# Patient Record
Sex: FEMALE | Race: BLACK OR AFRICAN AMERICAN | Employment: UNEMPLOYED | URBAN - METROPOLITAN AREA
[De-identification: names, ages, dates, MRNs, and addresses within clinical notes are randomized per-mention and may not be internally consistent; named-entity substitution may affect disease eponyms.]

---

## 2018-02-20 ENCOUNTER — HOSPITAL ENCOUNTER (EMERGENCY)
Age: 38
Discharge: HOME | End: 2018-02-20
Attending: EMERGENCY MEDICINE
Payer: MEDICARE

## 2018-02-20 VITALS
HEIGHT: 65 IN | WEIGHT: 160 LBS | DIASTOLIC BLOOD PRESSURE: 79 MMHG | BODY MASS INDEX: 26.66 KG/M2 | TEMPERATURE: 97.3 F | OXYGEN SATURATION: 98 % | RESPIRATION RATE: 18 BRPM | HEART RATE: 79 BPM | SYSTOLIC BLOOD PRESSURE: 132 MMHG

## 2018-02-20 DIAGNOSIS — B96.89 BACTERIAL VAGINOSIS: ICD-10-CM

## 2018-02-20 DIAGNOSIS — R10.9 ABDOMINAL PAIN, UNSPECIFIED ABDOMINAL LOCATION: ICD-10-CM

## 2018-02-20 DIAGNOSIS — N39.0 URINARY TRACT INFECTION WITHOUT HEMATURIA, SITE UNSPECIFIED: ICD-10-CM

## 2018-02-20 DIAGNOSIS — R11.10 VOMITING, INTRACTABILITY OF VOMITING NOT SPECIFIED, PRESENCE OF NAUSEA NOT SPECIFIED, UNSPECIFIED VOMITING TYPE: Primary | ICD-10-CM

## 2018-02-20 DIAGNOSIS — N76.0 BACTERIAL VAGINOSIS: ICD-10-CM

## 2018-02-20 LAB
-: ABNORMAL
AMORPHOUS: ABNORMAL
BACTERIA: ABNORMAL
BILIRUBIN URINE: NEGATIVE
CASTS UA: ABNORMAL /LPF (ref 0–8)
COLOR: YELLOW
CRYSTALS, UA: ABNORMAL /HPF
DIRECT EXAM: ABNORMAL
EPITHELIAL CELLS UA: ABNORMAL /HPF (ref 0–5)
GLUCOSE URINE: NEGATIVE
HCG(URINE) PREGNANCY TEST: NEGATIVE
KETONES, URINE: ABNORMAL
LEUKOCYTE ESTERASE, URINE: ABNORMAL
Lab: ABNORMAL
MUCUS: ABNORMAL
NITRITE, URINE: NEGATIVE
OTHER OBSERVATIONS UA: ABNORMAL
PH UA: 5 (ref 5–8)
PROTEIN UA: NEGATIVE
RBC UA: ABNORMAL /HPF (ref 0–4)
RENAL EPITHELIAL, UA: ABNORMAL /HPF
SPECIFIC GRAVITY UA: 1.02 (ref 1–1.03)
SPECIMEN DESCRIPTION: ABNORMAL
STATUS: ABNORMAL
TRICHOMONAS: ABNORMAL
TURBIDITY: ABNORMAL
URINE HGB: NEGATIVE
UROBILINOGEN, URINE: NORMAL
WBC UA: ABNORMAL /HPF (ref 0–5)
YEAST: ABNORMAL

## 2018-02-20 PROCEDURE — 99284 EMERGENCY DEPT VISIT MOD MDM: CPT

## 2018-02-20 PROCEDURE — 6370000000 HC RX 637 (ALT 250 FOR IP): Performed by: EMERGENCY MEDICINE

## 2018-02-20 PROCEDURE — 87480 CANDIDA DNA DIR PROBE: CPT

## 2018-02-20 PROCEDURE — 87660 TRICHOMONAS VAGIN DIR PROBE: CPT

## 2018-02-20 PROCEDURE — 84703 CHORIONIC GONADOTROPIN ASSAY: CPT

## 2018-02-20 PROCEDURE — 87491 CHLMYD TRACH DNA AMP PROBE: CPT

## 2018-02-20 PROCEDURE — 87591 N.GONORRHOEAE DNA AMP PROB: CPT

## 2018-02-20 PROCEDURE — 87086 URINE CULTURE/COLONY COUNT: CPT

## 2018-02-20 PROCEDURE — 81001 URINALYSIS AUTO W/SCOPE: CPT

## 2018-02-20 PROCEDURE — 87510 GARDNER VAG DNA DIR PROBE: CPT

## 2018-02-20 RX ORDER — ONDANSETRON 4 MG/1
4 TABLET, FILM COATED ORAL EVERY 8 HOURS PRN
Qty: 20 TABLET | Refills: 0 | Status: SHIPPED | OUTPATIENT
Start: 2018-02-20 | End: 2018-06-13 | Stop reason: SDUPTHER

## 2018-02-20 RX ORDER — METRONIDAZOLE 500 MG/1
500 TABLET ORAL 2 TIMES DAILY
Qty: 14 TABLET | Refills: 0 | Status: SHIPPED | OUTPATIENT
Start: 2018-02-20 | End: 2018-02-27

## 2018-02-20 RX ORDER — CEPHALEXIN 500 MG/1
500 CAPSULE ORAL 4 TIMES DAILY
Qty: 28 CAPSULE | Refills: 0 | Status: SHIPPED | OUTPATIENT
Start: 2018-02-20 | End: 2018-02-27

## 2018-02-20 RX ORDER — ACETAMINOPHEN 325 MG/1
650 TABLET ORAL ONCE
Status: COMPLETED | OUTPATIENT
Start: 2018-02-20 | End: 2018-02-20

## 2018-02-20 RX ADMIN — ACETAMINOPHEN 650 MG: 325 TABLET ORAL at 10:49

## 2018-02-20 ASSESSMENT — PAIN DESCRIPTION - LOCATION: LOCATION: ABDOMEN

## 2018-02-20 ASSESSMENT — PAIN SCALES - GENERAL
PAINLEVEL_OUTOF10: 5
PAINLEVEL_OUTOF10: 5

## 2018-02-20 ASSESSMENT — PAIN DESCRIPTION - DESCRIPTORS: DESCRIPTORS: CRAMPING;ACHING;CONSTANT

## 2018-02-20 NOTE — ED PROVIDER NOTES
[address] [facility]     Emergency Department     Faculty Attestation    I performed a history and physical examination of the patient and discussed management with the resident. I reviewed the residents note and agree with the documented findings and plan of care. Any areas of disagreement are noted on the chart. I was personally present for the key portions of any procedures. I have documented in the chart those procedures where I was not present during the key portions. I have reviewed the emergency nurses triage note. I agree with the chief complaint, past medical history, past surgical history, allergies, medications, social and family history as documented unless otherwise noted below. For Physician Assistant/ Nurse Practitioner cases/documentation I have personally evaluated this patient and have completed at least one if not all key elements of the E/M (history, physical exam, and MDM). Additional findings are as noted. Patient presents complaining of abdominal pain. She denies fever, chills, chest pain, shortness breath, nausea, vomiting, dysuria. Patient says she did have an abnormal.  A couple of weeks ago that was very light and only lasts a couple of hours. She says that she has not yet had a normal period and is late for that. She is concerned that she may be pregnant but she did have a tubal ligation. She says she has not taken a pregnancy test at home. On exam, patient is resting comfortably in the bed. Lungs are clear to auscultation bilaterally heart sounds are normal.  Abdomen is soft with mild suprapubic tenderness. No rebound or guarding is present. There is no tenderness over McBurney's point. The resident will perform a pelvic exam.  We'll check urinalysis, pregnancy, and pelvic labs and reassess.       Slime Cui MD  Attending Emergency  Physician              Steve Chapa MD  02/20/18 8794

## 2018-02-20 NOTE — ED NOTES
Writer contacted [facility] clinic, was informed patient's been permanently dismissed from clinic. Writer was informed can make [facility] clinic appointment or send patient to [facility] walk-in hours. Writer to provide patient information & [place] cab #.       FLORENCE Martínez, FISHW  02/20/18 1206

## 2018-02-20 NOTE — ED NOTES
Awaiting Dr Adi Llamas to do vaginal exam     [address] [place] [address], RN  02/20/18 1132

## 2018-02-20 NOTE — ED PROVIDER NOTES
[facility] ED  Emergency Department Encounter  Emergency Medicine Resident     Pt Name: Benjamin Vogel  MRN: [id number]  [place] 1980  Date of evaluation: 2/20/18  PCP:  No primary care provider on file. CHIEF COMPLAINT       Chief Complaint   Patient presents with    Abdominal Pain     vomiting x 1 week       HISTORY OF PRESENT ILLNESS  (Location/Symptom, Timing/Onset, Context/Setting, Quality, Duration, Modifying Factors, Severity.)      Benjamin Vogel is a 41 y.o. female who presents for a pregnancy check up. She states that she has had unprotected sex with   1 partners. Does not complains of acute vaginal discharge and dysuria. Denies urinating more frequently. Does not crampy suprapubic abdominal with some vomitting that is non bilious and non bloody. She said her last period was approximately 1 month prior to the spotting that she had last week. Symptoms started over the last couple of days with nothing providing adequate relief. Mild to moderate severity. No other aggravating or relieving factors. Does have a history of stds and tubal no history of pid. Daughter has same symptoms of vomitting with some abdominal pain    Denies n/v/f/c/abd pain/CP/SOB. Sore throat, Conjunctivitis, Rashes, Arthralgia,     Denies anal pain and lesions or abnormal bowel movents. No recent travel, hospitalization or antibiotic use    PAST MEDICAL / SURGICAL / SOCIAL / FAMILY HISTORY      has a past medical history of Hypertension and Seasonal allergies. has a past surgical history that includes Tubal ligation. Social History     Social History    Marital status: Single     Spouse name: N/A    Number of children: N/A    Years of education: N/A     Occupational History    Not on file.      Social History Main Topics    Smoking status: Current Every Day Smoker     Packs/day: 0.25     Types: Cigarettes    Smokeless tobacco: Not on file    Alcohol use Yes      Comment: seldom    Drug (FLAGYL) 500 MG tablet     Sig: Take 1 tablet by mouth 2 times daily for 7 days     Dispense:  14 tablet     Refill:  0    cephALEXin (KEFLEX) 500 MG capsule     Sig: Take 1 capsule by mouth 4 times daily for 7 days     Dispense:  28 capsule     Refill:  0    ondansetron (ZOFRAN) 4 MG tablet     Sig: Take 1 tablet by mouth every 8 hours as needed for Nausea     Dispense:  20 tablet     Refill:  0       DDX: ureteritis via std (gonnococcal, chlamydia) others trichomonas, herpes, other causes of urethritis include chemical/irritants, allergic, physical damage, adenovirus, other infections like mycoplasm    DIAGNOSTIC RESULTS / [address] [place] [address] / [facility]     LABS:  Results for orders placed or performed during the hospital encounter of 02/20/18   Urine Culture   Result Value Ref Range    Specimen Description . CLEAN CATCH URINE     Special Requests NOT REPORTED     Culture NO SIGNIFICANT GROWTH     Culture       [address], [address] [place] [address] [phone]    Status FINAL 02/21/2018    VAGINITIS DNA PROBE   Result Value Ref Range    Specimen Description . VAGINA     Special Requests NOT REPORTED     Direct Exam POSITIVE for Gardnerella vaginalis. (A)     Direct Exam NEGATIVE for Candida sp. Direct Exam NEGATIVE for Trichomonas vaginalis     Direct Exam       Method of testing is a DNA probe intended for detection and identification of    Direct Exam        Candida species, Gardnerella vaginalis, and Trichomonas vaginalis nucleic acid    Direct Exam        in vaginal fluid specimens from patients with symptoms of vaginitis/vaginosis.     Direct Exam       [facility] [id number] [address], [address] [place] [address] [phone]    Status FINAL 02/20/2018    Urinalysis with microscopic   Result Value Ref Range    Color, UA YELLOW YEL    Turbidity UA CLOUDY (A) CLEAR    Glucose, Ur NEGATIVE NEG    Bilirubin Urine NEGATIVE NEG    Ketones, Urine TRACE (A) NEG    Specific [place], UA 1.021 1.005 - 12:14 PM      START taking these medications    Details   metroNIDAZOLE (FLAGYL) 500 MG tablet Take 1 tablet by mouth 2 times daily for 7 days, Disp-14 tablet, R-0Print      cephALEXin (KEFLEX) 500 MG capsule Take 1 capsule by mouth 4 times daily for 7 days, Disp-28 capsule, R-0Print             Mariama Prasad MD  Emergency Medicine Resident    (Please note that portions of this note were completed with a voice recognition program.  Efforts were made to edit the dictations but occasionally words are mis-transcribed.)        Mariama Prasad MD  Resident  02/21/18 5137

## 2018-02-21 LAB
C TRACH DNA GENITAL QL NAA+PROBE: NEGATIVE
CULTURE: NORMAL
CULTURE: NORMAL
Lab: NORMAL
N. GONORRHOEAE DNA: NEGATIVE
SPECIMEN DESCRIPTION: NORMAL
STATUS: NORMAL

## 2018-06-13 ENCOUNTER — HOSPITAL ENCOUNTER (EMERGENCY)
Age: 38
Discharge: HOME | End: 2018-06-13
Attending: EMERGENCY MEDICINE
Payer: MEDICARE

## 2018-06-13 VITALS
BODY MASS INDEX: 26.49 KG/M2 | TEMPERATURE: 97.7 F | OXYGEN SATURATION: 99 % | DIASTOLIC BLOOD PRESSURE: 88 MMHG | RESPIRATION RATE: 18 BRPM | HEIGHT: 65 IN | WEIGHT: 159 LBS | HEART RATE: 70 BPM | SYSTOLIC BLOOD PRESSURE: 145 MMHG

## 2018-06-13 DIAGNOSIS — J06.9 ACUTE UPPER RESPIRATORY INFECTION: Primary | ICD-10-CM

## 2018-06-13 DIAGNOSIS — J32.1 CHRONIC FRONTAL SINUSITIS: ICD-10-CM

## 2018-06-13 PROCEDURE — 99283 EMERGENCY DEPT VISIT LOW MDM: CPT

## 2018-06-13 RX ORDER — AZITHROMYCIN 250 MG/1
TABLET, FILM COATED ORAL
Qty: 1 PACKET | Refills: 0 | Status: SHIPPED | OUTPATIENT
Start: 2018-06-13 | End: 2018-06-23

## 2018-06-13 RX ORDER — GUAIFENESIN/DEXTROMETHORPHAN 100-10MG/5
5 SYRUP ORAL 3 TIMES DAILY PRN
Qty: 120 ML | Refills: 0 | Status: SHIPPED | OUTPATIENT
Start: 2018-06-13 | End: 2018-06-23

## 2018-06-13 RX ORDER — ONDANSETRON HYDROCHLORIDE 8 MG/1
8 TABLET, FILM COATED ORAL EVERY 8 HOURS PRN
Qty: 20 TABLET | Refills: 0 | Status: SHIPPED | OUTPATIENT
Start: 2018-06-13 | End: 2019-03-24

## 2018-06-13 ASSESSMENT — PAIN DESCRIPTION - LOCATION: LOCATION: OTHER (COMMENT)

## 2018-06-13 ASSESSMENT — PAIN SCALES - GENERAL: PAINLEVEL_OUTOF10: 7

## 2018-06-13 ASSESSMENT — ENCOUNTER SYMPTOMS
SINUS PAIN: 1
SHORTNESS OF BREATH: 0
WHEEZING: 0
RHINORRHEA: 1
VOMITING: 0
SORE THROAT: 1
NAUSEA: 0
TROUBLE SWALLOWING: 0
COUGH: 1
VOICE CHANGE: 0

## 2018-06-13 ASSESSMENT — PAIN DESCRIPTION - DESCRIPTORS: DESCRIPTORS: ACHING

## 2018-06-13 ASSESSMENT — PAIN DESCRIPTION - PAIN TYPE: TYPE: ACUTE PAIN

## 2018-06-13 ASSESSMENT — PAIN DESCRIPTION - FREQUENCY: FREQUENCY: CONTINUOUS

## 2018-09-14 ENCOUNTER — HOSPITAL ENCOUNTER (EMERGENCY)
Age: 38
Discharge: HOME | End: 2018-09-14
Attending: EMERGENCY MEDICINE
Payer: MEDICARE

## 2018-09-14 VITALS
SYSTOLIC BLOOD PRESSURE: 156 MMHG | HEIGHT: 65 IN | TEMPERATURE: 98 F | HEART RATE: 60 BPM | RESPIRATION RATE: 18 BRPM | DIASTOLIC BLOOD PRESSURE: 98 MMHG | OXYGEN SATURATION: 100 % | BODY MASS INDEX: 25.99 KG/M2 | WEIGHT: 156 LBS

## 2018-09-14 DIAGNOSIS — K08.89 PAIN, DENTAL: Primary | ICD-10-CM

## 2018-09-14 PROCEDURE — 6360000002 HC RX W HCPCS: Performed by: EMERGENCY MEDICINE

## 2018-09-14 PROCEDURE — 99282 EMERGENCY DEPT VISIT SF MDM: CPT

## 2018-09-14 PROCEDURE — 96374 THER/PROPH/DIAG INJ IV PUSH: CPT

## 2018-09-14 PROCEDURE — 6370000000 HC RX 637 (ALT 250 FOR IP): Performed by: EMERGENCY MEDICINE

## 2018-09-14 RX ORDER — KETOROLAC TROMETHAMINE 30 MG/ML
30 INJECTION, SOLUTION INTRAMUSCULAR; INTRAVENOUS ONCE
Status: COMPLETED | OUTPATIENT
Start: 2018-09-14 | End: 2018-09-14

## 2018-09-14 RX ORDER — OXYCODONE HYDROCHLORIDE AND ACETAMINOPHEN 5; 325 MG/1; MG/1
1 TABLET ORAL EVERY 6 HOURS PRN
Qty: 3 TABLET | Refills: 0 | Status: SHIPPED | OUTPATIENT
Start: 2018-09-14 | End: 2018-09-16

## 2018-09-14 RX ORDER — OXYCODONE HYDROCHLORIDE AND ACETAMINOPHEN 5; 325 MG/1; MG/1
1 TABLET ORAL ONCE
Status: COMPLETED | OUTPATIENT
Start: 2018-09-14 | End: 2018-09-14

## 2018-09-14 RX ORDER — PENICILLIN V POTASSIUM 500 MG/1
500 TABLET ORAL 4 TIMES DAILY
Qty: 28 TABLET | Refills: 0 | Status: SHIPPED | OUTPATIENT
Start: 2018-09-14 | End: 2018-09-21

## 2018-09-14 RX ORDER — IBUPROFEN 600 MG/1
600 TABLET ORAL EVERY 6 HOURS PRN
Qty: 24 TABLET | Refills: 0 | Status: SHIPPED | OUTPATIENT
Start: 2018-09-14 | End: 2018-09-30

## 2018-09-14 RX ADMIN — KETOROLAC TROMETHAMINE 30 MG: 30 INJECTION, SOLUTION INTRAMUSCULAR; INTRAVENOUS at 02:11

## 2018-09-14 RX ADMIN — OXYCODONE HYDROCHLORIDE AND ACETAMINOPHEN 1 TABLET: 5; 325 TABLET ORAL at 02:11

## 2018-09-14 ASSESSMENT — ENCOUNTER SYMPTOMS
FACIAL SWELLING: 0
WHEEZING: 0
SHORTNESS OF BREATH: 0
ABDOMINAL PAIN: 0
NAUSEA: 0
VOMITING: 0
COUGH: 0
DIARRHEA: 0
VOICE CHANGE: 0

## 2018-09-14 ASSESSMENT — PAIN DESCRIPTION - LOCATION: LOCATION: MOUTH

## 2018-09-14 ASSESSMENT — PAIN DESCRIPTION - ORIENTATION: ORIENTATION: RIGHT

## 2018-09-14 ASSESSMENT — PAIN SCALES - GENERAL
PAINLEVEL_OUTOF10: 9
PAINLEVEL_OUTOF10: 9

## 2018-09-14 ASSESSMENT — PAIN DESCRIPTION - DESCRIPTORS: DESCRIPTORS: SHARP;THROBBING

## 2018-09-14 NOTE — ED PROVIDER NOTES
ACETAMINOPHEN (TYLENOL) 325 MG TABLET    Take 1 tablet by mouth every 6 hours as needed for Pain    BENZONATATE (TESSALON) 100 MG CAPSULE    Take 1 capsule by mouth 3 times daily as needed for Cough    DOCOSANOL (ABREVA) 10 % CREA CREAM    Apply 1 Dose topically 5 times daily    HYDROCHLOROTHIAZIDE (HYDRODIURIL) 25 MG TABLET    Take 1 tablet by mouth daily    LORATADINE (CLARITIN) 10 MG TABLET    Take 10 mg by mouth daily. NAPROXEN (NAPROSYN) 500 MG TABLET    Take 1 tablet by mouth 2 times daily (with meals) for 30 doses    ONDANSETRON (ZOFRAN) 8 MG TABLET    Take 1 tablet by mouth every 8 hours as needed for Nausea    SODIUM CHLORIDE (OCEAN, BABY AYR) 0.65 % NASAL SPRAY    1 spray by Nasal route as needed for Congestion       ALLERGIES     Seasonal and Motrin [ibuprofen]    FAMILY HISTORY     History reviewed. No pertinent family history. SOCIAL HISTORY       Social History     Social History    Marital status: Single     Spouse name: N/A    Number of children: N/A    Years of education: N/A     Social History Main Topics    Smoking status: Current Every Day Smoker     Packs/day: 0.25     Types: Cigarettes    Smokeless tobacco: None    Alcohol use Yes      Comment: seldom    Drug use: No    Sexual activity: Not Asked     Other Topics Concern    None     Social History Narrative    None       SCREENINGS             PHYSICAL EXAM    (up to 7 for level 4, 8 or more for level 5)     ED Triage Vitals   BP Temp Temp src Pulse Resp SpO2 Height Weight   -- -- -- -- -- -- -- --       Physical Exam   Constitutional: She is oriented to person, place, and time. She appears well-developed and well-nourished. No distress. HENT:   Head: Normocephalic and atraumatic. Multiple fillings, no obvious abscess, no fluctuance. No trismus. No signs of Morgan's, no stridor or trouble swallowing   Eyes: Conjunctivae and EOM are normal.   Neck: Normal range of motion. Neck supple. No JVD present.    Cardiovascular: (IBU) 600 MG TABLET    Take 1 tablet by mouth every 6 hours as needed for Pain    OXYCODONE-ACETAMINOPHEN (PERCOCET) 5-325 MG PER TABLET    Take 1 tablet by mouth every 6 hours as needed for Pain for up to 3 doses. Intended supply: 3 days. Take lowest dose possible to manage pain. PENICILLIN V POTASSIUM (VEETID) 500 MG TABLET    Take 1 tablet by mouth 4 times daily for 7 days              Summation      Patient Course:      ED Medications administered this visit:    Medications   ketorolac (TORADOL) injection 30 mg (not administered)   oxyCODONE-acetaminophen (PERCOCET) 5-325 MG per tablet 1 tablet (not administered)       New Prescriptions from this visit:    New Prescriptions    IBUPROFEN (IBU) 600 MG TABLET    Take 1 tablet by mouth every 6 hours as needed for Pain    OXYCODONE-ACETAMINOPHEN (PERCOCET) 5-325 MG PER TABLET    Take 1 tablet by mouth every 6 hours as needed for Pain for up to 3 doses. Intended supply: 3 days. Take lowest dose possible to manage pain. PENICILLIN V POTASSIUM (VEETID) 500 MG TABLET    Take 1 tablet by mouth 4 times daily for 7 days       Follow-up:  Your dentist    Schedule an appointment as soon as possible for a visit in 1 day  for evaluation and treatment        Final Impression:   1.  Pain, dental               (Please note that portions of this note were completed with a voice recognition program.  Efforts were made to edit the dictations but occasionally words are mis-transcribed.)            Yanelis Larios MD  09/14/18 0210

## 2018-09-30 ENCOUNTER — HOSPITAL ENCOUNTER (EMERGENCY)
Age: 38
Discharge: HOME | End: 2018-09-30
Attending: EMERGENCY MEDICINE
Payer: MEDICARE

## 2018-09-30 VITALS
HEART RATE: 77 BPM | SYSTOLIC BLOOD PRESSURE: 117 MMHG | OXYGEN SATURATION: 100 % | HEIGHT: 65 IN | RESPIRATION RATE: 16 BRPM | TEMPERATURE: 98.1 F | WEIGHT: 180 LBS | BODY MASS INDEX: 29.99 KG/M2 | DIASTOLIC BLOOD PRESSURE: 92 MMHG

## 2018-09-30 DIAGNOSIS — K04.7 DENTAL ABSCESS: Primary | ICD-10-CM

## 2018-09-30 DIAGNOSIS — R51.9 FACE PAIN: ICD-10-CM

## 2018-09-30 PROCEDURE — 99282 EMERGENCY DEPT VISIT SF MDM: CPT

## 2018-09-30 PROCEDURE — 6360000002 HC RX W HCPCS: Performed by: EMERGENCY MEDICINE

## 2018-09-30 PROCEDURE — 6370000000 HC RX 637 (ALT 250 FOR IP): Performed by: EMERGENCY MEDICINE

## 2018-09-30 PROCEDURE — 96372 THER/PROPH/DIAG INJ SC/IM: CPT

## 2018-09-30 RX ORDER — HYDROCODONE BITARTRATE AND ACETAMINOPHEN 5; 325 MG/1; MG/1
1 TABLET ORAL ONCE
Status: COMPLETED | OUTPATIENT
Start: 2018-09-30 | End: 2018-09-30

## 2018-09-30 RX ORDER — KETOROLAC TROMETHAMINE 30 MG/ML
30 INJECTION, SOLUTION INTRAMUSCULAR; INTRAVENOUS ONCE
Status: COMPLETED | OUTPATIENT
Start: 2018-09-30 | End: 2018-09-30

## 2018-09-30 RX ORDER — PENICILLIN V POTASSIUM 250 MG/1
500 TABLET ORAL ONCE
Status: COMPLETED | OUTPATIENT
Start: 2018-09-30 | End: 2018-09-30

## 2018-09-30 RX ORDER — IBUPROFEN 600 MG/1
600 TABLET ORAL EVERY 6 HOURS PRN
Qty: 60 TABLET | Refills: 0 | Status: SHIPPED | OUTPATIENT
Start: 2018-09-30 | End: 2019-03-24

## 2018-09-30 RX ORDER — PENICILLIN V POTASSIUM 500 MG/1
500 TABLET ORAL 4 TIMES DAILY
Qty: 40 TABLET | Refills: 0 | Status: SHIPPED | OUTPATIENT
Start: 2018-09-30 | End: 2018-10-10

## 2018-09-30 RX ORDER — HYDROCODONE BITARTRATE AND ACETAMINOPHEN 5; 325 MG/1; MG/1
1 TABLET ORAL EVERY 6 HOURS PRN
Qty: 20 TABLET | Refills: 0 | Status: SHIPPED | OUTPATIENT
Start: 2018-09-30 | End: 2018-10-05

## 2018-09-30 RX ADMIN — KETOROLAC TROMETHAMINE 30 MG: 30 INJECTION, SOLUTION INTRAMUSCULAR; INTRAVENOUS at 14:39

## 2018-09-30 RX ADMIN — PENICILLIN V POTASIUM 500 MG: 250 TABLET ORAL at 14:39

## 2018-09-30 RX ADMIN — HYDROCODONE BITARTRATE AND ACETAMINOPHEN 1 TABLET: 5; 325 TABLET ORAL at 14:39

## 2018-09-30 ASSESSMENT — PAIN SCALES - GENERAL
PAINLEVEL_OUTOF10: 8
PAINLEVEL_OUTOF10: 9
PAINLEVEL_OUTOF10: 9

## 2018-09-30 ASSESSMENT — ENCOUNTER SYMPTOMS
FACIAL SWELLING: 1
TRISMUS: 0

## 2018-10-05 ENCOUNTER — HOSPITAL ENCOUNTER (EMERGENCY)
Age: 38
Discharge: HOME | End: 2018-10-05
Attending: EMERGENCY MEDICINE
Payer: MEDICARE

## 2018-10-05 ENCOUNTER — APPOINTMENT (OUTPATIENT)
Dept: GENERAL RADIOLOGY | Age: 38
End: 2018-10-05
Payer: MEDICARE

## 2018-10-05 VITALS
WEIGHT: 153 LBS | TEMPERATURE: 98.4 F | BODY MASS INDEX: 25.49 KG/M2 | DIASTOLIC BLOOD PRESSURE: 100 MMHG | HEIGHT: 65 IN | HEART RATE: 71 BPM | OXYGEN SATURATION: 100 % | RESPIRATION RATE: 16 BRPM | SYSTOLIC BLOOD PRESSURE: 153 MMHG

## 2018-10-05 DIAGNOSIS — S82.001A CLOSED DISPLACED FRACTURE OF RIGHT PATELLA, UNSPECIFIED FRACTURE MORPHOLOGY, INITIAL ENCOUNTER: ICD-10-CM

## 2018-10-05 DIAGNOSIS — M25.061 HEMARTHROSIS OF RIGHT KNEE: Primary | ICD-10-CM

## 2018-10-05 PROCEDURE — 6370000000 HC RX 637 (ALT 250 FOR IP): Performed by: PHYSICIAN ASSISTANT

## 2018-10-05 PROCEDURE — 29505 APPLICATION LONG LEG SPLINT: CPT

## 2018-10-05 PROCEDURE — 73562 X-RAY EXAM OF KNEE 3: CPT

## 2018-10-05 PROCEDURE — 99284 EMERGENCY DEPT VISIT MOD MDM: CPT

## 2018-10-05 RX ORDER — HYDROCODONE BITARTRATE AND ACETAMINOPHEN 5; 325 MG/1; MG/1
1 TABLET ORAL EVERY 6 HOURS PRN
Qty: 12 TABLET | Refills: 0 | Status: SHIPPED | OUTPATIENT
Start: 2018-10-05 | End: 2018-10-08

## 2018-10-05 RX ORDER — HYDROCODONE BITARTRATE AND ACETAMINOPHEN 5; 325 MG/1; MG/1
1 TABLET ORAL ONCE
Status: COMPLETED | OUTPATIENT
Start: 2018-10-05 | End: 2018-10-05

## 2018-10-05 RX ADMIN — HYDROCODONE BITARTRATE AND ACETAMINOPHEN 1 TABLET: 5; 325 TABLET ORAL at 12:04

## 2018-10-05 ASSESSMENT — PAIN SCALES - GENERAL
PAINLEVEL_OUTOF10: 10
PAINLEVEL_OUTOF10: 10

## 2018-10-05 ASSESSMENT — PAIN DESCRIPTION - DESCRIPTORS: DESCRIPTORS: SHARP

## 2018-10-05 ASSESSMENT — PAIN DESCRIPTION - ORIENTATION: ORIENTATION: RIGHT

## 2018-10-05 ASSESSMENT — PAIN DESCRIPTION - LOCATION: LOCATION: KNEE

## 2018-10-05 ASSESSMENT — PAIN DESCRIPTION - PAIN TYPE: TYPE: ACUTE PAIN

## 2018-10-05 NOTE — ED PROVIDER NOTES
Ordering Physician Provided Reason for Exam: Pt states she fell and has entire right knee pain x 1 day Acuity: Acute Type of Exam: Initial Mechanism of Injury: Pt states she fell and has entire right knee pain x 1 day FINDINGS: Under irregular lucency extends through the inferior and medial aspect of the patella as well as along the superior margin of the patella. A large joint effusion is also present. The findings are suspicious of minimally displaced patellar fracture with hemarthrosis. Suspected acute intra-articular fracture of the inferior pole of patella with large hemarthrosis. LABS:  Labs Reviewed - No data to display    All other labs were within normal range or not returned as of this dictation. EMERGENCY DEPARTMENT COURSE and DIFFERENTIAL DIAGNOSIS/MDM:   Vitals:    Vitals:    10/05/18 1056   BP: (!) 153/100   Pulse: 71   Resp: 16   Temp: 98.4 °F (36.9 °C)   TempSrc: Oral   SpO2: 100%   Weight: 153 lb (69.4 kg)   Height: 5' 5\" (1.651 m)         Patient instructed to return to the emergency room if symptoms worsen, return, or any other concern right away which is agreed by the patient    ED MEDS:  Orders Placed This Encounter   Medications    HYDROcodone-acetaminophen (NORCO) 5-325 MG per tablet 1 tablet    HYDROcodone-acetaminophen (NORCO) 5-325 MG per tablet     Sig: Take 1 tablet by mouth every 6 hours as needed for Pain for up to 3 days. Intended supply: 3 days. Take lowest dose possible to manage pain. Dispense:  12 tablet     Refill:  0         CONSULTS:  None    PROCEDURES:  None      FINAL IMPRESSION      1. Hemarthrosis of right knee    2.  Closed displaced fracture of right patella, unspecified fracture morphology, initial encounter          DISPOSITION/PLAN   DISPOSITION Decision To Discharge    PATIENT REFERRED TO:  [facility] ED  [address] [address]  [phone]    If symptoms worsen    Robbin Nelson MD  [address] [place] [address] [address]

## 2018-10-09 ENCOUNTER — OFFICE VISIT (OUTPATIENT)
Dept: ORTHOPEDIC SURGERY | Age: 38
End: 2018-10-09
Payer: MEDICARE

## 2018-10-09 VITALS — WEIGHT: 153 LBS | HEIGHT: 65 IN | BODY MASS INDEX: 25.49 KG/M2

## 2018-10-09 DIAGNOSIS — S82.001A CLOSED NONDISPLACED FRACTURE OF RIGHT PATELLA, UNSPECIFIED FRACTURE MORPHOLOGY, INITIAL ENCOUNTER: Primary | ICD-10-CM

## 2018-10-09 PROCEDURE — G8419 CALC BMI OUT NRM PARAM NOF/U: HCPCS | Performed by: ORTHOPAEDIC SURGERY

## 2018-10-09 PROCEDURE — 4004F PT TOBACCO SCREEN RCVD TLK: CPT | Performed by: ORTHOPAEDIC SURGERY

## 2018-10-09 PROCEDURE — G8484 FLU IMMUNIZE NO ADMIN: HCPCS | Performed by: ORTHOPAEDIC SURGERY

## 2018-10-09 PROCEDURE — 99203 OFFICE O/P NEW LOW 30 MIN: CPT | Performed by: ORTHOPAEDIC SURGERY

## 2018-10-09 PROCEDURE — 20610 DRAIN/INJ JOINT/BURSA W/O US: CPT | Performed by: ORTHOPAEDIC SURGERY

## 2018-10-09 PROCEDURE — G8427 DOCREV CUR MEDS BY ELIG CLIN: HCPCS | Performed by: ORTHOPAEDIC SURGERY

## 2018-10-09 RX ORDER — LIDOCAINE HYDROCHLORIDE 10 MG/ML
4 INJECTION, SOLUTION INFILTRATION; PERINEURAL ONCE
Status: COMPLETED | OUTPATIENT
Start: 2018-10-09 | End: 2018-10-09

## 2018-10-09 RX ORDER — TRAMADOL HYDROCHLORIDE 50 MG/1
50 TABLET ORAL EVERY 4 HOURS PRN
Qty: 30 TABLET | Refills: 0 | Status: SHIPPED | OUTPATIENT
Start: 2018-10-09 | End: 2018-10-14

## 2018-10-09 RX ADMIN — LIDOCAINE HYDROCHLORIDE 4 ML: 10 INJECTION, SOLUTION INFILTRATION; PERINEURAL at 10:00

## 2018-10-19 ENCOUNTER — HOSPITAL ENCOUNTER (EMERGENCY)
Age: 38
Discharge: HOME | End: 2018-10-19
Attending: EMERGENCY MEDICINE
Payer: MEDICARE

## 2018-10-19 ENCOUNTER — APPOINTMENT (OUTPATIENT)
Dept: CT IMAGING | Age: 38
End: 2018-10-19
Payer: MEDICARE

## 2018-10-19 ENCOUNTER — APPOINTMENT (OUTPATIENT)
Dept: ULTRASOUND IMAGING | Age: 38
End: 2018-10-19
Payer: MEDICARE

## 2018-10-19 VITALS
TEMPERATURE: 98.1 F | HEIGHT: 65 IN | OXYGEN SATURATION: 98 % | WEIGHT: 153 LBS | RESPIRATION RATE: 17 BRPM | HEART RATE: 70 BPM | SYSTOLIC BLOOD PRESSURE: 178 MMHG | DIASTOLIC BLOOD PRESSURE: 113 MMHG | BODY MASS INDEX: 25.49 KG/M2

## 2018-10-19 DIAGNOSIS — N73.9 FEMALE PELVIC INFLAMMATORY DISEASE: ICD-10-CM

## 2018-10-19 DIAGNOSIS — N39.0 URINARY TRACT INFECTION WITHOUT HEMATURIA, SITE UNSPECIFIED: Primary | ICD-10-CM

## 2018-10-19 DIAGNOSIS — B96.89 BACTERIAL VAGINOSIS: ICD-10-CM

## 2018-10-19 DIAGNOSIS — N76.0 BACTERIAL VAGINOSIS: ICD-10-CM

## 2018-10-19 DIAGNOSIS — N83.519 OVARIAN TORSION: ICD-10-CM

## 2018-10-19 LAB
-: ABNORMAL
ABSOLUTE EOS #: 0.15 K/UL (ref 0–0.44)
ABSOLUTE IMMATURE GRANULOCYTE: 0.1 K/UL (ref 0–0.3)
ABSOLUTE LYMPH #: 1.58 K/UL (ref 1.1–3.7)
ABSOLUTE MONO #: 0.73 K/UL (ref 0.1–1.2)
ALBUMIN SERPL-MCNC: 3.9 G/DL (ref 3.5–5.2)
ALBUMIN/GLOBULIN RATIO: 1.3 (ref 1–2.5)
ALP BLD-CCNC: 53 U/L (ref 35–104)
ALT SERPL-CCNC: 26 U/L (ref 5–33)
AMORPHOUS: ABNORMAL
ANION GAP SERPL CALCULATED.3IONS-SCNC: 12 MMOL/L (ref 9–17)
AST SERPL-CCNC: 39 U/L
BACTERIA: ABNORMAL
BASOPHILS # BLD: 0 % (ref 0–2)
BASOPHILS ABSOLUTE: 0.05 K/UL (ref 0–0.2)
BILIRUB SERPL-MCNC: 0.15 MG/DL (ref 0.3–1.2)
BILIRUBIN URINE: NEGATIVE
BUN BLDV-MCNC: 7 MG/DL (ref 6–20)
BUN/CREAT BLD: ABNORMAL (ref 9–20)
CALCIUM SERPL-MCNC: 9.1 MG/DL (ref 8.6–10.4)
CASTS UA: ABNORMAL /LPF (ref 0–8)
CHLORIDE BLD-SCNC: 107 MMOL/L (ref 98–107)
CO2: 26 MMOL/L (ref 20–31)
COLOR: YELLOW
CREAT SERPL-MCNC: 0.48 MG/DL (ref 0.5–0.9)
CRYSTALS, UA: ABNORMAL /HPF
DIFFERENTIAL TYPE: ABNORMAL
DIRECT EXAM: ABNORMAL
EOSINOPHILS RELATIVE PERCENT: 1 % (ref 1–4)
EPITHELIAL CELLS UA: ABNORMAL /HPF (ref 0–5)
GFR AFRICAN AMERICAN: >60 ML/MIN
GFR NON-AFRICAN AMERICAN: >60 ML/MIN
GFR SERPL CREATININE-BSD FRML MDRD: ABNORMAL ML/MIN/{1.73_M2}
GFR SERPL CREATININE-BSD FRML MDRD: ABNORMAL ML/MIN/{1.73_M2}
GLUCOSE BLD-MCNC: 88 MG/DL (ref 70–99)
GLUCOSE URINE: NEGATIVE
HCG QUALITATIVE: NEGATIVE
HCT VFR BLD CALC: 30.9 % (ref 36.3–47.1)
HEMOGLOBIN: 9.6 G/DL (ref 11.9–15.1)
IMMATURE GRANULOCYTES: 1 %
KETONES, URINE: NEGATIVE
LEUKOCYTE ESTERASE, URINE: ABNORMAL
LIPASE: 35 U/L (ref 13–60)
LYMPHOCYTES # BLD: 14 % (ref 24–43)
Lab: ABNORMAL
MCH RBC QN AUTO: 26.4 PG (ref 25.2–33.5)
MCHC RBC AUTO-ENTMCNC: 31.1 G/DL (ref 28.4–34.8)
MCV RBC AUTO: 85.1 FL (ref 82.6–102.9)
MONOCYTES # BLD: 6 % (ref 3–12)
MUCUS: ABNORMAL
NITRITE, URINE: NEGATIVE
NRBC AUTOMATED: 0 PER 100 WBC
OTHER OBSERVATIONS UA: ABNORMAL
PDW BLD-RTO: 19.1 % (ref 11.8–14.4)
PH UA: 7 (ref 5–8)
PLATELET # BLD: 233 K/UL (ref 138–453)
PLATELET ESTIMATE: ABNORMAL
PMV BLD AUTO: 11.6 FL (ref 8.1–13.5)
POTASSIUM SERPL-SCNC: 4.1 MMOL/L (ref 3.7–5.3)
PROTEIN UA: ABNORMAL
RBC # BLD: 3.63 M/UL (ref 3.95–5.11)
RBC # BLD: ABNORMAL 10*6/UL
RBC UA: ABNORMAL /HPF (ref 0–4)
RENAL EPITHELIAL, UA: ABNORMAL /HPF
SEG NEUTROPHILS: 78 % (ref 36–65)
SEGMENTED NEUTROPHILS ABSOLUTE COUNT: 8.75 K/UL (ref 1.5–8.1)
SODIUM BLD-SCNC: 145 MMOL/L (ref 135–144)
SPECIFIC GRAVITY UA: 1.01 (ref 1–1.03)
SPECIMEN DESCRIPTION: ABNORMAL
STATUS: ABNORMAL
TOTAL PROTEIN: 7 G/DL (ref 6.4–8.3)
TRICHOMONAS: ABNORMAL
TURBIDITY: CLEAR
URINE HGB: NEGATIVE
UROBILINOGEN, URINE: NORMAL
WBC # BLD: 11.4 K/UL (ref 3.5–11.3)
WBC # BLD: ABNORMAL 10*3/UL
WBC UA: ABNORMAL /HPF (ref 0–5)
YEAST: ABNORMAL

## 2018-10-19 PROCEDURE — 93976 VASCULAR STUDY: CPT

## 2018-10-19 PROCEDURE — 81001 URINALYSIS AUTO W/SCOPE: CPT

## 2018-10-19 PROCEDURE — 6360000002 HC RX W HCPCS: Performed by: STUDENT IN AN ORGANIZED HEALTH CARE EDUCATION/TRAINING PROGRAM

## 2018-10-19 PROCEDURE — 87491 CHLMYD TRACH DNA AMP PROBE: CPT

## 2018-10-19 PROCEDURE — 84703 CHORIONIC GONADOTROPIN ASSAY: CPT

## 2018-10-19 PROCEDURE — 96374 THER/PROPH/DIAG INJ IV PUSH: CPT

## 2018-10-19 PROCEDURE — 83690 ASSAY OF LIPASE: CPT

## 2018-10-19 PROCEDURE — 99284 EMERGENCY DEPT VISIT MOD MDM: CPT

## 2018-10-19 PROCEDURE — 6370000000 HC RX 637 (ALT 250 FOR IP): Performed by: STUDENT IN AN ORGANIZED HEALTH CARE EDUCATION/TRAINING PROGRAM

## 2018-10-19 PROCEDURE — 76830 TRANSVAGINAL US NON-OB: CPT

## 2018-10-19 PROCEDURE — 87480 CANDIDA DNA DIR PROBE: CPT

## 2018-10-19 PROCEDURE — 87510 GARDNER VAG DNA DIR PROBE: CPT

## 2018-10-19 PROCEDURE — 87591 N.GONORRHOEAE DNA AMP PROB: CPT

## 2018-10-19 PROCEDURE — 80053 COMPREHEN METABOLIC PANEL: CPT

## 2018-10-19 PROCEDURE — 87660 TRICHOMONAS VAGIN DIR PROBE: CPT

## 2018-10-19 PROCEDURE — 85025 COMPLETE CBC W/AUTO DIFF WBC: CPT

## 2018-10-19 PROCEDURE — 96372 THER/PROPH/DIAG INJ SC/IM: CPT

## 2018-10-19 RX ORDER — CEFTRIAXONE SODIUM 250 MG/1
250 INJECTION, POWDER, FOR SOLUTION INTRAMUSCULAR; INTRAVENOUS ONCE
Status: COMPLETED | OUTPATIENT
Start: 2018-10-19 | End: 2018-10-19

## 2018-10-19 RX ORDER — ACETAMINOPHEN 500 MG
1000 TABLET ORAL EVERY 6 HOURS PRN
Qty: 120 TABLET | Refills: 0 | Status: SHIPPED | OUTPATIENT
Start: 2018-10-19 | End: 2019-01-22

## 2018-10-19 RX ORDER — DOXYCYCLINE HYCLATE 100 MG
100 TABLET ORAL ONCE
Status: COMPLETED | OUTPATIENT
Start: 2018-10-19 | End: 2018-10-19

## 2018-10-19 RX ORDER — KETOROLAC TROMETHAMINE 30 MG/ML
30 INJECTION, SOLUTION INTRAMUSCULAR; INTRAVENOUS ONCE
Status: COMPLETED | OUTPATIENT
Start: 2018-10-19 | End: 2018-10-19

## 2018-10-19 RX ORDER — MORPHINE SULFATE 4 MG/ML
4 INJECTION, SOLUTION INTRAMUSCULAR; INTRAVENOUS ONCE
Status: DISCONTINUED | OUTPATIENT
Start: 2018-10-19 | End: 2018-10-19

## 2018-10-19 RX ORDER — DOXYCYCLINE 100 MG/1
100 TABLET ORAL 2 TIMES DAILY
Qty: 28 TABLET | Refills: 0 | Status: SHIPPED | OUTPATIENT
Start: 2018-10-19 | End: 2018-11-02

## 2018-10-19 RX ORDER — ACETAMINOPHEN 500 MG
1000 TABLET ORAL ONCE
Status: COMPLETED | OUTPATIENT
Start: 2018-10-19 | End: 2018-10-19

## 2018-10-19 RX ORDER — CEPHALEXIN 500 MG/1
500 CAPSULE ORAL 2 TIMES DAILY
Qty: 14 CAPSULE | Refills: 0 | Status: SHIPPED | OUTPATIENT
Start: 2018-10-19 | End: 2018-10-26

## 2018-10-19 RX ADMIN — KETOROLAC TROMETHAMINE 30 MG: 30 INJECTION, SOLUTION INTRAMUSCULAR at 12:16

## 2018-10-19 RX ADMIN — CEFTRIAXONE SODIUM 250 MG: 250 INJECTION, POWDER, FOR SOLUTION INTRAMUSCULAR; INTRAVENOUS at 14:12

## 2018-10-19 RX ADMIN — DOXYCYCLINE HYCLATE 100 MG: 100 TABLET, COATED ORAL at 14:12

## 2018-10-19 RX ADMIN — ACETAMINOPHEN 1000 MG: 500 TABLET ORAL at 12:16

## 2018-10-19 ASSESSMENT — PAIN DESCRIPTION - LOCATION: LOCATION: ABDOMEN

## 2018-10-19 ASSESSMENT — ENCOUNTER SYMPTOMS
VOMITING: 0
DIARRHEA: 0
NAUSEA: 0
BLOOD IN STOOL: 0
PHOTOPHOBIA: 0
ABDOMINAL PAIN: 1
ABDOMINAL DISTENTION: 1
CONSTIPATION: 0
SORE THROAT: 0
SHORTNESS OF BREATH: 0
COUGH: 0

## 2018-10-19 ASSESSMENT — PAIN DESCRIPTION - FREQUENCY: FREQUENCY: CONTINUOUS

## 2018-10-19 ASSESSMENT — PAIN DESCRIPTION - PROGRESSION: CLINICAL_PROGRESSION: NOT CHANGED

## 2018-10-19 ASSESSMENT — PAIN SCALES - GENERAL
PAINLEVEL_OUTOF10: 8
PAINLEVEL_OUTOF10: 8

## 2018-10-19 ASSESSMENT — PAIN DESCRIPTION - DESCRIPTORS: DESCRIPTORS: CRAMPING;ACHING

## 2018-10-19 ASSESSMENT — PAIN DESCRIPTION - PAIN TYPE: TYPE: ACUTE PAIN

## 2018-10-19 ASSESSMENT — PAIN DESCRIPTION - ONSET: ONSET: ON-GOING

## 2018-10-19 NOTE — ED PROVIDER NOTES
Occupational History    Not on file. Social History Main Topics    Smoking status: Current Every Day Smoker     Packs/day: 0.25     Types: Cigarettes    Smokeless tobacco: Never Used    Alcohol use Yes      Comment: seldom    Drug use: No    Sexual activity: Not on file     Other Topics Concern    Not on file     Social History Narrative    No narrative on file       History reviewed. No pertinent family history. Allergies:  Seasonal and Motrin [ibuprofen]    Home Medications:  Prior to Admission medications    Medication Sig Start Date End Date Taking? Authorizing Provider   doxycycline monohydrate (ADOXA) 100 MG tablet Take 1 tablet by mouth 2 times daily for 14 days 10/19/18 11/2/18 Yes Camden Garcia MD   cephALEXin (KEFLEX) 500 MG capsule Take 1 capsule by mouth 2 times daily for 7 days 10/19/18 10/26/18 Yes Camden Garcia MD   acetaminophen (TYLENOL) 500 MG tablet Take 2 tablets by mouth every 6 hours as needed for Pain 10/19/18  Yes Camden Garcia MD   ibuprofen (IBU) 600 MG tablet Take 1 tablet by mouth every 6 hours as needed for Pain 9/30/18   Zak Grossman MD   sodium chloride (OCEAN, BABY AYR) 0.65 % nasal spray 1 spray by Nasal route as needed for Congestion 6/13/18   Merlinda Ronde, MD   ondansetron [facility]) 8 MG tablet Take 1 tablet by mouth every 8 hours as needed for Nausea 6/13/18   Merlinda Ronde, MD   hydrochlorothiazide (HYDRODIURIL) 25 MG tablet Take 1 tablet by mouth daily 11/12/15   Flaquita Juares MD   benzonatate (TESSALON) 100 MG capsule Take 1 capsule by mouth 3 times daily as needed for Cough 9/11/15   Radha Sequin, DO   docosanol (ABREVA) 10 % CREA cream Apply 1 Dose topically 5 times daily    Radha Sequin, DO   naproxen (NAPROSYN) 500 MG tablet Take 1 tablet by mouth 2 times daily (with meals) for 30 doses 7/4/15 7/19/15  Anival Larson MD   loratadine (CLARITIN) 10 MG tablet Take 10 mg by mouth daily.     Historical Provider, MD MG tablet Take 2 tablets by mouth every 6 hours as needed for Pain, Disp-120 tablet, R-0Print             Guicho Mac MD  Emergency Medicine Resident    (Please note that portions of thisnote were completed with a voice recognition program.  Efforts were made to edit the dictations but occasionally words are mis-transcribed.)        Guicho Mac MD  10/19/18 6294

## 2018-10-22 LAB
C TRACH DNA GENITAL QL NAA+PROBE: NEGATIVE
N. GONORRHOEAE DNA: ABNORMAL

## 2018-10-23 ENCOUNTER — TELEPHONE (OUTPATIENT)
Dept: PHARMACY | Age: 38
End: 2018-10-23

## 2018-10-23 NOTE — TELEPHONE ENCOUNTER
CLINICAL PHARMACY NOTE:  Telephone Follow-up for Positive STD Test    At the time of Piper Gandara's visit to [facility] Emergency Department on 10/19/18 STD testing was performed. DNA testing was positive for Gonorrhea. While in the ED, patient was given ceftriaxone 250mg IM x 1 dose, one dose of doxycycline 100mg orally x1 plus a prescription for continued therapy with doxycycline. Treatment appropriate, no follow up needed at this time. Attempt made to reach patient by telephone to review results and instruct to abstain from sexual intercourse  for 7 days after treatment in addition to advising to inform any partners that they will need to be tested as well. Unable to reach patient, current number invalid.     Electronically signed by Sharon So [facility] on 10/23/2018 at 11:32 AM

## 2019-01-21 ENCOUNTER — APPOINTMENT (OUTPATIENT)
Dept: GENERAL RADIOLOGY | Age: 39
End: 2019-01-21
Payer: MEDICARE

## 2019-01-21 ENCOUNTER — HOSPITAL ENCOUNTER (EMERGENCY)
Age: 39
Discharge: HOME | End: 2019-01-22
Attending: EMERGENCY MEDICINE
Payer: MEDICARE

## 2019-01-21 DIAGNOSIS — M25.469 SUPRAPATELLAR EFFUSION OF KNEE: Primary | ICD-10-CM

## 2019-01-21 DIAGNOSIS — M25.561 ACUTE PAIN OF RIGHT KNEE: ICD-10-CM

## 2019-01-21 PROCEDURE — 99283 EMERGENCY DEPT VISIT LOW MDM: CPT

## 2019-01-21 PROCEDURE — 73562 X-RAY EXAM OF KNEE 3: CPT

## 2019-01-21 PROCEDURE — 6370000000 HC RX 637 (ALT 250 FOR IP): Performed by: STUDENT IN AN ORGANIZED HEALTH CARE EDUCATION/TRAINING PROGRAM

## 2019-01-21 RX ORDER — ACETAMINOPHEN 325 MG/1
650 TABLET ORAL ONCE
Status: COMPLETED | OUTPATIENT
Start: 2019-01-21 | End: 2019-01-21

## 2019-01-21 RX ADMIN — ACETAMINOPHEN 650 MG: 325 TABLET ORAL at 22:26

## 2019-01-21 ASSESSMENT — PAIN DESCRIPTION - LOCATION: LOCATION: KNEE

## 2019-01-21 ASSESSMENT — PAIN DESCRIPTION - DESCRIPTORS: DESCRIPTORS: ACHING;DISCOMFORT;SHOOTING;SHARP

## 2019-01-21 ASSESSMENT — PAIN DESCRIPTION - ORIENTATION: ORIENTATION: RIGHT

## 2019-01-21 ASSESSMENT — PAIN SCALES - GENERAL
PAINLEVEL_OUTOF10: 10
PAINLEVEL_OUTOF10: 10

## 2019-01-22 VITALS
TEMPERATURE: 98.6 F | RESPIRATION RATE: 18 BRPM | DIASTOLIC BLOOD PRESSURE: 86 MMHG | HEART RATE: 94 BPM | OXYGEN SATURATION: 97 % | SYSTOLIC BLOOD PRESSURE: 138 MMHG

## 2019-01-22 RX ORDER — ACETAMINOPHEN 325 MG/1
650 TABLET ORAL EVERY 6 HOURS PRN
Qty: 120 TABLET | Refills: 0 | Status: SHIPPED | OUTPATIENT
Start: 2019-01-22 | End: 2019-03-24 | Stop reason: SDUPTHER

## 2019-01-22 ASSESSMENT — ENCOUNTER SYMPTOMS
SHORTNESS OF BREATH: 0
ABDOMINAL PAIN: 0

## 2019-01-23 ENCOUNTER — OFFICE VISIT (OUTPATIENT)
Dept: ORTHOPEDIC SURGERY | Age: 39
End: 2019-01-23
Payer: MEDICARE

## 2019-01-23 VITALS — HEIGHT: 65 IN | WEIGHT: 150 LBS | BODY MASS INDEX: 24.99 KG/M2

## 2019-01-23 DIAGNOSIS — M25.561 ACUTE PAIN OF RIGHT KNEE: Primary | ICD-10-CM

## 2019-01-23 PROCEDURE — 99213 OFFICE O/P EST LOW 20 MIN: CPT | Performed by: STUDENT IN AN ORGANIZED HEALTH CARE EDUCATION/TRAINING PROGRAM

## 2019-01-23 PROCEDURE — G8420 CALC BMI NORM PARAMETERS: HCPCS | Performed by: STUDENT IN AN ORGANIZED HEALTH CARE EDUCATION/TRAINING PROGRAM

## 2019-01-23 PROCEDURE — G8427 DOCREV CUR MEDS BY ELIG CLIN: HCPCS | Performed by: STUDENT IN AN ORGANIZED HEALTH CARE EDUCATION/TRAINING PROGRAM

## 2019-01-23 PROCEDURE — 4004F PT TOBACCO SCREEN RCVD TLK: CPT | Performed by: STUDENT IN AN ORGANIZED HEALTH CARE EDUCATION/TRAINING PROGRAM

## 2019-01-23 PROCEDURE — 20610 DRAIN/INJ JOINT/BURSA W/O US: CPT | Performed by: STUDENT IN AN ORGANIZED HEALTH CARE EDUCATION/TRAINING PROGRAM

## 2019-01-23 PROCEDURE — G8484 FLU IMMUNIZE NO ADMIN: HCPCS | Performed by: STUDENT IN AN ORGANIZED HEALTH CARE EDUCATION/TRAINING PROGRAM

## 2019-03-05 ENCOUNTER — HOSPITAL ENCOUNTER (EMERGENCY)
Age: 39
Discharge: HOME | End: 2019-03-05
Attending: EMERGENCY MEDICINE
Payer: MEDICARE

## 2019-03-05 ENCOUNTER — APPOINTMENT (OUTPATIENT)
Dept: GENERAL RADIOLOGY | Age: 39
End: 2019-03-05
Payer: MEDICARE

## 2019-03-05 VITALS
HEIGHT: 65 IN | OXYGEN SATURATION: 99 % | BODY MASS INDEX: 26.66 KG/M2 | RESPIRATION RATE: 18 BRPM | TEMPERATURE: 98.2 F | HEART RATE: 89 BPM | WEIGHT: 160 LBS | DIASTOLIC BLOOD PRESSURE: 80 MMHG | SYSTOLIC BLOOD PRESSURE: 138 MMHG

## 2019-03-05 DIAGNOSIS — M53.3 COCCYX PAIN: Primary | ICD-10-CM

## 2019-03-05 LAB
CHP ED QC CHECK: YES
PREGNANCY TEST URINE, POC: NEGATIVE

## 2019-03-05 PROCEDURE — 6370000000 HC RX 637 (ALT 250 FOR IP): Performed by: STUDENT IN AN ORGANIZED HEALTH CARE EDUCATION/TRAINING PROGRAM

## 2019-03-05 PROCEDURE — 72220 X-RAY EXAM SACRUM TAILBONE: CPT

## 2019-03-05 PROCEDURE — 99283 EMERGENCY DEPT VISIT LOW MDM: CPT

## 2019-03-05 PROCEDURE — 96372 THER/PROPH/DIAG INJ SC/IM: CPT

## 2019-03-05 PROCEDURE — 6360000002 HC RX W HCPCS: Performed by: EMERGENCY MEDICINE

## 2019-03-05 RX ORDER — KETOROLAC TROMETHAMINE 30 MG/ML
30 INJECTION, SOLUTION INTRAMUSCULAR; INTRAVENOUS ONCE
Status: COMPLETED | OUTPATIENT
Start: 2019-03-05 | End: 2019-03-05

## 2019-03-05 RX ORDER — CYCLOBENZAPRINE HCL 10 MG
10 TABLET ORAL ONCE
Status: COMPLETED | OUTPATIENT
Start: 2019-03-05 | End: 2019-03-05

## 2019-03-05 RX ORDER — ACETAMINOPHEN 500 MG
1000 TABLET ORAL ONCE
Status: COMPLETED | OUTPATIENT
Start: 2019-03-05 | End: 2019-03-05

## 2019-03-05 RX ORDER — HYDROCODONE BITARTRATE AND ACETAMINOPHEN 5; 325 MG/1; MG/1
1 TABLET ORAL EVERY 8 HOURS PRN
Qty: 7 TABLET | Refills: 0 | Status: SHIPPED | OUTPATIENT
Start: 2019-03-05 | End: 2019-03-07

## 2019-03-05 RX ADMIN — ACETAMINOPHEN 1000 MG: 500 TABLET ORAL at 18:25

## 2019-03-05 RX ADMIN — CYCLOBENZAPRINE HYDROCHLORIDE 10 MG: 10 TABLET, FILM COATED ORAL at 18:25

## 2019-03-05 RX ADMIN — KETOROLAC TROMETHAMINE 30 MG: 30 INJECTION, SOLUTION INTRAMUSCULAR; INTRAVENOUS at 20:10

## 2019-03-05 ASSESSMENT — PAIN DESCRIPTION - DESCRIPTORS: DESCRIPTORS: ACHING;SHARP

## 2019-03-05 ASSESSMENT — PAIN SCALES - GENERAL
PAINLEVEL_OUTOF10: 9

## 2019-03-05 ASSESSMENT — PAIN DESCRIPTION - LOCATION: LOCATION: COCCYX

## 2019-03-12 ASSESSMENT — ENCOUNTER SYMPTOMS
ABDOMINAL PAIN: 0
NAUSEA: 0
VOMITING: 0
SHORTNESS OF BREATH: 0

## 2019-03-24 ENCOUNTER — HOSPITAL ENCOUNTER (EMERGENCY)
Age: 39
Discharge: HOME | End: 2019-03-24
Attending: EMERGENCY MEDICINE
Payer: MEDICARE

## 2019-03-24 VITALS
OXYGEN SATURATION: 98 % | SYSTOLIC BLOOD PRESSURE: 148 MMHG | HEART RATE: 80 BPM | BODY MASS INDEX: 24.96 KG/M2 | TEMPERATURE: 97.5 F | RESPIRATION RATE: 18 BRPM | WEIGHT: 150 LBS | DIASTOLIC BLOOD PRESSURE: 95 MMHG

## 2019-03-24 DIAGNOSIS — M71.21 SYNOVIAL CYST OF RIGHT POPLITEAL SPACE: Primary | ICD-10-CM

## 2019-03-24 PROCEDURE — 99284 EMERGENCY DEPT VISIT MOD MDM: CPT

## 2019-03-24 PROCEDURE — 96372 THER/PROPH/DIAG INJ SC/IM: CPT

## 2019-03-24 PROCEDURE — 6360000002 HC RX W HCPCS: Performed by: NURSE PRACTITIONER

## 2019-03-24 PROCEDURE — 93971 EXTREMITY STUDY: CPT

## 2019-03-24 RX ORDER — ACETAMINOPHEN 500 MG
1000 TABLET ORAL EVERY 6 HOURS PRN
Qty: 30 TABLET | Refills: 0 | Status: SHIPPED | OUTPATIENT
Start: 2019-03-24 | End: 2019-04-04 | Stop reason: SDUPTHER

## 2019-03-24 RX ORDER — KETOROLAC TROMETHAMINE 30 MG/ML
30 INJECTION, SOLUTION INTRAMUSCULAR; INTRAVENOUS ONCE
Status: COMPLETED | OUTPATIENT
Start: 2019-03-24 | End: 2019-03-24

## 2019-03-24 RX ADMIN — KETOROLAC TROMETHAMINE 30 MG: 30 INJECTION INTRAMUSCULAR; INTRAVENOUS at 15:32

## 2019-03-24 ASSESSMENT — PAIN DESCRIPTION - PAIN TYPE: TYPE: ACUTE PAIN

## 2019-03-24 ASSESSMENT — PAIN SCALES - GENERAL
PAINLEVEL_OUTOF10: 10
PAINLEVEL_OUTOF10: 10

## 2019-03-24 ASSESSMENT — PAIN DESCRIPTION - ORIENTATION: ORIENTATION: RIGHT

## 2019-03-24 ASSESSMENT — PAIN DESCRIPTION - LOCATION: LOCATION: KNEE;LEG

## 2019-03-24 ASSESSMENT — PAIN DESCRIPTION - FREQUENCY: FREQUENCY: CONTINUOUS

## 2019-03-24 ASSESSMENT — PAIN DESCRIPTION - PROGRESSION: CLINICAL_PROGRESSION: GRADUALLY WORSENING

## 2019-03-24 ASSESSMENT — PAIN DESCRIPTION - ONSET: ONSET: ON-GOING

## 2019-04-02 ENCOUNTER — HOSPITAL ENCOUNTER (OUTPATIENT)
Dept: MRI IMAGING | Age: 39
Discharge: HOME | End: 2019-04-04
Payer: MEDICARE

## 2019-04-02 DIAGNOSIS — M25.561 ACUTE PAIN OF RIGHT KNEE: ICD-10-CM

## 2019-04-02 PROCEDURE — 73721 MRI JNT OF LWR EXTRE W/O DYE: CPT

## 2019-04-04 ENCOUNTER — HOSPITAL ENCOUNTER (EMERGENCY)
Age: 39
Discharge: HOME | End: 2019-04-04
Attending: EMERGENCY MEDICINE
Payer: MEDICARE

## 2019-04-04 VITALS
HEIGHT: 65 IN | OXYGEN SATURATION: 100 % | DIASTOLIC BLOOD PRESSURE: 86 MMHG | HEART RATE: 73 BPM | WEIGHT: 158 LBS | SYSTOLIC BLOOD PRESSURE: 134 MMHG | TEMPERATURE: 97.3 F | RESPIRATION RATE: 16 BRPM | BODY MASS INDEX: 26.33 KG/M2

## 2019-04-04 DIAGNOSIS — M25.561 CHRONIC PAIN OF RIGHT KNEE: Primary | ICD-10-CM

## 2019-04-04 DIAGNOSIS — G89.29 CHRONIC PAIN OF RIGHT KNEE: Primary | ICD-10-CM

## 2019-04-04 PROCEDURE — 99282 EMERGENCY DEPT VISIT SF MDM: CPT

## 2019-04-04 RX ORDER — ACETAMINOPHEN 500 MG
1000 TABLET ORAL EVERY 6 HOURS PRN
Qty: 30 TABLET | Refills: 0 | Status: SHIPPED | OUTPATIENT
Start: 2019-04-04 | End: 2020-02-03 | Stop reason: ALTCHOICE

## 2019-04-04 ASSESSMENT — PAIN DESCRIPTION - PAIN TYPE: TYPE: ACUTE PAIN

## 2019-04-04 ASSESSMENT — PAIN DESCRIPTION - LOCATION: LOCATION: KNEE

## 2019-04-04 ASSESSMENT — PAIN DESCRIPTION - ORIENTATION: ORIENTATION: RIGHT

## 2019-04-04 ASSESSMENT — PAIN DESCRIPTION - FREQUENCY: FREQUENCY: CONTINUOUS

## 2019-04-04 ASSESSMENT — PAIN SCALES - GENERAL: PAINLEVEL_OUTOF10: 9

## 2019-04-04 NOTE — ED PROVIDER NOTES
[facility] ED  Emergency Department Encounter  Emergency Medicine Resident     Pt Name: Collin Mills  MRN: [id number]  [place] 1980  Date ofevaluation: 4/4/19  PCP:  No primary care provider on file. CHIEF COMPLAINT       Chief Complaint   Patient presents with    Other     Pt reports that she needs her MRI results from scan on Monday       HISTORY OF PRESENT ILLNESS  (Location/Symptom, Timing/Onset, Context/Setting, Quality, Duration, Modifying Factors, Severity.)      Collin Mills is a 42 y.o. female who presents requesting results from an MRI of her R knee she had done on 4/2. Patient has had chronic R knee pain that she has had for several months and is being evaluated by orthopedic surgery for this. She was instructed to call the orthopedic clinic for her results however states she was not told this. Denies any changes in her pain, denies any new injury. No other symptoms or complaints otherwise. Would like her results and to be discharged so she can make it to work at 5. REVIEW OF SYSTEMS    (2-9 systems for level 4, 10 or more for level 5)      Review of Systems   Constitutional: Negative for chills and fever. Respiratory: Negative for shortness of breath. Cardiovascular: Negative for chest pain. Musculoskeletal:        R knee pain   Neurological: Negative for weakness and numbness. PAST MEDICAL / SURGICAL /SOCIAL / FAMILY HISTORY      has a past medical history of Hypertension and Seasonal allergies. has a past surgical history that includes Tubal ligation.       Social History     Socioeconomic History    Marital status: Single     Spouse name: Not on file    Number of children: Not on file    Years of education: Not on file    Highest education level: Not on file   Occupational History    Not on file   Social Needs    Financial resource strain: Not on file    Food insecurity:     Worry: Not on file     Inability: Not on file   [facility] blood pressure is 134/86 and her pulse is 73. Her respiration is 16 and oxygen saturation is 100%. Physical Exam   Constitutional: She is oriented to person, place, and time. She appears well-developed and well-nourished. HENT:   Head: Normocephalic and atraumatic. Mouth/Throat: Oropharynx is clear and moist.   Neck: Neck supple. Cardiovascular: Normal rate. Pulmonary/Chest: Effort normal.   Abdominal: Soft. There is no tenderness. Musculoskeletal: She exhibits no deformity. Tenderness to palpation diffusely to R knee, with mild amount of swelling to posterior aspect, full ROM at the knee though with pain, no erythema or warmth   Neurological: She is alert and oriented to person, place, and time. Skin: Skin is warm and dry. Psychiatric: She has a normal mood and affect. Her behavior is normal.   Vitals reviewed. Vitals:    Vitals:    04/04/19 0822   BP: 134/86   Pulse: 73   Resp: 16   Temp: 97.3 °F (36.3 °C)   TempSrc: Oral   SpO2: 100%   Weight: 158 lb (71.7 kg)   Height: 5' 5\" (1.651 m)       DIFFERENTIAL  DIAGNOSIS     PLAN (LABS / IMAGING / EKG):  No orders of the defined types were placed in this encounter. Plan of care is reviewed and discussed with the family/ patient when able. Family/ Patient consents to treatment and plan if able to do so. MEDICATIONS ORDERED:  Orders Placed This Encounter   Medications    acetaminophen (APAP EXTRA STRENGTH) 500 MG tablet     Sig: Take 2 tablets by mouth every 6 hours as needed for Pain     Dispense:  30 tablet     Refill:  0       DIAGNOSTIC RESULTS     LABS:  No results found for this visit on 04/04/19. Labs Reviewed - No data to display    RADIOLOGY:        ED BEDSIDE ULTRASOUND:   Not indicated    [address] [place] [address] / [facility]   41 y/o F w/ chronic R knee pain, currently being evaluated by orthopedic surgery, here requesting results of her MRI that was obtained 2 days ago. Denies any changes in her symptoms.  Did not call the worsen      DISCHARGE MEDICATIONS:  Current Discharge Medication List          Adalberto Kumar DO  Emergency Medicine Resident    (Please note that portions of this note were completed with a voice recognition program.  Julienne Merchant made to edit the dictations but occasionally words are mis-transcribed.)      Adalberto Kumar DO  04/05/19 4297

## 2019-04-05 ENCOUNTER — TELEPHONE (OUTPATIENT)
Dept: ORTHOPEDIC SURGERY | Age: 39
End: 2019-04-05

## 2019-04-05 ASSESSMENT — ENCOUNTER SYMPTOMS: SHORTNESS OF BREATH: 0

## 2019-04-05 NOTE — ED PROVIDER NOTES
°F (36.3 °C), Pulse: 73, Resp: 16      Comments            Sanders MD, F.A.C.E.P.   Attending Emergency Physician         Elin Saldivar MD  04/05/19 8779

## 2019-04-05 NOTE — TELEPHONE ENCOUNTER
Patient was a no show for 4/5/19 appointment with Dr. Charlie Rodriguez. Attempted to call patient to reschedule, but had to leave a VM.

## 2019-04-17 DIAGNOSIS — S82.001A CLOSED NONDISPLACED FRACTURE OF RIGHT PATELLA, UNSPECIFIED FRACTURE MORPHOLOGY, INITIAL ENCOUNTER: Primary | ICD-10-CM

## 2019-04-22 DIAGNOSIS — S82.001A CLOSED NONDISPLACED FRACTURE OF RIGHT PATELLA, UNSPECIFIED FRACTURE MORPHOLOGY, INITIAL ENCOUNTER: Primary | ICD-10-CM

## 2019-04-23 ENCOUNTER — TELEPHONE (OUTPATIENT)
Dept: ORTHOPEDIC SURGERY | Age: 39
End: 2019-04-23

## 2019-04-23 NOTE — TELEPHONE ENCOUNTER
Pt missed apt today, called to reschedule. Pt just wants to know her results of her MRI right knee. Anyway you could call her and tell her or give us permission to give her the results. She doesn't want an apt if its not necessary.

## 2019-04-25 NOTE — TELEPHONE ENCOUNTER
Spoke with Dr. Nithin Wang and given the ok to give patient MRI results over the phone.      Spoke with patient about MRI results, and scheduled appointment for patient to talk with Dr. Nithin Wang about bakers cyst

## 2019-05-06 ENCOUNTER — HOSPITAL ENCOUNTER (EMERGENCY)
Age: 39
Discharge: HOME | End: 2019-05-07
Attending: EMERGENCY MEDICINE
Payer: MEDICARE

## 2019-05-06 DIAGNOSIS — S89.91XA INJURY OF RIGHT KNEE, INITIAL ENCOUNTER: Primary | ICD-10-CM

## 2019-05-06 PROCEDURE — 99284 EMERGENCY DEPT VISIT MOD MDM: CPT

## 2019-05-06 ASSESSMENT — ENCOUNTER SYMPTOMS
NAUSEA: 0
TROUBLE SWALLOWING: 0
VOMITING: 0
COUGH: 0
SHORTNESS OF BREATH: 0

## 2019-05-06 ASSESSMENT — PAIN SCALES - GENERAL: PAINLEVEL_OUTOF10: 10

## 2019-05-06 ASSESSMENT — PAIN DESCRIPTION - ORIENTATION: ORIENTATION: RIGHT

## 2019-05-06 ASSESSMENT — PAIN DESCRIPTION - LOCATION: LOCATION: KNEE

## 2019-05-07 ENCOUNTER — APPOINTMENT (OUTPATIENT)
Dept: GENERAL RADIOLOGY | Age: 39
End: 2019-05-07
Payer: MEDICARE

## 2019-05-07 VITALS
WEIGHT: 169 LBS | HEART RATE: 83 BPM | TEMPERATURE: 98.5 F | OXYGEN SATURATION: 100 % | RESPIRATION RATE: 16 BRPM | SYSTOLIC BLOOD PRESSURE: 163 MMHG | HEIGHT: 65 IN | BODY MASS INDEX: 28.16 KG/M2 | DIASTOLIC BLOOD PRESSURE: 81 MMHG

## 2019-05-07 PROCEDURE — 73562 X-RAY EXAM OF KNEE 3: CPT

## 2019-05-07 PROCEDURE — 6370000000 HC RX 637 (ALT 250 FOR IP): Performed by: NURSE PRACTITIONER

## 2019-05-07 RX ORDER — HYDROCODONE BITARTRATE AND ACETAMINOPHEN 5; 325 MG/1; MG/1
1 TABLET ORAL ONCE
Status: COMPLETED | OUTPATIENT
Start: 2019-05-07 | End: 2019-05-07

## 2019-05-07 RX ORDER — ACETAMINOPHEN 325 MG/1
650 TABLET ORAL EVERY 6 HOURS PRN
Qty: 20 TABLET | Refills: 0 | Status: SHIPPED | OUTPATIENT
Start: 2019-05-07 | End: 2020-02-03 | Stop reason: ALTCHOICE

## 2019-05-07 RX ADMIN — HYDROCODONE BITARTRATE AND ACETAMINOPHEN 1 TABLET: 5; 325 TABLET ORAL at 00:36

## 2019-05-07 ASSESSMENT — PAIN SCALES - GENERAL: PAINLEVEL_OUTOF10: 9

## 2019-05-07 NOTE — ED PROVIDER NOTES
[address] ED  eMERGENCY dEPARTMENT eNCOUnter   Independent Attestation     Pt Name: Reeda Bumpers  MRN: [id number]  [place] 1980  Date of evaluation: 5/7/19       Reeda Bumpers is a 42 y.o. female who presents with Knee Injury        Based on the medical record, the care appears appropriate. I was personally available for consultation in the Emergency Department.     Leonela Nguyen MD  Attending Emergency  Physician                  Leonela Nguyen MD  05/07/19 7161

## 2019-05-07 NOTE — ED NOTES
Pt discharged in stable condition with knee immobilizer and crutches. Pt verbalized the understanding for the need of follow up care with an orthopedic doctor.       Scottie Goldberg RN  05/07/19 6103

## 2019-05-07 NOTE — ED PROVIDER NOTES
[address] [facility] ED  eMERGENCYdEPARTMENT eNCOUnter      Pt Name: Patrizia Tapia  MRN: [id number]  [place] 1980  Date of evaluation: 5/6/2019  Provider:TALYA VITALE CNP    CHIEF COMPLAINT       Chief Complaint   Patient presents with    Knee Injury         HISTORY OF PRESENT ILLNESS  (Location/Symptom, Timing/Onset, Context/Setting, Quality, Duration, Modifying Factors, Severity.)   Patrizia Tapia is a 42 y.o. female who presents to the emergency department with complaints of right knee pain. States she was going upstairs and her right knee gave out. States she felt something \"crack\" in her knee and she fell. Reports pain in anterior knee, rats it 9/10, throbbing and sharp. Pain is worse with movement. States she can't walk, relates she didn't try walking due to pain. Reports tingling sensation in her right toes. Has hx of knee arthritis, hx knee fracture. Also relates she has Baker's cyst, had appointment with ortho, and didn't go. Not taking anything for pain. Nursing Notes were reviewed and I agree. REVIEW OF SYSTEMS    (2-9 systems for level 4,10 or more for level 5)      Review of Systems   Constitutional: Negative for chills and fever. HENT: Negative for trouble swallowing. Respiratory: Negative for cough and shortness of breath. Cardiovascular: Negative for chest pain and palpitations. Gastrointestinal: Negative for nausea and vomiting. Musculoskeletal:        Right knee pain     Except as noted above the remainder of the review of systems was reviewed andnegative. PAST MEDICAL HISTORY         Diagnosis Date    Hypertension     Seasonal allergies      Reviewed. SURGICAL HISTORY           Procedure Laterality Date    TUBAL LIGATION       Reviewed.   CURRENT MEDICATIONS       Previous Medications    ACETAMINOPHEN (APAP EXTRA STRENGTH) 500 MG TABLET    Take 2 tablets by mouth every 6 hours as needed for Pain       ALLERGIES     Seasonal and Motrin [ibuprofen]    FAMILY HISTORY     History reviewed. No pertinent family history. No family status information on file. Reviewed and not relevant. SOCIAL HISTORY      reports that she has been smoking cigarettes. She has been smoking about 0.50 packs per day. She has never used smokeless tobacco. She reports that she drinks alcohol. She reports that she does not use drugs. Reviewed. PHYSICAL EXAM    (up to 7 for level 4, 8 or more for level 5)     ED Triage Vitals [05/06/19 2351]   BP Temp Temp src Pulse Resp SpO2 Height Weight   (!) 163/81 -- -- 83 16 100 % 5' 5\" (1.651 m) 169 lb (76.7 kg)       Physical Exam   Constitutional: She is oriented to person, place, and time. She appears well-developed and well-nourished. No distress. HENT:   Head: Normocephalic and atraumatic. Right Ear: External ear normal.   Left Ear: External ear normal.   Nose: Nose normal.   Eyes: Right eye exhibits no discharge. Left eye exhibits no discharge. No scleral icterus. Neck: Normal range of motion. No tracheal deviation present. Pulmonary/Chest: Effort normal. No stridor. No respiratory distress. Musculoskeletal: Normal range of motion. She exhibits no edema. Right knee: She exhibits swelling and bony tenderness. She exhibits normal range of motion, no ecchymosis and no erythema. Tenderness found. Generalized tenderness to palpation of right knee, worse over anterior and posterior aspect. + mild swelling posteriorly. Limited ROM testing due to patient complaining of pain. No calf tenderness/ swelling or bruising. DP/PT +2, distal sensation intact, brisk cap refill. Neurological: She is alert and oriented to person, place, and time. Coordination normal.   Skin: Skin is warm and dry. She is not diaphoretic. Psychiatric: She has a normal mood and affect.  Her behavior is normal.       DIAGNOSTIC RESULTS     RADIOLOGY:   Pending       LABS:  Labs Reviewed - No data to display    All other labs were within

## 2019-05-21 ENCOUNTER — HOSPITAL ENCOUNTER (EMERGENCY)
Age: 39
Discharge: HOME | End: 2019-05-21
Attending: EMERGENCY MEDICINE
Payer: MEDICARE

## 2019-05-21 VITALS
OXYGEN SATURATION: 100 % | DIASTOLIC BLOOD PRESSURE: 85 MMHG | RESPIRATION RATE: 18 BRPM | SYSTOLIC BLOOD PRESSURE: 132 MMHG | HEART RATE: 71 BPM | TEMPERATURE: 98.9 F

## 2019-05-21 DIAGNOSIS — N39.0 URINARY TRACT INFECTION IN FEMALE: ICD-10-CM

## 2019-05-21 DIAGNOSIS — N88.9 CERVICAL LESION: ICD-10-CM

## 2019-05-21 DIAGNOSIS — H10.213 CHEMICAL CONJUNCTIVITIS OF BOTH EYES: Primary | ICD-10-CM

## 2019-05-21 LAB
-: ABNORMAL
AMORPHOUS: ABNORMAL
BACTERIA: ABNORMAL
BILIRUBIN URINE: NEGATIVE
CASTS UA: ABNORMAL /LPF (ref 0–8)
COLOR: YELLOW
COMMENT UA: ABNORMAL
CRYSTALS, UA: ABNORMAL /HPF
DIRECT EXAM: NORMAL
EPITHELIAL CELLS UA: ABNORMAL /HPF (ref 0–5)
GLUCOSE URINE: NEGATIVE
HCG(URINE) PREGNANCY TEST: NEGATIVE
KETONES, URINE: NEGATIVE
LEUKOCYTE ESTERASE, URINE: ABNORMAL
Lab: NORMAL
MUCUS: ABNORMAL
NITRITE, URINE: NEGATIVE
OTHER OBSERVATIONS UA: ABNORMAL
PH UA: 5 (ref 5–8)
PROTEIN UA: NEGATIVE
RBC UA: ABNORMAL /HPF (ref 0–4)
RENAL EPITHELIAL, UA: ABNORMAL /HPF
SPECIFIC GRAVITY UA: 1.01 (ref 1–1.03)
SPECIMEN DESCRIPTION: NORMAL
TRICHOMONAS: ABNORMAL
TURBIDITY: ABNORMAL
URINE HGB: NEGATIVE
UROBILINOGEN, URINE: NORMAL
WBC UA: ABNORMAL /HPF (ref 0–5)
YEAST: ABNORMAL

## 2019-05-21 PROCEDURE — 87510 GARDNER VAG DNA DIR PROBE: CPT

## 2019-05-21 PROCEDURE — 87480 CANDIDA DNA DIR PROBE: CPT

## 2019-05-21 PROCEDURE — 84703 CHORIONIC GONADOTROPIN ASSAY: CPT

## 2019-05-21 PROCEDURE — G0382 LEV 3 HOSP TYPE B ED VISIT: HCPCS

## 2019-05-21 PROCEDURE — 81001 URINALYSIS AUTO W/SCOPE: CPT

## 2019-05-21 PROCEDURE — 87491 CHLMYD TRACH DNA AMP PROBE: CPT

## 2019-05-21 PROCEDURE — 87660 TRICHOMONAS VAGIN DIR PROBE: CPT

## 2019-05-21 PROCEDURE — 6360000002 HC RX W HCPCS: Performed by: EMERGENCY MEDICINE

## 2019-05-21 PROCEDURE — 96372 THER/PROPH/DIAG INJ SC/IM: CPT

## 2019-05-21 PROCEDURE — 87591 N.GONORRHOEAE DNA AMP PROB: CPT

## 2019-05-21 PROCEDURE — 6370000000 HC RX 637 (ALT 250 FOR IP): Performed by: EMERGENCY MEDICINE

## 2019-05-21 PROCEDURE — 87086 URINE CULTURE/COLONY COUNT: CPT

## 2019-05-21 RX ORDER — CEPHALEXIN 500 MG/1
500 CAPSULE ORAL ONCE
Status: DISCONTINUED | OUTPATIENT
Start: 2019-05-21 | End: 2019-05-21 | Stop reason: HOSPADM

## 2019-05-21 RX ORDER — CEPHALEXIN 500 MG/1
500 CAPSULE ORAL 4 TIMES DAILY
Qty: 28 CAPSULE | Refills: 0 | Status: SHIPPED | OUTPATIENT
Start: 2019-05-21 | End: 2019-05-28

## 2019-05-21 RX ORDER — CEFTRIAXONE SODIUM 250 MG/1
250 INJECTION, POWDER, FOR SOLUTION INTRAMUSCULAR; INTRAVENOUS ONCE
Status: COMPLETED | OUTPATIENT
Start: 2019-05-21 | End: 2019-05-21

## 2019-05-21 RX ORDER — AZITHROMYCIN 250 MG/1
1000 TABLET, FILM COATED ORAL DAILY
Status: DISCONTINUED | OUTPATIENT
Start: 2019-05-21 | End: 2019-05-21 | Stop reason: HOSPADM

## 2019-05-21 RX ADMIN — HYPROMELLOSE 2906 (4000 MPA.S) 1 DROP: 25 SOLUTION OPHTHALMIC at 15:54

## 2019-05-21 RX ADMIN — CEFTRIAXONE SODIUM 250 MG: 250 INJECTION, POWDER, FOR SOLUTION INTRAMUSCULAR; INTRAVENOUS at 15:31

## 2019-05-21 RX ADMIN — AZITHROMYCIN 1000 MG: 250 TABLET, FILM COATED ORAL at 15:31

## 2019-05-21 ASSESSMENT — ENCOUNTER SYMPTOMS
EYE PAIN: 1
EYE DISCHARGE: 1
PHOTOPHOBIA: 0
NAUSEA: 0
VOMITING: 0
BACK PAIN: 0

## 2019-05-21 ASSESSMENT — PAIN SCALES - GENERAL: PAINLEVEL_OUTOF10: 10

## 2019-05-21 NOTE — ED PROVIDER NOTES
Emergency Medicine Attending Note    I have seen and examined the patient in conjunction with the Resident/MLP. Please see my key portion documented:      I agree with the assessment and plan as discussed with Dr. Lynsey Rob. Electronically signed:  EVANGELINA Cruz MD  05/21/19 3029

## 2019-05-21 NOTE — ED PROVIDER NOTES
on file     Non-medical: Not on file   Tobacco Use    Smoking status: Current Every Day Smoker     Packs/day: 0.50     Types: Cigarettes    Smokeless tobacco: Never Used   Substance and Sexual Activity    Alcohol use: Yes     Comment: seldom    Drug use: No    Sexual activity: Not on file   Lifestyle    Physical activity:     Days per week: Not on file     Minutes per session: Not on file    Stress: Not on file   Relationships    Social connections:     Talks on phone: Not on file     Gets together: Not on file     Attends Anabaptism service: Not on file     Active member of club or organization: Not on file     Attends meetings of clubs or organizations: Not on file     Relationship status: Not on file    Intimate partner violence:     Fear of current or ex partner: Not on file     Emotionally abused: Not on file     Physically abused: Not on file     Forced sexual activity: Not on file   Other Topics Concern    Not on file   Social History Narrative    Not on file       No family history on file. Allergies:    Seasonal and Motrin [ibuprofen]    Home Medications:  Prior to Admission medications    Medication Sig Start Date End Date Taking? Authorizing Provider   cephALEXin (KEFLEX) 500 MG capsule Take 1 capsule by mouth 4 times daily for 7 days 5/21/19 5/28/19 Yes Oneida Meeks, DO   Carboxymethylcellul-Glycerin 0.5-0.9 % SOLN Apply 2 drops to eye as needed (dry and irritated eyes) 5/21/19  Yes Oneida Meeks DO   acetaminophen (AMINOFEN) 325 MG tablet Take 2 tablets by mouth every 6 hours as needed for Pain 5/7/19   TALYA Gould - CNP   acetaminophen (APAP EXTRA STRENGTH) 500 MG tablet Take 2 tablets by mouth every 6 hours as needed for Pain 4/4/19   Sushila Trejo, DO       REVIEW OF SYSTEMS    (2-9 systems for level 4, 10 or more for level 5)    Review of Systems   Constitutional: Negative for chills, diaphoresis and fever. Eyes: Positive for pain and discharge.  Negative for photophobia and visual disturbance. Gastrointestinal: Negative for nausea and vomiting. Endocrine: Negative for polyuria. Genitourinary: Positive for vaginal pain. Negative for decreased urine volume, difficulty urinating, dyspareunia, dysuria, frequency, genital sores, hematuria, urgency, vaginal bleeding and vaginal discharge. Musculoskeletal: Negative for back pain and neck pain. Skin: Negative for rash. Allergic/Immunologic: Positive for environmental allergies. Neurological: Negative for dizziness, light-headedness and headaches. PHYSICAL EXAM   (up to 7 for level 4, 8 or more for level 5)    VITALS:   Vitals:    05/21/19 1418   BP: 132/85   Pulse: 71   Resp: 18   Temp: 98.9 °F (37.2 °C)   TempSrc: Oral   SpO2: 100%       Physical Exam   Constitutional: She is oriented to person, place, and time. She appears well-developed and well-nourished. No distress. HENT:   Head: Normocephalic and atraumatic. Right Ear: External ear normal.   Left Ear: External ear normal.   Nose: Nose normal.   Mouth/Throat: Uvula is midline and oropharynx is clear and moist.   Eyes: Pupils are equal, round, and reactive to light. EOM are normal. Right eye exhibits discharge. Left eye exhibits discharge. Right conjunctiva is injected. Left conjunctiva is injected. Clear watery discharge from eyes bilaterally with mild conjunctival injection. There is no exudative discharge or matting of the eyelashes   Neck: Normal range of motion. Neck supple. Cardiovascular: Normal rate, regular rhythm and normal heart sounds. No murmur heard. Pulmonary/Chest: Effort normal and breath sounds normal. No respiratory distress. She has no wheezes. She has no rales. Abdominal: Soft. Bowel sounds are normal. She exhibits no distension. There is no tenderness. There is no rebound and no guarding. Genitourinary: Pelvic exam was performed with patient supine. There is no rash, tenderness or lesion on the right labia.  There is no rash, tenderness or lesion on the left labia. Uterus is not enlarged and not tender. Cervix exhibits discharge and friability. Right adnexum displays no mass, no tenderness and no fullness. Left adnexum displays no mass, no tenderness and no fullness. No erythema, tenderness or bleeding in the vagina. Vaginal discharge found. Genitourinary Comments: Thick mucoid discharge coming from cervix and vaginal vault   Musculoskeletal: Normal range of motion. Neurological: She is alert and oriented to person, place, and time. Skin: Skin is warm and dry. No rash noted. She is not diaphoretic. Psychiatric: She has a normal mood and affect. Her behavior is normal.   Nursing note and vitals reviewed.       DIFFERENTIAL  DIAGNOSIS   PLAN (LABS / IMAGING / EKG):  Orders Placed This Encounter   Procedures    VAGINITIS DNA PROBE    C.trachomatis N.gonorrhoeae DNA    Urine Culture    Urinalysis Reflex to Culture    PREGNANCY, URINE    Microscopic Urinalysis    Vaginal exam       MEDICATIONS ORDERED:  Orders Placed This Encounter   Medications    hydroxypropyl methylcellulose (GONIOSOL) 2.5 % ophthalmic solution 1 drop    azithromycin (ZITHROMAX) tablet 1,000 mg    cefTRIAXone (ROCEPHIN) injection 250 mg    cephALEXin (KEFLEX) capsule 500 mg    cephALEXin (KEFLEX) 500 MG capsule     Sig: Take 1 capsule by mouth 4 times daily for 7 days     Dispense:  28 capsule     Refill:  0    Carboxymethylcellul-Glycerin 0.5-0.9 % SOLN     Sig: Apply 2 drops to eye as needed (dry and irritated eyes)     Dispense:  15 mL     Refill:  0       DDX:   Chemical conjunctivitis, bacterial conjunctivitis, viral conjunctivitis, bacterial vaginosis, candidiasis, diarrhea, chlamydia, Trichomonas, pubic lice    DIAGNOSTIC RESULTS / EMERGENCYDEPARTMENT COURSE / MDM   LABS:  Labs Reviewed   URINE RT REFLEX TO CULTURE - Abnormal; Notable for the following components:       Result Value    Turbidity UA CLOUDY (*)     Leukocyte Esterase, Prescriptions    CARBOXYMETHYLCELLUL-GLYCERIN 0.5-0.9 % SOLN    Apply 2 drops to eye as needed (dry and irritated eyes)    CEPHALEXIN (KEFLEX) 500 MG CAPSULE    Take 1 capsule by mouth 4 times daily for 7 days       Angeline M. Ples Klinefelter  Emergency Medicine Resident Physician, PGY-1    (Please note that portions of this note were completed with a voice recognition program.  Efforts were made to edit the dictations but occasionally words are mis-transcribed.)        [address] [facility],   Resident  05/21/19 [id number]

## 2019-05-22 ENCOUNTER — TELEPHONE (OUTPATIENT)
Dept: FAMILY MEDICINE CLINIC | Age: 39
End: 2019-05-22

## 2019-05-22 LAB
C TRACH DNA GENITAL QL NAA+PROBE: NEGATIVE
CULTURE: NORMAL
Lab: NORMAL
N. GONORRHOEAE DNA: NEGATIVE
SPECIMEN DESCRIPTION: NORMAL
SPECIMEN DESCRIPTION: NORMAL

## 2019-07-13 ENCOUNTER — APPOINTMENT (OUTPATIENT)
Dept: GENERAL RADIOLOGY | Age: 39
End: 2019-07-13
Payer: MEDICARE

## 2019-07-13 ENCOUNTER — HOSPITAL ENCOUNTER (EMERGENCY)
Age: 39
Discharge: HOME | End: 2019-07-13
Attending: EMERGENCY MEDICINE
Payer: MEDICARE

## 2019-07-13 VITALS
SYSTOLIC BLOOD PRESSURE: 114 MMHG | RESPIRATION RATE: 16 BRPM | HEIGHT: 65 IN | WEIGHT: 150 LBS | TEMPERATURE: 97.9 F | OXYGEN SATURATION: 100 % | DIASTOLIC BLOOD PRESSURE: 79 MMHG | BODY MASS INDEX: 24.99 KG/M2 | HEART RATE: 65 BPM

## 2019-07-13 DIAGNOSIS — J06.9 UPPER RESPIRATORY TRACT INFECTION, UNSPECIFIED TYPE: Primary | ICD-10-CM

## 2019-07-13 DIAGNOSIS — J01.00 ACUTE MAXILLARY SINUSITIS, RECURRENCE NOT SPECIFIED: ICD-10-CM

## 2019-07-13 PROCEDURE — 71046 X-RAY EXAM CHEST 2 VIEWS: CPT

## 2019-07-13 PROCEDURE — 99282 EMERGENCY DEPT VISIT SF MDM: CPT

## 2019-07-13 PROCEDURE — 6370000000 HC RX 637 (ALT 250 FOR IP): Performed by: EMERGENCY MEDICINE

## 2019-07-13 RX ORDER — ONDANSETRON 4 MG/1
4 TABLET, ORALLY DISINTEGRATING ORAL ONCE
Status: COMPLETED | OUTPATIENT
Start: 2019-07-13 | End: 2019-07-13

## 2019-07-13 RX ORDER — OXYMETAZOLINE HYDROCHLORIDE 0.05 G/100ML
2 SPRAY NASAL 2 TIMES DAILY
Qty: 1 BOTTLE | Refills: 0 | Status: SHIPPED | OUTPATIENT
Start: 2019-07-13 | End: 2019-07-16

## 2019-07-13 RX ORDER — BENZONATATE 100 MG/1
100 CAPSULE ORAL 3 TIMES DAILY PRN
Status: DISCONTINUED | OUTPATIENT
Start: 2019-07-13 | End: 2019-07-13 | Stop reason: HOSPADM

## 2019-07-13 RX ORDER — IBUPROFEN 800 MG/1
800 TABLET ORAL ONCE
Status: COMPLETED | OUTPATIENT
Start: 2019-07-13 | End: 2019-07-13

## 2019-07-13 RX ORDER — BENZONATATE 100 MG/1
100 CAPSULE ORAL 3 TIMES DAILY PRN
Qty: 30 CAPSULE | Refills: 0 | Status: SHIPPED | OUTPATIENT
Start: 2019-07-13 | End: 2019-07-20

## 2019-07-13 RX ORDER — ONDANSETRON 4 MG/1
4 TABLET, ORALLY DISINTEGRATING ORAL EVERY 8 HOURS PRN
Qty: 10 TABLET | Refills: 0 | Status: ON HOLD | OUTPATIENT
Start: 2019-07-13 | End: 2022-01-01

## 2019-07-13 RX ADMIN — BENZONATATE 100 MG: 100 CAPSULE ORAL at 04:36

## 2019-07-13 RX ADMIN — IBUPROFEN 800 MG: 800 TABLET ORAL at 04:36

## 2019-07-13 RX ADMIN — ONDANSETRON 4 MG: 4 TABLET, ORALLY DISINTEGRATING ORAL at 04:36

## 2019-07-13 ASSESSMENT — ENCOUNTER SYMPTOMS
DIARRHEA: 0
WHEEZING: 0
COLOR CHANGE: 0
SHORTNESS OF BREATH: 0
RHINORRHEA: 0
EYE REDNESS: 0
SINUS PRESSURE: 1
CHEST TIGHTNESS: 1
EYE PAIN: 0
BACK PAIN: 0
SORE THROAT: 1
BLOOD IN STOOL: 0
TROUBLE SWALLOWING: 0
COUGH: 1
VOMITING: 0
EYE DISCHARGE: 0
CONSTIPATION: 0
ABDOMINAL PAIN: 0
FACIAL SWELLING: 0
NAUSEA: 1

## 2019-07-13 ASSESSMENT — PAIN SCALES - GENERAL
PAINLEVEL_OUTOF10: 8
PAINLEVEL_OUTOF10: 8

## 2019-07-13 ASSESSMENT — PAIN DESCRIPTION - PAIN TYPE: TYPE: ACUTE PAIN

## 2019-07-13 ASSESSMENT — PAIN DESCRIPTION - DESCRIPTORS: DESCRIPTORS: BURNING;TIGHTNESS

## 2019-07-13 ASSESSMENT — PAIN DESCRIPTION - LOCATION: LOCATION: CHEST

## 2019-07-13 ASSESSMENT — PAIN DESCRIPTION - FREQUENCY: FREQUENCY: CONTINUOUS

## 2019-07-13 NOTE — ED PROVIDER NOTES
Abdominal: Soft. Bowel sounds are normal. She exhibits no distension and no mass. There is no tenderness. There is no rebound and no guarding. Musculoskeletal: Normal range of motion. She exhibits no edema or tenderness. Lymphadenopathy:     She has no cervical adenopathy. Neurological: She is alert and oriented to person, place, and time. She displays normal reflexes. No cranial nerve deficit or sensory deficit. She exhibits normal muscle tone. Coordination normal.   Skin: Skin is warm and dry. No rash noted. She is not diaphoretic. No erythema. No pallor. Psychiatric: She has a normal mood and affect. Her behavior is normal. Judgment and thought content normal.   Nursing note and vitals reviewed. DIAGNOSTIC RESULTS     RADIOLOGY:All plain film, CT,MRI, and formal ultrasound images (except ED bedside ultrasound) are read by the radiologist and the interpretations are directly viewed by the emergency physician. Xr Chest Standard (2 Vw)    Result Date: 7/13/2019  EXAMINATION: TWO XRAY VIEWS OF THE CHEST 7/13/2019 4:46 am COMPARISON: 11/12/2015 HISTORY: ORDERING SYSTEM PROVIDED HISTORY: Cough TECHNOLOGIST PROVIDED HISTORY: Cough Reason for Exam: Pt c/o cough for 2 days. Acuity: Acute Type of Exam: Initial Additional signs and symptoms: Pt c/o cough for 2 days. FINDINGS: The cardiac silhouette is within normal limits for size. The pulmonary vasculature is within normal limits. There is no focal consolidation, pleural effusion or pneumothorax. The visualized osseous structures demonstrate no acute abnormality. No acute cardiopulmonary abnormality. LABS: All lab results were reviewed by myself, and all abnormals are listed below. Labs Reviewed - No data to display      MEDICAL DECISION MAKING:     Patient has no abnormal sounds on the lung exam so I think most likely her symptoms are related to a viral URI causing some pleurisy or chest wall strain due to all the coughing.   We will get a chest x-ray and treat her symptoms. EMERGENCY DEPARTMENT COURSE:   Vitals:    Vitals:    19 0417   BP: 125/81   Pulse: 65   Resp: 16   Temp: 97.9 °F (36.6 °C)   TempSrc: Oral   SpO2: 99%   Weight: 150 lb (68 kg)   Height: 5' 5\" (1.651 m)       The patient was given the following medications while in the emergency department:  Orders Placed This Encounter   Medications    benzonatate (TESSALON) capsule 100 mg    ondansetron (ZOFRAN-ODT) disintegrating tablet 4 mg    ibuprofen (ADVIL;MOTRIN) tablet 800 mg    oxymetazoline (12 HOUR NASAL SPRAY) 0.05 % nasal spray     Si sprays by Nasal route 2 times daily for 3 days Do not take more than 3 days in a row. Dispense:  1 Bottle     Refill:  0    ondansetron (ZOFRAN ODT) 4 MG disintegrating tablet     Sig: Take 1 tablet by mouth every 8 hours as needed for Nausea or Vomiting     Dispense:  10 tablet     Refill:  0    benzonatate (TESSALON PERLES) 100 MG capsule     Sig: Take 1 capsule by mouth 3 times daily as needed for Cough     Dispense:  30 capsule     Refill:  0       -------------------------  5:16 AM  Patient was updated on results and plan of care. CONSULTS:  None    PROCEDURES:  None    FINAL IMPRESSION      1. Upper respiratory tract infection, unspecified type    2.  Acute maxillary sinusitis, recurrence not specified          DISPOSITION/PLAN   DISPOSITION Decision To Discharge 2019 05:15:02 AM      PATIENT REFERREDTO:  [facility] ED  [facility] [id number] [phone]    If symptoms worsen      DISCHARGEMEDICATIONS:  New Prescriptions    BENZONATATE (TESSALON PERLES) 100 MG CAPSULE    Take 1 capsule by mouth 3 times daily as needed for Cough    ONDANSETRON (ZOFRAN ODT) 4 MG DISINTEGRATING TABLET    Take 1 tablet by mouth every 8 hours as needed for Nausea or Vomiting    OXYMETAZOLINE (12 HOUR NASAL SPRAY) 0.05 % NASAL SPRAY    2 sprays by Nasal route 2 times daily for 3 days Do not take more than 3 days in a row.        (Please note that portions of this note were completed with a voice recognition program.  Efforts were made to edit thedictations but occasionally words are mis-transcribed.)    Adriana Mae MD  Attending Emergency Physician                        Adriana Mae MD  07/13/19 [id number]

## 2019-10-05 ENCOUNTER — HOSPITAL ENCOUNTER (EMERGENCY)
Age: 39
Discharge: HOME | End: 2019-10-05
Attending: EMERGENCY MEDICINE
Payer: MEDICARE

## 2019-10-05 ENCOUNTER — APPOINTMENT (OUTPATIENT)
Dept: GENERAL RADIOLOGY | Age: 39
End: 2019-10-05
Payer: MEDICARE

## 2019-10-05 VITALS
HEART RATE: 71 BPM | SYSTOLIC BLOOD PRESSURE: 150 MMHG | HEIGHT: 65 IN | RESPIRATION RATE: 14 BRPM | BODY MASS INDEX: 24.99 KG/M2 | TEMPERATURE: 97.8 F | WEIGHT: 150 LBS | DIASTOLIC BLOOD PRESSURE: 90 MMHG | OXYGEN SATURATION: 100 %

## 2019-10-05 DIAGNOSIS — S63.92XA SPRAIN OF LEFT HAND, INITIAL ENCOUNTER: Primary | ICD-10-CM

## 2019-10-05 PROCEDURE — 73110 X-RAY EXAM OF WRIST: CPT

## 2019-10-05 PROCEDURE — 99283 EMERGENCY DEPT VISIT LOW MDM: CPT

## 2019-10-05 PROCEDURE — 6370000000 HC RX 637 (ALT 250 FOR IP): Performed by: NURSE PRACTITIONER

## 2019-10-05 PROCEDURE — 73130 X-RAY EXAM OF HAND: CPT

## 2019-10-05 RX ORDER — ACETAMINOPHEN 325 MG/1
650 TABLET ORAL EVERY 6 HOURS PRN
Qty: 20 TABLET | Refills: 0 | Status: SHIPPED | OUTPATIENT
Start: 2019-10-05 | End: 2020-02-03 | Stop reason: ALTCHOICE

## 2019-10-05 RX ORDER — ACETAMINOPHEN 325 MG/1
650 TABLET ORAL ONCE
Status: COMPLETED | OUTPATIENT
Start: 2019-10-05 | End: 2019-10-05

## 2019-10-05 RX ADMIN — ACETAMINOPHEN 650 MG: 325 TABLET, FILM COATED ORAL at 10:46

## 2019-10-05 ASSESSMENT — ENCOUNTER SYMPTOMS
TROUBLE SWALLOWING: 0
COUGH: 0
NAUSEA: 0
SHORTNESS OF BREATH: 0
VOMITING: 0

## 2019-10-05 ASSESSMENT — PAIN DESCRIPTION - DESCRIPTORS: DESCRIPTORS: ACHING;NUMBNESS

## 2019-10-05 ASSESSMENT — PAIN DESCRIPTION - FREQUENCY: FREQUENCY: CONTINUOUS

## 2019-10-05 ASSESSMENT — PAIN DESCRIPTION - LOCATION: LOCATION: HAND

## 2019-10-05 ASSESSMENT — PAIN SCALES - GENERAL
PAINLEVEL_OUTOF10: 8
PAINLEVEL_OUTOF10: 6

## 2019-10-05 ASSESSMENT — PAIN DESCRIPTION - ORIENTATION: ORIENTATION: LEFT

## 2019-10-05 ASSESSMENT — PAIN DESCRIPTION - PAIN TYPE: TYPE: ACUTE PAIN

## 2019-10-12 ENCOUNTER — HOSPITAL ENCOUNTER (EMERGENCY)
Age: 39
Discharge: HOME | End: 2019-10-12
Attending: EMERGENCY MEDICINE
Payer: MEDICARE

## 2019-10-12 ENCOUNTER — APPOINTMENT (OUTPATIENT)
Dept: GENERAL RADIOLOGY | Age: 39
End: 2019-10-12
Payer: MEDICARE

## 2019-10-12 VITALS
SYSTOLIC BLOOD PRESSURE: 122 MMHG | HEIGHT: 65 IN | RESPIRATION RATE: 20 BRPM | TEMPERATURE: 98.1 F | DIASTOLIC BLOOD PRESSURE: 76 MMHG | OXYGEN SATURATION: 100 % | BODY MASS INDEX: 24.99 KG/M2 | HEART RATE: 87 BPM | WEIGHT: 150 LBS

## 2019-10-12 DIAGNOSIS — M54.6 ACUTE MIDLINE THORACIC BACK PAIN: ICD-10-CM

## 2019-10-12 DIAGNOSIS — M54.2 NECK PAIN: Primary | ICD-10-CM

## 2019-10-12 PROCEDURE — 71046 X-RAY EXAM CHEST 2 VIEWS: CPT

## 2019-10-12 PROCEDURE — 72040 X-RAY EXAM NECK SPINE 2-3 VW: CPT

## 2019-10-12 PROCEDURE — 72072 X-RAY EXAM THORAC SPINE 3VWS: CPT

## 2019-10-12 PROCEDURE — 6370000000 HC RX 637 (ALT 250 FOR IP): Performed by: STUDENT IN AN ORGANIZED HEALTH CARE EDUCATION/TRAINING PROGRAM

## 2019-10-12 PROCEDURE — 99284 EMERGENCY DEPT VISIT MOD MDM: CPT

## 2019-10-12 RX ORDER — CYCLOBENZAPRINE HCL 5 MG
5 TABLET ORAL 2 TIMES DAILY PRN
Qty: 10 TABLET | Refills: 0 | Status: SHIPPED | OUTPATIENT
Start: 2019-10-12 | End: 2019-10-22

## 2019-10-12 RX ORDER — ACETAMINOPHEN 500 MG
1000 TABLET ORAL ONCE
Status: COMPLETED | OUTPATIENT
Start: 2019-10-12 | End: 2019-10-12

## 2019-10-12 RX ORDER — CYCLOBENZAPRINE HCL 10 MG
10 TABLET ORAL ONCE
Status: COMPLETED | OUTPATIENT
Start: 2019-10-12 | End: 2019-10-12

## 2019-10-12 RX ADMIN — CYCLOBENZAPRINE 10 MG: 10 TABLET, FILM COATED ORAL at 06:35

## 2019-10-12 RX ADMIN — ACETAMINOPHEN 1000 MG: 500 TABLET ORAL at 06:35

## 2019-10-12 ASSESSMENT — ENCOUNTER SYMPTOMS
CHEST TIGHTNESS: 1
ABDOMINAL PAIN: 0
VOMITING: 0
SORE THROAT: 0
COUGH: 0
PHOTOPHOBIA: 0
BACK PAIN: 1
SHORTNESS OF BREATH: 1
NAUSEA: 0

## 2019-10-12 ASSESSMENT — PAIN DESCRIPTION - LOCATION
LOCATION: BACK
LOCATION: BACK

## 2019-10-12 ASSESSMENT — PAIN SCALES - GENERAL
PAINLEVEL_OUTOF10: 10
PAINLEVEL_OUTOF10: 9
PAINLEVEL_OUTOF10: 10

## 2019-10-12 ASSESSMENT — PAIN DESCRIPTION - ORIENTATION: ORIENTATION: MID

## 2019-10-12 ASSESSMENT — PAIN DESCRIPTION - DIRECTION: RADIATING_TOWARDS: HEAD

## 2019-10-16 ENCOUNTER — HOSPITAL ENCOUNTER (EMERGENCY)
Age: 39
Discharge: HOME | End: 2019-10-16
Attending: EMERGENCY MEDICINE
Payer: MEDICARE

## 2019-10-16 VITALS
TEMPERATURE: 97.3 F | HEART RATE: 74 BPM | OXYGEN SATURATION: 97 % | SYSTOLIC BLOOD PRESSURE: 132 MMHG | HEIGHT: 65 IN | RESPIRATION RATE: 18 BRPM | DIASTOLIC BLOOD PRESSURE: 76 MMHG | WEIGHT: 145 LBS | BODY MASS INDEX: 24.16 KG/M2

## 2019-10-16 DIAGNOSIS — R21 RASH AND OTHER NONSPECIFIC SKIN ERUPTION: Primary | ICD-10-CM

## 2019-10-16 LAB — T. PALLIDUM, IGG: NONREACTIVE

## 2019-10-16 PROCEDURE — 6370000000 HC RX 637 (ALT 250 FOR IP): Performed by: STUDENT IN AN ORGANIZED HEALTH CARE EDUCATION/TRAINING PROGRAM

## 2019-10-16 PROCEDURE — 99282 EMERGENCY DEPT VISIT SF MDM: CPT

## 2019-10-16 PROCEDURE — 86780 TREPONEMA PALLIDUM: CPT

## 2019-10-16 RX ORDER — HYDROXYZINE HYDROCHLORIDE 25 MG/1
25 TABLET, FILM COATED ORAL EVERY 6 HOURS PRN
Qty: 10 TABLET | Refills: 0 | Status: SHIPPED | OUTPATIENT
Start: 2019-10-16 | End: 2019-10-26

## 2019-10-16 RX ORDER — HYDROXYZINE HYDROCHLORIDE 10 MG/1
10 TABLET, FILM COATED ORAL ONCE
Status: COMPLETED | OUTPATIENT
Start: 2019-10-16 | End: 2019-10-16

## 2019-10-16 RX ADMIN — HYDROXYZINE HYDROCHLORIDE 10 MG: 10 TABLET ORAL at 02:24

## 2019-10-16 ASSESSMENT — PAIN DESCRIPTION - ORIENTATION: ORIENTATION: RIGHT;LEFT

## 2019-10-16 ASSESSMENT — PAIN DESCRIPTION - DESCRIPTORS: DESCRIPTORS: BURNING

## 2019-10-16 ASSESSMENT — PAIN DESCRIPTION - LOCATION: LOCATION: HAND

## 2019-10-16 ASSESSMENT — PAIN SCALES - GENERAL: PAINLEVEL_OUTOF10: 9

## 2019-10-16 ASSESSMENT — PAIN DESCRIPTION - FREQUENCY: FREQUENCY: CONTINUOUS

## 2019-10-16 ASSESSMENT — PAIN DESCRIPTION - PAIN TYPE: TYPE: ACUTE PAIN

## 2019-11-15 DIAGNOSIS — M79.642 LEFT HAND PAIN: Primary | ICD-10-CM

## 2020-02-03 ENCOUNTER — HOSPITAL ENCOUNTER (EMERGENCY)
Age: 40
Discharge: HOME | End: 2020-02-03
Attending: EMERGENCY MEDICINE
Payer: MEDICARE

## 2020-02-03 VITALS
SYSTOLIC BLOOD PRESSURE: 179 MMHG | TEMPERATURE: 98.4 F | OXYGEN SATURATION: 99 % | DIASTOLIC BLOOD PRESSURE: 95 MMHG | RESPIRATION RATE: 18 BRPM | HEART RATE: 80 BPM

## 2020-02-03 LAB
CHP ED QC CHECK: NORMAL
PREGNANCY TEST URINE, POC: NEGATIVE

## 2020-02-03 PROCEDURE — 6370000000 HC RX 637 (ALT 250 FOR IP): Performed by: STUDENT IN AN ORGANIZED HEALTH CARE EDUCATION/TRAINING PROGRAM

## 2020-02-03 PROCEDURE — 96372 THER/PROPH/DIAG INJ SC/IM: CPT

## 2020-02-03 PROCEDURE — 81025 URINE PREGNANCY TEST: CPT

## 2020-02-03 PROCEDURE — 6360000002 HC RX W HCPCS: Performed by: STUDENT IN AN ORGANIZED HEALTH CARE EDUCATION/TRAINING PROGRAM

## 2020-02-03 PROCEDURE — 99283 EMERGENCY DEPT VISIT LOW MDM: CPT

## 2020-02-03 RX ORDER — IBUPROFEN 800 MG/1
800 TABLET ORAL EVERY 8 HOURS PRN
Qty: 30 TABLET | Refills: 0 | Status: SHIPPED | OUTPATIENT
Start: 2020-02-03 | End: 2020-09-04 | Stop reason: ALTCHOICE

## 2020-02-03 RX ORDER — ACETAMINOPHEN 500 MG
1000 TABLET ORAL ONCE
Status: COMPLETED | OUTPATIENT
Start: 2020-02-03 | End: 2020-02-03

## 2020-02-03 RX ORDER — KETOROLAC TROMETHAMINE 15 MG/ML
15 INJECTION, SOLUTION INTRAMUSCULAR; INTRAVENOUS ONCE
Status: COMPLETED | OUTPATIENT
Start: 2020-02-03 | End: 2020-02-03

## 2020-02-03 RX ORDER — PENICILLIN V POTASSIUM 500 MG/1
500 TABLET ORAL 4 TIMES DAILY
Qty: 28 TABLET | Refills: 0 | Status: SHIPPED | OUTPATIENT
Start: 2020-02-03 | End: 2020-02-10

## 2020-02-03 RX ORDER — ACETAMINOPHEN 500 MG
1000 TABLET ORAL 3 TIMES DAILY
Qty: 180 TABLET | Refills: 0 | Status: SHIPPED | OUTPATIENT
Start: 2020-02-03 | End: 2020-02-11

## 2020-02-03 RX ADMIN — KETOROLAC TROMETHAMINE 15 MG: 15 INJECTION, SOLUTION INTRAMUSCULAR; INTRAVENOUS at 11:22

## 2020-02-03 RX ADMIN — ACETAMINOPHEN 1000 MG: 500 TABLET ORAL at 11:22

## 2020-02-03 RX ADMIN — BENZOCAINE: 200 GEL TOPICAL at 11:40

## 2020-02-03 ASSESSMENT — ENCOUNTER SYMPTOMS
SORE THROAT: 0
NAUSEA: 0
VOMITING: 0
ABDOMINAL PAIN: 0
DIARRHEA: 0
COUGH: 0
SHORTNESS OF BREATH: 0

## 2020-02-03 ASSESSMENT — PAIN DESCRIPTION - PAIN TYPE: TYPE: ACUTE PAIN

## 2020-02-03 ASSESSMENT — PAIN DESCRIPTION - DESCRIPTORS: DESCRIPTORS: ACHING;SHOOTING

## 2020-02-03 ASSESSMENT — PAIN SCALES - GENERAL
PAINLEVEL_OUTOF10: 9
PAINLEVEL_OUTOF10: 9

## 2020-02-03 ASSESSMENT — PAIN DESCRIPTION - PROGRESSION: CLINICAL_PROGRESSION: NOT CHANGED

## 2020-02-03 ASSESSMENT — PAIN DESCRIPTION - ORIENTATION: ORIENTATION: RIGHT;UPPER

## 2020-02-03 ASSESSMENT — PAIN DESCRIPTION - LOCATION: LOCATION: TEETH

## 2020-02-03 ASSESSMENT — PAIN DESCRIPTION - FREQUENCY: FREQUENCY: CONTINUOUS

## 2020-02-03 NOTE — ED PROVIDER NOTES
[address]  ED  Emergency Department Encounter  EmergencyMedicine Resident     Pt Chai Conte  MRN: [id number]  [place] 1980  Date of evaluation: 2/3/20  PCP:  No primary care provider on file. CHIEF COMPLAINT       Chief Complaint   Patient presents with    Dental Pain     Pt states she was chewing bbq last night when she had a shooting pain in her top back Rt tooth    Pregnancy Test     pt thinks she could be pregnant        HISTORY OF PRESENT ILLNESS  (Location/Symptom, Timing/Onset, Context/Setting, Quality, Duration, Modifying Factors, Severity.)      Taina Allison is a 47 y.o. female who presents with right-sided dental pain after she broke a tooth yesterday while eating barbecue. Patient reports her right upper premolar broke last night; she was not previously having any pain in this tooth. She has previously had a cavity filled in this tooth with no further complications. Since last night, she has had increasing pain with radiation to her left face, ear, and neck. She reports subjective fevers and chills as well as nausea. No difficulty speaking, swallowing, or breathing. Recent mild nasal congestion and cough preceding this event. No other symptoms; no chest pain, shortness of breath, abdominal pain, vomiting, numbness or tingling, focal weakness. Patient is also requesting a pregnancy test as she had missed her period for 2 months. She reports she is currently on her period which feels normal for her and she does not have any abnormal symptoms. PAST MEDICAL / SURGICAL / SOCIAL / FAMILY HISTORY      has a past medical history of Hypertension and Seasonal allergies. has a past surgical history that includes Tubal ligation.     Social History     Socioeconomic History    Marital status: Single     Spouse name: Not on file    Number of children: Not on file    Years of education: Not on file    Highest education level: Not on file   Occupational History    Not on file   Social Needs    Financial resource strain: Not on file    Food insecurity:     Worry: Not on file     Inability: Not on file    Transportation needs:     Medical: Not on file     Non-medical: Not on file   Tobacco Use    Smoking status: Current Every Day Smoker     Packs/day: 1.00     Types: Cigarettes    Smokeless tobacco: Never Used   Substance and Sexual Activity    Alcohol use: Yes     Comment: seldom    Drug use: No    Sexual activity: Not on file   Lifestyle    Physical activity:     Days per week: Not on file     Minutes per session: Not on file    Stress: Not on file   Relationships    Social connections:     Talks on phone: Not on file     Gets together: Not on file     Attends Holiness service: Not on file     Active member of club or organization: Not on file     Attends meetings of clubs or organizations: Not on file     Relationship status: Not on file    Intimate partner violence:     Fear of current or ex partner: Not on file     Emotionally abused: Not on file     Physically abused: Not on file     Forced sexual activity: Not on file   Other Topics Concern    Not on file   Social History Narrative    Not on file       History reviewed. No pertinent family history. Allergies:  Seasonal and Motrin [ibuprofen]    Home Medications:  Prior to Admission medications    Medication Sig Start Date End Date Taking?  Authorizing Provider   ibuprofen (ADVIL;MOTRIN) 800 MG tablet Take 1 tablet by mouth every 8 hours as needed for Pain 2/3/20  Yes Zaria Maier MD   acetaminophen (TYLENOL) 500 MG tablet Take 2 tablets by mouth 3 times daily 2/3/20  Yes Zaria Maier MD   benzocaine (ORAJEL) 20 % GEL mucosal gel Apply to affected area three times daily as needed 2/3/20  Yes Zaria Maier MD   penicillin v potassium (VEETID) 500 MG tablet Take 1 tablet by mouth 4 times daily for 7 days 2/3/20 2/10/20 Yes Zaria Maier MD   ondansetron (ZOFRAN ODT) 4 MG disintegrating tablet Take 1 tablet by mouth every 8 hours as needed for Nausea or Vomiting 7/13/19   Harsh Ruiz MD   Carboxymethylcellul-Glycerin 0.5-0.9 % SOLN Apply 2 drops to eye as needed (dry and irritated eyes) 5/21/19   Oneida Meeks, DO       REVIEW OF SYSTEMS    (2-9 systems for level 4, 10 or more for level 5)      Review of Systems   Constitutional: Negative for chills, fatigue and fever. HENT: Positive for dental problem. Negative for congestion and sore throat. Eyes: Negative for visual disturbance. Respiratory: Negative for cough and shortness of breath. Cardiovascular: Negative for chest pain. Gastrointestinal: Negative for abdominal pain, diarrhea, nausea and vomiting. Genitourinary: Negative for dysuria, flank pain and hematuria. Musculoskeletal: Negative for arthralgias, gait problem, neck pain and neck stiffness. Neurological: Negative for syncope, weakness, light-headedness, numbness and headaches. PHYSICAL EXAM   (up to 7 for level 4, 8 or more for level 5)      INITIAL VITALS:   BP (!) 179/95   Pulse 80   Temp 98.4 °F (36.9 °C) (Oral)   Resp 18   SpO2 99%     Physical Exam   Gen. Appearance: patient appears well, nondistressed. HEENT: head atraumatic, normocephalic. Pupils equal and reactive to light. Oropharynx clear and moist.  Right upper premolar is broken in half, with half of the tooth gone. No exposed pulp. No visible or palpable abscess. There is significant tenderness with percussion of the tooth. The tooth is not loose. There is no bleeding. No trismus or drooling. No sublingual or 7 to [place] fullness. Uvula midline, no peritonsillar abscess or other abscess. Neck: Supple, normal range of motion. No lymphadenopathy. Pulmonary: Lungs clear to auscultation bilaterally. Equal air entry right left. Cardiovascular:  Heart sounds normal, no murmurs. Peripheral pulses strong, regular, equal.   Abdomen: Soft, nontender, nondistended. angina. Plan for symptomatic treatment with Tylenol, ibuprofen, Orajel. Dental paste. Penicillin. Point-of-care urine pregnancy test.  Dentist follow-up. RADIOLOGY:  None    EKG  None    All EKG's are interpreted by the Emergency Department Physician who either signs or Co-signs this chart in the absence of a cardiologist.    EMERGENCY DEPARTMENT COURSE:    Patient provided with a dentist appointment. She declined an appointment for tomorrow as she feels she must attend work. She has an appointment for the day after tomorrow. Attempted to apply dental paste x 3 with no success. Prescriptions for penicillin and symptomatic support provided. Patient was counseled to follow up with their Primary Care Provider as soon as possible for an ER follow-up visit. Patient counseled to return to the Emergency Department for any worsening symptoms, fever, difficulty speaking/breathing/swallowing, or for any other cares or concerns. Patient verbalized understanding and agreement with plan. Discharged to home in stable condition and in no distress. PROCEDURES:  None    CONSULTS:  None    CRITICAL CARE:  None    FINAL IMPRESSION      1.  Open fracture of tooth, initial encounter          DISPOSITION / PLAN     DISPOSITION Decision To Discharge 02/03/2020 12:22:05 PM      PATIENT REFERRED TO:  Your Primary Care Provider    Schedule an appointment as soon as possible for a visit       [address] [facility] [address]  [address], [address] [id number]  [phone]  Schedule an appointment as soon as possible for a visit       [facility] ED  [address] [place] [address]  [phone]    As needed      DISCHARGE MEDICATIONS:  Discharge Medication List as of 2/3/2020 12:00 PM      START taking these medications    Details   ibuprofen (ADVIL;MOTRIN) 800 MG tablet Take 1 tablet by mouth every 8 hours as needed for Pain, Disp-30 tablet, R-0Print      acetaminophen (TYLENOL) 500 MG tablet

## 2020-02-03 NOTE — ED PROVIDER NOTES
[address] [facility]     Emergency Department     Faculty Attestation    I performed a history and physical examination of the patient and discussed management with the resident. I have reviewed and agree with the residents findings including all diagnostic interpretations, and treatment plans as written at the time of my review. Any areas of disagreement are noted on the chart. I was personally present for the key portions of any procedures. I have documented in the chart those procedures where I was not present during the key portions. For Physician Assistant/ Nurse Practitioner cases/documentation I have personally evaluated this patient and have completed at least one if not all key elements of the E/M (history, physical exam, and MDM). Additional findings are as noted. Primary Care Physician: No primary care provider on file. History: This is a 47 y.o. female who presents to the Emergency Department with complaint of dental pain. The patient states she was eating food last night when she broke off a tooth on the right upper molar area. She complained of pain. Patient also has missed her menstrual period for 2 months and would like to be checked for pregnancy. There is no complaints of abdominal pain. Physical:   oral temperature is 98.4 °F (36.9 °C). Her blood pressure is 179/95 (abnormal) and her pulse is 80. Her respiration is 18 and oxygen saturation is 99%. There is obviously a fractured tooth in the right upper first molar. No abscess is noted. Impression: Dental fracture, pregnancy test    Plan: UCG, analgesia, penicillin, dental clinic follow-up    (Please note that portions of this note were completed with a voice recognition program.  Efforts were made to edit the dictations but occasionally words are mis-transcribed.)    Luca Quinonez.  Charlene Schwarz MD, [facility],[address]  Attending Emergency Medicine Physician        Aldo Combs MD  02/03/20 2113

## 2020-02-04 LAB — HCG, PREGNANCY URINE (POC): NEGATIVE

## 2020-02-10 ENCOUNTER — HOSPITAL ENCOUNTER (EMERGENCY)
Age: 40
Discharge: HOME | End: 2020-02-10
Attending: EMERGENCY MEDICINE
Payer: MEDICARE

## 2020-02-10 ENCOUNTER — APPOINTMENT (OUTPATIENT)
Dept: GENERAL RADIOLOGY | Age: 40
End: 2020-02-10
Payer: MEDICARE

## 2020-02-10 VITALS
WEIGHT: 145 LBS | RESPIRATION RATE: 16 BRPM | DIASTOLIC BLOOD PRESSURE: 78 MMHG | TEMPERATURE: 97.3 F | SYSTOLIC BLOOD PRESSURE: 123 MMHG | BODY MASS INDEX: 24.16 KG/M2 | HEIGHT: 65 IN | OXYGEN SATURATION: 100 % | HEART RATE: 78 BPM

## 2020-02-10 PROCEDURE — 73562 X-RAY EXAM OF KNEE 3: CPT

## 2020-02-10 PROCEDURE — 96372 THER/PROPH/DIAG INJ SC/IM: CPT

## 2020-02-10 PROCEDURE — 6360000002 HC RX W HCPCS: Performed by: STUDENT IN AN ORGANIZED HEALTH CARE EDUCATION/TRAINING PROGRAM

## 2020-02-10 PROCEDURE — 6370000000 HC RX 637 (ALT 250 FOR IP): Performed by: STUDENT IN AN ORGANIZED HEALTH CARE EDUCATION/TRAINING PROGRAM

## 2020-02-10 PROCEDURE — 99284 EMERGENCY DEPT VISIT MOD MDM: CPT

## 2020-02-10 PROCEDURE — 96374 THER/PROPH/DIAG INJ IV PUSH: CPT

## 2020-02-10 RX ORDER — KETOROLAC TROMETHAMINE 15 MG/ML
15 INJECTION, SOLUTION INTRAMUSCULAR; INTRAVENOUS ONCE
Status: COMPLETED | OUTPATIENT
Start: 2020-02-10 | End: 2020-02-10

## 2020-02-10 RX ORDER — IBUPROFEN 800 MG/1
800 TABLET ORAL EVERY 6 HOURS PRN
Qty: 21 TABLET | Refills: 0 | Status: SHIPPED | OUTPATIENT
Start: 2020-02-10 | End: 2020-09-04 | Stop reason: SDUPTHER

## 2020-02-10 RX ORDER — CYCLOBENZAPRINE HCL 5 MG
5 TABLET ORAL 2 TIMES DAILY PRN
Qty: 10 TABLET | Refills: 0 | Status: SHIPPED | OUTPATIENT
Start: 2020-02-10 | End: 2020-02-20

## 2020-02-10 RX ORDER — CYCLOBENZAPRINE HCL 10 MG
10 TABLET ORAL ONCE
Status: COMPLETED | OUTPATIENT
Start: 2020-02-10 | End: 2020-02-10

## 2020-02-10 RX ADMIN — KETOROLAC TROMETHAMINE 15 MG: 15 INJECTION, SOLUTION INTRAMUSCULAR; INTRAVENOUS at 22:55

## 2020-02-10 RX ADMIN — CYCLOBENZAPRINE 10 MG: 10 TABLET, FILM COATED ORAL at 22:55

## 2020-02-10 RX ADMIN — ENOXAPARIN SODIUM 70 MG: 80 INJECTION SUBCUTANEOUS at 23:43

## 2020-02-10 ASSESSMENT — PAIN SCALES - GENERAL
PAINLEVEL_OUTOF10: 8
PAINLEVEL_OUTOF10: 8

## 2020-02-10 ASSESSMENT — PAIN DESCRIPTION - DESCRIPTORS: DESCRIPTORS: SHARP;SORE

## 2020-02-10 ASSESSMENT — PAIN DESCRIPTION - LOCATION: LOCATION: KNEE

## 2020-02-10 ASSESSMENT — PAIN DESCRIPTION - ORIENTATION: ORIENTATION: RIGHT

## 2020-02-11 ENCOUNTER — HOSPITAL ENCOUNTER (EMERGENCY)
Age: 40
Discharge: HOME | End: 2020-02-11
Attending: EMERGENCY MEDICINE
Payer: MEDICARE

## 2020-02-11 VITALS
SYSTOLIC BLOOD PRESSURE: 122 MMHG | HEIGHT: 64 IN | RESPIRATION RATE: 15 BRPM | OXYGEN SATURATION: 99 % | TEMPERATURE: 97 F | BODY MASS INDEX: 27.14 KG/M2 | DIASTOLIC BLOOD PRESSURE: 75 MMHG | WEIGHT: 159 LBS | HEART RATE: 88 BPM

## 2020-02-11 PROCEDURE — 6370000000 HC RX 637 (ALT 250 FOR IP): Performed by: STUDENT IN AN ORGANIZED HEALTH CARE EDUCATION/TRAINING PROGRAM

## 2020-02-11 PROCEDURE — 99283 EMERGENCY DEPT VISIT LOW MDM: CPT

## 2020-02-11 PROCEDURE — 93970 EXTREMITY STUDY: CPT

## 2020-02-11 RX ORDER — IBUPROFEN 400 MG/1
400 TABLET ORAL ONCE
Status: DISCONTINUED | OUTPATIENT
Start: 2020-02-11 | End: 2020-02-11 | Stop reason: HOSPADM

## 2020-02-11 RX ORDER — ACETAMINOPHEN 500 MG
1000 TABLET ORAL ONCE
Status: COMPLETED | OUTPATIENT
Start: 2020-02-11 | End: 2020-02-11

## 2020-02-11 RX ADMIN — ACETAMINOPHEN 1000 MG: 500 TABLET ORAL at 11:10

## 2020-02-11 ASSESSMENT — ENCOUNTER SYMPTOMS
APNEA: 0
VOMITING: 0
DIARRHEA: 0
EYE REDNESS: 0
SHORTNESS OF BREATH: 0
NAUSEA: 0
ABDOMINAL PAIN: 0
BLOOD IN STOOL: 0
COUGH: 0
WHEEZING: 0
RHINORRHEA: 0
SINUS PAIN: 0
EYE PAIN: 0

## 2020-02-11 ASSESSMENT — PAIN SCALES - GENERAL: PAINLEVEL_OUTOF10: 9

## 2020-02-11 NOTE — ED PROVIDER NOTES
[address]  ED  Emergency Department Encounter  EmergencyMedicineResident     Pt Name: Michelle Olivarez  MRN: [id number]  [place] 1980  Date of evaluation: 2/11/20  PCP: No primary care provider on file. CHIEF COMPLAINT       Chief Complaint   Patient presents with    Knee Pain     pt states \"need U/S of knee bc they think I have a blood clot\"        HISTORY OF PRESENT ILLNESS  (Location/Symptom, Timing/Onset, Context/Setting, Quality, Duration, Modifying Factors, Severity.)      Michelle Olivarez is a 47 y.o. female who presents with right knee pain and right leg swelling. Patient states that yesterday, she almost had a fall, because she slipped. She felt a pop in her right knee. She presented to the emergency department last night around 10:30 PM and was evaluated. X-ray was negative for acute findings. ED physicians at that time wanted to obtain DVT ultrasound but were unable to do so due to the time. They treated her with 1 milligram per kilogram Lovenox and asked her to come back this morning for ultrasound of her right leg. Not on estrogen. No history of DVT. Not on any anticoagulation. Patient does have a history of chronic right knee issues. Patient has had pain for the past few years. She states that last year, she was evaluated by orthopedic surgeon. They ordered an MRI that was performed in March. However she did not follow up with them after the MRI. Patient went back to work and has since had chronic right knee pain. Has a hx of baker's cyst in the knee. Multiple patellar dislocations. Patient denies chest pain, shortness of breath, abdominal pain, nausea, vomiting, fever, chills, headache, dizziness, lightheadedness, vision changes, dysuria, hematuria, changes in bowel movements. PAST MEDICAL / SURGICAL /SOCIAL / FAMILY HISTORY      has a past medical history of Hypertension and Seasonal allergies.        has a past surgical history that includes 2/20/20  Paradise Adams DO   ibuprofen (IBU) 800 MG tablet Take 1 tablet by mouth every 6 hours as needed for Pain 2/10/20   Paradise Adams DO   ibuprofen (ADVIL;MOTRIN) 800 MG tablet Take 1 tablet by mouth every 8 hours as needed for Pain 2/3/20   Rosalee Sanabria MD   benzocaine (ORAJEL) 20 % GEL mucosal gel Apply to affected area three times daily as needed 2/3/20   Rosalee Sanabria MD   ondansetron (ZOFRAN ODT) 4 MG disintegrating tablet Take 1 tablet by mouth every 8 hours as needed for Nausea or Vomiting 7/13/19   Anita Villagomez MD   Carboxymethylcellul-Glycerin 0.5-0.9 % SOLN Apply 2 drops to eye as needed (dry and irritated eyes) 5/21/19   Oneida Meeks DO       REVIEW OF SYSTEMS    (2-9 systems for level 4, 10 or more forlevel 5)      Review of Systems   Constitutional: Negative for activity change, appetite change, chills and fever. HENT: Negative for congestion, ear pain, rhinorrhea and sinus pain. Eyes: Negative for pain and redness. Respiratory: Negative for apnea, cough, shortness of breath and wheezing. Cardiovascular: Negative for chest pain. Gastrointestinal: Negative for abdominal pain, blood in stool, diarrhea, nausea and vomiting. Genitourinary: Negative for decreased urine volume, difficulty urinating, dysuria and hematuria. Musculoskeletal: Negative for neck pain and neck stiffness. Right knee pain, right leg swelling   Skin: Negative for rash. Neurological: Negative for dizziness, syncope, weakness, light-headedness and headaches. PHYSICAL EXAM   (up to 7 for level 4, 8 or more forlevel 5)      ED TRIAGE VITALS BP: 122/75, Temp: 97 °F (36.1 °C), Pulse: 88, Resp: 15, SpO2: 99 %    Vitals:    02/11/20 1040   BP: 122/75   Pulse: 88   Resp: 15   Temp: 97 °F (36.1 °C)   TempSrc: Oral   SpO2: 99%   Weight: 159 lb (72.1 kg)   Height: 5' 4\" (1.626 m)         Physical Exam  Constitutional:       Appearance: She is well-developed.  She is not ill-appearing, DIFFERENTIAL  DIAGNOSIS     PLAN (LABS / IMAGING / EKG):  Orders Placed This Encounter   Procedures    VL DUP LOWER EXTREMITY VENOUS BILATERAL       MEDICATIONS ORDERED:  ED Medication Orders (From admission, onward)    Start Ordered     Status Ordering Provider    02/11/20 1100 02/11/20 1052  acetaminophen (TYLENOL) tablet 1,000 mg  ONCE      Last MAR action:  Given - by Hosea Moe on 02/11/20 at [address], [place]          DDX: Fracture, dislocation, sprain, strain, chronic knee issues, baker's cyst    DIAGNOSTIC RESULTS / [address] [place] [address] / [facility]     IMPRESSION & INITIAL PLAN:  61-year-old female with history of chronic right knee issues who presents the emergency department with right lower extremity swelling and pain. Had a slip and twist of the knee resulting in a pop. No fall. Occurred yesterday. Has had a history of Baker's cyst and multiple patellar dislocations. Has not followed up with orthopedic surgery after initial evaluation last year. On exam, patient appears mildly uncomfortable, not ill or toxic. Cardiac RRR, no murmurs, rubs, gallops, Lungs are CTA-B, no wheezes, rales, rhonchi, Abd soft, nontender, nondistended. Right knee exam as above. Moderate effusion of the knee. No swelling anywhere else in the lower extremity. No calf tenderness. Negative Homans sign. Plan is to obtain DVT ultrasound to ensure that she does not have a DVT. We will treat appropriately. We will have her follow-up with orthopedic surgeon. LABS:  No results found for this visit on 02/11/20. RADIOLOGY:  VL DUP LOWER EXTREMITY VENOUS BILATERAL   Final Result          ECG:  None     All EKG's are interpreted by the Emergency Department Physician who either signsor Co-signs this chart in the absence of a cardiologist.    BEDSIDE ULTRASOUND:  None     EMERGENCY DEPARTMENT COURSE:    ED Course as of Feb 12 2215   Tue Feb 11, 2020   1313 DVT ultrasound negative for acute findings.  Patient was counseled to follow up with primary care physician and Ortho surgeon and given appropriate emergency department return precautions. Patient was discharged in stable condition.    [SM]      ED Course User Index  [SM] Jessy Hoyos MD        PROCEDURES:  None     CONSULTS:  None    CRITICAL CARE:  See attending physician note    FINAL IMPRESSION      1. Chronic pain of right knee          DISPOSITION / PLAN     DISPOSITION    Discharge to home     PATIENT REFERRED TO:  [facility]  [address] [address]  [phone]  Go to   If symptoms worsen    Your new doctor (list provided)    Schedule an appointment as soon as possible for a visit in 1 day  For follow-up of this visit    [address] [place] [address]  [phone]  Schedule an appointment as soon as possible for a visit in 1 day  For follow-up of this visit      DISCHARGE MEDICATIONS:  Discharge Medication List as of 2/11/2020  1:27 PM      START taking these medications    Details   Acetaminophen (ACETAMINOPHEN EXTRA STRENGTH) 500 MG CAPS Take 1-2 tablets by mouth every 6 hours as needed for pain. Do not take more than 8 pills in a 24 hour period. , Disp-30 capsule, R-0Print           Discharge Medication List as of 2/11/2020  1:27 Nicole Castellanos MD  Emergency Medicine Resident    (Please note that portions of this note were completed with a voice recognition program.  Efforts were made to edit the dictations but occasionally words are mis-transcribed.)        Jessy Hoyos MD  Resident  02/12/20 [phone]

## 2020-02-11 NOTE — ED PROVIDER NOTES
[facility] ED     Emergency Department     Faculty Attestation        I performed a history and physical examination of the patient and discussed management with the resident. I reviewed the residents note and agree with the documented findings and plan of care. Any areas of disagreement are noted on the chart. I was personally present for the key portions of any procedures. I have documented in the chart those procedures where I was not present during the key portions. I have reviewed the emergency nurses triage note. I agree with the chief complaint, past medical history, past surgical history, allergies, medications, social and family history as documented unless otherwise noted below. For mid-level providers such as nurse practitioners as well as physicians assistants:    I have personally seen and evaluated the patient. I find the patient's history and physical exam are consistent with NP/PA documentation. I agree with the care provided, treatment rendered, disposition, & follow-up plan. Additional findings are as noted. Vital Signs: /78   Pulse 78   Temp 97.3 °F (36.3 °C) (Oral)   Resp 16   Ht 5' 5\" (1.651 m)   Wt 145 lb (65.8 kg)   SpO2 100%   BMI 24.13 kg/m²   PCP:  No primary care provider on file. Pertinent Comments:     Concern for lower extremity DVT. Patient is having no chest pain or cough or shortness of breath. She has no sign of arterial occlusion. I will give Lovenox here and have her return in the morning for duplex. Critical Care  None         Note, if the patient's blood pressure was elevated, and they have no history of hypertension, they were informed of the following: The patient may have Pre-hypertension/Hypertension: The patient has been informed that they may have pre-hypertension or Hypertension based on a blood pressure reading in the emergency department.  I recommend that the patient call the primary care provider listed on their discharge instructions or a physician of their choice this week to arrange follow up for further evaluation of possible pre-hypertension or Hypertension. (Please note that portions of this note were completed with a voice recognition program.  Efforts were made to edit the dictations but occasionally words are mis-transcribed. )    Marquis Elissa MD  Attending Emergency Medicine Physician              Constanza Rodgers MD  02/10/20 0861

## 2020-02-11 NOTE — ED PROVIDER NOTES
[facility]     Emergency Department     Faculty Note/ Attestation      Pt Name: Kyle Rogers                                       MRN: [id number]  [place] 1980  Date of evaluation: 2/11/2020  Patients PCP:    No primary care provider on file. Attestation  I performed a history and physical examination of the patient and discussed management with the resident. I reviewed the residents note and agree with the documented findings and plan of care. Any areas of disagreement are noted on the chart. I was personally present for the key portions of any procedures. I have documented in the chart those procedures where I was not present during the key portions. I have reviewed the emergency nurses triage note. I agree with the chief complaint, past medical history, past surgical history, allergies, medications, social and family history as documented unless otherwise noted below. For Physician Assistant/ Nurse Practitioner cases/documentation I have personally evaluated this patient and have completed at least one if not all key elements of the E/M (history, physical exam, and MDM). Additional findings are as noted. Initial Screens:             Vitals:    Vitals:    02/11/20 1040   BP: 122/75   Pulse: 88   Resp: 15   Temp: 97 °F (36.1 °C)   TempSrc: Oral   SpO2: 99%   Weight: 159 lb (72.1 kg)   Height: 5' 4\" (1.626 m)       Chief Complaint      Chief Complaint   Patient presents with    Knee Pain     pt states \"need U/S of knee bc they think I have a blood clot\"           height is 5' 4\" (1.626 m) and weight is 159 lb (72.1 kg). Her oral temperature is 97 °F (36.1 °C). Her blood pressure is 122/75 and her pulse is 88. Her respiration is 15 and oxygen saturation is 99%.             DIAGNOSTIC RESULTS       RADIOLOGY:   VL DUP LOWER EXTREMITY VENOUS BILATERAL    (Results Pending)         LABS:  Labs Reviewed - No data to display      EMERGENCY DEPARTMENT COURSE:

## 2020-02-11 NOTE — ED PROVIDER NOTES
[facility] ED  Emergency Department Encounter  EmergencyMedicine Resident     Pt Jazlyn Ortiz  MRN: [id number]  [place] 1980  Date of evaluation: 2/10/20  PCP:  No primary care provider on file. CHIEF COMPLAINT       Chief Complaint   Patient presents with    Knee Pain     right knee pain        HISTORY OF PRESENT ILLNESS  (Location/Symptom, Timing/Onset, Context/Setting, Quality, Duration, Modifying Factors, Severity.)      Patient here with right knee pain, patient's had chronic knee pain issues in the past with reported prior ACL tear joint effusion. Patient states she pivoted felt a pop feels like there is fluid in her joint she has swelling in her thigh and whole leg. She is not on estrogen she is had no history of DVT she is on any anticoagulation, she had no mechanical fall or injuries. PAST MEDICAL / SURGICAL / SOCIAL / FAMILY HISTORY     Past medical and surgical history reviewed by nursing    Social history reviewed by nursing    Allergies reviewed by nursing    Home Medications:  Prior to Admission medications    Medication Sig Start Date End Date Taking?  Authorizing Provider   cyclobenzaprine (FLEXERIL) 5 MG tablet Take 1 tablet by mouth 2 times daily as needed for Muscle spasms 2/10/20 2/20/20 Yes Theresa Adams, DO   ibuprofen (IBU) 800 MG tablet Take 1 tablet by mouth every 6 hours as needed for Pain 2/10/20  Yes Theresa Adams, DO   ibuprofen (ADVIL;MOTRIN) 800 MG tablet Take 1 tablet by mouth every 8 hours as needed for Pain 2/3/20   Martina Márquez MD   acetaminophen (TYLENOL) 500 MG tablet Take 2 tablets by mouth 3 times daily 2/3/20   Martina Márquez MD   benzocaine (ORAJEL) 20 % GEL mucosal gel Apply to affected area three times daily as needed 2/3/20   Martina Márquez MD   ondansetron (ZOFRAN ODT) 4 MG disintegrating tablet Take 1 tablet by mouth every 8 hours as needed for Nausea or Vomiting 7/13/19   Kelsey Gil MD Carboxymethylcellul-Glycerin 0.5-0.9 % SOLN Apply 2 drops to eye as needed (dry and irritated eyes) 5/21/19   Oneida Meeks, DO       REVIEW OF SYSTEMS    (2-9 systems for level 4, 10 or more for level 5)      General ROS - No fevers, No chills, no gradual weight loss, no night sweats  Ophthalmic ROS - No discharge, No changes in vision  ENT ROS -  No sore throat, No rhinorrhea,   Respiratory ROS - no shortness of breath, no cough, no  wheezing  Cardiovascular ROS - No chest pain, no dyspnea on exertion  Gastrointestinal ROS - No abdominal pain, no nausea, no vomiting, no change in bowel habits, no black or bloody stools  Genito-Urinary ROS - No dysuria, trouble voiding, or hematuria  Musculoskeletal ROS - No myalgias, positive arthalgias  Neurological ROS - No headache, no dizziness/lightheadedness, No focal weakness, no loss of sensation  Dermatological ROS - No lesions, No rash         PHYSICAL EXAM   (up to 7 for level 4, 8 or more for level 5)      INITIAL VITALS:   /78   Pulse 78   Temp 97.3 °F (36.3 °C) (Oral)   Resp 16   Ht 5' 5\" (1.651 m)   Wt 145 lb (65.8 kg)   SpO2 100%   BMI 24.13 kg/m²     General Appearance: Well-appearing, in no acute distress  HEENT: Head: normocephalic/atraumatic eyes: PERRLA, EOMT, conjunctiva not injected, sclerae nonicteric ears: External canals patent nose: Nares patent, no rhinorrhea, throat:mucous membranes moist, oropharynx clear     Neck: Trachea midline, no JVD. Lungs: No evidence of increased work of breathing. CTA B/L, no wheezes/rhonchi     Cardiovascular: RRR, no murmur, 2+ peripheral pulses bilaterally. Cap refill less than 2 seconds. No lower extremity edema noted    Abdomen: Soft, nontender. No guarding or rebound tenderness. Neurologic: NARVAEZ  to person, place, time, and event. No sensation deficits. Moving all extremities    Extremities: Skin warm, dry and intact.   Patient is in no obvious distress she does have right lower leg swelling inner thigh and distal to her knee increased greater right than left no obvious joint effusion she has no instability of the knee mild pain to patellar grind test she has mild hypertonicity of the quad muscle      DIFFERENTIAL  DIAGNOSIS     PLAN (LABS / IMAGING / EKG):  Orders Placed This Encounter   Procedures    XR KNEE RIGHT (3 VIEWS)    VL DUP LOWER EXTREMITY VENOUS RIGHT       MEDICATIONS ORDERED:  Orders Placed This Encounter   Medications    ketorolac (TORADOL) injection 15 mg    cyclobenzaprine (FLEXERIL) tablet 10 mg    enoxaparin (LOVENOX) injection 70 mg    cyclobenzaprine (FLEXERIL) 5 MG tablet     Sig: Take 1 tablet by mouth 2 times daily as needed for Muscle spasms     Dispense:  10 tablet     Refill:  0    ibuprofen (IBU) 800 MG tablet     Sig: Take 1 tablet by mouth every 6 hours as needed for Pain     Dispense:  21 tablet     Refill:  0           DIAGNOSTIC RESULTS / EMERGENCY DEPARTMENT COURSE / MDM     LABS:  No results found for this visit on 02/10/20. RADIOLOGY:  No results found. EKG  None    All EKG's are interpreted by the Emergency Department Physician who either signs or Co-signs this chart in the absence of a cardiologist.    EMERGENCY DEPARTMENT COURSE/IMPRESSION:    Patient here with right knee pain, patient's had chronic knee pain issues in the past with reported prior ACL tear joint effusion. Patient states she pivoted felt a pop feels like there is fluid in her joint she has swelling in her thigh and whole leg. She is not on estrogen she is had no history of DVT she is on any anticoagulation, she had no mechanical fall or injuries.     Patient is in no obvious distress she does have right lower leg swelling inner thigh and distal to her knee increased greater right than left no obvious joint effusion she has no instability of the knee mild pain to patellar grind test she has mild hypertonicity of the quad muscle    \concern is DVT joint effusion fracture however less likely will get x-ray will get Doppler lower extremity rule out DVT we will give Toradol Flexeril if negative will patient follow-up with orthopedics and give symptomatic support  ED Course as of Feb 11 0417   Mon Feb 10, 2020   2329 Vascular unable to come in still have concern for DVT no concern for PE she has no shortness of breath no chest pain, will treat with Lovenox and have patient come back within 12 hours for formal Doppler    [EF]      ED Course User Index  [EF] Derrell Lindsay DO       PROCEDURES:  None    CONSULTS:  None    CRITICAL CARE:  None    FINAL IMPRESSION      1. Acute pain of right knee    2. Leg swelling          DISPOSITION / PLAN     DISPOSITION Decision To Discharge 02/10/2020 11:31:37 PM      PATIENT REFERRED TO:  [facility]  [address] [address]  [phone]  Go in 1 day  For ultrasound of your leg      DISCHARGE MEDICATIONS:  Discharge Medication List as of 2/10/2020 11:36 PM      START taking these medications    Details   cyclobenzaprine (FLEXERIL) 5 MG tablet Take 1 tablet by mouth 2 times daily as needed for Muscle spasms, Disp-10 tablet, R-0Print      !! ibuprofen (IBU) 800 MG tablet Take 1 tablet by mouth every 6 hours as needed for Pain, Disp-21 tablet, R-0Print       !! - Potential duplicate medications found. Please discuss with provider. DO Beatriz Henderson D.O.   Emergency Medicine Resident    (Please note that portions of this note were completed with a voice recognition program.  Efforts were made to edit the dictations but occasionally words aremis-transcribed.)       Derrell Lindsay DO  Resident  02/11/20 1969

## 2020-05-12 ENCOUNTER — HOSPITAL ENCOUNTER (EMERGENCY)
Age: 40
Discharge: HOME | End: 2020-05-13
Attending: EMERGENCY MEDICINE
Payer: MEDICARE

## 2020-05-12 PROCEDURE — 99284 EMERGENCY DEPT VISIT MOD MDM: CPT

## 2020-05-12 ASSESSMENT — PAIN DESCRIPTION - ONSET: ONSET: GRADUAL

## 2020-05-12 ASSESSMENT — PAIN DESCRIPTION - DESCRIPTORS: DESCRIPTORS: THROBBING;STABBING

## 2020-05-12 ASSESSMENT — PAIN DESCRIPTION - FREQUENCY: FREQUENCY: CONTINUOUS

## 2020-05-12 ASSESSMENT — PAIN SCALES - GENERAL: PAINLEVEL_OUTOF10: 10

## 2020-05-12 ASSESSMENT — PAIN DESCRIPTION - PAIN TYPE: TYPE: ACUTE PAIN

## 2020-05-12 ASSESSMENT — PAIN DESCRIPTION - LOCATION: LOCATION: FLANK

## 2020-05-12 ASSESSMENT — PAIN DESCRIPTION - ORIENTATION: ORIENTATION: RIGHT

## 2020-05-12 ASSESSMENT — PAIN DESCRIPTION - PROGRESSION: CLINICAL_PROGRESSION: GRADUALLY WORSENING

## 2020-05-13 ENCOUNTER — APPOINTMENT (OUTPATIENT)
Dept: CT IMAGING | Age: 40
End: 2020-05-13
Payer: MEDICARE

## 2020-05-13 VITALS
DIASTOLIC BLOOD PRESSURE: 70 MMHG | SYSTOLIC BLOOD PRESSURE: 126 MMHG | TEMPERATURE: 98.4 F | HEART RATE: 69 BPM | BODY MASS INDEX: 27.46 KG/M2 | RESPIRATION RATE: 16 BRPM | WEIGHT: 160 LBS | OXYGEN SATURATION: 100 %

## 2020-05-13 LAB
-: ABNORMAL
ABSOLUTE EOS #: 0.11 K/UL (ref 0–0.44)
ABSOLUTE IMMATURE GRANULOCYTE: <0.03 K/UL (ref 0–0.3)
ABSOLUTE LYMPH #: 2.23 K/UL (ref 1.1–3.7)
ABSOLUTE MONO #: 0.83 K/UL (ref 0.1–1.2)
ALBUMIN SERPL-MCNC: 4.4 G/DL (ref 3.5–5.2)
ALBUMIN/GLOBULIN RATIO: 1.2 (ref 1–2.5)
ALP BLD-CCNC: 89 U/L (ref 35–104)
ALT SERPL-CCNC: 115 U/L (ref 5–33)
AMORPHOUS: ABNORMAL
ANION GAP SERPL CALCULATED.3IONS-SCNC: 14 MMOL/L (ref 9–17)
AST SERPL-CCNC: 457 U/L
BACTERIA: ABNORMAL
BASOPHILS # BLD: 1 % (ref 0–2)
BASOPHILS ABSOLUTE: 0.04 K/UL (ref 0–0.2)
BILIRUB SERPL-MCNC: 0.28 MG/DL (ref 0.3–1.2)
BILIRUBIN URINE: NEGATIVE
BUN BLDV-MCNC: 4 MG/DL (ref 6–20)
BUN/CREAT BLD: ABNORMAL (ref 9–20)
CALCIUM SERPL-MCNC: 9.1 MG/DL (ref 8.6–10.4)
CASTS UA: ABNORMAL /LPF (ref 0–8)
CHLORIDE BLD-SCNC: 101 MMOL/L (ref 98–107)
CO2: 22 MMOL/L (ref 20–31)
COLOR: YELLOW
CREAT SERPL-MCNC: 0.53 MG/DL (ref 0.5–0.9)
CRYSTALS, UA: ABNORMAL /HPF
DIFFERENTIAL TYPE: ABNORMAL
EOSINOPHILS RELATIVE PERCENT: 1 % (ref 1–4)
EPITHELIAL CELLS UA: ABNORMAL /HPF (ref 0–5)
GFR AFRICAN AMERICAN: >60 ML/MIN
GFR NON-AFRICAN AMERICAN: >60 ML/MIN
GFR SERPL CREATININE-BSD FRML MDRD: ABNORMAL ML/MIN/{1.73_M2}
GFR SERPL CREATININE-BSD FRML MDRD: ABNORMAL ML/MIN/{1.73_M2}
GLUCOSE BLD-MCNC: 92 MG/DL (ref 70–99)
GLUCOSE URINE: NEGATIVE
HAV IGM SER IA-ACNC: NONREACTIVE
HCG QUALITATIVE: NEGATIVE
HCT VFR BLD CALC: 32.5 % (ref 36.3–47.1)
HEMOGLOBIN: 9.5 G/DL (ref 11.9–15.1)
HEPATITIS B CORE IGM ANTIBODY: NONREACTIVE
HEPATITIS B SURFACE ANTIGEN: NONREACTIVE
HEPATITIS C ANTIBODY: NONREACTIVE
IMMATURE GRANULOCYTES: 0 %
KETONES, URINE: NEGATIVE
LEUKOCYTE ESTERASE, URINE: ABNORMAL
LIPASE: 46 U/L (ref 13–60)
LYMPHOCYTES # BLD: 29 % (ref 24–43)
MCH RBC QN AUTO: 21.3 PG (ref 25.2–33.5)
MCHC RBC AUTO-ENTMCNC: 29.2 G/DL (ref 28.4–34.8)
MCV RBC AUTO: 72.9 FL (ref 82.6–102.9)
MONOCYTES # BLD: 11 % (ref 3–12)
MUCUS: ABNORMAL
NITRITE, URINE: NEGATIVE
NRBC AUTOMATED: 0 PER 100 WBC
OTHER OBSERVATIONS UA: ABNORMAL
PDW BLD-RTO: 19.9 % (ref 11.8–14.4)
PH UA: 5 (ref 5–8)
PLATELET # BLD: ABNORMAL K/UL (ref 138–453)
PLATELET ESTIMATE: ABNORMAL
PLATELET, FLUORESCENCE: 165 K/UL (ref 138–453)
PLATELET, IMMATURE FRACTION: 12.3 % (ref 1.1–10.3)
PMV BLD AUTO: ABNORMAL FL (ref 8.1–13.5)
POTASSIUM SERPL-SCNC: 4.2 MMOL/L (ref 3.7–5.3)
PROTEIN UA: NEGATIVE
RBC # BLD: 4.46 M/UL (ref 3.95–5.11)
RBC # BLD: ABNORMAL 10*6/UL
RBC UA: ABNORMAL /HPF (ref 0–4)
RENAL EPITHELIAL, UA: ABNORMAL /HPF
SEG NEUTROPHILS: 58 % (ref 36–65)
SEGMENTED NEUTROPHILS ABSOLUTE COUNT: 4.53 K/UL (ref 1.5–8.1)
SODIUM BLD-SCNC: 137 MMOL/L (ref 135–144)
SPECIFIC GRAVITY UA: 1 (ref 1–1.03)
TOTAL PROTEIN: 8.1 G/DL (ref 6.4–8.3)
TRICHOMONAS: ABNORMAL
TURBIDITY: ABNORMAL
URINE HGB: NEGATIVE
UROBILINOGEN, URINE: NORMAL
WBC # BLD: 7.8 K/UL (ref 3.5–11.3)
WBC # BLD: ABNORMAL 10*3/UL
WBC UA: ABNORMAL /HPF (ref 0–5)
YEAST: ABNORMAL

## 2020-05-13 PROCEDURE — 96375 TX/PRO/DX INJ NEW DRUG ADDON: CPT

## 2020-05-13 PROCEDURE — 80053 COMPREHEN METABOLIC PANEL: CPT

## 2020-05-13 PROCEDURE — 85055 RETICULATED PLATELET ASSAY: CPT

## 2020-05-13 PROCEDURE — 74176 CT ABD & PELVIS W/O CONTRAST: CPT

## 2020-05-13 PROCEDURE — 6360000002 HC RX W HCPCS: Performed by: STUDENT IN AN ORGANIZED HEALTH CARE EDUCATION/TRAINING PROGRAM

## 2020-05-13 PROCEDURE — 96374 THER/PROPH/DIAG INJ IV PUSH: CPT

## 2020-05-13 PROCEDURE — 84703 CHORIONIC GONADOTROPIN ASSAY: CPT

## 2020-05-13 PROCEDURE — 6370000000 HC RX 637 (ALT 250 FOR IP): Performed by: STUDENT IN AN ORGANIZED HEALTH CARE EDUCATION/TRAINING PROGRAM

## 2020-05-13 PROCEDURE — 83690 ASSAY OF LIPASE: CPT

## 2020-05-13 PROCEDURE — 81001 URINALYSIS AUTO W/SCOPE: CPT

## 2020-05-13 PROCEDURE — 80074 ACUTE HEPATITIS PANEL: CPT

## 2020-05-13 PROCEDURE — 2580000003 HC RX 258: Performed by: STUDENT IN AN ORGANIZED HEALTH CARE EDUCATION/TRAINING PROGRAM

## 2020-05-13 PROCEDURE — 85025 COMPLETE CBC W/AUTO DIFF WBC: CPT

## 2020-05-13 RX ORDER — CEPHALEXIN 500 MG/1
500 CAPSULE ORAL ONCE
Status: COMPLETED | OUTPATIENT
Start: 2020-05-13 | End: 2020-05-13

## 2020-05-13 RX ORDER — CEPHALEXIN 500 MG/1
500 CAPSULE ORAL 2 TIMES DAILY
Qty: 21 CAPSULE | Refills: 0 | Status: SHIPPED | OUTPATIENT
Start: 2020-05-13 | End: 2020-07-08 | Stop reason: ALTCHOICE

## 2020-05-13 RX ORDER — ONDANSETRON 2 MG/ML
4 INJECTION INTRAMUSCULAR; INTRAVENOUS ONCE
Status: COMPLETED | OUTPATIENT
Start: 2020-05-13 | End: 2020-05-13

## 2020-05-13 RX ORDER — KETOROLAC TROMETHAMINE 30 MG/ML
30 INJECTION, SOLUTION INTRAMUSCULAR; INTRAVENOUS ONCE
Status: COMPLETED | OUTPATIENT
Start: 2020-05-13 | End: 2020-05-13

## 2020-05-13 RX ORDER — HYDROCODONE BITARTRATE AND ACETAMINOPHEN 5; 325 MG/1; MG/1
1 TABLET ORAL ONCE
Status: COMPLETED | OUTPATIENT
Start: 2020-05-13 | End: 2020-05-13

## 2020-05-13 RX ORDER — 0.9 % SODIUM CHLORIDE 0.9 %
1000 INTRAVENOUS SOLUTION INTRAVENOUS ONCE
Status: COMPLETED | OUTPATIENT
Start: 2020-05-13 | End: 2020-05-13

## 2020-05-13 RX ADMIN — KETOROLAC TROMETHAMINE 30 MG: 30 INJECTION, SOLUTION INTRAMUSCULAR at 00:58

## 2020-05-13 RX ADMIN — CEPHALEXIN 500 MG: 500 CAPSULE ORAL at 01:59

## 2020-05-13 RX ADMIN — SODIUM CHLORIDE 1000 ML: 9 INJECTION, SOLUTION INTRAVENOUS at 00:30

## 2020-05-13 RX ADMIN — ONDANSETRON 4 MG: 2 INJECTION INTRAMUSCULAR; INTRAVENOUS at 00:30

## 2020-05-13 RX ADMIN — HYDROCODONE BITARTRATE AND ACETAMINOPHEN 1 TABLET: 5; 325 TABLET ORAL at 01:59

## 2020-05-13 ASSESSMENT — ENCOUNTER SYMPTOMS
BACK PAIN: 0
VOMITING: 0
RHINORRHEA: 0
SHORTNESS OF BREATH: 0
NAUSEA: 0
COUGH: 0
ABDOMINAL PAIN: 0

## 2020-05-13 ASSESSMENT — PAIN SCALES - GENERAL
PAINLEVEL_OUTOF10: 6
PAINLEVEL_OUTOF10: 8
PAINLEVEL_OUTOF10: 6

## 2020-05-13 NOTE — ED PROVIDER NOTES
[facility] [address] [facility]  Emergency Department  Faculty Attestation       I performed a history and physical examination of the patient and discussed management with the resident. I reviewed the residents note and agree with the documented findings including all diagnostic interpretations and plan of care. Any areas of disagreement are noted on the chart. I was personally present for the key portions of any procedures. I have documented in the chart those procedures where I was not present during the key portions. I have reviewed the emergency nurses triage note. I agree with the chief complaint, past medical history, past surgical history, allergies, medications, social and family history as documented unless otherwise noted below. Documentation of the HPI, Physical Exam and Medical Decision Making performed by elisaibsahara is based on my personal performance of the HPI, PE and MDM. For Physician Assistant/ Nurse Practitioner cases/documentation I have personally evaluated this patient and have completed at least one if not all key elements of the E/M (history, physical exam, and MDM). Additional findings are as noted. Pertinent Comments     Primary Care Physician: No primary care provider on file. ED Triage Vitals   BP Temp Temp Source Pulse Resp SpO2 Height Weight   05/12/20 2358 05/12/20 2347 05/12/20 2347 05/12/20 2358 05/12/20 2358 05/12/20 2358 -- 05/12/20 2358   129/68 98.4 °F (36.9 °C) Oral 74 18 100 %  160 lb (72.6 kg)        History/Physical: This is a 47 y.o. female who presents to the Emergency Department with complaint of right flank pain. Also had nausea without vomiting. No history of kidney stones in the past.  No dysuria hematuria. No fevers    On exam she appears uncomfortable. She does have tenderness over the right CVA and right upper quadrant with no rebound or guarding. Heart sounds regular. Lungs clear to auscultation. Alert and oriented. MDM/Plan: Flank pain.   Concern

## 2020-05-13 NOTE — ED PROVIDER NOTES
[facility] [facility]  Emergency Department Encounter  EmergencyMedicine Resident     This patient was seen during the COVID-19 crisis. There were limited resources and those resources we did have had to be conserved for the sickest of patients. William Duran  MRN: [id number]  [place] 1980  Date of evaluation: 5/13/20  PCP:  No primary care provider on file. CHIEF COMPLAINT       Chief Complaint   Patient presents with    Flank Pain       HISTORY OF PRESENT ILLNESS  (Location/Symptom, Timing/Onset, Context/Setting, Quality, Duration, Modifying Factors, Severity.)      Urszula Shepard is a 47 y.o. female who presents with complaints of right-sided flank pain x3 days. States she has never felt pain like this before. Throbbing pain. Also reports nausea, does not have any vomiting. Denies dysuria or hematuria. No history of kidney stones. No fevers. No vaginal bleeding or discharge. Is sexually active. Had tubal ligation. PAST MEDICAL / SURGICAL / SOCIAL / FAMILY HISTORY      has a past medical history of Hypertension and Seasonal allergies. has a past surgical history that includes Tubal ligation. Social History     Socioeconomic History    Marital status: Single     Spouse name: Not on file    Number of children: Not on file    Years of education: Not on file    Highest education level: Not on file   Occupational History    Not on file   Social Needs    Financial resource strain: Not on file    Food insecurity     Worry: Not on file     Inability: Not on file    Transportation needs     Medical: Not on file     Non-medical: Not on file   Tobacco Use    Smoking status: Current Every Day Smoker     Packs/day: 1.00     Types: Cigarettes    Smokeless tobacco: Never Used   Substance and Sexual Activity    Alcohol use:  Yes     Alcohol/week: 2.0 standard drinks     Types: 2 Cans of beer per week     Comment: seldom    Drug use: No    Sexual activity: Yes Review of Systems   Constitutional: Negative for chills and fever. HENT: Negative for congestion and rhinorrhea. Respiratory: Negative for cough and shortness of breath. Cardiovascular: Negative for chest pain. Gastrointestinal: Negative for abdominal pain, nausea and vomiting. Genitourinary: Positive for flank pain. Negative for decreased urine volume and urgency. Musculoskeletal: Negative for back pain and gait problem. Skin: Negative for rash. Neurological: Negative for light-headedness and headaches. PHYSICAL EXAM   (up to 7 for level 4, 8 or more for level 5)      INITIAL VITALS:   /70   Pulse 69   Temp 98.4 °F (36.9 °C) (Oral)   Resp 16   Wt 160 lb (72.6 kg)   LMP 05/30/2019   SpO2 100%   BMI 27.46 kg/m²     Physical Exam  Vitals signs and nursing note reviewed. Constitutional:       Appearance: Normal appearance. Cardiovascular:      Rate and Rhythm: Normal rate. Heart sounds: No murmur. Pulmonary:      Effort: Pulmonary effort is normal.      Breath sounds: Normal breath sounds. Abdominal:      General: Abdomen is flat. There is no distension. Palpations: There is no mass. Tenderness: There is no right CVA tenderness, left CVA tenderness, guarding or rebound. Hernia: No hernia is present. Comments: Right flank pain ttp, no ecchymosis, no CVA tenderness bilaterally   Musculoskeletal: Normal range of motion. Skin:     General: Skin is warm. Capillary Refill: Capillary refill takes less than 2 seconds. Coloration: Skin is not jaundiced. Neurological:      General: No focal deficit present. Mental Status: She is alert and oriented to person, place, and time.          DIFFERENTIAL  DIAGNOSIS     PLAN (LABS / IMAGING / EKG):  Orders Placed This Encounter   Procedures    CT ABDOMEN PELVIS WO CONTRAST    CBC Auto Differential    Comprehensive Metabolic Panel    LIPASE    Urinalysis with Microscopic    HCG Qualitative, Potassium 4.2 3.7 - 5.3 mmol/L    Chloride 101 98 - 107 mmol/L    CO2 22 20 - 31 mmol/L    Anion Gap 14 9 - 17 mmol/L    Alkaline Phosphatase 89 35 - 104 U/L     (H) 5 - 33 U/L     (H) <32 U/L    Total Bilirubin 0.28 (L) 0.3 - 1.2 mg/dL    Total Protein 8.1 6.4 - 8.3 g/dL    Alb 4.4 3.5 - 5.2 g/dL    Albumin/Globulin Ratio 1.2 1.0 - 2.5    GFR Non-African American >60 >60 mL/min    GFR African American >60 >60 mL/min    GFR Comment          GFR Staging NOT REPORTED    LIPASE   Result Value Ref Range    Lipase 46 13 - 60 U/L   Urinalysis with Microscopic   Result Value Ref Range    Color, UA YELLOW YELLOW    Turbidity UA CLOUDY (A) CLEAR    Glucose, Ur NEGATIVE NEGATIVE    Bilirubin Urine NEGATIVE NEGATIVE    Ketones, Urine NEGATIVE NEGATIVE    Specific Gravity, UA 1.004 (L) 1.005 - 1.030    Urine Hgb NEGATIVE NEGATIVE    pH, UA 5.0 5.0 - 8.0    Protein, UA NEGATIVE NEGATIVE    Urobilinogen, Urine Normal Normal    Nitrite, Urine NEGATIVE NEGATIVE    Leukocyte Esterase, Urine LARGE (A) NEGATIVE    -          WBC, UA 5 TO 10 0 - 5 /HPF    RBC, UA 0 TO 2 0 - 4 /HPF    Casts UA  0 - 8 /LPF     None Reference range defined for non-centrifuged specimen. Crystals, UA NOT REPORTED None /HPF    Epithelial Cells UA 20 TO 50 0 - 5 /HPF    Renal Epithelial, UA NOT REPORTED 0 /HPF    Bacteria, UA FEW (A) None    Mucus, UA NOT REPORTED None    Trichomonas, UA NOT REPORTED None    Amorphous, UA NOT REPORTED None    Other Observations UA NOT REPORTED NOT REQ.     Yeast, UA FEW (A) None   HCG Qualitative, Serum   Result Value Ref Range    hCG Qual NEGATIVE NEGATIVE   Immature Platelet Fraction   Result Value Ref Range    Platelet, Immature Fraction 12.3 (H) 1.1 - 10.3 %    Platelet, Fluorescence 165 138 - 453 k/uL   Hepatitis Panel, Acute   Result Value Ref Range    Hepatitis B Surface Ag NONREACTIVE NONREACTIVE    Hepatitis C Ab NONREACTIVE NONREACTIVE    Hep B Core Ab, IgM NONREACTIVE NONREACTIVE    Hep A IgM [address] Gal[address] May 13, 2020   0058 Elevated LFTs noted, UA abnormal, awaiting CT for possible stones     [AF]   0155 Bedside US without evidence of gall stones, will try to get pain under control. Patient agreeable.     [AF]      ED Course User Index  [AF] Ania Garcia MD     Patient has slightly improved. States that she knows that she is elevated LFTs at baseline, she is not sure why. She states that she thinks it might be related to alcohol. Denies any history of gallstones or kidney stones again. I provided the patient with referral to GI, gave her a clinic list for primary care as well as walk-in clinic because she does not have a family physician in [place]. I discussed risks of benefit to return instructions, patient is agreeable with this plan. PROCEDURES:  None    CONSULTS:  None    CRITICAL CARE:  None    FINAL IMPRESSION      1.  Flank pain          DISPOSITION / PLAN     DISPOSITION Decision To Discharge    PATIENT REFERRED TO:  [facility]. [facility]  [address]  [address] [address]  [phone]  Go to       [facility]  [address] [address]  [phone]  Go to   If symptoms worsen      DISCHARGE MEDICATIONS:  Discharge Medication List as of 5/13/2020  2:31 AM          Sherry Begum MD  Emergency Medicine Resident    (Please note that portions of this note were completed with a voicerecognition program.  Efforts were made to edit the dictations but occasionally words are mis-transcribed.)        Ania Garcia MD  05/13/20 [id number]

## 2020-06-24 ENCOUNTER — TELEPHONE (OUTPATIENT)
Dept: GASTROENTEROLOGY | Age: 40
End: 2020-06-24

## 2020-07-08 ENCOUNTER — TELEPHONE (OUTPATIENT)
Dept: GASTROENTEROLOGY | Age: 40
End: 2020-07-08

## 2020-07-08 ENCOUNTER — HOSPITAL ENCOUNTER (EMERGENCY)
Age: 40
Discharge: HOME | End: 2020-07-08
Attending: EMERGENCY MEDICINE
Payer: MEDICARE

## 2020-07-08 VITALS
WEIGHT: 160 LBS | TEMPERATURE: 98.2 F | OXYGEN SATURATION: 100 % | DIASTOLIC BLOOD PRESSURE: 87 MMHG | HEIGHT: 63 IN | RESPIRATION RATE: 16 BRPM | SYSTOLIC BLOOD PRESSURE: 130 MMHG | BODY MASS INDEX: 28.35 KG/M2 | HEART RATE: 83 BPM

## 2020-07-08 LAB
-: ABNORMAL
ABSOLUTE EOS #: 0.05 K/UL (ref 0–0.44)
ABSOLUTE IMMATURE GRANULOCYTE: 0 K/UL (ref 0–0.3)
ABSOLUTE LYMPH #: 1.08 K/UL (ref 1.1–3.7)
ABSOLUTE MONO #: 0.65 K/UL (ref 0.1–1.2)
ALBUMIN SERPL-MCNC: 3.7 G/DL (ref 3.5–5.2)
ALBUMIN/GLOBULIN RATIO: 1.1 (ref 1–2.5)
ALP BLD-CCNC: 134 U/L (ref 35–104)
ALT SERPL-CCNC: 86 U/L (ref 5–33)
AMORPHOUS: ABNORMAL
ANION GAP SERPL CALCULATED.3IONS-SCNC: 16 MMOL/L (ref 9–17)
AST SERPL-CCNC: 316 U/L
BACTERIA: ABNORMAL
BASOPHILS # BLD: 1 % (ref 0–2)
BASOPHILS ABSOLUTE: 0.05 K/UL (ref 0–0.2)
BILIRUB SERPL-MCNC: 0.51 MG/DL (ref 0.3–1.2)
BILIRUBIN DIRECT: 0.21 MG/DL
BILIRUBIN URINE: NEGATIVE
BILIRUBIN, INDIRECT: 0.3 MG/DL (ref 0–1)
BUN BLDV-MCNC: 4 MG/DL (ref 6–20)
BUN/CREAT BLD: ABNORMAL (ref 9–20)
CALCIUM SERPL-MCNC: 8.3 MG/DL (ref 8.6–10.4)
CASTS UA: ABNORMAL /LPF (ref 0–8)
CHLORIDE BLD-SCNC: 99 MMOL/L (ref 98–107)
CO2: 23 MMOL/L (ref 20–31)
COLOR: YELLOW
CREAT SERPL-MCNC: 0.49 MG/DL (ref 0.5–0.9)
CRYSTALS, UA: ABNORMAL /HPF
DIFFERENTIAL TYPE: ABNORMAL
DIRECT EXAM: ABNORMAL
EOSINOPHILS RELATIVE PERCENT: 1 % (ref 1–4)
EPITHELIAL CELLS UA: ABNORMAL /HPF (ref 0–5)
GFR AFRICAN AMERICAN: >60 ML/MIN
GFR NON-AFRICAN AMERICAN: >60 ML/MIN
GFR SERPL CREATININE-BSD FRML MDRD: ABNORMAL ML/MIN/{1.73_M2}
GFR SERPL CREATININE-BSD FRML MDRD: ABNORMAL ML/MIN/{1.73_M2}
GLOBULIN: ABNORMAL G/DL (ref 1.5–3.8)
GLUCOSE BLD-MCNC: 129 MG/DL (ref 70–99)
GLUCOSE URINE: NEGATIVE
HCG QUALITATIVE: NEGATIVE
HCT VFR BLD CALC: 28.5 % (ref 36.3–47.1)
HEMOGLOBIN: 8.7 G/DL (ref 11.9–15.1)
IMMATURE GRANULOCYTES: 0 %
INR BLD: 1.1
KETONES, URINE: NEGATIVE
LEUKOCYTE ESTERASE, URINE: ABNORMAL
LIPASE: 40 U/L (ref 13–60)
LYMPHOCYTES # BLD: 20 % (ref 24–43)
Lab: ABNORMAL
MCH RBC QN AUTO: 23.3 PG (ref 25.2–33.5)
MCHC RBC AUTO-ENTMCNC: 30.5 G/DL (ref 28.4–34.8)
MCV RBC AUTO: 76.2 FL (ref 82.6–102.9)
MONOCYTES # BLD: 12 % (ref 3–12)
MORPHOLOGY: ABNORMAL
MORPHOLOGY: ABNORMAL
MUCUS: ABNORMAL
NITRITE, URINE: NEGATIVE
NRBC AUTOMATED: 0 PER 100 WBC
OTHER OBSERVATIONS UA: ABNORMAL
PDW BLD-RTO: 22.4 % (ref 11.8–14.4)
PH UA: 5.5 (ref 5–8)
PLATELET # BLD: ABNORMAL K/UL (ref 138–453)
PLATELET ESTIMATE: ABNORMAL
PLATELET, FLUORESCENCE: 93 K/UL (ref 138–453)
PLATELET, IMMATURE FRACTION: 12.8 % (ref 1.1–10.3)
PMV BLD AUTO: ABNORMAL FL (ref 8.1–13.5)
POTASSIUM SERPL-SCNC: 3.2 MMOL/L (ref 3.7–5.3)
PROTEIN UA: ABNORMAL
PROTHROMBIN TIME: 11.1 SEC (ref 9–12)
RBC # BLD: 3.74 M/UL (ref 3.95–5.11)
RBC # BLD: ABNORMAL 10*6/UL
RBC UA: ABNORMAL /HPF (ref 0–4)
RENAL EPITHELIAL, UA: ABNORMAL /HPF
SEG NEUTROPHILS: 66 % (ref 36–65)
SEGMENTED NEUTROPHILS ABSOLUTE COUNT: 3.57 K/UL (ref 1.5–8.1)
SODIUM BLD-SCNC: 138 MMOL/L (ref 135–144)
SPECIFIC GRAVITY UA: 1.01 (ref 1–1.03)
SPECIMEN DESCRIPTION: ABNORMAL
TOTAL PROTEIN: 7 G/DL (ref 6.4–8.3)
TRICHOMONAS: ABNORMAL
TURBIDITY: ABNORMAL
URINE HGB: NEGATIVE
UROBILINOGEN, URINE: NORMAL
WBC # BLD: 5.4 K/UL (ref 3.5–11.3)
WBC # BLD: ABNORMAL 10*3/UL
WBC UA: ABNORMAL /HPF (ref 0–5)
YEAST: ABNORMAL

## 2020-07-08 PROCEDURE — 96374 THER/PROPH/DIAG INJ IV PUSH: CPT

## 2020-07-08 PROCEDURE — 87510 GARDNER VAG DNA DIR PROBE: CPT

## 2020-07-08 PROCEDURE — 85055 RETICULATED PLATELET ASSAY: CPT

## 2020-07-08 PROCEDURE — 80076 HEPATIC FUNCTION PANEL: CPT

## 2020-07-08 PROCEDURE — 85610 PROTHROMBIN TIME: CPT

## 2020-07-08 PROCEDURE — 6370000000 HC RX 637 (ALT 250 FOR IP): Performed by: EMERGENCY MEDICINE

## 2020-07-08 PROCEDURE — 83690 ASSAY OF LIPASE: CPT

## 2020-07-08 PROCEDURE — 84703 CHORIONIC GONADOTROPIN ASSAY: CPT

## 2020-07-08 PROCEDURE — 87491 CHLMYD TRACH DNA AMP PROBE: CPT

## 2020-07-08 PROCEDURE — 85025 COMPLETE CBC W/AUTO DIFF WBC: CPT

## 2020-07-08 PROCEDURE — 87480 CANDIDA DNA DIR PROBE: CPT

## 2020-07-08 PROCEDURE — 87660 TRICHOMONAS VAGIN DIR PROBE: CPT

## 2020-07-08 PROCEDURE — 99284 EMERGENCY DEPT VISIT MOD MDM: CPT

## 2020-07-08 PROCEDURE — 6360000002 HC RX W HCPCS: Performed by: EMERGENCY MEDICINE

## 2020-07-08 PROCEDURE — 2580000003 HC RX 258: Performed by: EMERGENCY MEDICINE

## 2020-07-08 PROCEDURE — 81001 URINALYSIS AUTO W/SCOPE: CPT

## 2020-07-08 PROCEDURE — 80048 BASIC METABOLIC PNL TOTAL CA: CPT

## 2020-07-08 PROCEDURE — 87591 N.GONORRHOEAE DNA AMP PROB: CPT

## 2020-07-08 RX ORDER — CEPHALEXIN 500 MG/1
500 CAPSULE ORAL ONCE
Status: COMPLETED | OUTPATIENT
Start: 2020-07-08 | End: 2020-07-08

## 2020-07-08 RX ORDER — OMEPRAZOLE 40 MG/1
40 CAPSULE, DELAYED RELEASE ORAL
Qty: 30 CAPSULE | Refills: 0 | Status: ON HOLD | OUTPATIENT
Start: 2020-07-08 | End: 2022-01-01

## 2020-07-08 RX ORDER — CEPHALEXIN 500 MG/1
500 CAPSULE ORAL 2 TIMES DAILY
Qty: 13 CAPSULE | Refills: 0 | Status: SHIPPED | OUTPATIENT
Start: 2020-07-08 | End: 2020-07-15

## 2020-07-08 RX ORDER — PANTOPRAZOLE SODIUM 40 MG/1
40 TABLET, DELAYED RELEASE ORAL ONCE
Status: COMPLETED | OUTPATIENT
Start: 2020-07-08 | End: 2020-07-08

## 2020-07-08 RX ORDER — ONDANSETRON 2 MG/ML
4 INJECTION INTRAMUSCULAR; INTRAVENOUS ONCE
Status: COMPLETED | OUTPATIENT
Start: 2020-07-08 | End: 2020-07-08

## 2020-07-08 RX ORDER — 0.9 % SODIUM CHLORIDE 0.9 %
1000 INTRAVENOUS SOLUTION INTRAVENOUS ONCE
Status: COMPLETED | OUTPATIENT
Start: 2020-07-08 | End: 2020-07-08

## 2020-07-08 RX ADMIN — ONDANSETRON 4 MG: 2 INJECTION, SOLUTION INTRAMUSCULAR; INTRAVENOUS at 12:17

## 2020-07-08 RX ADMIN — CEPHALEXIN 500 MG: 500 CAPSULE ORAL at 13:58

## 2020-07-08 RX ADMIN — PANTOPRAZOLE SODIUM 40 MG: 40 TABLET, DELAYED RELEASE ORAL at 13:58

## 2020-07-08 RX ADMIN — SODIUM CHLORIDE 1000 ML: 9 INJECTION, SOLUTION INTRAVENOUS at 12:17

## 2020-07-08 ASSESSMENT — ENCOUNTER SYMPTOMS
COUGH: 0
ABDOMINAL PAIN: 0
COLOR CHANGE: 0
NAUSEA: 1
EYE PAIN: 0
DIARRHEA: 1
BACK PAIN: 0
VOMITING: 1
ANAL BLEEDING: 0
SHORTNESS OF BREATH: 0
EYE DISCHARGE: 0
RHINORRHEA: 0
SORE THROAT: 0
BLOOD IN STOOL: 0

## 2020-07-08 NOTE — ED PROVIDER NOTES
[facility] ED  eMERGENCY dEPARTMENT eNCOUnter      Pt Name: Brien Person  MRN: [id number]  [place] 1980  Date of evaluation: 7/8/20      CHIEF COMPLAINT       Chief Complaint   Patient presents with    Emesis    Diarrhea         HISTORY OF PRESENT ILLNESS    Brien Person is a 47 y.o. female who presents after she says she vomited blood this morning. She says she went to take out the garbage and noticed maggots which caused her to become sick to her stomach and threw up. She says at first she started throwing up what looked like remnants of food but then started having bright red blood. She says she does still feel a little nauseous at this time. She denies any blood in the stool. She denies abdominal pain. She denies fever, chills, chest pain or shortness of breath. Patient does admit to drinking alcohol daily and says that she did have a [place] today. She says she also has been told that her liver enzymes are elevated and that she is supposed to see GI later this week. Location/Symptom: hemetemesis  Timing/Onset: this morning  Context/Setting: hx of elevated LFTs, etoh abuse  Quality: bright red  Duration: just prior to arrival  Modifying Factors: none  Severity: moderate      REVIEW OF SYSTEMS       Review of Systems   Constitutional: Negative for chills and fever. HENT: Negative for ear pain, rhinorrhea and sore throat. Eyes: Negative for pain and discharge. Respiratory: Negative for cough and shortness of breath. Cardiovascular: Negative for chest pain and leg swelling. Gastrointestinal: Positive for diarrhea, nausea and vomiting. Negative for abdominal pain, anal bleeding and blood in stool. Genitourinary: Negative for dysuria, flank pain, frequency, vaginal bleeding and vaginal discharge. Musculoskeletal: Negative for back pain, myalgias and neck pain. Skin: Negative for color change and rash. Neurological: Negative for dizziness and headaches. Psychiatric/Behavioral: Negative for suicidal ideas. The patient is not nervous/anxious. PAST MEDICAL HISTORY    has a past medical history of Hypertension and Seasonal allergies. SURGICAL HISTORY      has a past surgical history that includes Tubal ligation. CURRENT MEDICATIONS       Previous Medications    ACETAMINOPHEN (ACETAMINOPHEN EXTRA STRENGTH) 500 MG CAPS    Take 1-2 tablets by mouth every 6 hours as needed for pain. Do not take more than 8 pills in a 24 hour period. BENZOCAINE (ORAJEL) 20 % GEL MUCOSAL GEL    Apply to affected area three times daily as needed    CARBOXYMETHYLCELLUL-GLYCERIN 0.5-0.9 % SOLN    Apply 2 drops to eye as needed (dry and irritated eyes)    IBUPROFEN (ADVIL;MOTRIN) 800 MG TABLET    Take 1 tablet by mouth every 8 hours as needed for Pain    IBUPROFEN (IBU) 800 MG TABLET    Take 1 tablet by mouth every 6 hours as needed for Pain    ONDANSETRON (ZOFRAN ODT) 4 MG DISINTEGRATING TABLET    Take 1 tablet by mouth every 8 hours as needed for Nausea or Vomiting       ALLERGIES     is allergic to seasonal and motrin [ibuprofen]. FAMILY HISTORY     has no family status information on file. family history is not on file. SOCIAL HISTORY      reports that she has been smoking cigarettes. She has been smoking about 1.00 pack per day. She has never used smokeless tobacco. She reports current alcohol use of about 2.0 standard drinks of alcohol per week. She reports that she does not use drugs. PHYSICAL EXAM     INITIAL VITALS:  height is 5' 3\" (1.6 m) and weight is 160 lb (72.6 kg). Her oral temperature is 98.2 °F (36.8 °C). Her blood pressure is 130/87 and her pulse is 83. Her respiration is 16 and oxygen saturation is 100%. Physical Exam  Vitals signs and nursing note reviewed. Constitutional:       Appearance: Normal appearance. Comments: Patient is resting comfortably in the bed and appears well   HENT:      Head: Normocephalic and atraumatic. the following  images and reviewed the radiologist interpretations:  Not indicated      ED BEDSIDE ULTRASOUND:   Not indicated    LABS:  Labs Reviewed   VAGINITIS DNA PROBE - Abnormal; Notable for the following components:       Result Value    Direct Exam POSITIVE for Gardnerella vaginalis.  (*)     All other components within normal limits   BASIC METABOLIC PANEL - Abnormal; Notable for the following components:    Glucose 129 (*)     BUN 4 (*)     CREATININE 0.49 (*)     Calcium 8.3 (*)     Potassium 3.2 (*)     All other components within normal limits   CBC WITH AUTO DIFFERENTIAL - Abnormal; Notable for the following components:    RBC 3.74 (*)     Hemoglobin 8.7 (*)     Hematocrit 28.5 (*)     MCV 76.2 (*)     MCH 23.3 (*)     RDW 22.4 (*)     Seg Neutrophils 66 (*)     Lymphocytes 20 (*)     Absolute Lymph # 1.08 (*)     All other components within normal limits   HEPATIC FUNCTION PANEL - Abnormal; Notable for the following components:    Alkaline Phosphatase 134 (*)     ALT 86 (*)      (*)     All other components within normal limits   URINALYSIS WITH MICROSCOPIC - Abnormal; Notable for the following components:    Turbidity UA CLOUDY (*)     Protein, UA TRACE (*)     Leukocyte Esterase, Urine MODERATE (*)     Bacteria, UA MODERATE (*)     All other components within normal limits   IMMATURE PLATELET FRACTION - Abnormal; Notable for the following components:    Platelet, Immature Fraction 12.8 (*)     Platelet, Fluorescence 93 (*)     All other components within normal limits   C.TRACHOMATIS N.GONORRHOEAE DNA   LIPASE   HCG, SERUM, QUALITATIVE   PROTIME-INR       Decreased Hgb    EMERGENCY DEPARTMENT COURSE:   Vitals:    Vitals:    07/08/20 1148 07/08/20 1155   BP:  130/87   Pulse:  83   Resp:  16   Temp: 98.2 °F (36.8 °C)    TempSrc: Oral    SpO2:  100%   Weight:  160 lb (72.6 kg)   Height:  5' 3\" (1.6 m)     -------------------------  BP: 130/87, Temp: 98.2 °F (36.8 °C), Pulse: 83, Resp: 16    wnl    FINAL IMPRESSION      1. Hematemesis with nausea    2. Elevated LFTs    3. Anemia, unspecified type    4.  Acute cystitis without hematuria          DISPOSITION/PLAN     AMA    PATIENT REFERRED TO:  Anita Salas MD  [address] [place] [address]  [phone]    Call today        DISCHARGE MEDICATIONS:  New Prescriptions    CEPHALEXIN (KEFLEX) 500 MG CAPSULE    Take 1 capsule by mouth 2 times daily for 7 days    OMEPRAZOLE (PRILOSEC) 40 MG DELAYED RELEASE CAPSULE    Take 1 capsule by mouth every morning (before breakfast)       (Please note that portions of this note were completed with a voice recognition program.  Efforts were made to edit the dictations but occasionally words are mis-transcribed.)    Filiberto Cooper MD  Attending Emergency Physician                    Filiberto Cooper MD  07/08/20 8353

## 2020-07-08 NOTE — ED TRIAGE NOTES
Pt to ED c/o vomiting blood, nausea, and diarrhea. Pt stated she vomited blood twice at 0200 this morning. Pt stated she's had ongoing diarrhea and itching around her rectum. Pt denies abd pain, chest pain or SOB. Pt resting on stretcher, NAD noted.

## 2020-07-08 NOTE — TELEPHONE ENCOUNTER
Writer called patient back. After reviewing her stay today at the ED and her labs, patient was advised to go back to the ED. With her low Hgb and throwing up blood it is in the patients best interest to go to the ED to be checked out and possibly have blood infusion and EGD. Patient stated that as soon as she has someone to watch her children that she will go back to the hospital. Patient was informed that after her hospital visit we will see her in our office for a hospital follow up. Patient thanked writer for our call and stated the understanding for the need to go back to the ED.

## 2020-07-09 ENCOUNTER — HOSPITAL ENCOUNTER (EMERGENCY)
Age: 40
Discharge: HOME | End: 2020-07-09
Attending: EMERGENCY MEDICINE
Payer: MEDICARE

## 2020-07-09 VITALS
DIASTOLIC BLOOD PRESSURE: 91 MMHG | SYSTOLIC BLOOD PRESSURE: 141 MMHG | RESPIRATION RATE: 18 BRPM | HEART RATE: 86 BPM | OXYGEN SATURATION: 98 % | TEMPERATURE: 98.5 F

## 2020-07-09 LAB
ABSOLUTE EOS #: 0.05 K/UL (ref 0–0.44)
ABSOLUTE IMMATURE GRANULOCYTE: 0 K/UL (ref 0–0.3)
ABSOLUTE LYMPH #: 1.4 K/UL (ref 1.1–3.7)
ABSOLUTE MONO #: 0.7 K/UL (ref 0.1–1.2)
BASOPHILS # BLD: 1 % (ref 0–2)
BASOPHILS ABSOLUTE: 0.05 K/UL (ref 0–0.2)
C TRACH DNA GENITAL QL NAA+PROBE: NEGATIVE
DIFFERENTIAL TYPE: ABNORMAL
EOSINOPHILS RELATIVE PERCENT: 1 % (ref 1–4)
HCT VFR BLD CALC: 30.8 % (ref 36.3–47.1)
HEMOGLOBIN: 9.3 G/DL (ref 11.9–15.1)
IMMATURE GRANULOCYTES: 0 %
LYMPHOCYTES # BLD: 28 % (ref 24–43)
MCH RBC QN AUTO: 23.3 PG (ref 25.2–33.5)
MCHC RBC AUTO-ENTMCNC: 30.2 G/DL (ref 28.4–34.8)
MCV RBC AUTO: 77 FL (ref 82.6–102.9)
MONOCYTES # BLD: 14 % (ref 3–12)
MORPHOLOGY: ABNORMAL
MORPHOLOGY: ABNORMAL
N. GONORRHOEAE DNA: NEGATIVE
NRBC AUTOMATED: 0 PER 100 WBC
PDW BLD-RTO: 22.3 % (ref 11.8–14.4)
PLATELET # BLD: ABNORMAL K/UL (ref 138–453)
PLATELET ESTIMATE: ABNORMAL
PLATELET, FLUORESCENCE: 100 K/UL (ref 138–453)
PLATELET, IMMATURE FRACTION: 12.5 % (ref 1.1–10.3)
PMV BLD AUTO: ABNORMAL FL (ref 8.1–13.5)
RBC # BLD: 4 M/UL (ref 3.95–5.11)
RBC # BLD: ABNORMAL 10*6/UL
SEG NEUTROPHILS: 56 % (ref 36–65)
SEGMENTED NEUTROPHILS ABSOLUTE COUNT: 2.8 K/UL (ref 1.5–8.1)
SPECIMEN DESCRIPTION: NORMAL
WBC # BLD: 5 K/UL (ref 3.5–11.3)
WBC # BLD: ABNORMAL 10*3/UL

## 2020-07-09 PROCEDURE — 99281 EMR DPT VST MAYX REQ PHY/QHP: CPT | Performed by: INTERNAL MEDICINE

## 2020-07-09 PROCEDURE — 85055 RETICULATED PLATELET ASSAY: CPT

## 2020-07-09 PROCEDURE — 85025 COMPLETE CBC W/AUTO DIFF WBC: CPT

## 2020-07-09 PROCEDURE — 99283 EMERGENCY DEPT VISIT LOW MDM: CPT

## 2020-07-09 RX ORDER — PANTOPRAZOLE SODIUM 20 MG/1
40 TABLET, DELAYED RELEASE ORAL DAILY
Qty: 20 TABLET | Refills: 0 | Status: ON HOLD | OUTPATIENT
Start: 2020-07-09 | End: 2022-01-01

## 2020-07-09 NOTE — ED NOTES
Patient approached writer at her desk, yelling that she wanted her IV out and to go to another hospital as security was called for patients son in the lobby. Writer redirected patient back to her room and attempted to de-escalate the patient by explaining that staff are attempting to provide a temp check and mask to her son. Patient was able to calm and sit in her bed. Writer assisted patients son in the lobby and then escorted him to patient room. Social work will remain available as needed.       JANNET Sol  07/09/20 8313

## 2020-07-09 NOTE — ED PROVIDER NOTES
[facility] ED  Emergency Department Encounter  EmergencyMedicine Resident     Pt Francisco Colon  MRN: [id number]  [place] 1980  Date of evaluation: 7/9/20  PCP:  No primary care provider on file. CHIEF COMPLAINT       Chief Complaint   Patient presents with    Hematemesis     pt states she has muliple episodes of blood in vomit    Other     Pt was told to come in due to low hemoglobin       HISTORY OF PRESENT ILLNESS  (Location/Symptom, Timing/Onset, Context/Setting, Quality, Duration, Modifying Factors, Severity.)      Geetha Dia is a 47 y.o. female who presents with an episode of hematemesis last night, was discharged from ED yesterday for the same. Was told to come back today for a blood transfusion, and an endoscopy. Patient has been feeling more lightheaded since discharge yesterday, with one episode of hematemesis. Patient's hemoglobin as of yesterday was 8.7. Patient is anxious about the blood transfusion and endoscopy. Patient denies chest pain, shortness of breath, abdominal pain. Patient reports that she has never had a transfusion before, but has a history of anemia. Patient was told yesterday to make an appointment with GI for an endoscopy. She called GI, they told her to return to the ED for admission for endoscopy. PAST MEDICAL / SURGICAL / SOCIAL / FAMILY HISTORY      has a past medical history of Hypertension and Seasonal allergies. has a past surgical history that includes Tubal ligation.       Social History     Socioeconomic History    Marital status: Single     Spouse name: Not on file    Number of children: Not on file    Years of education: Not on file    Highest education level: Not on file   Occupational History    Not on file   Social Needs    Financial resource strain: Not on file    Food insecurity     Worry: Not on file     Inability: Not on file    Transportation needs     Medical: Not on file     Non-medical: There is no abdominal tenderness. Skin:     General: Skin is warm. Capillary Refill: Capillary refill takes less than 2 seconds. Neurological:      General: No focal deficit present. Mental Status: She is alert and oriented to person, place, and time.          DIFFERENTIAL  DIAGNOSIS     PLAN (LABS / IMAGING / EKG):  Orders Placed This Encounter   Procedures    CBC Auto Differential    Immature Platelet Fraction    Inpatient consult to GI       MEDICATIONS ORDERED:  Orders Placed This Encounter   Medications    pantoprazole (PROTONIX) 20 MG tablet     Sig: Take 2 tablets by mouth daily     Dispense:  20 tablet     Refill:  0       DDX: Low hemoglobin from hematemesis    DIAGNOSTIC RESULTS / EMERGENCY DEPARTMENT COURSE / MDM   LAB RESULTS:  Results for orders placed or performed during the hospital encounter of 07/09/20   CBC Auto Differential   Result Value Ref Range    WBC 5.0 3.5 - 11.3 k/uL    RBC 4.00 3.95 - 5.11 m/uL    Hemoglobin 9.3 (L) 11.9 - 15.1 g/dL    Hematocrit 30.8 (L) 36.3 - 47.1 %    MCV 77.0 (L) 82.6 - 102.9 fL    MCH 23.3 (L) 25.2 - 33.5 pg    MCHC 30.2 28.4 - 34.8 g/dL    RDW 22.3 (H) 11.8 - 14.4 %    Platelets See Reflexed IPF Result 138 - 453 k/uL    MPV NOT REPORTED 8.1 - 13.5 fL    NRBC Automated 0.0 0.0 per 100 WBC    Differential Type NOT REPORTED     WBC Morphology NOT REPORTED     RBC Morphology NOT REPORTED     Platelet Estimate NOT REPORTED     Immature Granulocytes 0 0 %    Seg Neutrophils 56 36 - 65 %    Lymphocytes 28 24 - 43 %    Monocytes 14 (H) 3 - 12 %    Eosinophils % 1 1 - 4 %    Basophils 1 0 - 2 %    Absolute Immature Granulocyte 0.00 0.00 - 0.30 k/uL    Segs Absolute 2.80 1.50 - 8.10 k/uL    Absolute Lymph # 1.40 1.10 - 3.70 k/uL    Absolute Mono # 0.70 0.10 - 1.20 k/uL    Absolute Eos # 0.05 0.00 - 0.44 k/uL    Basophils Absolute 0.05 0.00 - 0.20 k/uL    Morphology ANISOCYTOSIS PRESENT     Morphology MICROCYTOSIS PRESENT    Immature Platelet Fraction Result Value Ref Range    Platelet, Immature Fraction 12.5 (H) 1.1 - 10.3 %    Platelet, Fluorescence 100 (L) 138 - 453 k/uL       IMPRESSION: Low hemoglobin from hematemesis, hemoglobin above 7, no need for transfusion right now. Consulted GI, they are happy for patient to be discharged and to follow-up with an outpatient as appointment for EGD. RADIOLOGY:  None    EKG  None    All EKG's are interpreted by the Emergency Department Physician who either signs or Co-signs this chart in the absence of a cardiologist.    EMERGENCY DEPARTMENT COURSE:  ED Course as of Jul 09 1412   Thu Jul 09, 2020   1206 12:06 PM patient reports that she called GI to schedule an appointment for an endoscopy, however they told her to return to the ED for admission    We will wait for the new hemoglobin today to consult GI    [EM]   1207 Hemoglobin Quant(!): 9.3 [EM]   1340 1:40 PM spoke with GI, they advised her to make an appointment for endoscopy as an outpatient. Patient stable can be discharged    [EM]      ED Course User Index  [EM] Suzan Resendez MD       PROCEDURES:  None    CONSULTS:  IP CONSULT TO GI    CRITICAL CARE:  Please see attending note    FINAL IMPRESSION      1. Hematemesis with nausea          DISPOSITION / PLAN     DISPOSITION patient will be discharged home as she is stable, advised patient to follow-up with GI clinic for her EGD.       PATIENT REFERRED TO:  Krystian Houser [address] [address]    Dr. Phil Robb, call to schedule this appointment for follow up with EGD  scheduling      DISCHARGE MEDICATIONS:  Discharge Medication List as of 7/9/2020  1:52 PM      START taking these medications    Details   pantoprazole (PROTONIX) 20 MG tablet Take 2 tablets by mouth daily, Disp-20 tablet, R-0Print             Suzan Resendez MD  Emergency Medicine Resident    (Please note that portions of thisnote were completed with a voice recognition program.  Efforts were made to edit the dictations but occasionally words are mis-transcribed.)     Treva العلي MD  Resident  07/09/20 [address], MD  Resident  08/06/20 1500

## 2020-07-09 NOTE — CONSULTS
Date:  7/9/2020    CC:  Hematemesis    HPI:  48 yo AA F c/o hematemesis. She states she has had \"liver problems\" for two years and she was referred to GI service for OP consultation. She actually had an appointment for initial consultation on 7/6/2020 but did not keep it as she stated she could not miss work. Yesterday, she stated she was taking out the garbage and she saw maggots in her garbage and it made her nauseated and she started retching and vomited some blood. She came to the ER and admission was advised but she declined. She states she went home, continued to drink ETOH and had further episodes of hematemesis. She called the GI office and was advised to go back to ER. When we went to interview patient she was sitting up in bed and eating potato salad. Allergies:  Motrin    PMH:  Allergies             HTN    PSH:  Tubal ligation    SH:  Admits to 3 beers per day chronically. She admits she was concerned about ETOH affecting her liver but she continued to drink having her last beer about 2 am today. PE:  WD WN AA F  NAD    Afebrile VSS    HEENT;  Sclera anicteric    Lungs: Clear    Heart:  RRR    Abd:  NL BS, soft, nontender    Ext: Neg CCE    Neuro:  A/O    Labs:  Hgb 9.3  (was 8.7 yesterday) plts 100              INR 1.1 from 7/8/2020    Assessment/Plan:  1. Hematemesis. Could be a M-W or other pathology. PPI therap     EGD reasonable. This procedure, the alternatives including no work up or treatment,risks and benefits were discussed. Among the risks discussed included cardiorespiratory suppression, aspiration, bleeding, failure to diagnose cancer or other pathology and perforation requiring surgery. The patient claimed to understand what was explained and all of her questions were answered. She is hemodynamically stable and EGD and follow up can either be done as IP or OP.   Either way she will need OP GI follow up for her Shaylee Londontz" problems she states she has had for about two years.      Spoke with ER attending who indicated she feels patient is stable enough to be discharged. Again, she should get OP GI evaluation and hopefully she will be compliant with appointments. Please recall IP GI service if further questions or if her situation changes and she becomes IP.

## 2020-07-09 NOTE — ED PROVIDER NOTES
[facility]     Emergency Department     Faculty Attestation    I performed a history and physical examination of the patient and discussed management with the resident. I have reviewed and agree with the residents findings including all diagnostic interpretations, and treatment plans as written. Any areas of disagreement are noted on the chart. I was personally present for the key portions of any procedures. I have documented in the chart those procedures where I was not present during the key portions. I have reviewed the emergency nurses triage note. I agree with the chief complaint, past medical history, past surgical history, allergies, medications, social and family history as documented unless otherwise noted below. Documentation of the HPI, Physical Exam and Medical Decision Making performed by hayley is based on my personal performance of the HPI, PE and MDM. For Physician Assistant/ Nurse Practitioner cases/documentation I have personally evaluated this patient and have completed at least one if not all key elements of the E/M (history, physical exam, and MDM). Additional findings are as noted. Patient seen in ER yeaterday for hematemesis, Notes hemoglobin  Yesterday was 8.7, She has chronically elevated lft, states she has hematemesis in the past. Follows with GI dr. Marisol Rogers. Missed appointment on 7/6 (earlier this week). Patient left ama yesterday as she needed to be home to tend her chidren. Was sent back by GI office today due to \" low hemoglobin\" and possible \"transfusion\" and \"egd\". Patient upon arrival into the room has eaten a box lunch and is drinking cranberry juice. Patient today hemoglobin is 9.3 which is similar to h/h back in 10/2018, she has chronic anemia.   Last episode of emesis was 2 aM, she is toelrating oral intake at this time, no tachycardic, soft abdomen without renderness  Will speak with GI, and plan for out patient follow up. No need for transfusion at this time. Likely outpatient EGD. Start on ppi    Zuly Castro D.O, M.P.H  Attending Emergency Medicine Physician         Zuly Castro, DO  07/09/20 1240      Spoke with GI at bedside, and given patient tolerating oral intake, they are agreeable with outpatient follow up.      Zuly Castro, DO  07/09/20 Senth[place] 7 Andree Langston, DO  08/06/20 1700

## 2020-07-13 ENCOUNTER — HOSPITAL ENCOUNTER (EMERGENCY)
Age: 40
Discharge: HOME | End: 2020-07-14
Attending: EMERGENCY MEDICINE | Admitting: INTERNAL MEDICINE
Payer: MEDICARE

## 2020-07-13 ENCOUNTER — APPOINTMENT (OUTPATIENT)
Dept: GENERAL RADIOLOGY | Age: 40
End: 2020-07-13
Payer: MEDICARE

## 2020-07-13 ENCOUNTER — OFFICE VISIT (OUTPATIENT)
Dept: GASTROENTEROLOGY | Age: 40
End: 2020-07-13
Payer: MEDICARE

## 2020-07-13 VITALS — TEMPERATURE: 98.6 F | WEIGHT: 144 LBS | RESPIRATION RATE: 18 BRPM | BODY MASS INDEX: 25.51 KG/M2

## 2020-07-13 PROBLEM — K92.0 HEMATEMESIS: Status: ACTIVE | Noted: 2020-07-13

## 2020-07-13 LAB
ABSOLUTE EOS #: 0 K/UL (ref 0–0.4)
ABSOLUTE IMMATURE GRANULOCYTE: 0 K/UL (ref 0–0.3)
ABSOLUTE LYMPH #: 2.77 K/UL (ref 1–4.8)
ABSOLUTE MONO #: 0.06 K/UL (ref 0.1–0.8)
ALBUMIN SERPL-MCNC: 4.2 G/DL (ref 3.5–5.2)
ALBUMIN/GLOBULIN RATIO: 1.1 (ref 1–2.5)
ALP BLD-CCNC: 149 U/L (ref 35–104)
ALT SERPL-CCNC: 76 U/L (ref 5–33)
ANION GAP SERPL CALCULATED.3IONS-SCNC: 14 MMOL/L (ref 9–17)
AST SERPL-CCNC: 291 U/L
BASOPHILS # BLD: 1 % (ref 0–2)
BASOPHILS ABSOLUTE: 0.06 K/UL (ref 0–0.2)
BILIRUB SERPL-MCNC: 0.54 MG/DL (ref 0.3–1.2)
BILIRUBIN DIRECT: 0.23 MG/DL
BILIRUBIN, INDIRECT: 0.31 MG/DL (ref 0–1)
BUN BLDV-MCNC: 4 MG/DL (ref 6–20)
BUN/CREAT BLD: ABNORMAL (ref 9–20)
CALCIUM SERPL-MCNC: 8.9 MG/DL (ref 8.6–10.4)
CHLORIDE BLD-SCNC: 103 MMOL/L (ref 98–107)
CO2: 23 MMOL/L (ref 20–31)
CREAT SERPL-MCNC: 0.52 MG/DL (ref 0.5–0.9)
DIFFERENTIAL TYPE: ABNORMAL
EOSINOPHILS RELATIVE PERCENT: 0 % (ref 1–4)
GFR AFRICAN AMERICAN: >60 ML/MIN
GFR NON-AFRICAN AMERICAN: >60 ML/MIN
GFR SERPL CREATININE-BSD FRML MDRD: ABNORMAL ML/MIN/{1.73_M2}
GFR SERPL CREATININE-BSD FRML MDRD: ABNORMAL ML/MIN/{1.73_M2}
GLOBULIN: ABNORMAL G/DL (ref 1.5–3.8)
GLUCOSE BLD-MCNC: 77 MG/DL (ref 70–99)
HCG QUALITATIVE: NEGATIVE
HCT VFR BLD CALC: 32.7 % (ref 36.3–47.1)
HEMOGLOBIN: 10 G/DL (ref 11.9–15.1)
IMMATURE GRANULOCYTES: 0 %
INR BLD: 1
LIPASE: 72 U/L (ref 13–60)
LYMPHOCYTES # BLD: 44 % (ref 24–44)
MCH RBC QN AUTO: 23.3 PG (ref 25.2–33.5)
MCHC RBC AUTO-ENTMCNC: 30.6 G/DL (ref 28.4–34.8)
MCV RBC AUTO: 76 FL (ref 82.6–102.9)
MONOCYTES # BLD: 1 % (ref 1–7)
MORPHOLOGY: ABNORMAL
MORPHOLOGY: ABNORMAL
NRBC AUTOMATED: 0 PER 100 WBC
PARTIAL THROMBOPLASTIN TIME: 27.1 SEC (ref 20.5–30.5)
PDW BLD-RTO: 22 % (ref 11.8–14.4)
PLATELET # BLD: ABNORMAL K/UL (ref 138–453)
PLATELET ESTIMATE: ABNORMAL
PLATELET, FLUORESCENCE: 105 K/UL (ref 138–453)
PLATELET, IMMATURE FRACTION: 14 % (ref 1.1–10.3)
PMV BLD AUTO: ABNORMAL FL (ref 8.1–13.5)
POTASSIUM SERPL-SCNC: 3.8 MMOL/L (ref 3.7–5.3)
PROTHROMBIN TIME: 10.1 SEC (ref 9–12)
RBC # BLD: 4.3 M/UL (ref 3.95–5.11)
RBC # BLD: ABNORMAL 10*6/UL
SEG NEUTROPHILS: 54 % (ref 36–66)
SEGMENTED NEUTROPHILS ABSOLUTE COUNT: 3.41 K/UL (ref 1.8–7.7)
SODIUM BLD-SCNC: 140 MMOL/L (ref 135–144)
TOTAL PROTEIN: 8.1 G/DL (ref 6.4–8.3)
WBC # BLD: 6.3 K/UL (ref 3.5–11.3)
WBC # BLD: ABNORMAL 10*3/UL

## 2020-07-13 PROCEDURE — 99212 OFFICE O/P EST SF 10 MIN: CPT | Performed by: INTERNAL MEDICINE

## 2020-07-13 PROCEDURE — 99285 EMERGENCY DEPT VISIT HI MDM: CPT

## 2020-07-13 PROCEDURE — 85055 RETICULATED PLATELET ASSAY: CPT

## 2020-07-13 PROCEDURE — 83690 ASSAY OF LIPASE: CPT

## 2020-07-13 PROCEDURE — 85730 THROMBOPLASTIN TIME PARTIAL: CPT

## 2020-07-13 PROCEDURE — 4004F PT TOBACCO SCREEN RCVD TLK: CPT | Performed by: INTERNAL MEDICINE

## 2020-07-13 PROCEDURE — 84703 CHORIONIC GONADOTROPIN ASSAY: CPT

## 2020-07-13 PROCEDURE — 85025 COMPLETE CBC W/AUTO DIFF WBC: CPT

## 2020-07-13 PROCEDURE — 80076 HEPATIC FUNCTION PANEL: CPT

## 2020-07-13 PROCEDURE — 2580000003 HC RX 258: Performed by: STUDENT IN AN ORGANIZED HEALTH CARE EDUCATION/TRAINING PROGRAM

## 2020-07-13 PROCEDURE — G8419 CALC BMI OUT NRM PARAM NOF/U: HCPCS | Performed by: INTERNAL MEDICINE

## 2020-07-13 PROCEDURE — 6360000002 HC RX W HCPCS: Performed by: STUDENT IN AN ORGANIZED HEALTH CARE EDUCATION/TRAINING PROGRAM

## 2020-07-13 PROCEDURE — 81001 URINALYSIS AUTO W/SCOPE: CPT

## 2020-07-13 PROCEDURE — 96365 THER/PROPH/DIAG IV INF INIT: CPT

## 2020-07-13 PROCEDURE — G8427 DOCREV CUR MEDS BY ELIG CLIN: HCPCS | Performed by: INTERNAL MEDICINE

## 2020-07-13 PROCEDURE — 96368 THER/DIAG CONCURRENT INF: CPT

## 2020-07-13 PROCEDURE — 96375 TX/PRO/DX INJ NEW DRUG ADDON: CPT

## 2020-07-13 PROCEDURE — 80048 BASIC METABOLIC PNL TOTAL CA: CPT

## 2020-07-13 PROCEDURE — 85610 PROTHROMBIN TIME: CPT

## 2020-07-13 PROCEDURE — 71046 X-RAY EXAM CHEST 2 VIEWS: CPT

## 2020-07-13 PROCEDURE — C9113 INJ PANTOPRAZOLE SODIUM, VIA: HCPCS | Performed by: STUDENT IN AN ORGANIZED HEALTH CARE EDUCATION/TRAINING PROGRAM

## 2020-07-13 RX ORDER — PANTOPRAZOLE SODIUM 40 MG/1
40 TABLET, DELAYED RELEASE ORAL
Qty: 180 TABLET | Refills: 1 | Status: ON HOLD | OUTPATIENT
Start: 2020-07-13 | End: 2022-01-01

## 2020-07-13 RX ORDER — ONDANSETRON 2 MG/ML
4 INJECTION INTRAMUSCULAR; INTRAVENOUS ONCE
Status: COMPLETED | OUTPATIENT
Start: 2020-07-13 | End: 2020-07-13

## 2020-07-13 RX ORDER — 0.9 % SODIUM CHLORIDE 0.9 %
1000 INTRAVENOUS SOLUTION INTRAVENOUS ONCE
Status: COMPLETED | OUTPATIENT
Start: 2020-07-13 | End: 2020-07-14

## 2020-07-13 RX ADMIN — ONDANSETRON 4 MG: 2 INJECTION INTRAMUSCULAR; INTRAVENOUS at 21:15

## 2020-07-13 RX ADMIN — SODIUM CHLORIDE 8 MG/HR: 9 INJECTION, SOLUTION INTRAVENOUS at 22:26

## 2020-07-13 RX ADMIN — SODIUM CHLORIDE 1000 ML: 9 INJECTION, SOLUTION INTRAVENOUS at 21:15

## 2020-07-13 RX ADMIN — SODIUM CHLORIDE 80 MG: 9 INJECTION, SOLUTION INTRAVENOUS at 22:09

## 2020-07-13 RX ADMIN — OCTREOTIDE ACETATE 25 MCG/HR: 500 INJECTION, SOLUTION INTRAVENOUS; SUBCUTANEOUS at 22:26

## 2020-07-13 ASSESSMENT — ENCOUNTER SYMPTOMS
BLOOD IN STOOL: 0
BACK PAIN: 0
SORE THROAT: 1
TROUBLE SWALLOWING: 1
ABDOMINAL PAIN: 1
NAUSEA: 1
EYE PAIN: 0
VOMITING: 1
ABDOMINAL PAIN: 0
CHOKING: 1
SHORTNESS OF BREATH: 0
COUGH: 1
NAUSEA: 1
VOMITING: 1
ALLERGIC/IMMUNOLOGIC NEGATIVE: 1
SORE THROAT: 0
DIARRHEA: 1
EYES NEGATIVE: 1

## 2020-07-13 NOTE — PROGRESS NOTES
Reason for Referral: Suspected GI Bleed  Sherry Henley MD  [address]  [place], [address] [place] [address]    Chief Complaint   Patient presents with   Raulito Dalton Follow-up     hospital follow up. Patient states throwing up blood twice since being discharged from the hospital the second time. Patient states not being able to eat like she normally does.  Other     Patient states that the second time she was in the ED that she was expecting to stay to have the EGD done and then they never kept her.  Diarrhea     Patient states having diarrhea daily for months. HISTORY OF PRESENT ILLNESS: Crow Salinas is a 47 y.o. female with a past history remarkable for HTN, allergies, referred for evaluation of odynophagia and recurrent episodes of hematemesis. Patient is a heavy drinker/daily drinker with notable dependency. She had recurrent episodes of dyspepsia and hematemesis over the weekend. Episodes started last week, she presented to the ED but was subsequently discharged. Active drinker- 3-4 beer  Illicit drugs include marijuana       Past Medical,Family, and Social History reviewed and does contribute to the patient presentingcondition. Patient's PMH/PSH,SH,PSYCH Hx, MEDs, ALLERGIES, and ROS were all reviewed and updated in the appropriate sections.     PAST MEDICAL HISTORY:  Past Medical History:   Diagnosis Date    Hypertension     Seasonal allergies        Past Surgical History:   Procedure Laterality Date    TUBAL LIGATION         CURRENT MEDICATIONS:    Current Outpatient Medications:     pantoprazole (PROTONIX) 20 MG tablet, Take 2 tablets by mouth daily, Disp: 20 tablet, Rfl: 0    cephALEXin (KEFLEX) 500 MG capsule, Take 1 capsule by mouth 2 times daily for 7 days, Disp: 13 capsule, Rfl: 0    omeprazole (PRILOSEC) 40 MG delayed release capsule, Take 1 capsule by mouth every morning (before breakfast), Disp: 30 capsule, Rfl: 0    Acetaminophen (ACETAMINOPHEN EXTRA STRENGTH) 500 MG CAPS, Take 1-2 tablets by mouth every 6 hours as needed for pain. Do not take more than 8 pills in a 24 hour period. , Disp: 30 capsule, Rfl: 0    ibuprofen (IBU) 800 MG tablet, Take 1 tablet by mouth every 6 hours as needed for Pain, Disp: 21 tablet, Rfl: 0    ibuprofen (ADVIL;MOTRIN) 800 MG tablet, Take 1 tablet by mouth every 8 hours as needed for Pain, Disp: 30 tablet, Rfl: 0    benzocaine (ORAJEL) 20 % GEL mucosal gel, Apply to affected area three times daily as needed, Disp: 1 Bottle, Rfl: 0    ondansetron (ZOFRAN ODT) 4 MG disintegrating tablet, Take 1 tablet by mouth every 8 hours as needed for Nausea or Vomiting, Disp: 10 tablet, Rfl: 0    Carboxymethylcellul-Glycerin 0.5-0.9 % SOLN, Apply 2 drops to eye as needed (dry and irritated eyes), Disp: 15 mL, Rfl: 0    ALLERGIES:   Allergies   Allergen Reactions    Seasonal     Motrin [Ibuprofen] Nausea Only     Feels better when taken with food       FAMILY HISTORY: History reviewed. No pertinent family history. SOCIAL HISTORY:   Social History     Socioeconomic History    Marital status: Single     Spouse name: Not on file    Number of children: Not on file    Years of education: Not on file    Highest education level: Not on file   Occupational History    Not on file   Social Needs    Financial resource strain: Not on file    Food insecurity     Worry: Not on file     Inability: Not on file    Transportation needs     Medical: Not on file     Non-medical: Not on file   Tobacco Use    Smoking status: Current Every Day Smoker     Packs/day: 3.00     Types: Cigars    Smokeless tobacco: Never Used    Tobacco comment: black and milds, 3 packs/day   Substance and Sexual Activity    Alcohol use:  Yes     Alcohol/week: 2.0 standard drinks     Types: 2 Cans of beer per week     Comment: daily 3-4 beers    Drug use: No    Sexual activity: Yes     Partners: Male   Lifestyle    Physical activity     Days per week: Not on file     Minutes per session: Not on file    Stress: Not on file   Relationships    Social connections     Talks on phone: Not on file     Gets together: Not on file     Attends Jewish service: Not on file     Active member of club or organization: Not on file     Attends meetings of clubs or organizations: Not on file     Relationship status: Not on file    Intimate partner violence     Fear of current or ex partner: Not on file     Emotionally abused: Not on file     Physically abused: Not on file     Forced sexual activity: Not on file   Other Topics Concern    Not on file   Social History Narrative    Not on file         REVIEW OF SYSTEMS: A 12-point review of systems was obtained and pertinent positives and negatives were listed below. REVIEW OF SYSTEMS:     Constitutional: No fever, no chills, no lethargy, no weakness. HEENT:  No headache, otalgia, itchy eyes, nasal discharge or sore throat. Cardiac:  No chest pain, dyspnea, orthopnea or PND. Chest:   No cough, phlegm or wheezing. Abdomen:      Detailed by MA   Neuro:  No focal weakness, abnormal movements or seizure like activity. Skin:   No rashes, no itching. :   No hematuria, no pyuria, no dysuria, no flank pain. Extremities:  No swelling or joint pains. ROS was otherwise negative    Review of Systems    PHYSICAL EXAMINATION: Vital signs reviewed per the nursing documentation. Temp 98.6 °F (37 °C)   Resp 18   Wt 144 lb (65.3 kg)   BMI 25.51 kg/m²   Body mass index is 25.51 kg/m². Physical Exam    Physical Exam   Constitutional: Patient is oriented to person, place, and time. Patient appears well-developed and well-nourished. HENT:   Head: Normocephalic and atraumatic. Eyes: Pupils are equal, round, and reactive to light. EOM are normal.   Neck: Normal range of motion. Neck supple. No JVD present. No tracheal deviation present. No thyromegaly present. Cardiovascular: Normal rate, regular rhythm, normal heart sounds and intact distal pulses. Pulmonary/Chest: Effort normal and breath sounds normal. No stridor. No respiratory distress. He has no wheezes. He has no rales. He exhibits no tenderness. Abdominal: Soft. Bowel sounds are normal. He exhibits no distension and no mass. There is no tenderness. There is no rebound and no guarding. No hernia. Musculoskeletal: Normal range of motion. Lymphadenopathy:    Patient has no cervical adenopathy. Neurological: Patient is alert and oriented to person, place, and time. Psychiatric: Patient has a normal mood and affect. Patient behavior is normal.       LABORATORY DATA: Reviewed  Lab Results   Component Value Date    WBC 5.0 07/09/2020    HGB 9.3 (L) 07/09/2020    HCT 30.8 (L) 07/09/2020    MCV 77.0 (L) 07/09/2020    PLT See Reflexed IPF Result 07/09/2020     07/08/2020    K 3.2 (L) 07/08/2020    CL 99 07/08/2020    CO2 23 07/08/2020    BUN 4 (L) 07/08/2020    CREATININE 0.49 (L) 07/08/2020    LABPROT 7.5 10/23/2012    LABALBU 3.7 07/08/2020    BILITOT 0.51 07/08/2020    ALKPHOS 134 (H) 07/08/2020     (H) 07/08/2020    ALT 86 (H) 07/08/2020    INR 1.1 07/08/2020         Lab Results   Component Value Date    RBC 4.00 07/09/2020    HGB 9.3 (L) 07/09/2020    MCV 77.0 (L) 07/09/2020    MCH 23.3 (L) 07/09/2020    MCHC 30.2 07/09/2020    RDW 22.3 (H) 07/09/2020    MPV NOT REPORTED 07/09/2020    BASOPCT 1 07/09/2020    LYMPHSABS 1.40 07/09/2020    MONOSABS 0.70 07/09/2020    NEUTROABS 2.80 07/09/2020    EOSABS 0.05 07/09/2020    BASOSABS 0.05 07/09/2020         DIAGNOSTIC TESTING:     No results found. IMPRESSION: Shoshana Hughes is a 47 y.o. female with a past history remarkable for HTN, allergies, referred for evaluation of odynophagia and recurrent episodes of hematemesis. Patient is a heavy drinker/daily drinker with notable dependency. She had recurrent episodes of dyspepsia and hematemesis over the weekend.  Episodes started last week, she presented to the ED but was subsequently discharged. PLAN:    1) Suspected UGI bleeding- Protonix 40mg BID. Will send patient to the ED for an evaluation (the patient needs to be admitted for recurrent hematemesis and 20 pounds weight loss over 2 months). She had 3 episodes of hematemesis over the weekend and would benefit highly from an in-patient EGD. Patient needs to be admitted her UGI bleeding. 2) Pancreatic insufficiency- possible etiology for her subacute diarrhea/chronic diarrhea. 3) RTC after discharge from hospital      Thank you for allowing me to participate in the care of Ms. Isrrael Phelan. For any further questions please do not hesitate to contact me. I have reviewed and agree with the MA/LPN ROS please refer to their documentation from today's encounter on a separate note. Leah Anderson MD, MPH   [facility]  Office #: [phone]          this note is created with the assistance of a speech recognition program.  While intending to generate a document that actually reflects the content of the visit, the document can still have some errors including those of syntax and sound a like substitutions which may escape proof reading. It such instances, actual meaning can be extrapolated by contextual diversion.

## 2020-07-13 NOTE — TELEPHONE ENCOUNTER
The patient called the office to verify location of the appointment today and make sure that we were not keeping her for surgery. Informed that appointment was at our [facility]. V's location [address] ( go to the main entrance and they will check her temperature and guide her from there, and that this was just a consultation to meet with the doctor. Writer thanked and call ended.

## 2020-07-14 ENCOUNTER — APPOINTMENT (OUTPATIENT)
Dept: ULTRASOUND IMAGING | Age: 40
End: 2020-07-14
Payer: MEDICARE

## 2020-07-14 VITALS
DIASTOLIC BLOOD PRESSURE: 62 MMHG | WEIGHT: 144 LBS | BODY MASS INDEX: 25.52 KG/M2 | HEART RATE: 70 BPM | OXYGEN SATURATION: 100 % | TEMPERATURE: 98.1 F | HEIGHT: 63 IN | RESPIRATION RATE: 16 BRPM | SYSTOLIC BLOOD PRESSURE: 129 MMHG

## 2020-07-14 PROBLEM — D69.6 THROMBOCYTOPENIA (HCC): Status: ACTIVE | Noted: 2020-07-14

## 2020-07-14 PROBLEM — D50.0 BLOOD LOSS ANEMIA: Status: ACTIVE | Noted: 2020-07-14

## 2020-07-14 PROBLEM — R74.01 ELEVATED TRANSAMINASE LEVEL: Status: ACTIVE | Noted: 2020-07-14

## 2020-07-14 LAB
-: ABNORMAL
ABSOLUTE EOS #: 0 K/UL (ref 0–0.4)
ABSOLUTE IMMATURE GRANULOCYTE: 0 K/UL (ref 0–0.3)
ABSOLUTE LYMPH #: 0.83 K/UL (ref 1–4.8)
ABSOLUTE MONO #: 0.2 K/UL (ref 0.1–0.8)
ALBUMIN SERPL-MCNC: 3.4 G/DL (ref 3.5–5.2)
ALBUMIN/GLOBULIN RATIO: 1.1 (ref 1–2.5)
ALP BLD-CCNC: 123 U/L (ref 35–104)
ALT SERPL-CCNC: 60 U/L (ref 5–33)
AMORPHOUS: ABNORMAL
ANION GAP SERPL CALCULATED.3IONS-SCNC: 17 MMOL/L (ref 9–17)
AST SERPL-CCNC: 207 U/L
BACTERIA: ABNORMAL
BASOPHILS # BLD: 0 % (ref 0–2)
BASOPHILS ABSOLUTE: 0 K/UL (ref 0–0.2)
BILIRUB SERPL-MCNC: 0.54 MG/DL (ref 0.3–1.2)
BILIRUBIN DIRECT: 0.22 MG/DL
BILIRUBIN URINE: NEGATIVE
BILIRUBIN, INDIRECT: 0.32 MG/DL (ref 0–1)
BUN BLDV-MCNC: 3 MG/DL (ref 6–20)
BUN/CREAT BLD: ABNORMAL (ref 9–20)
CALCIUM SERPL-MCNC: 7.8 MG/DL (ref 8.6–10.4)
CASTS UA: ABNORMAL /LPF (ref 0–8)
CHLORIDE BLD-SCNC: 109 MMOL/L (ref 98–107)
CO2: 21 MMOL/L (ref 20–31)
COLOR: YELLOW
CREAT SERPL-MCNC: 0.52 MG/DL (ref 0.5–0.9)
CRYSTALS, UA: ABNORMAL /HPF
DIFFERENTIAL TYPE: ABNORMAL
EOSINOPHILS RELATIVE PERCENT: 0 % (ref 1–4)
EPITHELIAL CELLS UA: ABNORMAL /HPF (ref 0–5)
GFR AFRICAN AMERICAN: >60 ML/MIN
GFR NON-AFRICAN AMERICAN: >60 ML/MIN
GFR SERPL CREATININE-BSD FRML MDRD: ABNORMAL ML/MIN/{1.73_M2}
GFR SERPL CREATININE-BSD FRML MDRD: ABNORMAL ML/MIN/{1.73_M2}
GLOBULIN: ABNORMAL G/DL (ref 1.5–3.8)
GLUCOSE BLD-MCNC: 71 MG/DL (ref 70–99)
GLUCOSE URINE: NEGATIVE
HCT VFR BLD CALC: 29.8 % (ref 36.3–47.1)
HEMOGLOBIN: 8.7 G/DL (ref 11.9–15.1)
IMMATURE GRANULOCYTES: 0 %
KETONES, URINE: NEGATIVE
LEUKOCYTE ESTERASE, URINE: NEGATIVE
LYMPHOCYTES # BLD: 17 % (ref 24–44)
MCH RBC QN AUTO: 23 PG (ref 25.2–33.5)
MCHC RBC AUTO-ENTMCNC: 29.2 G/DL (ref 28.4–34.8)
MCV RBC AUTO: 78.6 FL (ref 82.6–102.9)
MONOCYTES # BLD: 4 % (ref 1–7)
MORPHOLOGY: ABNORMAL
MUCUS: ABNORMAL
NITRITE, URINE: NEGATIVE
NRBC AUTOMATED: 0 PER 100 WBC
OTHER OBSERVATIONS UA: ABNORMAL
PDW BLD-RTO: 22 % (ref 11.8–14.4)
PH UA: 5 (ref 5–8)
PLATELET # BLD: ABNORMAL K/UL (ref 138–453)
PLATELET ESTIMATE: ABNORMAL
PLATELET, FLUORESCENCE: 84 K/UL (ref 138–453)
PLATELET, IMMATURE FRACTION: 11.3 % (ref 1.1–10.3)
PMV BLD AUTO: ABNORMAL FL (ref 8.1–13.5)
POTASSIUM SERPL-SCNC: 4.1 MMOL/L (ref 3.7–5.3)
PROTEIN UA: NEGATIVE
RBC # BLD: 3.79 M/UL (ref 3.95–5.11)
RBC # BLD: ABNORMAL 10*6/UL
RBC UA: ABNORMAL /HPF (ref 0–4)
RENAL EPITHELIAL, UA: ABNORMAL /HPF
SEG NEUTROPHILS: 79 % (ref 36–66)
SEGMENTED NEUTROPHILS ABSOLUTE COUNT: 3.87 K/UL (ref 1.8–7.7)
SODIUM BLD-SCNC: 147 MMOL/L (ref 135–144)
SPECIFIC GRAVITY UA: 1.02 (ref 1–1.03)
TOTAL PROTEIN: 6.5 G/DL (ref 6.4–8.3)
TRICHOMONAS: ABNORMAL
TURBIDITY: ABNORMAL
URINE HGB: NEGATIVE
UROBILINOGEN, URINE: NORMAL
WBC # BLD: 4.9 K/UL (ref 3.5–11.3)
WBC # BLD: ABNORMAL 10*3/UL
WBC UA: ABNORMAL /HPF (ref 0–5)
YEAST: ABNORMAL

## 2020-07-14 PROCEDURE — 85025 COMPLETE CBC W/AUTO DIFF WBC: CPT

## 2020-07-14 PROCEDURE — U0003 INFECTIOUS AGENT DETECTION BY NUCLEIC ACID (DNA OR RNA); SEVERE ACUTE RESPIRATORY SYNDROME CORONAVIRUS 2 (SARS-COV-2) (CORONAVIRUS DISEASE [COVID-19]), AMPLIFIED PROBE TECHNIQUE, MAKING USE OF HIGH THROUGHPUT TECHNOLOGIES AS DESCRIBED BY CMS-2020-01-R: HCPCS

## 2020-07-14 PROCEDURE — 85055 RETICULATED PLATELET ASSAY: CPT

## 2020-07-14 PROCEDURE — 99222 1ST HOSP IP/OBS MODERATE 55: CPT | Performed by: INTERNAL MEDICINE

## 2020-07-14 PROCEDURE — 96366 THER/PROPH/DIAG IV INF ADDON: CPT

## 2020-07-14 PROCEDURE — 2580000003 HC RX 258: Performed by: STUDENT IN AN ORGANIZED HEALTH CARE EDUCATION/TRAINING PROGRAM

## 2020-07-14 PROCEDURE — 76705 ECHO EXAM OF ABDOMEN: CPT

## 2020-07-14 PROCEDURE — 80076 HEPATIC FUNCTION PANEL: CPT

## 2020-07-14 PROCEDURE — 99284 EMERGENCY DEPT VISIT MOD MDM: CPT | Performed by: INTERNAL MEDICINE

## 2020-07-14 PROCEDURE — 80048 BASIC METABOLIC PNL TOTAL CA: CPT

## 2020-07-14 PROCEDURE — 6360000002 HC RX W HCPCS: Performed by: STUDENT IN AN ORGANIZED HEALTH CARE EDUCATION/TRAINING PROGRAM

## 2020-07-14 PROCEDURE — C9113 INJ PANTOPRAZOLE SODIUM, VIA: HCPCS | Performed by: STUDENT IN AN ORGANIZED HEALTH CARE EDUCATION/TRAINING PROGRAM

## 2020-07-14 RX ORDER — CARBOXYMETHYLCELLULOSE SODIUM 10 MG/ML
2 GEL OPHTHALMIC PRN
Status: DISCONTINUED | OUTPATIENT
Start: 2020-07-14 | End: 2020-07-14 | Stop reason: HOSPADM

## 2020-07-14 RX ORDER — SODIUM CHLORIDE 0.9 % (FLUSH) 0.9 %
10 SYRINGE (ML) INJECTION PRN
Status: DISCONTINUED | OUTPATIENT
Start: 2020-07-14 | End: 2020-07-14 | Stop reason: HOSPADM

## 2020-07-14 RX ORDER — ACETAMINOPHEN 650 MG/1
650 SUPPOSITORY RECTAL EVERY 6 HOURS PRN
Status: DISCONTINUED | OUTPATIENT
Start: 2020-07-14 | End: 2020-07-14 | Stop reason: HOSPADM

## 2020-07-14 RX ORDER — ONDANSETRON 2 MG/ML
4 INJECTION INTRAMUSCULAR; INTRAVENOUS EVERY 6 HOURS PRN
Status: DISCONTINUED | OUTPATIENT
Start: 2020-07-14 | End: 2020-07-14 | Stop reason: HOSPADM

## 2020-07-14 RX ORDER — ACETAMINOPHEN 325 MG/1
650 TABLET ORAL EVERY 6 HOURS PRN
Status: DISCONTINUED | OUTPATIENT
Start: 2020-07-14 | End: 2020-07-14 | Stop reason: HOSPADM

## 2020-07-14 RX ORDER — SODIUM CHLORIDE 0.9 % (FLUSH) 0.9 %
10 SYRINGE (ML) INJECTION EVERY 12 HOURS SCHEDULED
Status: DISCONTINUED | OUTPATIENT
Start: 2020-07-14 | End: 2020-07-14 | Stop reason: HOSPADM

## 2020-07-14 RX ORDER — PROMETHAZINE HYDROCHLORIDE 25 MG/1
12.5 TABLET ORAL EVERY 6 HOURS PRN
Status: DISCONTINUED | OUTPATIENT
Start: 2020-07-14 | End: 2020-07-14 | Stop reason: HOSPADM

## 2020-07-14 RX ADMIN — SODIUM CHLORIDE 8 MG/HR: 9 INJECTION, SOLUTION INTRAVENOUS at 10:12

## 2020-07-14 ASSESSMENT — ENCOUNTER SYMPTOMS
DIARRHEA: 1
ABDOMINAL PAIN: 1
VOMITING: 1
NAUSEA: 1
CONSTIPATION: 0
SHORTNESS OF BREATH: 0
ANAL BLEEDING: 0
CHOKING: 0
COUGH: 0
BLOOD IN STOOL: 0
ABDOMINAL DISTENTION: 0

## 2020-07-14 NOTE — ED NOTES
Patient transported to ultrasound with tech via cot. NAD noted.       Aster Vinson RN  07/14/20 2009

## 2020-07-14 NOTE — ED NOTES
Spoke with GI , Audrey [facility]. Notified patient is requesting to go home. Pt does not want to stay. Patient is refusing any further testing and EGD/admission. Dr. Adarsh Solorzano notified. Dr. Adarsh Solorzano at bedside with Renetta Escamilla RN to discuss risks of leaving against medical advice.       William Sy RN  07/14/20 4104

## 2020-07-14 NOTE — ED PROVIDER NOTES
[facility] ED     Emergency Department     Faculty Attestation    I performed a history and physical examination of the patient and discussed management with the resident. I reviewed the residents note and agree with the documented findings and plan of care. Any areas of disagreement are noted on the chart. I was personally present for the key portions of any procedures. I have documented in the chart those procedures where I was not present during the key portions. I have reviewed the emergency nurses triage note. I agree with the chief complaint, past medical history, past surgical history, allergies, medications, social and family history as documented unless otherwise noted below. For Physician Assistant/ Nurse Practitioner cases/documentation I have personally evaluated this patient and have completed at least one if not all key elements of the E/M (history, physical exam, and MDM). Additional findings are as noted. Patient sent here by her GI doctor for repeated hematemesis for the past several days. Patient was seen by me on Wednesday of this week and did have a hemoglobin in the eights. I had recommended for her to stay at that time but she decided to leave [address] due to needing to care for her children. Patient did return since then but her hemoglobin had improved and she was tolerating p.o. so she was discharged to follow-up with GI outpatient. Patient says she continues to have multiple episodes of hematemesis. She says she also has noticed darkening of her stool. She says she does not have any abdominal pain at this time but did have some to her left upper quadrant earlier today. She denies chest pain or shortness of breath. Will repeat labs and plan to admit patient for GI consult.       Karen Tran MD  Attending Emergency  Physician              Dalia Woodward MD  07/13/20 6265

## 2020-07-14 NOTE — ED NOTES
Pt resting in bed. No needs at this time. Pt respirations are even and unlabored, pt is oriented X 4, speaking in complete sentences, bed is in the lowest position, call light is within reach. Will continue to monitor.        Steven Saldivar RN  07/14/20 9827

## 2020-07-14 NOTE — ED NOTES
Pt resting in bed. Pt respirations are even and unlabored, pt is oriented X 4, speaking in complete sentences, bed is in the lowest position, call light is within reach. Will continue to monitor.        Haley Villegas RN  07/14/20 4588

## 2020-07-14 NOTE — ED NOTES
Pt ambulated to restroom, no distress noted. Morning labs drawn, urine obtain.       Shabbir Reddy RN  07/14/20 2875

## 2020-07-14 NOTE — ED PROVIDER NOTES
Faculty Sign-Out Attestation  Handoff taken on the following patient from prior Attending Physician: Nela Mendoza    I was available and discussed any additional care issues that arose and coordinated the management plans with the resident(s) caring for the patient during my duty period. Any areas of disagreement with residents documentation of care or procedures are noted on the chart. I was personally present for the key portions of any/all procedures during my duty period. I have documented in the chart those procedures where I was not present during the key portions.     Upper GIB, stable, admit for EGD in am    Shad Chen MD  Attending Physician       Shad Chen MD  07/14/20 8433

## 2020-07-14 NOTE — ED NOTES
Pt resting on cot with NAD, lights dimmed, bf at bedside. Protonix and Ocreotide running as prescribed. Pt remains on monitor, will cont to monitor.       Magdalena Dorantes RN  07/14/20 0706

## 2020-07-14 NOTE — ED NOTES
Patient returned from ultrasound with tech via cot. Patient placed back on monitor, updated on plan of care. Pt denies needs.       Judson Boston RN  07/14/20 1336

## 2020-07-14 NOTE — ED NOTES
Patient called out, reports she is refusing EGD. Pt wants to know \"why i'm being admitted if I don't want that\" admitting team paged.       Francisco Munroe RN  07/14/20 7364

## 2020-07-14 NOTE — ED NOTES
Spoke with [facility], will come down and speak with patient and update patient about admission. Pt reports she does not want further testing. Awaiting [facility] to be at bedside.       Sharmin Bhakta RN  07/14/20 5408

## 2020-07-14 NOTE — ED PROVIDER NOTES
[facility] ED  Emergency Department  Emergency Medicine Resident Sign-out     Care of Rafael Finley was assumed from Dr. Easton Owens and is being seen for Abdominal Pain   . The patient's initial evaluation and plan have been discussed with the prior provider who initially evaluated the patient. EMERGENCY DEPARTMENT COURSE / MEDICAL DECISION MAKING:       MEDICATIONS GIVEN:  Orders Placed This Encounter   Medications    0.9 % sodium chloride bolus    ondansetron (ZOFRAN) injection 4 mg    pantoprazole (PROTONIX) 80 mg in sodium chloride 0.9 % 50 mL bolus    octreotide (SANDOSTATIN) 500 mcg in sodium chloride 0.9 % 100 mL infusion    pantoprazole (PROTONIX) 80 mg in sodium chloride 0.9 % 100 mL infusion       LABS / RADIOLOGY:     Labs Reviewed   CBC WITH AUTO DIFFERENTIAL - Abnormal; Notable for the following components:       Result Value    Hemoglobin 10.0 (*)     Hematocrit 32.7 (*)     MCV 76.0 (*)     MCH 23.3 (*)     RDW 22.0 (*)     Eosinophils % 0 (*)     Absolute Mono # 0.06 (*)     All other components within normal limits   BASIC METABOLIC PANEL - Abnormal; Notable for the following components:    BUN 4 (*)     All other components within normal limits   HEPATIC FUNCTION PANEL - Abnormal; Notable for the following components:    Alkaline Phosphatase 149 (*)     ALT 76 (*)      (*)     All other components within normal limits   LIPASE - Abnormal; Notable for the following components:    Lipase 72 (*)     All other components within normal limits   IMMATURE PLATELET FRACTION - Abnormal; Notable for the following components:    Platelet, Immature Fraction 14.0 (*)     Platelet, Fluorescence 105 (*)     All other components within normal limits   PROTIME-INR   APTT   HCG, SERUM, QUALITATIVE   URINALYSIS WITH MICROSCOPIC   HEMOGLOBIN AND HEMATOCRIT, BLOOD   COVID-19       XR CHEST STANDARD (2 VW)   Final Result   Normal chest examination.              RECENT VITALS: Temp: 98.1 °F (36.7 °C),  Pulse: 70, Resp: 16, BP: (!) 101/55, SpO2: 94 %    This patient is a 47 y.o. Female with hematemesis over the last week, has been having is prone for the last year patient drinks proximately 4-6 beers per day no concern for alcohol withdrawal at this time. Patient was seen by GI earlier today and advised to come in, patient will be admitted to hospital started octreotide, Protonix, n.p.o. plan for EGD in the morning. No need for antibiotics as patient has not febrile, low suspicion for SBP    OUTSTANDING TASKS / RECOMMENDATIONS:      1. Await Bed placement       FINAL IMPRESSION:     1. Hematemesis with nausea    2. Transaminitis        DISPOSITION:       DISPOSITION:  []  Discharge   []  Transfer -    [x]  Admission -[facility]   []  Against Medical Advice   []  Eloped   FOLLOW-UP: No follow-up provider specified.    DISCHARGE MEDICATIONS: New Prescriptions    No medications on file          Salas Anaya DO  Emergency Medicine Resident  [facility]     Salas Anaya [place]  Resident  07/14/20 1268 [place] DO Marilyn  Resident  07/14/20 3465

## 2020-07-14 NOTE — ED NOTES
Spoke with [facility], patient needs to be seen by GI for dispo to discharge if patient requesting to go home. GI paged. Awaiting call back.       Olivia Arguello RN  07/14/20 1665

## 2020-07-14 NOTE — H&P
[address]     Department of Internal Medicine - Staff Internal Medicine Teaching Service          ADMISSION NOTE/HISTORY AND PHYSICAL EXAMINATION   Date: 7/14/2020  Patient Name: Doroteo Quesada  Date of admission: 7/13/2020  8:11 PM  YOB: 1980  PCP: No primary care provider on file. History Obtained From:  patient, electronic medical record    CHIEF COMPLAINT     Chief complaint: Bloody vomiting. HISTORY OF PRESENTING ILLNESS     The patient is a pleasant 47 y.o. female with h/o alcohol abuse now came to ED for evaluation of her Hematemesis. Pt reported that she has been having hematemesis for last 1 week and she was admitted to the ED at [facility], but was discharged as out patient follow up with GI. This morning (07/13/2020) she had multiple episodes of bloody vomiting and was told by GI to come to ED for in patient follow up. Pt states that she had multiple episode of abdominal distension,but she didn't get any treatment for that. Pt c/o diarrhea but denies any blood in stools. Pt reported that she had some anxiety issues but doesn't take any medication for that. Her Liver enzymes were elevated, Hb was low 10, Lipase 72. HCG was negative in ER. Review of Systems   Constitutional: Negative for activity change, fatigue and fever. HENT: Negative for dental problem. Respiratory: Negative for cough, choking and shortness of breath. Cardiovascular: Negative for palpitations. Gastrointestinal: Positive for abdominal pain, diarrhea, nausea and vomiting. Negative for abdominal distention, anal bleeding, blood in stool and constipation. Genitourinary: Negative.             PAST MEDICAL HISTORY     Past Medical History:   Diagnosis Date    Hypertension     Seasonal allergies        PAST SURGICAL HISTORY     Past Surgical History:   Procedure Laterality Date    TUBAL LIGATION         ALLERGIES     Seasonal and Motrin [ibuprofen]    MEDICATIONS 07/13/20 144 lb (65.3 kg)   07/13/20 144 lb (65.3 kg)   07/08/20 160 lb (72.6 kg)     Body Mass Index : Body mass index is 25.51 kg/m². PHYSICAL EXAMINATION:  Constitutional: This is a well developed, well nourished, 25-29.9 - Overweight 47y.o. year old female who is alert, oriented, cooperative and in no apparent distress. Head:normocephalic and atraumatic. EENT:  PERRLA. Neck: Supple without thyromegaly. No elevated JVP. Trachea was midline. Respiratory: Chest was symmetrical without dullness to percussion. Cardiovascular: Regular rate. Abdomen: Slightly rounded and soft without organomegaly. No rebound, rigidity or guarding was appreciated. Lymphatic: No lymphadenopathy. Musculoskeletal: Normal curvature of the spine. No gross muscle weakness. Extremities:  No lower extremity edema, ulcerations, tenderness, varicosities or erythema. Muscle size, tone and strength are normal.  No involuntary movements are noted. Skin:  Warm and dry. Good color, turgor and pigmentation. No lesions or scars.   No cyanosis or clubbing  Neurological/Psychiatric: The patient's general behavior, level of consciousness, thought content and emotional status is normal.          INVESTIGATIONS     Laboratory Testing:     Recent Results (from the past 24 hour(s))   CBC Auto Differential    Collection Time: 07/13/20  8:50 PM   Result Value Ref Range    WBC 6.3 3.5 - 11.3 k/uL    RBC 4.30 3.95 - 5.11 m/uL    Hemoglobin 10.0 (L) 11.9 - 15.1 g/dL    Hematocrit 32.7 (L) 36.3 - 47.1 %    MCV 76.0 (L) 82.6 - 102.9 fL    MCH 23.3 (L) 25.2 - 33.5 pg    MCHC 30.6 28.4 - 34.8 g/dL    RDW 22.0 (H) 11.8 - 14.4 %    Platelets See Reflexed IPF Result 138 - 453 k/uL    MPV NOT REPORTED 8.1 - 13.5 fL    NRBC Automated 0.0 0.0 per 100 WBC    Differential Type NOT REPORTED     WBC Morphology NOT REPORTED     RBC Morphology NOT REPORTED     Platelet Estimate NOT REPORTED     Immature Granulocytes 0 0 %    Seg Neutrophils 54 36 - 66 %    Lymphocytes 44 24 - 44 %    Monocytes 1 1 - 7 %    Eosinophils % 0 (L) 1 - 4 %    Basophils 1 0 - 2 %    Absolute Immature Granulocyte 0.00 0.00 - 0.30 k/uL    Segs Absolute 3.41 1.8 - 7.7 k/uL    Absolute Lymph # 2.77 1.0 - 4.8 k/uL    Absolute Mono # 0.06 (L) 0.1 - 0.8 k/uL    Absolute Eos # 0.00 0.0 - 0.4 k/uL    Basophils Absolute 0.06 0.0 - 0.2 k/uL    Morphology MICROCYTOSIS PRESENT     Morphology ANISOCYTOSIS PRESENT    BASIC METABOLIC PANEL    Collection Time: 07/13/20  8:50 PM   Result Value Ref Range    Glucose 77 70 - 99 mg/dL    BUN 4 (L) 6 - 20 mg/dL    CREATININE 0.52 0.50 - 0.90 mg/dL    Bun/Cre Ratio NOT REPORTED 9 - 20    Calcium 8.9 8.6 - 10.4 mg/dL    Sodium 140 135 - 144 mmol/L    Potassium 3.8 3.7 - 5.3 mmol/L    Chloride 103 98 - 107 mmol/L    CO2 23 20 - 31 mmol/L    Anion Gap 14 9 - 17 mmol/L    GFR Non-African American >60 >60 mL/min    GFR African American >60 >60 mL/min    GFR Comment          GFR Staging NOT REPORTED    HEPATIC FUNCTION PANEL    Collection Time: 07/13/20  8:50 PM   Result Value Ref Range    Alb 4.2 3.5 - 5.2 g/dL    Alkaline Phosphatase 149 (H) 35 - 104 U/L    ALT 76 (H) 5 - 33 U/L     (H) <32 U/L    Total Bilirubin 0.54 0.3 - 1.2 mg/dL    Bilirubin, Direct 0.23 <0.31 mg/dL    Bilirubin, Indirect 0.31 0.00 - 1.00 mg/dL    Total Protein 8.1 6.4 - 8.3 g/dL    Globulin NOT REPORTED 1.5 - 3.8 g/dL    Albumin/Globulin Ratio 1.1 1.0 - 2.5   PROTIME-INR    Collection Time: 07/13/20  8:50 PM   Result Value Ref Range    Protime 10.1 9.0 - 12.0 sec    INR 1.0    APTT    Collection Time: 07/13/20  8:50 PM   Result Value Ref Range    PTT 27.1 20.5 - 30.5 sec   HCG Qualitative, Serum    Collection Time: 07/13/20  8:50 PM   Result Value Ref Range    hCG Qual NEGATIVE NEGATIVE   LIPASE    Collection Time: 07/13/20  8:50 PM   Result Value Ref Range    Lipase 72 (H) 13 - 60 U/L   Immature Platelet Fraction    Collection Time: 07/13/20  8:50 PM   Result Value Ref Range

## 2020-07-14 NOTE — ED PROVIDER NOTES
FACULTY SIGN-OUT  ADDENDUM     Care of this patient was assumed from previous attending physician. The patient's initial evaluation and plan have been discussed with the prior provider who initially evaluated the patient. Attestation  I was available and discussed any additional care issues that arose and coordinated the management plans with the resident(s) caring for the patient during my duty period. Any areas of disagreement with resident's documentation of care or procedures are noted on the chart. I was personally present for the key portions of any/all procedures, during my duty period. I have documented in the chart those procedures where I was not present during the key portions. ED COURSE      The patient was given the following medications:  Orders Placed This Encounter   Medications    0.9 % sodium chloride bolus    ondansetron (ZOFRAN) injection 4 mg    pantoprazole (PROTONIX) 80 mg in sodium chloride 0.9 % 50 mL bolus    octreotide (SANDOSTATIN) 500 mcg in sodium chloride 0.9 % 100 mL infusion    pantoprazole (PROTONIX) 80 mg in sodium chloride 0.9 % 100 mL infusion    carboxymethylcellulose PF (THERATEARS) 1 % ophthalmic gel 2 drop    sodium chloride flush 0.9 % injection 10 mL    sodium chloride flush 0.9 % injection 10 mL    OR Linked Order Group     acetaminophen (TYLENOL) tablet 650 mg     acetaminophen (TYLENOL) suppository 650 mg    magnesium hydroxide (MILK OF MAGNESIA) 400 MG/5ML suspension 30 mL    OR Linked Order Group     promethazine (PHENERGAN) tablet 12.5 mg     ondansetron (ZOFRAN) injection 4 mg       RECENT VITALS:   Temp: 98.1 °F (36.7 °C), Pulse: 70, Resp: 16, BP: 113/67    MEDICAL DECISION MAKING        Doroteo Quesada is a 47 y.o. female who presents to the Emergency Department with complaints of GI bleed. Patient is admitted, scheduled to undergo EGD in the morning. Cristobal Power MD, F.A.C.E.P.   Attending Emergency Physician    (Please note that portions of this note were completed with a voice recognition program.  Efforts were made to edit the dictations but occasionally words are mis-transcribed.)         Tommy Mcclellan MD  07/14/20 2357

## 2020-07-14 NOTE — ED PROVIDER NOTES
Worry: Not on file     Inability: Not on file    Transportation needs     Medical: Not on file     Non-medical: Not on file   Tobacco Use    Smoking status: Current Every Day Smoker     Packs/day: 3.00     Types: Cigars    Smokeless tobacco: Never Used    Tobacco comment: black and milds, 3 packs/day   Substance and Sexual Activity    Alcohol use: Yes     Alcohol/week: 2.0 standard drinks     Types: 2 Cans of beer per week     Comment: daily 3-4 beers    Drug use: Yes     Types: Marijuana    Sexual activity: Yes     Partners: Male   Lifestyle    Physical activity     Days per week: Not on file     Minutes per session: Not on file    Stress: Not on file   Relationships    Social connections     Talks on phone: Not on file     Gets together: Not on file     Attends Christian service: Not on file     Active member of club or organization: Not on file     Attends meetings of clubs or organizations: Not on file     Relationship status: Not on file    Intimate partner violence     Fear of current or ex partner: Not on file     Emotionally abused: Not on file     Physically abused: Not on file     Forced sexual activity: Not on file   Other Topics Concern    Not on file   Social History Narrative    Not on file       I counseled the patient against using tobacco products. History reviewed. No pertinent family history. No other pertinent FamHx on review with patient/guardian. Allergies:  Seasonal and Motrin [ibuprofen]    Home Medications:  Prior to Admission medications    Medication Sig Start Date End Date Taking?  Authorizing Provider   pantoprazole (PROTONIX) 40 MG tablet Take 1 tablet by mouth 2 times daily (before meals) 7/13/20   Roni Nichols MD   pantoprazole (PROTONIX) 20 MG tablet Take 2 tablets by mouth daily 7/9/20   CARMEN Perdue MD   cephALEXin (KEFLEX) 500 MG capsule Take 1 capsule by mouth 2 times daily for 7 days 7/8/20 7/15/20  Swapnil Simms MD   omeprazole (PRILOSEC) 40 MG delayed release capsule Take 1 capsule by mouth every morning (before breakfast) 7/8/20   Dustin Maldonado MD   Acetaminophen (ACETAMINOPHEN EXTRA STRENGTH) 500 MG CAPS Take 1-2 tablets by mouth every 6 hours as needed for pain. Do not take more than 8 pills in a 24 hour period. 2/11/20   Pricila Palmer MD   ibuprofen (IBU) 800 MG tablet Take 1 tablet by mouth every 6 hours as needed for Pain 2/10/20   Rudy Adams DO   ibuprofen (ADVIL;MOTRIN) 800 MG tablet Take 1 tablet by mouth every 8 hours as needed for Pain 2/3/20   Juanita Ventura MD   benzocaine (ORAJEL) 20 % GEL mucosal gel Apply to affected area three times daily as needed 2/3/20   Juanita Ventura MD   ondansetron (ZOFRAN ODT) 4 MG disintegrating tablet Take 1 tablet by mouth every 8 hours as needed for Nausea or Vomiting 7/13/19   Keyla Perez MD   Carboxymethylcellul-Glycerin 0.5-0.9 % SOLN Apply 2 drops to eye as needed (dry and irritated eyes) 5/21/19   Oneida Meeks DO       REVIEW OF SYSTEMS    (2-9 systems for level 4, 10 ormore for level 5)      Review of Systems   Constitutional: Negative for chills and fever. HENT: Negative for sore throat. Eyes: Negative for pain and visual disturbance. Respiratory: Negative for shortness of breath. Cardiovascular: Negative for chest pain. Gastrointestinal: Positive for nausea and vomiting. Negative for abdominal pain and blood in stool. Hematemesis   Genitourinary: Negative for dysuria, hematuria and vaginal bleeding. Musculoskeletal: Negative for back pain and neck pain. Skin: Negative for rash. Neurological: Negative for light-headedness and headaches.        PHYSICAL EXAM   (up to 7 for level 4, 8 or more for level 5)      INITIAL VITALS:   /73   Pulse 70   Temp 98.1 °F (36.7 °C) (Oral)   Resp 16   Ht 5' 3\" (1.6 m)   Wt 144 lb (65.3 kg)   LMP 06/01/2020 (Approximate)   SpO2 97%   BMI 25.51 kg/m²     Physical Exam  Constitutional:       General: She is not in acute distress. Appearance: She is well-developed. HENT:      Head: Normocephalic and atraumatic. Nose: Nose normal. No rhinorrhea. Mouth/Throat:      Mouth: Mucous membranes are moist.      Pharynx: No oropharyngeal exudate. Comments: No gingival bleeding  Eyes:      General:         Right eye: No discharge. Left eye: No discharge. Conjunctiva/sclera: Conjunctivae normal.      Pupils: Pupils are equal, round, and reactive to light. Comments: No conjunctival pallor   Neck:      Musculoskeletal: Normal range of motion and neck supple. Cardiovascular:      Rate and Rhythm: Normal rate and regular rhythm. Pulses: Normal pulses. Heart sounds: Normal heart sounds. No murmur. No friction rub. No gallop. Pulmonary:      Effort: Pulmonary effort is normal.      Breath sounds: Normal breath sounds. No wheezing or rales. Abdominal:      General: Abdomen is flat. Palpations: Abdomen is soft. Tenderness: There is abdominal tenderness (Nonspecific). There is no guarding or rebound. Musculoskeletal: Normal range of motion. General: No deformity. Skin:     General: Skin is warm and dry. Findings: No rash. Neurological:      Mental Status: She is alert and oriented to person, place, and time. Mental status is at baseline.          DIFFERENTIAL  DIAGNOSIS     PLAN (LABS / IMAGING / EKG):  Orders Placed This Encounter   Procedures    XR CHEST STANDARD (2 VW)    CBC Auto Differential    BASIC METABOLIC PANEL    HEPATIC FUNCTION PANEL    PROTIME-INR    APTT    HCG Qualitative, Serum    LIPASE    Urinalysis with microscopic    Immature Platelet Fraction    HEMOGLOBIN AND HEMATOCRIT, BLOOD    COVID-19    Diet NPO, After Midnight    Telemetry monitoring    Inpatient consult to GI    Inpatient consult to Internal Medicine    PATIENT STATUS (FROM ED OR OR/PROCEDURAL) Inpatient       MEDICATIONS ORDERED:  Orders Placed This Encounter   Medications  0.9 % sodium chloride bolus    ondansetron (ZOFRAN) injection 4 mg    pantoprazole (PROTONIX) 80 mg in sodium chloride 0.9 % 50 mL bolus    octreotide (SANDOSTATIN) 500 mcg in sodium chloride 0.9 % 100 mL infusion    pantoprazole (PROTONIX) 80 mg in sodium chloride 0.9 % 100 mL infusion           DIAGNOSTIC RESULTS / EMERGENCY DEPARTMENT COURSE / MDM     LABS:  Results for orders placed or performed during the hospital encounter of 07/13/20   CBC Auto Differential   Result Value Ref Range    WBC 6.3 3.5 - 11.3 k/uL    RBC 4.30 3.95 - 5.11 m/uL    Hemoglobin 10.0 (L) 11.9 - 15.1 g/dL    Hematocrit 32.7 (L) 36.3 - 47.1 %    MCV 76.0 (L) 82.6 - 102.9 fL    MCH 23.3 (L) 25.2 - 33.5 pg    MCHC 30.6 28.4 - 34.8 g/dL    RDW 22.0 (H) 11.8 - 14.4 %    Platelets See Reflexed IPF Result 138 - 453 k/uL    MPV NOT REPORTED 8.1 - 13.5 fL    NRBC Automated 0.0 0.0 per 100 WBC    Differential Type NOT REPORTED     WBC Morphology NOT REPORTED     RBC Morphology NOT REPORTED     Platelet Estimate NOT REPORTED     Immature Granulocytes 0 0 %    Seg Neutrophils 54 36 - 66 %    Lymphocytes 44 24 - 44 %    Monocytes 1 1 - 7 %    Eosinophils % 0 (L) 1 - 4 %    Basophils 1 0 - 2 %    Absolute Immature Granulocyte 0.00 0.00 - 0.30 k/uL    Segs Absolute 3.41 1.8 - 7.7 k/uL    Absolute Lymph # 2.77 1.0 - 4.8 k/uL    Absolute Mono # 0.06 (L) 0.1 - 0.8 k/uL    Absolute Eos # 0.00 0.0 - 0.4 k/uL    Basophils Absolute 0.06 0.0 - 0.2 k/uL    Morphology MICROCYTOSIS PRESENT     Morphology ANISOCYTOSIS PRESENT    BASIC METABOLIC PANEL   Result Value Ref Range    Glucose 77 70 - 99 mg/dL    BUN 4 (L) 6 - 20 mg/dL    CREATININE 0.52 0.50 - 0.90 mg/dL    Bun/Cre Ratio NOT REPORTED 9 - 20    Calcium 8.9 8.6 - 10.4 mg/dL    Sodium 140 135 - 144 mmol/L    Potassium 3.8 3.7 - 5.3 mmol/L    Chloride 103 98 - 107 mmol/L    CO2 23 20 - 31 mmol/L    Anion Gap 14 9 - 17 mmol/L    GFR Non-African American >60 >60 mL/min    GFR African American >60 >60 mL/min    GFR Comment          GFR Staging NOT REPORTED    HEPATIC FUNCTION PANEL   Result Value Ref Range    Alb 4.2 3.5 - 5.2 g/dL    Alkaline Phosphatase 149 (H) 35 - 104 U/L    ALT 76 (H) 5 - 33 U/L     (H) <32 U/L    Total Bilirubin 0.54 0.3 - 1.2 mg/dL    Bilirubin, Direct 0.23 <0.31 mg/dL    Bilirubin, Indirect 0.31 0.00 - 1.00 mg/dL    Total Protein 8.1 6.4 - 8.3 g/dL    Globulin NOT REPORTED 1.5 - 3.8 g/dL    Albumin/Globulin Ratio 1.1 1.0 - 2.5   PROTIME-INR   Result Value Ref Range    Protime 10.1 9.0 - 12.0 sec    INR 1.0    APTT   Result Value Ref Range    PTT 27.1 20.5 - 30.5 sec   HCG Qualitative, Serum   Result Value Ref Range    hCG Qual NEGATIVE NEGATIVE   LIPASE   Result Value Ref Range    Lipase 72 (H) 13 - 60 U/L   Immature Platelet Fraction   Result Value Ref Range    Platelet, Immature Fraction 14.0 (H) 1.1 - 10.3 %    Platelet, Fluorescence 105 (L) 138 - 453 k/uL         IMPRESSION/MDM/ED COURSE:  47 y.o. female presented with hematemesis for last week, chronic alcoholic, no blood thinners, seen by GI today and advised to come in. No other complaints. Vital signs normal, patient appeared well, labs within normal limits aside from transaminitis which she has had. Spoke with GI, advised octreotide, Protonix, keep n.p.o. and will scope tomorrow morning. No signs of Boeellie's    ED Course as of Jul 13 2330 Mon Jul 13, 2020 2146 GI advised starting octreotide bolus and drip as well as Protonix drip, will scope in the morning, keep n.p.o., will admit to internal medicine.    [SF]      ED Course User Index  [SF] Donnia Cabot, DO         RADIOLOGY:  XR CHEST STANDARD (2 VW)   Final Result   Normal chest examination.                EKG  None    All EKG's are interpreted by the Emergency Department Physician who either signs or Co-signs this chart in the absence of a cardiologist.      PROCEDURES:  None    CONSULTS:  IP CONSULT TO GI  IP CONSULT TO INTERNAL MEDICINE        FINAL

## 2020-07-14 NOTE — ED NOTES
Pt counseled on leaving AMA by Dr. Joss Martinez and myself. [facility] also discussed leaving AMA with pt. Pt signed AMA form.        Anderson Pak RN  07/14/20 1531

## 2020-07-14 NOTE — ED NOTES
No change in pt status, pt remains resting on cot with NAD noted. Ocreotide and pantoprazole drips infusing as prescribed, pt remains on monitor, Will cont to monitor.       Marylee Rayas, RN  07/14/20 1455

## 2020-07-21 ENCOUNTER — TELEPHONE (OUTPATIENT)
Dept: GASTROENTEROLOGY | Age: 40
End: 2020-07-21

## 2020-07-21 NOTE — TELEPHONE ENCOUNTER
Patient called and wanted a work note for the time she was seen in office. Writer informed patient that she can come get it from the [place] office this week, wait until next week to go to [facility], or we could fax it. Patient did not have her works fax number and stated she would come get it on Monday. Patient also started discussing going back to the ED and having the EGD done. Writer informed patient that after leaving the hospital AMA last time that we advised her to go, that we will no longer advise her to go to have the procedure done in the ED. Patient was informed that when we decide to move forward with having it done that we will schedule the procedure for a specific time and day for her to be there. Patient gave Tee Gilbert a number for her to be reached at [phone].

## 2020-07-26 LAB
SARS-COV-2, PCR: NORMAL
SARS-COV-2, RAPID: NORMAL
SARS-COV-2: NOT DETECTED
SOURCE: NORMAL

## 2020-07-30 NOTE — TELEPHONE ENCOUNTER
Patient called wanting to speak with Nurse or Dr Jacqueline Rodas. Marisol Rogers to see when she is going to have her procedure . I told her that I woulkd have nurse return her call.  Thank You

## 2020-07-30 NOTE — TELEPHONE ENCOUNTER
Writer called patient back. She wanted to know when she was going back to the ED to have the procedure done. Writer informed patient since she left AMA we will not admit her into the ED for this and that she will need to schedule with our office a specific date and time. Writer informed patient that I would forward this message to our schedulers and someone would call her to set it all up. Patient thanked [address]. Writer is forwarding encounter to procedure schedulers. Joe Saul believes that this patient didn't complete the procedure prior due to fear of sedation. Please discuss this with patient and make sure she is agreeable prior to scheduling. Thank you!

## 2020-08-05 NOTE — TELEPHONE ENCOUNTER
PAUL Salas to contact the office to schedule EGD. If she has questions or concerns regarding sedation, she will need to be directed to speak with Dr. Flores Laws or the clinical staff.

## 2020-08-06 ENCOUNTER — TELEPHONE (OUTPATIENT)
Dept: GASTROENTEROLOGY | Age: 40
End: 2020-08-06

## 2020-08-06 ASSESSMENT — ENCOUNTER SYMPTOMS
COUGH: 0
TROUBLE SWALLOWING: 0
ABDOMINAL PAIN: 0
BLOOD IN STOOL: 0
CHEST TIGHTNESS: 0
DIARRHEA: 0
SORE THROAT: 0
SHORTNESS OF BREATH: 0
CONSTIPATION: 0

## 2020-08-12 NOTE — TELEPHONE ENCOUNTER
Lorena Palmer called to schedule EGD. Discussed process as an outpatient, she voiced understanding of the need for Covid-19 testing and a  home. Lorena Palmer is now scheduled at [facility] on Thursday [email or web address]. Covid-19 testing is scheduled on Monday 8/23/20 at [facility] at [id number], Monday was chosen to aid in transport as it will be difficult for Lorena Palmer to travel to [facility] [place] or [place].  Instructions e-mailed to [email or web address].

## 2020-08-24 NOTE — TELEPHONE ENCOUNTER
Pt called in asking to change her covid testing date until tomorrow 8/25/20 so her and her children can be tested all at the same time. Writer explained to the pt that her covid testing needs to be done today to allow enough time for the test to come back before her procedure. If covid testing is not done today, we will need to resched her proc. Pt did not want to resched so proc so writer offered to get her a new time today for her testing. Pt agreeable. Writer spoke w/ Whitney Salazar from [facility] surgery sched about getting a new time at the [place] site, but when Russel Martinez went back to ask pt about time she was not on the phone line. Writer attempted to call pt back at [phone] and male answered stating pt was not there with him. Writer explained to male that I was just speaking with pt and is there another phone number where the pt could be reached. Male gave me phone number of [phone] and writer attempted to contact, but there was no answer and writer left msg on pt's vm.

## 2020-08-27 ENCOUNTER — HOSPITAL ENCOUNTER (OUTPATIENT)
Age: 40
Setting detail: OUTPATIENT SURGERY
Discharge: HOME | End: 2020-08-27
Attending: INTERNAL MEDICINE | Admitting: INTERNAL MEDICINE
Payer: MEDICARE

## 2020-08-27 VITALS
HEART RATE: 82 BPM | TEMPERATURE: 98.6 F | OXYGEN SATURATION: 99 % | SYSTOLIC BLOOD PRESSURE: 108 MMHG | HEIGHT: 64 IN | WEIGHT: 144 LBS | RESPIRATION RATE: 18 BRPM | DIASTOLIC BLOOD PRESSURE: 76 MMHG | BODY MASS INDEX: 24.59 KG/M2

## 2020-08-27 LAB — HCG, PREGNANCY URINE (POC): NEGATIVE

## 2020-08-27 PROCEDURE — 81025 URINE PREGNANCY TEST: CPT

## 2020-08-27 RX ORDER — SODIUM CHLORIDE 9 MG/ML
INJECTION, SOLUTION INTRAVENOUS CONTINUOUS
Status: DISCONTINUED | OUTPATIENT
Start: 2020-08-27 | End: 2020-08-28 | Stop reason: HOSPADM

## 2020-08-27 ASSESSMENT — PAIN - FUNCTIONAL ASSESSMENT: PAIN_FUNCTIONAL_ASSESSMENT: 0-10

## 2020-08-27 NOTE — PROGRESS NOTES
History and Physical    Pt Name: Addy Vital  MRN: [id number]  YOB: 1980  Date of evaluation: 8/27/2020    SUBJECTIVE:   History of Chief Complaint:    Patient complains of GERD. She says that she had an episode of coughing, which then lead to vomiting. Patient says that she vomited so much and for an extended period that she ended up vomiting blood at the end. She says that she had been drinking alcohol at the time as well. She currently is taking a PPI. Patient complains of weight loss, not able to eat much currently. She has been scheduled for EGD today. Past Medical History    has a past medical history of Anxiety, GERD (gastroesophageal reflux disease), History of irregular heartbeat, Hypertension, and Seasonal allergies. Past Surgical History   has a past surgical history that includes Tubal ligation and Dilation and curettage of uterus. Medications  Prior to Admission medications    Medication Sig Start Date End Date Taking? Authorizing Provider   pantoprazole (PROTONIX) 40 MG tablet Take 1 tablet by mouth 2 times daily (before meals) 7/13/20   Shirlene Cole MD   pantoprazole (PROTONIX) 20 MG tablet Take 2 tablets by mouth daily 7/9/20   CARMEN Duong MD   omeprazole (PRILOSEC) 40 MG delayed release capsule Take 1 capsule by mouth every morning (before breakfast) 7/8/20   Irvin Chamorro MD   Acetaminophen (ACETAMINOPHEN EXTRA STRENGTH) 500 MG CAPS Take 1-2 tablets by mouth every 6 hours as needed for pain. Do not take more than 8 pills in a 24 hour period.  2/11/20   Derrell Jane MD   ibuprofen (IBU) 800 MG tablet Take 1 tablet by mouth every 6 hours as needed for Pain 2/10/20   Jennifer Adams DO   ibuprofen (ADVIL;MOTRIN) 800 MG tablet Take 1 tablet by mouth every 8 hours as needed for Pain 2/3/20   Jax Burrows MD   benzocaine (ORAJEL) 20 % GEL mucosal gel Apply to affected area three times daily as needed 2/3/20   Jax Burrows MD   ondansetron (ZOFRAN ODT) 4 MG disintegrating tablet Take 1 tablet by mouth every 8 hours as needed for Nausea or Vomiting 7/13/19   Denton Garcia MD   Carboxymethylcellul-Glycerin 0.5-0.9 % SOLN Apply 2 drops to eye as needed (dry and irritated eyes) 5/21/19   Oneida Meeks DO     Allergies  is allergic to seasonal and motrin [ibuprofen]. Family History  family history includes Anxiety Disorder in her brother and sister; Cancer in her mother; Heart Disease in her mother; Other in her mother. Social History   reports that she has been smoking cigars. She has been smoking about 3.00 packs per day. She has never used smokeless tobacco.   reports current alcohol use of about 2.0 standard drinks of alcohol per week. reports current drug use. Drug: Marijuana. Marital Status single  Occupation works for [employer]:   VITALS:  height is 5' 4\" (1.626 m) and weight is 144 lb (65.3 kg). Her temporal temperature is 98.6 °F (37 °C). Her blood pressure is 108/76 and her pulse is 82. Her respiration is 18 and oxygen saturation is 99%. CONSTITUTIONAL:alert & oriented x 3, no acute distress. Very pleasant. SKIN:  Warm and dry, no rashes on exposed areas of skin. HEAD:  Normocephalic, atraumatic. EYES: PERRL. EOMs intact. EARS:  Hearing grossly WNL. NOSE:  Nares patent. No rhinorrhea. MOUTH/THROAT:  benign  NECK:supple, no lymphadenopathy  LUNGS: Clear to auscultation bilaterally, no wheezes. CARDIOVASCULAR: Heart sounds are normal.  Regular rate and rhythm without murmur. ABDOMEN: soft, non tender, non distended. EXTREMITIES: no edema bilateral lower extremities. IMPRESSIONS:   1. Vomiting, GERD, history of hematemesis  2.  has a past medical history of Anxiety, GERD (gastroesophageal reflux disease), History of irregular heartbeat, Hypertension, and Seasonal allergies.    PLANS:   1. ROZINA TO PA-C  Electronically signed 8/27/2020 at 11:09 AM

## 2020-09-03 ENCOUNTER — TELEPHONE (OUTPATIENT)
Dept: GASTROENTEROLOGY | Age: 40
End: 2020-09-03

## 2020-09-03 NOTE — TELEPHONE ENCOUNTER
Pt left msg on ofc vm 9/3/20 @ 3:58pm in regards to scheduling proc. She states she has left several messages and hasn't rec'd a call back. Please return call to [phone].

## 2020-09-04 ENCOUNTER — APPOINTMENT (OUTPATIENT)
Dept: GENERAL RADIOLOGY | Age: 40
End: 2020-09-04
Payer: MEDICARE

## 2020-09-04 ENCOUNTER — HOSPITAL ENCOUNTER (EMERGENCY)
Age: 40
Discharge: HOME | End: 2020-09-04
Attending: EMERGENCY MEDICINE
Payer: MEDICARE

## 2020-09-04 VITALS
TEMPERATURE: 98 F | HEART RATE: 83 BPM | RESPIRATION RATE: 18 BRPM | HEIGHT: 64 IN | WEIGHT: 139 LBS | OXYGEN SATURATION: 98 % | DIASTOLIC BLOOD PRESSURE: 88 MMHG | BODY MASS INDEX: 23.73 KG/M2 | SYSTOLIC BLOOD PRESSURE: 125 MMHG

## 2020-09-04 PROCEDURE — 73630 X-RAY EXAM OF FOOT: CPT

## 2020-09-04 PROCEDURE — 6370000000 HC RX 637 (ALT 250 FOR IP): Performed by: STUDENT IN AN ORGANIZED HEALTH CARE EDUCATION/TRAINING PROGRAM

## 2020-09-04 PROCEDURE — 99283 EMERGENCY DEPT VISIT LOW MDM: CPT

## 2020-09-04 RX ORDER — ACETAMINOPHEN 500 MG
1000 TABLET ORAL ONCE
Status: COMPLETED | OUTPATIENT
Start: 2020-09-04 | End: 2020-09-04

## 2020-09-04 RX ORDER — IBUPROFEN 800 MG/1
800 TABLET ORAL ONCE
Status: DISCONTINUED | OUTPATIENT
Start: 2020-09-04 | End: 2020-09-04

## 2020-09-04 RX ORDER — IBUPROFEN 800 MG/1
800 TABLET ORAL EVERY 6 HOURS PRN
Qty: 21 TABLET | Refills: 0 | Status: ON HOLD | OUTPATIENT
Start: 2020-09-04 | End: 2022-01-01

## 2020-09-04 RX ADMIN — ACETAMINOPHEN 1000 MG: 500 TABLET ORAL at 21:17

## 2020-09-04 ASSESSMENT — PAIN DESCRIPTION - LOCATION: LOCATION: FOOT

## 2020-09-04 ASSESSMENT — PAIN DESCRIPTION - FREQUENCY: FREQUENCY: CONTINUOUS

## 2020-09-04 ASSESSMENT — PAIN SCALES - GENERAL
PAINLEVEL_OUTOF10: 10
PAINLEVEL_OUTOF10: 10

## 2020-09-04 ASSESSMENT — PAIN DESCRIPTION - PAIN TYPE: TYPE: ACUTE PAIN

## 2020-09-04 ASSESSMENT — PAIN DESCRIPTION - ORIENTATION: ORIENTATION: RIGHT

## 2020-09-04 ASSESSMENT — PAIN DESCRIPTION - DESCRIPTORS: DESCRIPTORS: THROBBING

## 2020-09-04 ASSESSMENT — PAIN DESCRIPTION - ONSET: ONSET: SUDDEN

## 2020-09-05 ASSESSMENT — ENCOUNTER SYMPTOMS
EYE REDNESS: 0
EYE DISCHARGE: 0
COLOR CHANGE: 0
ABDOMINAL PAIN: 0
SHORTNESS OF BREATH: 0
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
## 2020-09-09 ENCOUNTER — CLINICAL DOCUMENTATION (OUTPATIENT)
Dept: GASTROENTEROLOGY | Age: 40
End: 2020-09-09
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
## 2020-09-10 NOTE — TELEPHONE ENCOUNTER
Writer reviewed the clinical documentation note from Dr. Ramsey Overall. Called the patient and advised that at this time we cannot reschedule her procedure due to noncompliance. I advised that we will be dismissing her from the practice but we will be able to treat her via phone for the next 30 days. I also told the patient that I will be mailing her a dismissal letter with a release of information that she can send back to us. Patient expressed understanding. Letter created and mailed.

## 2020-09-11 ENCOUNTER — TELEPHONE (OUTPATIENT)
Dept: GASTROENTEROLOGY | Age: 40
End: 2020-09-11

## 2020-09-11 NOTE — TELEPHONE ENCOUNTER
Received a call from Smita Prescott a healthcare educator from [place] to see if we had any valid phone numbers for the patient. Was given the patient's primary number. Writer thanked and call ended.

## 2020-09-25 ENCOUNTER — HOSPITAL ENCOUNTER (EMERGENCY)
Age: 40
Discharge: LEFT BEFORE DISCHARGE | End: 2020-09-25
Attending: EMERGENCY MEDICINE
Payer: MEDICARE

## 2020-09-25 VITALS
OXYGEN SATURATION: 98 % | HEART RATE: 84 BPM | DIASTOLIC BLOOD PRESSURE: 63 MMHG | HEIGHT: 65 IN | RESPIRATION RATE: 16 BRPM | TEMPERATURE: 98 F | BODY MASS INDEX: 23.49 KG/M2 | SYSTOLIC BLOOD PRESSURE: 106 MMHG

## 2020-09-25 PROCEDURE — 99283 EMERGENCY DEPT VISIT LOW MDM: CPT

## 2020-09-25 ASSESSMENT — ENCOUNTER SYMPTOMS
ABDOMINAL PAIN: 0
COUGH: 0
RHINORRHEA: 0
SHORTNESS OF BREATH: 0
BACK PAIN: 0

## 2020-09-25 ASSESSMENT — PAIN SCALES - GENERAL: PAINLEVEL_OUTOF10: 9

## 2020-09-25 ASSESSMENT — PAIN DESCRIPTION - LOCATION: LOCATION: FACE

## 2020-09-25 ASSESSMENT — PAIN DESCRIPTION - ORIENTATION: ORIENTATION: RIGHT

## 2020-09-25 ASSESSMENT — PAIN DESCRIPTION - PAIN TYPE: TYPE: ACUTE PAIN

## 2020-09-25 NOTE — ED PROVIDER NOTES
MDM. For Phys Assistant/ Nurse Practitioner cases/documentation I have had a face to face evaluation of this patient and have completed at least one if not all key elements of the E/M (history, physical exam, and MDM). Additional findings are as noted. For APC cases I have personally evaluated and examined the patient in conjunction with the APC and agree with the treatment plan and disposition of the patient as recorded by the APC.     Bascom Apley, MD  Attending Emergency  Physician       Kenneth Falk MD  09/25/20 [id number]
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
## 2020-09-28 ENCOUNTER — FOLLOWUP TELEPHONE ENCOUNTER (OUTPATIENT)
Dept: PSYCHIATRY | Age: 40
End: 2020-09-28

## 2020-09-28 NOTE — PROGRESS NOTES
[facility] clinician outreached client to f/u on referral.  Called num on referral and reached an automated message stating this person was not available ([phone]). Unable to leave VM. Called additional num in chart ([phone]) num was not a working num.       Electronically signed by Reza Reza on 9/28/20 at 11:29 AM EDT

## 2020-11-22 ENCOUNTER — APPOINTMENT (OUTPATIENT)
Dept: CT IMAGING | Age: 40
End: 2020-11-22
Payer: MEDICARE

## 2020-11-22 ENCOUNTER — HOSPITAL ENCOUNTER (EMERGENCY)
Age: 40
Discharge: LEFT BEFORE DISCHARGE | End: 2020-11-22
Attending: EMERGENCY MEDICINE | Admitting: INTERNAL MEDICINE
Payer: MEDICARE

## 2020-11-22 ENCOUNTER — APPOINTMENT (OUTPATIENT)
Dept: GENERAL RADIOLOGY | Age: 40
End: 2020-11-22
Payer: MEDICARE

## 2020-11-22 VITALS
WEIGHT: 145 LBS | HEART RATE: 88 BPM | RESPIRATION RATE: 16 BRPM | SYSTOLIC BLOOD PRESSURE: 133 MMHG | DIASTOLIC BLOOD PRESSURE: 100 MMHG | OXYGEN SATURATION: 95 % | HEIGHT: 64 IN | BODY MASS INDEX: 24.75 KG/M2

## 2020-11-22 PROBLEM — R74.01 TRANSAMINITIS: Status: ACTIVE | Noted: 2020-11-22

## 2020-11-22 LAB
ABSOLUTE EOS #: 0.11 K/UL (ref 0–0.44)
ABSOLUTE IMMATURE GRANULOCYTE: <0.03 K/UL (ref 0–0.3)
ABSOLUTE LYMPH #: 2.44 K/UL (ref 1.1–3.7)
ABSOLUTE MONO #: 0.57 K/UL (ref 0.1–1.2)
ALBUMIN SERPL-MCNC: 3.9 G/DL (ref 3.5–5.2)
ALBUMIN/GLOBULIN RATIO: 1 (ref 1–2.5)
ALP BLD-CCNC: 75 U/L (ref 35–104)
ALT SERPL-CCNC: 33 U/L (ref 5–33)
ANION GAP SERPL CALCULATED.3IONS-SCNC: 11 MMOL/L (ref 9–17)
AST SERPL-CCNC: 157 U/L
BASOPHILS # BLD: 1 % (ref 0–2)
BASOPHILS ABSOLUTE: 0.05 K/UL (ref 0–0.2)
BILIRUB SERPL-MCNC: 0.23 MG/DL (ref 0.3–1.2)
BUN BLDV-MCNC: 5 MG/DL (ref 6–20)
BUN/CREAT BLD: ABNORMAL (ref 9–20)
CALCIUM SERPL-MCNC: 8.6 MG/DL (ref 8.6–10.4)
CHLORIDE BLD-SCNC: 102 MMOL/L (ref 98–107)
CO2: 23 MMOL/L (ref 20–31)
CREAT SERPL-MCNC: 0.35 MG/DL (ref 0.5–0.9)
DIFFERENTIAL TYPE: ABNORMAL
EOSINOPHILS RELATIVE PERCENT: 2 % (ref 1–4)
GFR AFRICAN AMERICAN: >60 ML/MIN
GFR NON-AFRICAN AMERICAN: >60 ML/MIN
GFR SERPL CREATININE-BSD FRML MDRD: ABNORMAL ML/MIN/{1.73_M2}
GFR SERPL CREATININE-BSD FRML MDRD: ABNORMAL ML/MIN/{1.73_M2}
GLUCOSE BLD-MCNC: 89 MG/DL (ref 70–99)
HCG QUALITATIVE: NEGATIVE
HCT VFR BLD CALC: 29.3 % (ref 36.3–47.1)
HEMOGLOBIN: 8.8 G/DL (ref 11.9–15.1)
IMMATURE GRANULOCYTES: 0 %
LACTIC ACID, WHOLE BLOOD: 1.7 MMOL/L (ref 0.7–2.1)
LACTIC ACID: NORMAL MMOL/L
LIPASE: 55 U/L (ref 13–60)
LYMPHOCYTES # BLD: 37 % (ref 24–43)
MCH RBC QN AUTO: 22.5 PG (ref 25.2–33.5)
MCHC RBC AUTO-ENTMCNC: 30 G/DL (ref 28.4–34.8)
MCV RBC AUTO: 74.9 FL (ref 82.6–102.9)
MONOCYTES # BLD: 9 % (ref 3–12)
NRBC AUTOMATED: 0 PER 100 WBC
PDW BLD-RTO: 18.7 % (ref 11.8–14.4)
PLATELET # BLD: 133 K/UL (ref 138–453)
PLATELET ESTIMATE: ABNORMAL
PMV BLD AUTO: 11.1 FL (ref 8.1–13.5)
POTASSIUM SERPL-SCNC: 3.8 MMOL/L (ref 3.7–5.3)
RBC # BLD: 3.91 M/UL (ref 3.95–5.11)
RBC # BLD: ABNORMAL 10*6/UL
SEG NEUTROPHILS: 51 % (ref 36–65)
SEGMENTED NEUTROPHILS ABSOLUTE COUNT: 3.38 K/UL (ref 1.5–8.1)
SODIUM BLD-SCNC: 136 MMOL/L (ref 135–144)
TOTAL PROTEIN: 7.7 G/DL (ref 6.4–8.3)
WBC # BLD: 6.6 K/UL (ref 3.5–11.3)
WBC # BLD: ABNORMAL 10*3/UL

## 2020-11-22 PROCEDURE — 99284 EMERGENCY DEPT VISIT MOD MDM: CPT

## 2020-11-22 PROCEDURE — 86900 BLOOD TYPING SEROLOGIC ABO: CPT

## 2020-11-22 PROCEDURE — 86901 BLOOD TYPING SEROLOGIC RH(D): CPT

## 2020-11-22 PROCEDURE — 80053 COMPREHEN METABOLIC PANEL: CPT

## 2020-11-22 PROCEDURE — 96374 THER/PROPH/DIAG INJ IV PUSH: CPT

## 2020-11-22 PROCEDURE — 6360000002 HC RX W HCPCS: Performed by: STUDENT IN AN ORGANIZED HEALTH CARE EDUCATION/TRAINING PROGRAM

## 2020-11-22 PROCEDURE — 74177 CT ABD & PELVIS W/CONTRAST: CPT

## 2020-11-22 PROCEDURE — 83605 ASSAY OF LACTIC ACID: CPT

## 2020-11-22 PROCEDURE — 84703 CHORIONIC GONADOTROPIN ASSAY: CPT

## 2020-11-22 PROCEDURE — 83690 ASSAY OF LIPASE: CPT

## 2020-11-22 PROCEDURE — 6360000004 HC RX CONTRAST MEDICATION: Performed by: STUDENT IN AN ORGANIZED HEALTH CARE EDUCATION/TRAINING PROGRAM

## 2020-11-22 PROCEDURE — 86850 RBC ANTIBODY SCREEN: CPT

## 2020-11-22 PROCEDURE — 86920 COMPATIBILITY TEST SPIN: CPT

## 2020-11-22 PROCEDURE — 85025 COMPLETE CBC W/AUTO DIFF WBC: CPT

## 2020-11-22 PROCEDURE — 2500000003 HC RX 250 WO HCPCS: Performed by: STUDENT IN AN ORGANIZED HEALTH CARE EDUCATION/TRAINING PROGRAM

## 2020-11-22 PROCEDURE — 71046 X-RAY EXAM CHEST 2 VIEWS: CPT

## 2020-11-22 RX ORDER — ONDANSETRON 2 MG/ML
4 INJECTION INTRAMUSCULAR; INTRAVENOUS EVERY 6 HOURS PRN
Status: DISCONTINUED | OUTPATIENT
Start: 2020-11-22 | End: 2020-11-22 | Stop reason: HOSPADM

## 2020-11-22 RX ORDER — ACETAMINOPHEN 325 MG/1
650 TABLET ORAL EVERY 6 HOURS PRN
Status: DISCONTINUED | OUTPATIENT
Start: 2020-11-22 | End: 2020-11-22 | Stop reason: HOSPADM

## 2020-11-22 RX ORDER — FAMOTIDINE 40 MG/1
40 TABLET, FILM COATED ORAL DAILY
Qty: 30 TABLET | Refills: 0 | Status: SHIPPED | OUTPATIENT
Start: 2020-11-22 | End: 2020-11-23

## 2020-11-22 RX ORDER — PROMETHAZINE HYDROCHLORIDE 12.5 MG/1
12.5 TABLET ORAL EVERY 6 HOURS PRN
Status: DISCONTINUED | OUTPATIENT
Start: 2020-11-22 | End: 2020-11-22 | Stop reason: HOSPADM

## 2020-11-22 RX ORDER — SODIUM CHLORIDE 0.9 % (FLUSH) 0.9 %
10 SYRINGE (ML) INJECTION PRN
Status: CANCELLED | OUTPATIENT
Start: 2020-11-22

## 2020-11-22 RX ORDER — SODIUM CHLORIDE 0.9 % (FLUSH) 0.9 %
10 SYRINGE (ML) INJECTION EVERY 12 HOURS SCHEDULED
Status: CANCELLED | OUTPATIENT
Start: 2020-11-22

## 2020-11-22 RX ORDER — ACETAMINOPHEN 650 MG/1
650 SUPPOSITORY RECTAL EVERY 6 HOURS PRN
Status: DISCONTINUED | OUTPATIENT
Start: 2020-11-22 | End: 2020-11-22 | Stop reason: HOSPADM

## 2020-11-22 RX ORDER — ONDANSETRON 2 MG/ML
4 INJECTION INTRAMUSCULAR; INTRAVENOUS ONCE
Status: COMPLETED | OUTPATIENT
Start: 2020-11-22 | End: 2020-11-22

## 2020-11-22 RX ORDER — SODIUM CHLORIDE 9 MG/ML
INJECTION, SOLUTION INTRAVENOUS CONTINUOUS
Status: DISCONTINUED | OUTPATIENT
Start: 2020-11-22 | End: 2020-11-22 | Stop reason: HOSPADM

## 2020-11-22 RX ADMIN — IOPAMIDOL 75 ML: 755 INJECTION, SOLUTION INTRAVENOUS at 02:43

## 2020-11-22 RX ADMIN — ONDANSETRON 4 MG: 2 INJECTION INTRAMUSCULAR; INTRAVENOUS at 01:53

## 2020-11-22 RX ADMIN — FAMOTIDINE 20 MG: 10 INJECTION INTRAVENOUS at 01:53

## 2020-11-22 ASSESSMENT — ENCOUNTER SYMPTOMS
VOMITING: 1
SORE THROAT: 0
SHORTNESS OF BREATH: 0
PHOTOPHOBIA: 0
TROUBLE SWALLOWING: 0
BLOOD IN STOOL: 0

## 2020-11-22 NOTE — PLAN OF CARE
GI update: Attempted to see pt for possible GI bleed-pt states she has family emergency and is leaving [address] pt she could have a life threatening GIB-she states she is aware and understands risks. Pt is alert and oriented x 4. Pt states she will return ASAP. Updated attending Dr. Ulices Marquez    If pt stays or returns, we will be happy to see pt and provide care.     Haroon Elliott, CNP

## 2020-11-22 NOTE — ED NOTES
Writer called lab and asked about needing blood for the type and screen and they said they would call back once they checked.      Sonali Govea RN  11/22/20 0974

## 2020-11-22 NOTE — ED NOTES
Writer noticed pt was not registered under the correct person after sending blood. Registration was notified and able to switch over all of the information and results to the correct pt.      Ralf Aragon RN  11/22/20 0725
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
## 2020-11-22 NOTE — ED NOTES
PT reports she wants to leave AMA. Admitting team notified. Awaiting response.       Rosalva Saenz RN  11/22/20 2684
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
## 2020-11-22 NOTE — ED NOTES
Pt to ED with c/o vomiting blood and distended abdomen,pt states it has been going on since July on and off. Pt states she never has regular BM's but denies any bloody or tarry stools. Pt states she has her period the 1st of the month usually but they are irregular. Pt in NAD, rr even and unlabored. Pt states she had 3-4 beers tonight as well. Pt states she was on hydrochlorothiazide but hasn't taken it in a long time. Family/friend at bedside that smells of etoh. Dr Nehal Lau at bedside.       Ann-Marie Nava, RN  11/22/20 3768

## 2020-11-22 NOTE — ED PROVIDER NOTES
[facility]     Emergency Department     Faculty Note/ Attestation      Pt Name: Tahira Rodríguez                                       MRN: [id number]  [place] 1980  Date of evaluation: 11/22/2020    Patients PCP:    No primary care provider on file. Attestation  I performed a history and physical examination of the patient and discussed management with the resident. I reviewed the residents note and agree with the documented findings and plan of care. Any areas of disagreement are noted on the chart. I was personally present for the key portions of any procedures. I have documented in the chart those procedures where I was not present during the key portions. I have reviewed the emergency nurses triage note. I agree with the chief complaint, past medical history, past surgical history, allergies, medications, social and family history as documented unless otherwise noted below. For Physician Assistant/ Nurse Practitioner cases/documentation I have personally evaluated this patient and have completed at least one if not all key elements of the E/M (history, physical exam, and MDM). Additional findings are as noted.       Initial Screens:        Sabine Coma Scale  Eye Opening: Spontaneous  Best Verbal Response: Oriented  Best Motor Response: Obeys commands  Pato Coma Scale Score: 15    Vitals:    Vitals:    11/22/20 0141   Pulse: 84   Resp: 16   SpO2: 98%   Weight: 145 lb (65.8 kg)   Height: 5' 4\" (1.626 m)       CHIEF COMPLAINT       Chief Complaint   Patient presents with    Hemoptysis     on and off since July    Other     distended abdomen             DIAGNOSTIC RESULTS             RADIOLOGY:   XR CHEST (2 VW)    (Results Pending)         LABS:  Labs Reviewed   CBC WITH AUTO DIFFERENTIAL   HCG, SERUM, QUALITATIVE   COMPREHENSIVE METABOLIC PANEL   LACTIC ACID, PLASMA   LIPASE   TYPE AND SCREEN         EMERGENCY DEPARTMENT COURSE:     -------------------------   ,  ,

## 2020-11-22 NOTE — ED NOTES
Pt repeatedly coming up to nurses station saying she needs to leave. Pt demanding IV be removed. Writer explained to patient the importance of staying. Pt refusing to stay, but reports she will come back to get admitted again.       Humza Mix RN  11/22/20 5774

## 2020-11-23 ENCOUNTER — HOSPITAL ENCOUNTER (OUTPATIENT)
Age: 40
Setting detail: OBSERVATION
Discharge: LEFT BEFORE DISCHARGE | End: 2020-11-24
Attending: EMERGENCY MEDICINE | Admitting: EMERGENCY MEDICINE
Payer: MEDICARE

## 2020-11-23 LAB
ABO/RH: NORMAL
ABSOLUTE EOS #: 0.19 K/UL (ref 0–0.44)
ABSOLUTE IMMATURE GRANULOCYTE: <0.03 K/UL (ref 0–0.3)
ABSOLUTE LYMPH #: 2.57 K/UL (ref 1.1–3.7)
ABSOLUTE MONO #: 0.5 K/UL (ref 0.1–1.2)
ALBUMIN SERPL-MCNC: 4.1 G/DL (ref 3.5–5.2)
ALBUMIN/GLOBULIN RATIO: 1.1 (ref 1–2.5)
ALP BLD-CCNC: 85 U/L (ref 35–104)
ALT SERPL-CCNC: 40 U/L (ref 5–33)
ANION GAP SERPL CALCULATED.3IONS-SCNC: 13 MMOL/L (ref 9–17)
ANTIBODY SCREEN: NEGATIVE
ARM BAND NUMBER: NORMAL
AST SERPL-CCNC: 260 U/L
BASOPHILS # BLD: 1 % (ref 0–2)
BASOPHILS ABSOLUTE: 0.06 K/UL (ref 0–0.2)
BILIRUB SERPL-MCNC: 0.21 MG/DL (ref 0.3–1.2)
BLD PROD TYP BPU: NORMAL
BLD PROD TYP BPU: NORMAL
BUN BLDV-MCNC: 5 MG/DL (ref 6–20)
BUN/CREAT BLD: ABNORMAL (ref 9–20)
CALCIUM SERPL-MCNC: 9 MG/DL (ref 8.6–10.4)
CHLORIDE BLD-SCNC: 98 MMOL/L (ref 98–107)
CO2: 24 MMOL/L (ref 20–31)
CREAT SERPL-MCNC: 0.42 MG/DL (ref 0.5–0.9)
CROSSMATCH RESULT: NORMAL
CROSSMATCH RESULT: NORMAL
DIFFERENTIAL TYPE: ABNORMAL
DISPENSE STATUS BLOOD BANK: NORMAL
DISPENSE STATUS BLOOD BANK: NORMAL
EOSINOPHILS RELATIVE PERCENT: 3 % (ref 1–4)
EXPIRATION DATE: NORMAL
GFR AFRICAN AMERICAN: >60 ML/MIN
GFR NON-AFRICAN AMERICAN: >60 ML/MIN
GFR SERPL CREATININE-BSD FRML MDRD: ABNORMAL ML/MIN/{1.73_M2}
GFR SERPL CREATININE-BSD FRML MDRD: ABNORMAL ML/MIN/{1.73_M2}
GLUCOSE BLD-MCNC: 88 MG/DL (ref 70–99)
HCT VFR BLD CALC: 28 % (ref 36.3–47.1)
HEMOGLOBIN: 8.5 G/DL (ref 11.9–15.1)
IMMATURE GRANULOCYTES: 0 %
LYMPHOCYTES # BLD: 35 % (ref 24–43)
MCH RBC QN AUTO: 22.7 PG (ref 25.2–33.5)
MCHC RBC AUTO-ENTMCNC: 30.4 G/DL (ref 28.4–34.8)
MCV RBC AUTO: 74.7 FL (ref 82.6–102.9)
MONOCYTES # BLD: 7 % (ref 3–12)
NRBC AUTOMATED: 0 PER 100 WBC
PDW BLD-RTO: 19 % (ref 11.8–14.4)
PLATELET # BLD: ABNORMAL K/UL (ref 138–453)
PLATELET ESTIMATE: ABNORMAL
PLATELET, FLUORESCENCE: 120 K/UL (ref 138–453)
PLATELET, IMMATURE FRACTION: 12 % (ref 1.1–10.3)
PMV BLD AUTO: ABNORMAL FL (ref 8.1–13.5)
POTASSIUM SERPL-SCNC: 4.1 MMOL/L (ref 3.7–5.3)
RBC # BLD: 3.75 M/UL (ref 3.95–5.11)
RBC # BLD: ABNORMAL 10*6/UL
SEG NEUTROPHILS: 55 % (ref 36–65)
SEGMENTED NEUTROPHILS ABSOLUTE COUNT: 4.03 K/UL (ref 1.5–8.1)
SODIUM BLD-SCNC: 135 MMOL/L (ref 135–144)
TOTAL PROTEIN: 7.8 G/DL (ref 6.4–8.3)
TRANSFUSION STATUS: NORMAL
TRANSFUSION STATUS: NORMAL
UNIT DIVISION: 0
UNIT DIVISION: 0
UNIT NUMBER: NORMAL
UNIT NUMBER: NORMAL
WBC # BLD: 7.4 K/UL (ref 3.5–11.3)
WBC # BLD: ABNORMAL 10*3/UL

## 2020-11-23 PROCEDURE — G0378 HOSPITAL OBSERVATION PER HR: HCPCS

## 2020-11-23 PROCEDURE — 85025 COMPLETE CBC W/AUTO DIFF WBC: CPT

## 2020-11-23 PROCEDURE — 96361 HYDRATE IV INFUSION ADD-ON: CPT

## 2020-11-23 PROCEDURE — 96374 THER/PROPH/DIAG INJ IV PUSH: CPT

## 2020-11-23 PROCEDURE — 2580000003 HC RX 258: Performed by: STUDENT IN AN ORGANIZED HEALTH CARE EDUCATION/TRAINING PROGRAM

## 2020-11-23 PROCEDURE — 96375 TX/PRO/DX INJ NEW DRUG ADDON: CPT

## 2020-11-23 PROCEDURE — C9113 INJ PANTOPRAZOLE SODIUM, VIA: HCPCS | Performed by: NURSE PRACTITIONER

## 2020-11-23 PROCEDURE — 6360000002 HC RX W HCPCS: Performed by: NURSE PRACTITIONER

## 2020-11-23 PROCEDURE — 80053 COMPREHEN METABOLIC PANEL: CPT

## 2020-11-23 PROCEDURE — 2500000003 HC RX 250 WO HCPCS: Performed by: STUDENT IN AN ORGANIZED HEALTH CARE EDUCATION/TRAINING PROGRAM

## 2020-11-23 PROCEDURE — U0003 INFECTIOUS AGENT DETECTION BY NUCLEIC ACID (DNA OR RNA); SEVERE ACUTE RESPIRATORY SYNDROME CORONAVIRUS 2 (SARS-COV-2) (CORONAVIRUS DISEASE [COVID-19]), AMPLIFIED PROBE TECHNIQUE, MAKING USE OF HIGH THROUGHPUT TECHNOLOGIES AS DESCRIBED BY CMS-2020-01-R: HCPCS

## 2020-11-23 PROCEDURE — 85055 RETICULATED PLATELET ASSAY: CPT

## 2020-11-23 PROCEDURE — 99282 EMERGENCY DEPT VISIT SF MDM: CPT

## 2020-11-23 RX ORDER — CALCIUM CARBONATE 200(500)MG
1 TABLET,CHEWABLE ORAL 3 TIMES DAILY PRN
Qty: 90 TABLET | Refills: 0 | Status: SHIPPED | OUTPATIENT
Start: 2020-11-23 | End: 2020-12-23

## 2020-11-23 RX ORDER — PANTOPRAZOLE SODIUM 40 MG/10ML
40 INJECTION, POWDER, LYOPHILIZED, FOR SOLUTION INTRAVENOUS 2 TIMES DAILY
Status: DISCONTINUED | OUTPATIENT
Start: 2020-11-23 | End: 2020-11-24 | Stop reason: HOSPADM

## 2020-11-23 RX ORDER — 0.9 % SODIUM CHLORIDE 0.9 %
1000 INTRAVENOUS SOLUTION INTRAVENOUS ONCE
Status: COMPLETED | OUTPATIENT
Start: 2020-11-23 | End: 2020-11-23

## 2020-11-23 RX ORDER — SODIUM CHLORIDE 9 MG/ML
10 INJECTION INTRAVENOUS DAILY
Status: DISCONTINUED | OUTPATIENT
Start: 2020-11-24 | End: 2020-11-24 | Stop reason: HOSPADM

## 2020-11-23 RX ORDER — PANTOPRAZOLE SODIUM 40 MG/1
40 TABLET, DELAYED RELEASE ORAL
Status: DISCONTINUED | OUTPATIENT
Start: 2020-11-24 | End: 2020-11-23

## 2020-11-23 RX ORDER — SODIUM CHLORIDE 0.9 % (FLUSH) 0.9 %
10 SYRINGE (ML) INJECTION PRN
Status: DISCONTINUED | OUTPATIENT
Start: 2020-11-23 | End: 2020-11-24 | Stop reason: HOSPADM

## 2020-11-23 RX ORDER — SODIUM CHLORIDE 0.9 % (FLUSH) 0.9 %
10 SYRINGE (ML) INJECTION EVERY 12 HOURS SCHEDULED
Status: DISCONTINUED | OUTPATIENT
Start: 2020-11-23 | End: 2020-11-24 | Stop reason: HOSPADM

## 2020-11-23 RX ORDER — ONDANSETRON 4 MG/1
4 TABLET, ORALLY DISINTEGRATING ORAL EVERY 8 HOURS PRN
Status: DISCONTINUED | OUTPATIENT
Start: 2020-11-23 | End: 2020-11-24 | Stop reason: HOSPADM

## 2020-11-23 RX ORDER — FAMOTIDINE 20 MG/1
20 TABLET, FILM COATED ORAL 2 TIMES DAILY
Qty: 60 TABLET | Refills: 0 | Status: ON HOLD | OUTPATIENT
Start: 2020-11-23 | End: 2022-01-01

## 2020-11-23 RX ADMIN — PANTOPRAZOLE SODIUM 40 MG: 40 INJECTION, POWDER, FOR SOLUTION INTRAVENOUS at 21:59

## 2020-11-23 RX ADMIN — SODIUM CHLORIDE 1000 ML: 9 INJECTION, SOLUTION INTRAVENOUS at 17:18

## 2020-11-23 RX ADMIN — FAMOTIDINE 20 MG: 10 INJECTION INTRAVENOUS at 17:18

## 2020-11-23 ASSESSMENT — ENCOUNTER SYMPTOMS
COLOR CHANGE: 0
ABDOMINAL PAIN: 0
COUGH: 0
CHEST TIGHTNESS: 0
BACK PAIN: 0
DIARRHEA: 0
VOMITING: 1
NAUSEA: 0
WHEEZING: 0
CONSTIPATION: 0
SHORTNESS OF BREATH: 0

## 2020-11-23 NOTE — ED PROVIDER NOTES
[address] HANDOFF       Handoff taken on the following patient from prior Attending Physician:  Pt Name: Marcos Anaid  PCP:  No primary care provider on file. Attestation  I was available and discussed any additional care issues that arose and coordinated the management plans with the resident(s) caring for the patient during my duty period. Any areas of disagreement with resident's documentation of care or procedures are noted on the chart. I was personally present for the key portions of any/all procedures during my duty period. I have documented in the chart those procedures where I was not present during the key portions. CHIEF COMPLAINT       Chief Complaint   Patient presents with    Emesis     Pt c/o vomiting blood for last three months, here yesterday for same \"they think I have a tear in my throat\"         CURRENT MEDICATIONS     Previous Medications  Previous Medications    ACETAMINOPHEN (ACETAMINOPHEN EXTRA STRENGTH) 500 MG CAPS    Take 1-2 tablets by mouth every 6 hours as needed for pain. Do not take more than 8 pills in a 24 hour period.     BENZOCAINE (ORAJEL) 20 % GEL MUCOSAL GEL    Apply to affected area three times daily as needed    CARBOXYMETHYLCELLUL-GLYCERIN 0.5-0.9 % SOLN    Apply 2 drops to eye as needed (dry and irritated eyes)    IBUPROFEN (IBU) 800 MG TABLET    Take 1 tablet by mouth every 6 hours as needed for Pain    OMEPRAZOLE (PRILOSEC) 40 MG DELAYED RELEASE CAPSULE    Take 1 capsule by mouth every morning (before breakfast)    ONDANSETRON (ZOFRAN ODT) 4 MG DISINTEGRATING TABLET    Take 1 tablet by mouth every 8 hours as needed for Nausea or Vomiting    PANTOPRAZOLE (PROTONIX) 20 MG TABLET    Take 2 tablets by mouth daily    PANTOPRAZOLE (PROTONIX) 40 MG TABLET    Take 1 tablet by mouth 2 times daily (before meals)       Encounter Medications  Orders Placed This Encounter   Medications    0.9 % sodium chloride bolus    famotidine (PEPCID) injection 20 mg    famotidine (PEPCID) 20 MG tablet     Sig: Take 1 tablet by mouth 2 times daily     Dispense:  60 tablet     Refill:  0    calcium carbonate (ANTACID) 500 MG chewable tablet     Sig: Take 1 tablet by mouth 3 times daily as needed for Heartburn     Dispense:  90 tablet     Refill:  0       ALLERGIES     is allergic to motrin [ibuprofen]; seasonal; and motrin [ibuprofen]. RECENT VITALS:   Temp: 97 °F (36.1 °C),  Pulse: 78, Resp: 18, BP: 126/80    RADIOLOGY:   No orders to display       LABS:  Labs Reviewed   CBC WITH AUTO DIFFERENTIAL - Abnormal; Notable for the following components:       Result Value    RBC 3.75 (*)     Hemoglobin 8.5 (*)     Hematocrit 28.0 (*)     MCV 74.7 (*)     MCH 22.7 (*)     RDW 19.0 (*)     All other components within normal limits   COMPREHENSIVE METABOLIC PANEL - Abnormal; Notable for the following components:    BUN 5 (*)     CREATININE 0.42 (*)     ALT 40 (*)      (*)     Total Bilirubin 0.21 (*)     All other components within normal limits   IMMATURE PLATELET FRACTION - Abnormal; Notable for the following components:    Platelet, Immature Fraction 12.0 (*)     Platelet, Fluorescence 120 (*)     All other components within normal limits       Persistent upper GI bleeding, drop in hemoglobin from 10-8 region. Was admitted to observation unit yesterday but left AGAINST MEDICAL ADVICE. Returning today with persistent symptoms. PLAN/ TASKS OUTSTANDING       Repeat labs, anticipate admission. (Please note that portions of this note were completed with a voice recognition program.  Efforts were made to edit the dictations but occasionally words are mis-transcribed. )    Schafer MD, F.A.C.E.P.   Attending Emergency Physician       Michael Coffman MD  11/23/20 7188

## 2020-11-23 NOTE — ED PROVIDER NOTES
Substance and Sexual Activity    Alcohol use: Yes     Alcohol/week: 2.0 standard drinks     Types: 2 Cans of beer per week     Comment: daily 3-4 beers    Drug use: Yes     Types: Marijuana    Sexual activity: Yes     Partners: Male   Lifestyle    Physical activity     Days per week: Not on file     Minutes per session: Not on file    Stress: Not on file   Relationships    Social connections     Talks on phone: Not on file     Gets together: Not on file     Attends Yazdanism service: Not on file     Active member of club or organization: Not on file     Attends meetings of clubs or organizations: Not on file     Relationship status: Not on file    Intimate partner violence     Fear of current or ex partner: Not on file     Emotionally abused: Not on file     Physically abused: Not on file     Forced sexual activity: Not on file   Other Topics Concern    Not on file   Social History Narrative    ** Merged History Encounter **            Family History   Problem Relation Age of Onset    Heart Disease Mother     Other Mother         HIV    Cancer Mother         female cancer    Anxiety Disorder Sister     Anxiety Disorder Brother         Allergies:  Motrin [ibuprofen]; Seasonal; and Motrin [ibuprofen]    Home Medications:  Prior to Admission medications    Medication Sig Start Date End Date Taking? Authorizing Provider   famotidine (PEPCID) 20 MG tablet Take 1 tablet by mouth 2 times daily 11/23/20  Yes Venita Mcgrath,    calcium carbonate (ANTACID) 500 MG chewable tablet Take 1 tablet by mouth 3 times daily as needed for Heartburn 11/23/20 12/23/20 Yes Rupesh Mcgrath,    Acetaminophen (ACETAMINOPHEN EXTRA STRENGTH) 500 MG CAPS Take 1-2 tablets by mouth every 6 hours as needed for pain. Do not take more than 8 pills in a 24 hour period.  9/4/20   Мария Luo DO   ibuprofen (IBU) 800 MG tablet Take 1 tablet by mouth every 6 hours as needed for Pain 9/4/20   Мария Luo DO pantoprazole (PROTONIX) 40 MG tablet Take 1 tablet by mouth 2 times daily (before meals) 7/13/20   Waleska Gunter MD   pantoprazole (PROTONIX) 20 MG tablet Take 2 tablets by mouth daily 7/9/20   CARMEN Jara MD   omeprazole (PRILOSEC) 40 MG delayed release capsule Take 1 capsule by mouth every morning (before breakfast) 7/8/20   Ijeoma Prasad MD   benzocaine (ORAJEL) 20 % GEL mucosal gel Apply to affected area three times daily as needed 2/3/20   Romona Krabbe, MD   ondansetron (ZOFRAN ODT) 4 MG disintegrating tablet Take 1 tablet by mouth every 8 hours as needed for Nausea or Vomiting 7/13/19   Noelle John MD   Carboxymethylcellul-Glycerin 0.5-0.9 % SOLN Apply 2 drops to eye as needed (dry and irritated eyes) 5/21/19   Oneida Nicole Coffee, DO       REVIEW OFSYSTEMS    (2-9 systems for level 4, 10 or more for level 5)      Review of Systems   Constitutional: Negative for chills, diaphoresis, fatigue and fever. Respiratory: Negative for cough, chest tightness, shortness of breath and wheezing. Cardiovascular: Negative for chest pain, palpitations and leg swelling. Gastrointestinal: Positive for vomiting. Negative for abdominal pain, constipation, diarrhea and nausea. Genitourinary: Negative for difficulty urinating, dysuria and urgency. Musculoskeletal: Negative for arthralgias, back pain, neck pain and neck stiffness. Skin: Negative for color change, pallor and rash. Neurological: Negative for dizziness, weakness, light-headedness and headaches. PHYSICAL EXAM   (up to 7 for level 4, 8 or more forlevel 5)      INITIAL VITALS:   ED Triage Vitals   BP Temp Temp Source Pulse Resp SpO2 Height Weight   11/23/20 1705 11/23/20 1655 11/23/20 1655 11/23/20 1705 11/23/20 1705 11/23/20 1705 11/23/20 1705 11/23/20 1705   126/80 97 °F (36.1 °C) Temporal 78 18 100 % 5' 4\" (1.626 m) 145 lb (65.8 kg)       Physical Exam  Vitals signs and nursing note reviewed.    Constitutional:       General: She is not in chewable tablet     Sig: Take 1 tablet by mouth 3 times daily as needed for Heartburn     Dispense:  90 tablet     Refill:  0       DDX: Gastritis, Boerhaave's, esophageal varices, anemia, alcohol abuse    Initial MDM/Plan: 47 y.o. female who presents with intermittent hematemesis. Patient states she frequently has a burning sensation chest/abdomen at night. Used to be on omeprazole, not taking it now. Patient alert, oriented, no acute distress. Mardulce Brew admission as she was supposed to be admitted yesterday. Plan for CBC, LFTs. If downtrending hemoglobin, will admit for GI evaluation. DIAGNOSTIC RESULTS / EMERGENCYDEPARTMENT COURSE / MDM     LABS:  Labs Reviewed   CBC WITH AUTO DIFFERENTIAL - Abnormal; Notable for the following components:       Result Value    RBC 3.75 (*)     Hemoglobin 8.5 (*)     Hematocrit 28.0 (*)     MCV 74.7 (*)     MCH 22.7 (*)     RDW 19.0 (*)     All other components within normal limits   COMPREHENSIVE METABOLIC PANEL - Abnormal; Notable for the following components:    BUN 5 (*)     CREATININE 0.42 (*)     ALT 40 (*)      (*)     Total Bilirubin 0.21 (*)     All other components within normal limits   IMMATURE PLATELET FRACTION - Abnormal; Notable for the following components:    Platelet, Immature Fraction 12.0 (*)     Platelet, Fluorescence 120 (*)     All other components within normal limits         RADIOLOGY:  No results found. EMERGENCY DEPARTMENT COURSE:  ED Course as of Nov 23 1756   Mon Nov 23, 2020   1744 Hgb stable in 8, but 8.5 from 8.7 yesterday. Will admit to observation or GI eval.   Hemoglobin Quant(!): 8.5 [JG]      ED Course User Index  [JG] Sarah Mcgrath DO          PROCEDURES:  None    CONSULTS:  IP CONSULT TO GI    CRITICAL CARE:  Please see attending note    FINAL IMPRESSION      1.  Hematemesis with nausea          DISPOSITION / PLAN     DISPOSITION Admitted 11/23/2020 06:04:23 PM      PATIENT REFERRED TO:  [facility]  [address] [facility]  [phone]  Go to   If symptoms worsen    [facility]  [address]  [address] [facility]  [phone]  Schedule an appointment as soon as possible for a visit         DISCHARGE MEDICATIONS:  New Prescriptions    CALCIUM CARBONATE (ANTACID) 500 MG CHEWABLE TABLET    Take 1 tablet by mouth 3 times daily as needed for Heartburn    FAMOTIDINE (PEPCID) 20 MG TABLET    Take 1 tablet by mouth 2 times daily       Isabel Sewell DO  Emergency Medicine Resident    (Please note that portions of this note were completed with a voice recognition program.Efforts were made to edit the dictations but occasionally words are mis-transcribed.)     Isabel Sewell DO  Resident  11/23/20 8599

## 2020-11-23 NOTE — ED PROVIDER NOTES
[facility]     Emergency Department     Faculty Note/ Attestation      Pt Name: Flaquita Freeman                                       MRN: [id number]  Mayra 1980  Date of evaluation: 11/23/2020    Patients PCP:    No primary care provider on file. Attestation  I performed a history and physical examination of the patient and discussed management with the resident. I reviewed the residents note and agree with the documented findings and plan of care. Any areas of disagreement are noted on the chart. I was personally present for the key portions of any procedures. I have documented in the chart those procedures where I was not present during the key portions. I have reviewed the emergency nurses triage note. I agree with the chief complaint, past medical history, past surgical history, allergies, medications, social and family history as documented unless otherwise noted below. For Physician Assistant/ Nurse Practitioner cases/documentation I have personally evaluated this patient and have completed at least one if not all key elements of the E/M (history, physical exam, and MDM). Additional findings are as noted. Initial Screens:             Vitals:    Vitals:    11/23/20 1655 11/23/20 1705   BP:  126/80   Pulse:  78   Resp:  18   Temp: 97 °F (36.1 °C)    TempSrc: Temporal    SpO2:  100%   Weight:  145 lb (65.8 kg)   Height:  5' 4\" (1.626 m)       [address] [place] [address]       Chief Complaint   Patient presents with    Emesis     Pt c/o vomiting blood for last three months, here yesterday for same \"they think I have a tear in my throat\"       The pt having vomiting again was admitted to see GI but left AMA.   The pt having abdominal pain with this pt concerned for dropping blood levels given her continued bloody emesis    DIAGNOSTIC RESULTS     RADIOLOGY:   No orders to display       LABS:  The pt Hb dropped again from 8.7 to 8.5 given she was to see GI for this with a drop from 10 earlier she should get consultation with GI in observation    EMERGENCY DEPARTMENT COURSE:     -------------------------  BP: 126/80, Temp: 97 °F (36.1 °C), Pulse: 78, Resp: 18  Physical Exam  Constitutional:       Appearance: She is well-developed. She is not diaphoretic. HENT:      Head: Normocephalic and atraumatic. Right Ear: External ear normal.      Left Ear: External ear normal.   Eyes:      General: No scleral icterus. Right eye: No discharge. Left eye: No discharge. Neck:      Musculoskeletal: Normal range of motion. Trachea: No tracheal deviation. Pulmonary:      Effort: Pulmonary effort is normal. No respiratory distress. Breath sounds: No stridor. Musculoskeletal: Normal range of motion. Skin:     General: Skin is warm and dry. Neurological:      Mental Status: She is alert and oriented to person, place, and time. Coordination: Coordination normal.   Psychiatric:         Behavior: Behavior normal.           Comments        Farah DO, RDMS.   Attending Emergency Physician          Padilla Mendoza DO  11/24/20 1002

## 2020-11-24 VITALS
SYSTOLIC BLOOD PRESSURE: 114 MMHG | RESPIRATION RATE: 16 BRPM | DIASTOLIC BLOOD PRESSURE: 62 MMHG | HEART RATE: 64 BPM | HEIGHT: 64 IN | BODY MASS INDEX: 24.75 KG/M2 | TEMPERATURE: 97.3 F | OXYGEN SATURATION: 100 % | WEIGHT: 145 LBS

## 2020-11-24 LAB
HCT VFR BLD CALC: 25.9 % (ref 36.3–47.1)
HEMOGLOBIN: 7.7 G/DL (ref 11.9–15.1)
SARS-COV-2, RAPID: NORMAL
SARS-COV-2: NORMAL
SARS-COV-2: NOT DETECTED
SOURCE: NORMAL

## 2020-11-24 PROCEDURE — G0378 HOSPITAL OBSERVATION PER HR: HCPCS

## 2020-11-24 PROCEDURE — 85018 HEMOGLOBIN: CPT

## 2020-11-24 PROCEDURE — 85014 HEMATOCRIT: CPT

## 2020-11-24 NOTE — TELEPHONE ENCOUNTER
Elly Carmona called office to schedule OV with Dr. Miguel Rosen. Writer viewed message that stated that she was dismissed from the practice as of 9/10/20. Writer informed Elly Carmona that we are unable to schedule at this time to her being dismissed. Elly Carmona voiced understanding.

## 2020-11-24 NOTE — ED NOTES
Dr. Wilfrido Suarez explained to pt that telemetry monitoring needed d/t drop in hgb, pt questions \"why? How long am I gona be here? How long you plan on keeping me? \"  Pt refused to change into hospital gown, agreeable to telemetry only at this time. Call light within reach, significant other at bedside. Will continue to monitor.       Ming Elias RN  11/24/20 5329

## 2020-11-24 NOTE — ED NOTES
Labeled blood specimens sent to lab via tube system  Pt. Resting in stretcher comfortably, NAD, no needs expressed at this time.        Sandoval Oquendo RN  11/24/20 0099

## 2020-11-24 NOTE — PROGRESS NOTES
[address] [facility]  CDU / OBSERVATION eNCOUnter  Attending NOte       I performed a history and physical examination of the patient and discussed management with the resident. I reviewed the residents note and agree with the documented findings and plan of care. Any areas of disagreement are noted on the chart. I was personally present for the key portions of any procedures. I have documented in the chart those procedures where I was not present during the key portions. I have reviewed the nurses notes. I agree with the chief complaint, past medical history, past surgical history, allergies, medications, social and family history as documented unless otherwise noted below. The Family history, social history, and ROS are effectively unchanged since admission unless noted elsewhere in the chart. Patient with vomiting and hematemesis for several months now. Patient has been to the hospital several times and admitted for EGD. Patient has left the hospital AMA on occasions because of being either afraid of the procedure or having a family emergency. Patient returns again with ongoing symptoms and requiring further intervention. Hemoglobin has trended downward. Since July patient's hemoglobin is gone from 10 to now 7.7. Patient with hemoglobin 8.7 yesterday. No reported vomiting overnight but patient has been hydrated. Patient for GI reevaluation. Patient been seen with by GI at last admission with plan for EGD. Anticipating ability to get patient to procedure today. Patient unfortunately decided to leave again. Patient left AGAINST MEDICAL ADVICE. Patient left knowing that her hemoglobin had dropped. Patient left without completing treatment. Patient was encouraged to return.   Patient was seen by the resident but left prior to my evaluation    Otilia Syed MD  Attending Emergency  Physician

## 2020-11-24 NOTE — H&P
[address]  CDU / OBSERVATION ENCOUNTER  RESIDENT NOTE     Pt Name: Angie Londono  MRN: [id number]  Kurtgfpamela 1980  Date of evaluation: 11/24/20  Patient's PCP is : No primary care provider on file. CHIEF COMPLAINT       Chief Complaint   Patient presents with    Emesis     Pt c/o vomiting blood for last three months, here yesterday for same \"they think I have a tear in my throat\"         HISTORY OF PRESENT ILLNESS    Angie Londono is a 47 y.o. female who presents with intermittent hematemesis. Patient reports that she has only had 2 episodes of hematemesis, once when she was seen in the ED 2 days ago at which time she was admitted but left AMA. Then previously a few weeks ago as well. Review of records shows that she has been seen multiple times since July for the same complaint. She was supposed to have an EGD in July but left because she was afraid of the procedure. She describes these episodes as small streaks of blood when she is vomiting. States \"I think I am just vomiting too hard\". Patient reports drinking 4 peers per day which is the cause of her vomiting. History of anemia with baseline hemoglobin around 8. Hemoglobin was obtained yesterday which was 8.5, slightly down from 8.7 2 days ago so she was admitted for GI evaluation. No melena, hematochezia, abdominal pain, chest pain, shortness of breath. Denies nausea currently.       Location/Symptom: Hematemesis  Timing/Onset: Since July  Provocation: Vomiting secondary to alcohol use  Quality: NA  Radiation: N/A  Severity: N/A  Timing/Duration: Intermittent  Modifying Factors: N/A    REVIEW OF SYSTEMS       General ROS - No fevers, No malaise   Ophthalmic ROS - No discharge, No changes in vision  ENT ROS -  No sore throat, No rhinorrhea,   Respiratory ROS - no shortness of breath, no cough, no  wheezing  Cardiovascular ROS - No chest pain, no dyspnea on exertion  Gastrointestinal ROS -positive for hematemesis. No abdominal pain, no change in bowel habits, no black or bloody stools  Genito-Urinary ROS - No dysuria, trouble voiding, or hematuria  Musculoskeletal ROS - No myalgias, No arthalgias  Neurological ROS - No headache, no dizziness/lightheadedness, No focal weakness, no loss of sensation  Dermatological ROS - No lesions, No rash     (PQRS) Advance directives on face sheet per hospital policy. No change unless specifically mentioned in chart    PAST MEDICAL HISTORY    has a past medical history of Anxiety, GERD (gastroesophageal reflux disease), History of irregular heartbeat, Hypertension, and Seasonal allergies. I have reviewed the past medical history with the patient and it is pertinent to this complaint. SURGICAL HISTORY      has a past surgical history that includes Tubal ligation and Dilation and curettage of uterus. I have reviewed and agree with Surgical History entered and it is pertinent to this complaint. CURRENT MEDICATIONS     ondansetron (ZOFRAN-ODT) disintegrating tablet 4 mg, Q8H PRN  sodium chloride flush 0.9 % injection 10 mL, 2 times per day  sodium chloride flush 0.9 % injection 10 mL, PRN  pantoprazole (PROTONIX) injection 40 mg, BID    And  sodium chloride (PF) 0.9 % injection 10 mL, Daily        All medication charted and reviewed. ALLERGIES     is allergic to motrin [ibuprofen]; seasonal; and motrin [ibuprofen]. FAMILY HISTORY     She indicated that the status of her mother is unknown. She indicated that her father is . She indicated that the status of her sister is unknown. She indicated that the status of her brother is unknown.     family history includes Anxiety Disorder in her brother and sister; Cancer in her mother; Heart Disease in her mother; Other in her mother. I have reviewed and agree with the family history entered.   I have reviewed the Family History and it is not significant to the case    SOCIAL HISTORY      reports that she has Psychogenic/psychiatric, MSK  Immediate: BHCG, INR, fingerstick    Sore throat:  DDX: epiglottitis, PTA, strep, GC/ Chl, viral    Syncope/ Pre-syncope:  DDX: cardiac arrhythmia/ valvular, low glucose, anemia, SAH, dissection, PE, AAA rupture, ectopic pregnancy rupture, seizure, vasovagal, orthostatic    Weakness:  DDX: CVA, MS, Guillain Vilonia, Transverse myelitis, Myasthenia gravis, cardiac, anemia, electrolytes, infection, change in medications, hypothyroid, rheumatalgic, depression, dehydration    Cough:  DDX: pneumonia, sinusitis, foreign body, URI, viral illness, asthma/ COPD, allergic rhinitis, GERD, ACE- inhibitor use, HIV (TB, PCP)    Hypertension:  DDX: HTN evaluate (acute EOD -- brain, renal, thoracic aorta, lung, kidney, eyes)   (renal dx, vascular lesion, drugs (MAO inhibitor + tyramine, steroids, cocaine, amphetamines)    AMS:  DDX: Evaluate for ingestion, infectious, trauma, seizure, AMS, electrolytes, encephalopathy, insulin, opiates, uremia, toxins, tumor, thyrotoxicosis, psychiatric, sepsis and stroke. Sepsis:  DDX: evaluate for intra-abdominal (pancreatitis, cholecystitis, cholangitis, pyelonephritis, urosepsis), CNS (Meningitis), pulmonary (pneumonia), cellulitis, additional infection, port/ catheter sites   SIRS   Temp >38 or <36   HR >90  RR >20  WBC >75814 or <4000 or bands >10%),   Severe sepsis (tissue hypoperfusion - AOF)  Septic shock (hypoperfusion despite appropriate fluid resuscitation) SBP <90 or MAP <65    DVT/ PE:  Evaluate for: prior history of venous thromboembolism, recent travel, recent surgery, leg swelling, hemoptysis, estrogen containing medications, history of malignancy.     Chest Pain:  DDX: Emergent: ACS/NSTEMI/STEMI/angina, arrhythmia, trauma, aortic dissection,  PE, PNA, pneumothroax, esophageal rupture, tamponade, Cocaine use  Nonemergent: pneumonia, pericarditis, GERD, MSK, Endocarditis, anxiety  Evaluated for: diaphoresis, present chest pain, tachypnea, BP both arms, heart sounds, JVD, tender chest wall, wheezing    Vaginal Discharge:  DDX: vaginal d/c + suprapubic pain: pregnancy, vaginitis, UTI, trichomonas, chlamydia/gonorrhea, candidiasis , physiologic discharge     Male STD:  DDX:  phimosis, paraphimosis, priapism, balanitis, posthitis, spermatocele, hydrocele, varicocele, epididymitis, orchitis, prostatitis, testicular torsion, testicular cancer, indirect inguinal hernia, guerita's gangrene, sexually transmitted infections. Female STD:  DDX: Evaluated for vaginal d/c, + suprapubic pain, pregnancy, vaginitis (trichomonas, candidiasis, bacterial vaginosis) UTI, cervicitis (chlamydia/gonorrhea), atropic vaginitis, foreign body, HIV, trauma, primary dermatologic disorders, bartholin's cyst/abscess, chancroid, HSV, HPV condylomas. Vaginal Bleeding:  DDX: ectopic, spontaneous , endometrial cancer, cervical cancer, previa/ abruptia, menses, DUB, fibroids, nonspecific    Dehydration:  DDX: evaluate for dehydration prerenal (inadequate renal perfusion) d/t volume depletion, redistribution of existing volume, inadequate CO or meds (ACEI, NSAIDs)    Abdominal Pain:  DDX: GERD, PUD, pancreatitis, cholecystitis, GB colic, cholangitis, Qmcd-[id number]-Pbkivd, ACS/ MI, pneumonia, SBO, DKA, AAA, mesenteric ischemia, perforated viscous, acute gastroenteritis, NSAP, pyelonephritis, kidney stone, appendicitis, hernia, D-TICS, testicular torsion, ectopic, ovarian torsion, ovarian cyst, PID, Mittelschmerz, period/ fibroid, UTI, constipation, epididymitis/ orchitis  Ransons criteria: WBC>16, age >49, glucose>200, AST>80, LDH>350  Evaluate for: EtOH abuse, ACS risk factors, point tenderness, rebound, guardingm Escobar sign, GB US (stone, sono Escobar, wall thick>3mm, CBD>6mm)    Sue Score: (appendicitis)  1. Abdominal pain (RLQ)     2  2. Anorexia (loss of appetite) or ketones in the urine  1  3. Nausea or vomiting      1  4. Migration to R iliac fossa     1  5.  Rebound tenderness     1  6. Fever of 37.3 °C/ 99.1 F +     1  7. Leukocytosis > 03070 WBC    2  8. Neutrophilia, or an increase in % of neutrophils in WBC 1  Total:  /10    (<3 no CT, 4-6 CT, 7-8 Surgery consult, 5-6 is consistent with diagnosis of acute appendicitis, 7-8 indicates a probable appendicitis, 9-10 indicates a very probable acute appendicitis)    BISAP Score: (pancreatitis)  BUN >25           1  Impaired mental status        1  SIRS criteria (HR >90, T 100.4, 36, RR >20/ CO2 <32, WBC >12, <4)  1  Age >60          1   Pleural Effusion          1  Total:     (Patients with a BISAP Score >0 had an increasing risk of mortality, with mortality increasing significantly with a score of 3 or greater. A score of 5 had a mortality rate of 22%.)    Back Pain:  DDX: PE, vertebral fracture, epidural abscess  Lower: cauda equina, herniated disc   Mid: AAA rupture, pyelonephritis, kidney stone, pancreatitis, PUD  Upper: pneumothorax, aortic dissection, PE, nonspecific    Shortness of Breath:  DDX: sob/wheezing/cough evaluate for COPD exacerbation (home O2, PNA, hospitalization), asthma (past intubation, hospitalization, tobacco history), Pneumothorax, anaphylaxis, anxiety, PE (FH, OCP, tobacco use, BMI, immobilization, recent surgery, cancer, past DVT/ PE), pericardial effusion, CHF, ACS/MI, atelectasis, lower airway obstruction, aspiration, sudden onset, fever, cough, leg swelling. Asthma:  Evaluate for: mild/ moderate/ severe respiratory distress, toxic appearance, dry cough/ nasal congestion, speaking in full sentences, nasal flaring, inspiratory/ expiratory wheezing, retractions and abdominal breathing. Past intubations, hospitalizations, ED visits, steroid use/ home meds, triggers, fevers/ chills.        Leg pain:  DDX: Trauma, knee fracture, ACL tear, collaterals, meniscus, ankle injury, DVT, abscess/ cellulitis, septic joint, gout, arthritis, patellar dislocation    Ankle/ Foot Injury:  DDX: sprain, Pseudo-Reid fracture, Reid fracture, Pearl AC, Lisanc, Majoseneuve, additional fractures    Dental Pain:  DDX: epiglottitis, mono's angina, retropharyngeal abscess/cellulitis, parapharyngeal abscess, peritonsillar abscess, mononucleosis, carotid dissection/aneurysm, alveolar osteitis (dry socket), pharyngitis, URI, foreign body aspiration or ingestion, trauma, dental cavitis, post-extraction pain, TMJ pain    Suicidal ideation:  DDX: +/- plan/ attempt, +/- auditory hallucinations, on/off meds, +/- psychiatrist f/u, new onset (evaluate for organic cause), intoxication, +/- homicidal ideation, voluntary v involuntary    Lightheaded/ Dizzy:  DDX:   Vertigo: Peripheral (BPPV, labyrinthitis, Meniere's) Central: (MS, Acoustic neuroma, carotid artery dissection). Lightheaded: Serious (cardiac valve/ arrhytmia, anemia, low glucose, infection), Common (orthostatic, nonspecific)    Fever:  DDX: meningitis, encephalitis, AOM, strep, URI/ viral, PNA, PE, UTI/ pyelo, abdominal (appy, GB, pancreatitis, sbp, PID), skin, neutropenic fever (chemo)    Vomiting:  DDX: SBO, DKA, Abdominal (gastroenteritis, appendicitis, gallbladder, pancreatitis, PUD, perforation), ICH, meningitis, vertigo, hyperemesis, abnormal lytes, EtOH intoxication/ tox, post-tussive, ACS/ MI.     Ear Pain:  DDX: Otitis media, Foreign body in the ear, Herpes zoster (shingles), Labyrinthitis, Mastoiditis, Otitis externa, Peritonsillar abscess, Sinusitis, Cerumen impaction    Eye Pain:  DDX: Allergic Conjunctivitis, Bacterial Conjunctivitis, Viral Conjunctivitis, Bacterial Endophthalmitis, Chalazion, Chemical Burn, Conjunctivitis, Acute Hemorrhagic, Contact Lens Complications, Corneal Foreign Body, Corneal Ulcer, Dacryocystitis, Corneal Abrasion, Glaucoma, Angle Closure, Acute, Herpes Zoster, Hordeolum, Orbital Cellulitis, Preseptal Cellulitis, Pterygium, Westbrook-Jared Syndrome, Trauma       DIAGNOSTIC RESULTS       RADIOLOGY:   I directly visualized the following images and reviewed the radiologist interpretations:    No results found. LABS:  I have reviewed and interpreted all available lab results.   Labs Reviewed   CBC WITH AUTO DIFFERENTIAL - Abnormal; Notable for the following components:       Result Value    RBC 3.75 (*)     Hemoglobin 8.5 (*)     Hematocrit 28.0 (*)     MCV 74.7 (*)     MCH 22.7 (*)     RDW 19.0 (*)     All other components within normal limits   COMPREHENSIVE METABOLIC PANEL - Abnormal; Notable for the following components:    BUN 5 (*)     CREATININE 0.42 (*)     ALT 40 (*)      (*)     Total Bilirubin 0.21 (*)     All other components within normal limits   IMMATURE PLATELET FRACTION - Abnormal; Notable for the following components:    Platelet, Immature Fraction 12.0 (*)     Platelet, Fluorescence 120 (*)     All other components within normal limits   HEMOGLOBIN AND HEMATOCRIT, BLOOD - Abnormal; Notable for the following components:    Hemoglobin 7.7 (*)     Hematocrit 25.9 (*)     All other components within normal limits   COVID-19   HEMOGLOBIN AND HEMATOCRIT, BLOOD   HEMOGLOBIN AND HEMATOCRIT, BLOOD       CDU IMPRESSION / PLAN      Serena Borden is a 47 y.o. female who presents with:    Hematemesis, intermittent since July after heavy drinking  -Recheck H/H every 8 hours  -Consult GI, appreciate recommendations    Transaminitis  -Likely secondary to alcohol abuse    · Continue home medications and pain control  · Monitor vitals, labs, and imaging  · DISPO: pending consults and clinical improvement    CONSULTS:    IP CONSULT TO GI    PROCEDURES:  Not indicated       PATIENT REFERRED TO:    [facility] ED  [address] [facility]  [phone]  Go to   If symptoms worsen    [address] [facility]  [address]  [address] [facility]  [phone]  Schedule an appointment as soon as possible for a visit         --  Sherrie Parkinson, DO   Emergency Medicine Resident     This dictation was generated by voice recognition computer software. Although all attempts are made to edit the dictation for accuracy, there may be errors in the transcription that are not intended.

## 2020-11-24 NOTE — ED NOTES
Pt requesting her IV to be removed stating she is leaving. Writer explained risks/benifets of leaving AMA. Pt verbalized understanding and stated she will follow up outpatient. Writer informed patient that will notify provider to speak with pt before leaving, patient refused stated she did not want to wait.     [facility]  11/24/20 [id number]

## 2020-11-26 NOTE — PROGRESS NOTES
Patient left AMA. Patient notified nurse but did not want to wait to speak to me. I did not see her prior to her departure.

## 2020-11-29 NOTE — DISCHARGE SUMMARY
CDU Discharge Summary        Patient:  John Corey  YOB: 1980    MRN: [id number]   Acct: [de-identified]    Primary Care Physician: No primary care provider on file. Admit date:  11/23/2020  4:56 PM  Discharge date: 11/24/2020  9:35 AM      Discharge Diagnoses:     1.) Hematemesis. Acute exacerbation of intermittent symptoms. Likely secondary to heavy drinking. Admitted for GI evaluation but left AGAINST MEDICAL ADVICE prior to completion of treatment. Patient is left AGAINST MEDICAL ADVICE twice in the last 2 days    2. Transaminitis. Likely secondary to alcohol abuse. Patient for GI evaluation but left AGAINST MEDICAL ADVICE prior to evaluation    3. Medical noncompliance. Patient is left AGAINST MEDICAL ADVICE several times. Efforts made to comply with patient's wishes but patient would not stay. Patient invited back for evaluation at any point    Follow-up:  Call today/tomorrow for a follow up appointment with No primary care provider on file. , or return to the Emergency Room with worsening symptoms    Stressed to patient the importance of following up with primary care doctor for further workup/management of symptoms. Pt verbalizes understanding and agrees with plan. Discharge Medication Changes:       Medication List      START taking these medications    calcium carbonate 500 MG chewable tablet  Commonly known as: Antacid  Take 1 tablet by mouth 3 times daily as needed for Heartburn     famotidine 20 MG tablet  Commonly known as:  Pepcid  Take 1 tablet by mouth 2 times daily        CONTINUE taking these medications    Acetaminophen 500 MG Caps  Commonly known as:  Acetaminophen Extra Strength  Take 1-2 tablets by mouth every 6 hours as needed for pain. Do not take more than 8 pills in a 24 hour period.      benzocaine 20 % Gel mucosal gel  Commonly known as:  ORAJEL  Apply to affected area three times daily as needed     Carboxymethylcellul-Glycerin 0.5-0.9 % Soln  Apply 2 drops to eye as needed (dry and irritated eyes)     ibuprofen 800 MG tablet  Commonly known as:  IBU  Take 1 tablet by mouth every 6 hours as needed for Pain     omeprazole 40 MG delayed release capsule  Commonly known as:  PRILOSEC  Take 1 capsule by mouth every morning (before breakfast)     ondansetron 4 MG disintegrating tablet  Commonly known as:  Zofran ODT  Take 1 tablet by mouth every 8 hours as needed for Nausea or Vomiting     * pantoprazole 20 MG tablet  Commonly known as:  Protonix  Take 2 tablets by mouth daily     * pantoprazole 40 MG tablet  Commonly known as:  PROTONIX  Take 1 tablet by mouth 2 times daily (before meals)         * This list has 2 medication(s) that are the same as other medications prescribed for you. Read the directions carefully, and ask your doctor or other care provider to review them with you.                Where to Get Your Medications      You can get these medications from any pharmacy    Bring a paper prescription for each of these medications  · calcium carbonate 500 MG chewable tablet  · famotidine 20 MG tablet         Diet:  No diet orders on file, advance as tolerated     Activity:  As tolerated    Consultants: IP CONSULT TO GI    Procedures:  Not indicated      Diagnostic Test:   Results for orders placed or performed during the hospital encounter of 11/23/20   CBC WITH AUTO DIFFERENTIAL   Result Value Ref Range    WBC 7.4 3.5 - 11.3 k/uL    RBC 3.75 (L) 3.95 - 5.11 m/uL    Hemoglobin 8.5 (L) 11.9 - 15.1 g/dL    Hematocrit 28.0 (L) 36.3 - 47.1 %    MCV 74.7 (L) 82.6 - 102.9 fL    MCH 22.7 (L) 25.2 - 33.5 pg    MCHC 30.4 28.4 - 34.8 g/dL    RDW 19.0 (H) 11.8 - 14.4 %    Platelets See Reflexed IPF Result 138 - 453 k/uL    MPV NOT REPORTED 8.1 - 13.5 fL    NRBC Automated 0.0 0.0 per 100 WBC    Differential Type NOT REPORTED     WBC Morphology NOT REPORTED     RBC Morphology ANISOCYTOSIS PRESENT     Platelet Estimate NOT REPORTED     Seg Neutrophils 55 36 - 65 % Lymphocytes 35 24 - 43 %    Monocytes 7 3 - 12 %    Eosinophils % 3 1 - 4 %    Basophils 1 0 - 2 %    Immature Granulocytes 0 0 %    Segs Absolute 4.03 1.50 - 8.10 k/uL    Absolute Lymph # 2.57 1.10 - 3.70 k/uL    Absolute Mono # 0.50 0.10 - 1.20 k/uL    Absolute Eos # 0.19 0.00 - 0.44 k/uL    Basophils Absolute 0.06 0.00 - 0.20 k/uL    Absolute Immature Granulocyte <0.03 0.00 - 0.30 k/uL   Comprehensive Metabolic Panel   Result Value Ref Range    Glucose 88 70 - 99 mg/dL    BUN 5 (L) 6 - 20 mg/dL    CREATININE 0.42 (L) 0.50 - 0.90 mg/dL    Bun/Cre Ratio NOT REPORTED 9 - 20    Calcium 9.0 8.6 - 10.4 mg/dL    Sodium 135 135 - 144 mmol/L    Potassium 4.1 3.7 - 5.3 mmol/L    Chloride 98 98 - 107 mmol/L    CO2 24 20 - 31 mmol/L    Anion Gap 13 9 - 17 mmol/L    Alkaline Phosphatase 85 35 - 104 U/L    ALT 40 (H) 5 - 33 U/L     (H) <32 U/L    Total Bilirubin 0.21 (L) 0.3 - 1.2 mg/dL    Total Protein 7.8 6.4 - 8.3 g/dL    Alb 4.1 3.5 - 5.2 g/dL    Albumin/Globulin Ratio 1.1 1.0 - 2.5    GFR Non-African American >60 >60 mL/min    GFR African American >60 >60 mL/min    GFR Comment          GFR Staging NOT REPORTED    Immature Platelet Fraction   Result Value Ref Range    Platelet, Immature Fraction 12.0 (H) 1.1 - 10.3 %    Platelet, Fluorescence 120 (L) 138 - 453 k/uL   HEMOGLOBIN AND HEMATOCRIT, BLOOD   Result Value Ref Range    Hemoglobin 7.7 (L) 11.9 - 15.1 g/dL    Hematocrit 25.9 (L) 36.3 - 47.1 %   COVID-19    Specimen: Other   Result Value Ref Range    SARS-CoV-2 Not Detected Not Detected    SARS-CoV-2, Rapid          Source . NASOPHARYNGEAL SWAB     SARS-CoV-2           No results found.         Physical Exam:  Patient is of decision-making capacity  General appearance - NAD, AOx 3    Lungs -CTAB, no R/R/R  Heart - RRR, no M/R/G  Abdomen - Soft, NT/ND  Neurological:  MAEx4, No focal motor deficit, sensory loss  Extremities - Cap refil <2 sec in all ext., no edema  Skin -warm, dry      Hospital Course:  Clinical course has improved, labs and imaging reviewed. Pedro Damian originally presented to the hospital on 11/23/2020  4:56 PM with hematemesis and anemia. At that time it was determined that She required further observation and GI evaluation including possible endoscopy. Patient was kept for further evaluation but declined leaving [address] prior to completion of therapy. She is decision-making capacity at the time of leaving. Patient was invited back at any point. Disposition: Home    Patient stated that they will not drive themselves home from the hospital if they have gotten pain killers/ narcotics earlier that day and that they will arrange for transportation on their own or work with the  for a ride. Patient counseled NOT to drive while under the influence of narcotics/ pain killers. Condition: Good    Patient stable and ready for discharge home. I have discussed plan of care with patient and they are in understanding. They were instructed to read discharge paperwork. All of their questions and concerns were addressed. Time Spent: 0 day      --  Vilma Martel MD  Emergency Medicine Attending Physician    This dictation was generated by voice recognition computer software. Although all attempts are made to edit the dictation for accuracy, there may be errors in the transcription that are not intended.

## 2021-01-01 ENCOUNTER — HOSPITAL ENCOUNTER (EMERGENCY)
Age: 41
Discharge: HOME | End: 2021-12-15
Attending: EMERGENCY MEDICINE
Payer: MEDICARE

## 2021-01-01 ENCOUNTER — APPOINTMENT (OUTPATIENT)
Dept: INTERVENTIONAL RADIOLOGY/VASCULAR | Age: 41
End: 2021-01-01
Payer: MEDICARE

## 2021-01-01 ENCOUNTER — HOSPITAL ENCOUNTER (EMERGENCY)
Age: 41
Discharge: HOME | End: 2021-12-14
Attending: EMERGENCY MEDICINE
Payer: MEDICARE

## 2021-01-01 VITALS
HEIGHT: 64 IN | SYSTOLIC BLOOD PRESSURE: 115 MMHG | HEART RATE: 98 BPM | BODY MASS INDEX: 25.61 KG/M2 | RESPIRATION RATE: 16 BRPM | DIASTOLIC BLOOD PRESSURE: 78 MMHG | TEMPERATURE: 97.3 F | OXYGEN SATURATION: 98 % | WEIGHT: 150 LBS

## 2021-01-01 VITALS
BODY MASS INDEX: 23.9 KG/M2 | HEART RATE: 87 BPM | TEMPERATURE: 98.4 F | HEIGHT: 64 IN | WEIGHT: 140 LBS | RESPIRATION RATE: 16 BRPM | OXYGEN SATURATION: 98 % | DIASTOLIC BLOOD PRESSURE: 82 MMHG | SYSTOLIC BLOOD PRESSURE: 122 MMHG

## 2021-01-01 DIAGNOSIS — K74.60 CIRRHOSIS OF LIVER WITH ASCITES, UNSPECIFIED HEPATIC CIRRHOSIS TYPE (HCC): Primary | ICD-10-CM

## 2021-01-01 DIAGNOSIS — R18.8 CIRRHOSIS OF LIVER WITH ASCITES, UNSPECIFIED HEPATIC CIRRHOSIS TYPE (HCC): Primary | ICD-10-CM

## 2021-01-01 DIAGNOSIS — R18.8 OTHER ASCITES: Primary | ICD-10-CM

## 2021-01-01 LAB
ABSOLUTE EOS #: 0.14 K/UL (ref 0–0.44)
ABSOLUTE IMMATURE GRANULOCYTE: 0.03 K/UL (ref 0–0.3)
ABSOLUTE LYMPH #: 1.53 K/UL (ref 1.1–3.7)
ABSOLUTE MONO #: 0.52 K/UL (ref 0.1–1.2)
ALBUMIN SERPL-MCNC: 2.8 G/DL (ref 3.5–5.2)
ALBUMIN/GLOBULIN RATIO: 0.5 (ref 1–2.5)
ALP BLD-CCNC: 197 U/L (ref 35–104)
ALT SERPL-CCNC: 31 U/L (ref 5–33)
ANION GAP SERPL CALCULATED.3IONS-SCNC: 12 MMOL/L (ref 9–17)
AST SERPL-CCNC: 208 U/L
BASOPHILS # BLD: 1 % (ref 0–2)
BASOPHILS ABSOLUTE: 0.03 K/UL (ref 0–0.2)
BILIRUB SERPL-MCNC: 1.07 MG/DL (ref 0.3–1.2)
BILIRUBIN DIRECT: 0.51 MG/DL
BILIRUBIN, INDIRECT: 0.56 MG/DL (ref 0–1)
BNP INTERPRETATION: NORMAL
BUN BLDV-MCNC: 2 MG/DL (ref 6–20)
BUN/CREAT BLD: ABNORMAL (ref 9–20)
CALCIUM SERPL-MCNC: 8 MG/DL (ref 8.6–10.4)
CHLORIDE BLD-SCNC: 102 MMOL/L (ref 98–107)
CO2: 22 MMOL/L (ref 20–31)
CREAT SERPL-MCNC: 0.29 MG/DL (ref 0.5–0.9)
DIFFERENTIAL TYPE: ABNORMAL
EOSINOPHILS RELATIVE PERCENT: 2 % (ref 1–4)
GFR AFRICAN AMERICAN: >60 ML/MIN
GFR NON-AFRICAN AMERICAN: >60 ML/MIN
GFR SERPL CREATININE-BSD FRML MDRD: ABNORMAL ML/MIN/{1.73_M2}
GFR SERPL CREATININE-BSD FRML MDRD: ABNORMAL ML/MIN/{1.73_M2}
GLOBULIN: ABNORMAL G/DL (ref 1.5–3.8)
GLUCOSE BLD-MCNC: 74 MG/DL (ref 70–99)
HCG QUALITATIVE: NEGATIVE
HCT VFR BLD CALC: 30.2 % (ref 36.3–47.1)
HEMOGLOBIN: 10.6 G/DL (ref 11.9–15.1)
IMMATURE GRANULOCYTES: 1 %
INR BLD: 1.3
LIPASE: 62 U/L (ref 13–60)
LYMPHOCYTES # BLD: 23 % (ref 24–43)
MAGNESIUM: 1.8 MG/DL (ref 1.6–2.6)
MCH RBC QN AUTO: 29.9 PG (ref 25.2–33.5)
MCHC RBC AUTO-ENTMCNC: 35.1 G/DL (ref 28.4–34.8)
MCV RBC AUTO: 85.3 FL (ref 82.6–102.9)
MONOCYTES # BLD: 8 % (ref 3–12)
NRBC AUTOMATED: 0 PER 100 WBC
PARTIAL THROMBOPLASTIN TIME: 31.4 SEC (ref 20.5–30.5)
PDW BLD-RTO: 19.2 % (ref 11.8–14.4)
PLATELET # BLD: ABNORMAL K/UL (ref 138–453)
PLATELET # BLD: NORMAL K/UL (ref 138–453)
PLATELET ESTIMATE: ABNORMAL
PLATELET, FLUORESCENCE: 39 K/UL (ref 138–453)
PLATELET, FLUORESCENCE: NORMAL K/UL (ref 138–453)
PLATELET, IMMATURE FRACTION: 16.6 % (ref 1.1–10.3)
PLATELET, IMMATURE FRACTION: NORMAL % (ref 1.1–10.3)
PMV BLD AUTO: ABNORMAL FL (ref 8.1–13.5)
POTASSIUM SERPL-SCNC: 3.3 MMOL/L (ref 3.7–5.3)
PRO-BNP: <20 PG/ML
PROTHROMBIN TIME: 13.5 SEC (ref 9.1–12.3)
RBC # BLD: 3.54 M/UL (ref 3.95–5.11)
RBC # BLD: ABNORMAL 10*6/UL
SEG NEUTROPHILS: 66 % (ref 36–65)
SEGMENTED NEUTROPHILS ABSOLUTE COUNT: 4.36 K/UL (ref 1.5–8.1)
SODIUM BLD-SCNC: 136 MMOL/L (ref 135–144)
TOTAL PROTEIN: 8 G/DL (ref 6.4–8.3)
WBC # BLD: 6.6 K/UL (ref 3.5–11.3)
WBC # BLD: ABNORMAL 10*3/UL

## 2021-01-01 PROCEDURE — 85730 THROMBOPLASTIN TIME PARTIAL: CPT

## 2021-01-01 PROCEDURE — 99284 EMERGENCY DEPT VISIT MOD MDM: CPT

## 2021-01-01 PROCEDURE — 80076 HEPATIC FUNCTION PANEL: CPT

## 2021-01-01 PROCEDURE — 80048 BASIC METABOLIC PNL TOTAL CA: CPT

## 2021-01-01 PROCEDURE — 85049 AUTOMATED PLATELET COUNT: CPT

## 2021-01-01 PROCEDURE — 84703 CHORIONIC GONADOTROPIN ASSAY: CPT

## 2021-01-01 PROCEDURE — 83735 ASSAY OF MAGNESIUM: CPT

## 2021-01-01 PROCEDURE — 2709999900 HC NON-CHARGEABLE SUPPLY

## 2021-01-01 PROCEDURE — 85055 RETICULATED PLATELET ASSAY: CPT

## 2021-01-01 PROCEDURE — 85025 COMPLETE CBC W/AUTO DIFF WBC: CPT

## 2021-01-01 PROCEDURE — 83880 ASSAY OF NATRIURETIC PEPTIDE: CPT

## 2021-01-01 PROCEDURE — 83690 ASSAY OF LIPASE: CPT

## 2021-01-01 PROCEDURE — 49083 ABD PARACENTESIS W/IMAGING: CPT

## 2021-01-01 PROCEDURE — C1729 CATH, DRAINAGE: HCPCS

## 2021-01-01 PROCEDURE — 99282 EMERGENCY DEPT VISIT SF MDM: CPT

## 2021-01-01 PROCEDURE — 49082 ABD PARACENTESIS: CPT

## 2021-01-01 PROCEDURE — 85610 PROTHROMBIN TIME: CPT

## 2021-01-01 ASSESSMENT — ENCOUNTER SYMPTOMS
VOMITING: 0
RHINORRHEA: 0
NAUSEA: 0
SORE THROAT: 0
CONSTIPATION: 0
ABDOMINAL DISTENTION: 1
SHORTNESS OF BREATH: 1
COUGH: 0
DIARRHEA: 0
ABDOMINAL PAIN: 1
NAUSEA: 0
WHEEZING: 0
SHORTNESS OF BREATH: 0
VOMITING: 0

## 2021-07-31 ENCOUNTER — APPOINTMENT (OUTPATIENT)
Dept: GENERAL RADIOLOGY | Age: 41
End: 2021-07-31
Payer: MEDICARE

## 2021-07-31 ENCOUNTER — HOSPITAL ENCOUNTER (EMERGENCY)
Age: 41
Discharge: HOME | End: 2021-07-31
Attending: EMERGENCY MEDICINE
Payer: MEDICARE

## 2021-07-31 VITALS
SYSTOLIC BLOOD PRESSURE: 120 MMHG | TEMPERATURE: 97.4 F | HEART RATE: 84 BPM | DIASTOLIC BLOOD PRESSURE: 78 MMHG | BODY MASS INDEX: 23.73 KG/M2 | OXYGEN SATURATION: 97 % | HEIGHT: 64 IN | WEIGHT: 139 LBS | RESPIRATION RATE: 18 BRPM
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
## 2021-07-31 PROCEDURE — 99283 EMERGENCY DEPT VISIT LOW MDM: CPT

## 2021-07-31 PROCEDURE — 73090 X-RAY EXAM OF FOREARM: CPT

## 2021-07-31 PROCEDURE — 6370000000 HC RX 637 (ALT 250 FOR IP): Performed by: STUDENT IN AN ORGANIZED HEALTH CARE EDUCATION/TRAINING PROGRAM

## 2021-07-31 RX ORDER — AMOXICILLIN AND CLAVULANATE POTASSIUM 875; 125 MG/1; MG/1
1 TABLET, FILM COATED ORAL EVERY 12 HOURS SCHEDULED
Status: DISCONTINUED | OUTPATIENT
Start: 2021-07-31 | End: 2021-07-31

## 2021-07-31 RX ORDER — AMOXICILLIN AND CLAVULANATE POTASSIUM 875; 125 MG/1; MG/1
1 TABLET, FILM COATED ORAL 2 TIMES DAILY
Qty: 20 TABLET | Refills: 0 | Status: SHIPPED | OUTPATIENT
Start: 2021-07-31 | End: 2021-01-01

## 2021-07-31 RX ORDER — AMOXICILLIN AND CLAVULANATE POTASSIUM 875; 125 MG/1; MG/1
1 TABLET, FILM COATED ORAL EVERY 12 HOURS SCHEDULED
Status: DISCONTINUED | OUTPATIENT
Start: 2021-07-31 | End: 2021-07-31 | Stop reason: HOSPADM

## 2021-07-31 RX ORDER — IBUPROFEN 400 MG/1
400 TABLET ORAL ONCE
Status: DISCONTINUED | OUTPATIENT
Start: 2021-07-31 | End: 2021-07-31 | Stop reason: HOSPADM

## 2021-07-31 RX ORDER — ACETAMINOPHEN 500 MG
1000 TABLET ORAL ONCE
Status: COMPLETED | OUTPATIENT
Start: 2021-07-31 | End: 2021-07-31

## 2021-07-31 RX ADMIN — AMOXICILLIN AND CLAVULANATE POTASSIUM 1 TABLET: 875; 125 TABLET, FILM COATED ORAL at 20:01

## 2021-07-31 RX ADMIN — ACETAMINOPHEN 1000 MG: 500 TABLET ORAL at 18:50

## 2021-07-31 ASSESSMENT — ENCOUNTER SYMPTOMS: ABDOMINAL PAIN: 0

## 2021-07-31 ASSESSMENT — PAIN SCALES - GENERAL: PAINLEVEL_OUTOF10: 8

## 2021-07-31 NOTE — ED NOTES
Dr Karen Monge at bedside. Patient given [place] Mention dog bite form to complete.      Morteza Oliveira RN  07/31/21 7399

## 2021-07-31 NOTE — ED PROVIDER NOTES
[facility] ED  Emergency Department Encounter  Emergency Medicine Resident     Pt Name: Trace Aldana  MRN: [id number]  Kurtgfpamela 1980  Date of evaluation: 7/31/21  PCP:  No primary care provider on file. CHIEF COMPLAINT       Chief Complaint   Patient presents with    Animal Bite     dog bite to the left forearm. last night       HISTORY OFPRESENT ILLNESS  (Location/Symptom, Timing/Onset, Context/Setting, Quality, Duration, Modifying Factors,Severity.)      Trace Aldana is a 46 y. o.yo female who presents with dog bite. PAST MEDICAL / SURGICAL / SOCIAL / FAMILY HISTORY      has a past medical history of Anxiety, GERD (gastroesophageal reflux disease), History of irregular heartbeat, Hypertension, and Seasonal allergies. has a past surgical history that includes Tubal ligation and Dilation and curettage of uterus. Social History     Socioeconomic History    Marital status: Single     Spouse name: Not on file    Number of children: Not on file    Years of education: Not on file    Highest education level: Not on file   Occupational History    Not on file   Tobacco Use    Smoking status: Current Every Day Smoker     Packs/day: 3.00     Types: Cigars    Smokeless tobacco: Never Used    Tobacco comment: black and milds, 3 packs/day   Vaping Use    Vaping Use: Never used   Substance and Sexual Activity    Alcohol use:  Yes     Alcohol/week: 2.0 standard drinks     Types: 2 Cans of beer per week     Comment: daily 3-4 beers    Drug use: Yes     Types: Marijuana    Sexual activity: Yes     Partners: Male   Other Topics Concern    Not on file   Social History Narrative    ** Merged History Encounter **          Social Determinants of Health     Financial Resource Strain:     Difficulty of Paying Living Expenses:    Food Insecurity:     Worried About Running Out of Food in the Last Year:     Ran Out of Food in the Last Year:    Transportation Needs:     Lack of Transportation (Medical):  Lack of Transportation (Non-Medical):    Physical Activity:     Days of Exercise per Week:     Minutes of Exercise per Session:    Stress:     Feeling of Stress :    Social Connections:     Frequency of Communication with Friends and Family:     Frequency of Social Gatherings with Friends and Family:     Attends [employer] Services:     Active Member of Clubs or Organizations:     Attends Club or Organization Meetings:     Marital Status:    Intimate Partner Violence:     Fear of Current or Ex-Partner:     Emotionally Abused:     Physically Abused:     Sexually Abused:        Family History   Problem Relation Age of Onset    Heart Disease Mother     Other Mother         HIV    Cancer Mother         female cancer    Anxiety Disorder Sister     Anxiety Disorder Brother         Allergies:  Motrin [ibuprofen], Seasonal, and Motrin [ibuprofen]    Home Medications:  Prior to Admission medications    Medication Sig Start Date End Date Taking? Authorizing Provider   amoxicillin-clavulanate (AUGMENTIN) 875-125 MG per tablet Take 1 tablet by mouth 2 times daily for 10 days 7/31/21 8/10/21 Yes Lisa Alfaro MD   famotidine (PEPCID) 20 MG tablet Take 1 tablet by mouth 2 times daily 11/23/20   Mckinley Mcgrath,    Acetaminophen (ACETAMINOPHEN EXTRA STRENGTH) 500 MG CAPS Take 1-2 tablets by mouth every 6 hours as needed for pain. Do not take more than 8 pills in a 24 hour period.  9/4/20   Cain Situ, DO   ibuprofen (IBU) 800 MG tablet Take 1 tablet by mouth every 6 hours as needed for Pain 9/4/20   Cain Situ, DO   pantoprazole (PROTONIX) 40 MG tablet Take 1 tablet by mouth 2 times daily (before meals) 7/13/20   Federico Garcia MD   pantoprazole (PROTONIX) 20 MG tablet Take 2 tablets by mouth daily 7/9/20   CARMEN Hood MD   omeprazole (PRILOSEC) 40 MG delayed release capsule Take 1 capsule by mouth every morning (before breakfast) 7/8/20   Terry Baker MD benzocaine (ORAJEL) 20 % GEL mucosal gel Apply to affected area three times daily as needed 2/3/20   Jennifer Oconnell MD   ondansetron (ZOFRAN ODT) 4 MG disintegrating tablet Take 1 tablet by mouth every 8 hours as needed for Nausea or Vomiting 7/13/19   Royal Garcia MD   Carboxymethylcellul-Glycerin 0.5-0.9 % SOLN Apply 2 drops to eye as needed (dry and irritated eyes) 5/21/19   Oneida Grace DO       REVIEW OFSYSTEMS    (2-9 systems for level 4, 10 or more for level 5)      Review of Systems   Constitutional: Negative for fatigue and fever. Gastrointestinal: Negative for abdominal pain. Musculoskeletal: Negative for joint swelling. Skin: Positive for wound. PHYSICAL EXAM   (up to 7 for level 4, 8 or more forlevel 5)      ED TRIAGE VITALS BP: 120/78, Temp: 97.4 °F (36.3 °C), Pulse: 84, Resp: 18, SpO2: 97 %    Vitals:    07/31/21 1828   BP: 120/78   Pulse: 84   Resp: 18   Temp: 97.4 °F (36.3 °C)   TempSrc: Tympanic   SpO2: 97%   Weight: 139 lb (63 kg)   Height: 5' 4\" (1.626 m)       Physical Exam  HENT:      Head: Normocephalic. Musculoskeletal:         General: Tenderness and signs of injury present. Skin:     Findings: Laceration present. Neurological:      Mental Status: She is alert.          DIFFERENTIAL  DIAGNOSIS     PLAN (LABS / IMAGING / EKG):  Orders Placed This Encounter   Procedures    XR RADIUS ULNA LEFT (2 VIEWS)       MEDICATIONS ORDERED:  Orders Placed This Encounter   Medications    DISCONTD: amoxicillin-clavulanate (AUGMENTIN) 875-125 MG per tablet 1 tablet     Order Specific Question:   Antimicrobial Indications     Answer:   Skin and Soft Tissue Infection    ibuprofen (ADVIL;MOTRIN) tablet 400 mg    acetaminophen (TYLENOL) tablet 1,000 mg    amoxicillin-clavulanate (AUGMENTIN) 875-125 MG per tablet 1 tablet     Order Specific Question:   Antimicrobial Indications     Answer:   Skin and Soft Tissue Infection    amoxicillin-clavulanate (AUGMENTIN) 875-125 MG per tablet     Sig: Take 1 tablet by mouth 2 times daily for 10 days     Dispense:  20 tablet     Refill:  0       DDX:     Dog bite    Initial MDM/Plan: 46 y.o. female who presents with animal bite. Dog bite:  X-ray for foreign body  Negative for foreign body  Irrigated with 1 L normal saline  Augmentin  Follow-up with primary care doctor within 3 days for wound check  Pain control    DIAGNOSTIC RESULTS / EMERGENCYDEPARTMENT COURSE / MDM     LABS:  No results found for this visit on 07/31/21. RADIOLOGY:  XR RADIUS ULNA LEFT (2 VIEWS)   Final Result   No acute bony abnormalities are noted                 EMERGENCY DEPARTMENT COURSE:  ED Course as of Jul 31 2017   Sat Jul 31, 2021   1848 Patient seen and assessed in the emergency department no acute respiratory cardiovascular distress. Patient here with animal bite to the left forearm, sustained by a pit bull, states that it was her neighbors pitbull attacked her left arm, happened yesterday. States that she tried going to the emergency department but there was a long wait time and decided to leave and return today. Denies any fevers or chills, states that the pain is over her left forearm, area has been draining, there is 1 puncture wound to the dorsal aspect of the left forearm. Denies any other injuries. Tetanus uptodate in 2015      [PS]   1849 Area irrigated thoroughly with copious amounts of normal saline, 1 L    [PS]      ED Course User Index  [PS] Claudette Parham MD          PROCEDURES:  None    CONSULTS:  None    CRITICAL CARE:  Please see attending note    FINAL IMPRESSION      1.  Dog bite, initial encounter          DISPOSITION / PLAN     DISPOSITION Decision To Discharge 07/31/2021 08:10:52 PM       PATIENT REFERRED TO:  [facility]  [address] [place] [address]  [phone]    As needed, If symptoms worsen    MARCELINO Crabtree [address]  [phone]  In 1 week        DISCHARGE MEDICATIONS:  New Prescriptions    AMOXICILLIN-CLAVULANATE (AUGMENTIN) 875-125 MG PER TABLET    Take 1 tablet by mouth 2 times daily for 10 days       Alhaji Spence MD  Emergency Medicine Resident    (Please note that portions of this note were completed with a voice recognition program.Efforts were made to edit the dictations but occasionally words are mis-transcribed.)       Alhaji Spence MD  Resident  07/31/21 2018

## 2021-12-14 NOTE — ED PROVIDER NOTES
[facility] ED  Emergency Department Encounter  Emergency Medicine Resident     Pt Name: Guillermo Rashid  MRN: [id number]  Kurtgfpamela 1980  Date of evaluation: 12/14/21  PCP:  No primary care provider on file. CHIEF COMPLAINT       Chief Complaint   Patient presents with    Other     Bleeding gums     Cough    Generalized Body Aches    Bloated       HISTORY OFPRESENT ILLNESS  (Location/Symptom, Timing/Onset, Context/Setting, Quality, Duration, Modifying Factors,Severity.)      Guillermo Rashid is a 46 y. o.yo female who has a history of liver cirrhosis secondary to alcohol use and history of thrombocytopenia who presents with complaints of bleeding gums and abdominal distention. Patient reports that over the past 1 week, she has noticed that whenever she is brushing her teeth, her gums are bleeding more than usual.  She also complains that she feels as though her abdomen is getting more intense and more bloated. Patient denies any patient denies if she has ever received any paracentesis. She denies any fever, chills. However she does state that she has generalized body aches. She denies any shortness of breath and chest pain. PAST MEDICAL / SURGICAL / SOCIAL / FAMILY HISTORY      has a past medical history of Anxiety, GERD (gastroesophageal reflux disease), History of irregular heartbeat, Hypertension, and Seasonal allergies. has a past surgical history that includes Tubal ligation and Dilation and curettage of uterus.      Social History     Socioeconomic History    Marital status: Single     Spouse name: Not on file    Number of children: Not on file    Years of education: Not on file    Highest education level: Not on file   Occupational History    Not on file   Tobacco Use    Smoking status: Current Every Day Smoker     Packs/day: 3.00     Types: Cigars    Smokeless tobacco: Never Used    Tobacco comment: black and milds, 3 packs/day   Vaping Use    Vaping Use: Never used   Substance and Sexual Activity    Alcohol use: Yes     Alcohol/week: 2.0 standard drinks     Types: 2 Cans of beer per week     Comment: daily 3-4 beers    Drug use: Yes     Types: Marijuana Xander Jock)    Sexual activity: Yes     Partners: Male   Other Topics Concern    Not on file   Social History Narrative    ** Merged History Encounter **          Social Determinants of Health     Financial Resource Strain:     Difficulty of Paying Living Expenses: Not on file   Food Insecurity:     Worried About Running Out of Food in the Last Year: Not on file    Scottie of Food in the Last Year: Not on file   Transportation Needs:     Lack of Transportation (Medical): Not on file    Lack of Transportation (Non-Medical):  Not on file   Physical Activity:     Days of Exercise per Week: Not on file    Minutes of Exercise per Session: Not on file   Stress:     Feeling of Stress : Not on file   Social Connections:     Frequency of Communication with Friends and Family: Not on file    Frequency of Social Gatherings with Friends and Family: Not on file    Attends Orthodoxy Services: Not on file    Active Member of Clubs or Organizations: Not on file    Attends Club or Organization Meetings: Not on file    Marital Status: Not on file   Intimate Partner Violence:     Fear of Current or Ex-Partner: Not on file    Emotionally Abused: Not on file    Physically Abused: Not on file    Sexually Abused: Not on file   Housing Stability:     Unable to Pay for Housing in the Last Year: Not on file    Number of [place] in the Last Year: Not on file    Unstable Housing in the Last Year: Not on file       Family History   Problem Relation Age of Onset    Heart Disease Mother     Other Mother         HIV    Cancer Mother         female cancer    Anxiety Disorder Sister     Anxiety Disorder Brother         Allergies:  Motrin [ibuprofen], Seasonal, and Motrin [ibuprofen]    Home Medications:  Prior to Admission medications    Medication Sig Start Date End Date Taking? Authorizing Provider   famotidine (PEPCID) 20 MG tablet Take 1 tablet by mouth 2 times daily 11/23/20   Teresa Mcgrath,    Acetaminophen (ACETAMINOPHEN EXTRA STRENGTH) 500 MG CAPS Take 1-2 tablets by mouth every 6 hours as needed for pain. Do not take more than 8 pills in a 24 hour period. 9/4/20   Sandra Son, DO   ibuprofen (IBU) 800 MG tablet Take 1 tablet by mouth every 6 hours as needed for Pain 9/4/20   Sandra Son, DO   pantoprazole (PROTONIX) 40 MG tablet Take 1 tablet by mouth 2 times daily (before meals) 7/13/20   Donald Cho MD   pantoprazole (PROTONIX) 20 MG tablet Take 2 tablets by mouth daily 7/9/20   CARMEN Retana MD   omeprazole (PRILOSEC) 40 MG delayed release capsule Take 1 capsule by mouth every morning (before breakfast) 7/8/20   Armand Abreu MD   benzocaine (ORAJEL) 20 % GEL mucosal gel Apply to affected area three times daily as needed 2/3/20   Alana Dandy, MD   ondansetron (ZOFRAN ODT) 4 MG disintegrating tablet Take 1 tablet by mouth every 8 hours as needed for Nausea or Vomiting 7/13/19   Arsen Hatfield MD   Carboxymethylcellul-Glycerin 0.5-0.9 % SOLN Apply 2 drops to eye as needed (dry and irritated eyes) 5/21/19   Oneida Waldron, DO       REVIEW OFSYSTEMS    (2-9 systems for level 4, 10 or more for level 5)      Review of Systems   HENT:        Bleeding gums   Respiratory: Positive for shortness of breath. Cardiovascular: Negative for chest pain and leg swelling. Gastrointestinal: Positive for abdominal pain. Negative for nausea and vomiting. Musculoskeletal: Positive for myalgias.        PHYSICAL EXAM   (up to 7 for level 4, 8 or more forlevel 5)      INITIAL VITALS:   ED Triage Vitals [12/14/21 1201]   BP Temp Temp Source Pulse Resp SpO2 Height Weight   114/82 98.4 °F (36.9 °C) Oral 87 16 98 % 5' 4\" (1.626 m) 140 lb (63.5 kg)       Physical Exam  Constitutional:       Appearance: Normal appearance. HENT:      Head: Normocephalic and atraumatic. Mouth/Throat:      Mouth: Mucous membranes are moist.   Eyes:      General: Scleral icterus present. Pupils: Pupils are equal, round, and reactive to light. Cardiovascular:      Rate and Rhythm: Normal rate and regular rhythm. Pulmonary:      Effort: Pulmonary effort is normal. No respiratory distress. Abdominal:      General: There is distension. Tenderness: There is no abdominal tenderness. Musculoskeletal:         General: Tenderness present. No swelling. Cervical back: No rigidity or tenderness. Skin:     Capillary Refill: Capillary refill takes less than 2 seconds. Coloration: Skin is not jaundiced. Neurological:      General: No focal deficit present. Mental Status: She is alert and oriented to person, place, and time. Cranial Nerves: No cranial nerve deficit. Psychiatric:         Mood and Affect: Mood normal.         Behavior: Behavior normal.         DIFFERENTIAL  DIAGNOSIS     PLAN (LABS / IMAGING / EKG):  Orders Placed This Encounter   Procedures    CBC Auto Differential    HEPATIC FUNCTION PANEL    MAGNESIUM    BASIC METABOLIC PANEL    Protime-INR    APTT    Brain Natriuretic Peptide    HCG Qualitative, Serum    LIPASE    Immature Platelet Fraction    PLATELET COUNT    Immature Platelet Fraction       MEDICATIONS ORDERED:  No orders of the defined types were placed in this encounter. Initial MDM/Plan: 46 y.o. female who presents with abdominal distention and bleeding gums patient was stable vitals, she does have a protuberant abdomen that is tender to palpate. Lungs clear to auscultate bilaterally. No evidence of bleeding gums.   -Given her history of liver cirrhosis and anemia and history of thrombocytopenia, will get labs including CBC, PT/INR, PTT  -We will also assess for fluid overload with BNP  -We will start LFTs, BMP, lipase  -Disposition pending on labs     DIAGNOSTIC RESULTS / EMERGENCYDEPARTMENT COURSE / MDM     LABS:  Labs Reviewed   CBC WITH AUTO DIFFERENTIAL - Abnormal; Notable for the following components:       Result Value    RBC 3.54 (*)     Hemoglobin 10.6 (*)     Hematocrit 30.2 (*)     MCHC 35.1 (*)     RDW 19.2 (*)     Seg Neutrophils 66 (*)     Lymphocytes 23 (*)     Immature Granulocytes 1 (*)     All other components within normal limits   HEPATIC FUNCTION PANEL - Abnormal; Notable for the following components:    Albumin 2.8 (*)     Alkaline Phosphatase 197 (*)      (*)     Bilirubin, Direct 0.51 (*)     Albumin/Globulin Ratio 0.5 (*)     All other components within normal limits   BASIC METABOLIC PANEL - Abnormal; Notable for the following components:    BUN 2 (*)     CREATININE 0.29 (*)     Calcium 8.0 (*)     Potassium 3.3 (*)     All other components within normal limits   PROTIME-INR - Abnormal; Notable for the following components:    Protime 13.5 (*)     All other components within normal limits   APTT - Abnormal; Notable for the following components:    PTT 31.4 (*)     All other components within normal limits   LIPASE - Abnormal; Notable for the following components:    Lipase 62 (*)     All other components within normal limits   IMMATURE PLATELET FRACTION - Abnormal; Notable for the following components:    Platelet, Immature Fraction 16.6 (*)     Platelet, Fluorescence 39 (*)     All other components within normal limits   MAGNESIUM   BRAIN NATRIURETIC PEPTIDE   HCG, SERUM, QUALITATIVE   IMMATURE PLATELET FRACTION   PLATELET COUNT         RADIOLOGY:  No results found.       EKG      All EKG's are interpreted by the Emergency Department Physicianwho either signs or Co-signs this chart in the absence of a cardiologist.    EMERGENCY DEPARTMENT COURSE:  ED Course as of 12/14/21 1656   Tue Dec 14, 2021   1404 Plan was patient to get IR guided paracentesis however pt reports she cant stay to get paracentesis because she has to  her daughter from school. Patient reports that she will come back tomorrow morning after she dropped off her daughter at school. IR was made aware of the change of plan. Patient to come back on 12/15/2021 for IR guided paracentesis. [AN]      ED Course User Index  [AN] Suly Yeager MD          PROCEDURES:  None    CONSULTS:  None    CRITICAL CARE:      FINAL IMPRESSION      1.  Cirrhosis of liver with ascites, unspecified hepatic cirrhosis type [facility])          DISPOSITION / PLAN     DISPOSITION Decision To Discharge 12/14/2021 02:11:11 PM      PATIENT REFERRED TO:  [facility] ED  [address] [place] [address]  [phone]    Come back tomorrow to get your paracentesis done      DISCHARGE MEDICATIONS:  Discharge Medication List as of 12/14/2021  2:12 PM          Suly Yeager MD  Emergency Medicine Resident    (Please note that portions of this note were completed with a voice recognition program.Efforts were made to edit the dictations but occasionally words are mis-transcribed.)        Suly Yeager MD  Resident  12/14/21 3289

## 2021-12-14 NOTE — ED PROVIDER NOTES
[facility]     Emergency Department     Faculty Note/ Attestation      Pt Name: Beto Malcolm                                       MRN: [id number]  Mayra 1980  Date of evaluation: 12/14/2021  Patients PCP:    No primary care provider on file. Attestation  I performed a history and physical examination of the patient/ or directly observed  and discussed management with the resident. I reviewed the residents note and agree with the documented findings and plan of care. Any areas of disagreement are noted on the chart. I was personally present for the key portions of any procedures. I have documented in the chart those procedures where I was not present during the key portions. I have reviewed the emergency nurses triage note. I agree with the chief complaint, past medical history, past surgical history, allergies, medications, social and family history as documented unless otherwise noted below. For Physician Assistant/ Nurse Practitioner cases/documentation I have personally evaluated this patient and have completed at least one if not all key elements of the E/M (history, physical exam, and MDM). Additional findings are as noted. This patient was evaluated in the Emergency Department for symptoms described in the history of present illness. The patient was evaluated in the context of the global COVID-19 pandemic, which necessitated consideration that the patient might be at risk for infection with the SARS-CoV-2 virus that causes COVID-19. Institutional protocols and algorithms that pertain to the evaluation of patients at risk for COVID-19 are in a state of rapid change based on information released by regulatory bodies including the CDC and federal and state organizations. These policies and algorithms were followed during the patient's care in the ED.      Initial Screens:        [place] Coma Scale  Eye Opening: Spontaneous  Best Verbal Response: Oriented  Best Motor Response: Obeys commands  [place] Coma Scale Score: 15    Vitals:    Vitals:    12/14/21 1201   BP: 114/82   Pulse: 87   Resp: 16   Temp: 98.4 °F (36.9 °C)   TempSrc: Oral   SpO2: 98%   Weight: 140 lb (63.5 kg)   Height: 5' 4\" (1.626 m)       Chief Complaint      Chief Complaint   Patient presents with    Other     Bleeding gums     Cough    Generalized Body Aches    Bloated          height is 5' 4\" (1.626 m) and weight is 140 lb (63.5 kg). Her oral temperature is 98.4 °F (36.9 °C). Her blood pressure is 114/82 and her pulse is 87. Her respiration is 16 and oxygen saturation is 98%. DIAGNOSTIC RESULTS       RADIOLOGY:   No orders to display         LABS:  Labs Reviewed   CBC WITH AUTO DIFFERENTIAL   HEPATIC FUNCTION PANEL   MAGNESIUM   BASIC METABOLIC PANEL   PROTIME-INR   APTT   BRAIN NATRIURETIC PEPTIDE   HCG, SERUM, QUALITATIVE   LIPASE         EMERGENCY DEPARTMENT COURSE:     -------------------------      BP: 114/82, Temp: 98.4 °F (36.9 °C), Pulse: 87, Resp: 16    System Problem List     Patient Active Problem List   Diagnosis    Hematemesis    Thrombocytopenia ([address]    Blood loss anemia    Elevated transaminase level    Transaminitis         Comments  Chronic Prob List noted          Shawna Mckeon MD,, MD, F.A.C.E.P.   Attending Emergency Physician         Shawna Mckeon MD  12/14/21 1300

## 2021-12-15 NOTE — ED PROVIDER NOTES
[address]  ED  Emergency Department        Pt Name: Prosper Vega  MRN: [id number]  [place] 1980  Date of evaluation: 12/15/21    CHIEF COMPLAINT       Chief Complaint   Patient presents with    Cirrhosis     was seen yesterday, told to come bacl today for a paracentesis       HISTORY OF PRESENT ILLNESS  (Location/Symptom, Timing/Onset, Context/Setting, Quality, Duration, ModifyingFactors, Severity.)      Prosper Vega is a 46 y.o. female who presents with history of liver cirrhosis. Was seen yesterday due to abdominal distention. Was planned to have IR guided paracentesis but had to go  her children she is back today. No pain just some discomfort due to abdominal distention. No shortness of breath or difficulty bleeding. Labs were drawn yesterday. Patient in accompanied by her 2 daughters. Wanting to have fluid drainage. No fevers no chills. Has never had paracentesis in the past    PAST MEDICAL / SURGICAL / SOCIAL / FAMILY HISTORY      has a past medical history of Anxiety, GERD (gastroesophageal reflux disease), History of irregular heartbeat, Hypertension, and Seasonal allergies. has a past surgical history that includes Tubal ligation and Dilation and curettage of uterus. Social History     Socioeconomic History    Marital status: Single     Spouse name: Not on file    Number of children: Not on file    Years of education: Not on file    Highest education level: Not on file   Occupational History    Not on file   Tobacco Use    Smoking status: Current Every Day Smoker     Packs/day: 3.00     Types: Cigars    Smokeless tobacco: Never Used    Tobacco comment: black and milds, 3 packs/day   Vaping Use    Vaping Use: Never used   Substance and Sexual Activity    Alcohol use:  Yes     Alcohol/week: 2.0 standard drinks     Types: 2 Cans of beer per week     Comment: daily 3-4 beers    Drug use: Yes     Types: Marijuana Sulma Edward)    Sexual STRENGTH) 500 MG CAPS Take 1-2 tablets by mouth every 6 hours as needed for pain. Do not take more than 8 pills in a 24 hour period. 9/4/20   Tawny Rosenbaum, DO   ibuprofen (IBU) 800 MG tablet Take 1 tablet by mouth every 6 hours as needed for Pain 9/4/20   Tawny Rosenbaum, DO   pantoprazole (PROTONIX) 40 MG tablet Take 1 tablet by mouth 2 times daily (before meals) 7/13/20   Roland Lopez MD   pantoprazole (PROTONIX) 20 MG tablet Take 2 tablets by mouth daily 7/9/20   CARMEN Gerardo MD   omeprazole (PRILOSEC) 40 MG delayed release capsule Take 1 capsule by mouth every morning (before breakfast) 7/8/20   Sherry Seay MD   benzocaine (ORAJEL) 20 % GEL mucosal gel Apply to affected area three times daily as needed 2/3/20   Bessie Canavan, MD   ondansetron (ZOFRAN ODT) 4 MG disintegrating tablet Take 1 tablet by mouth every 8 hours as needed for Nausea or Vomiting 7/13/19   Taz Holly MD   Carboxymethylcellul-Glycerin 0.5-0.9 % SOLN Apply 2 drops to eye as needed (dry and irritated eyes) 5/21/19   Oneida Brand DO       REVIEW OF SYSTEMS    (2-9 systems for level 4, 10 or more for level 5)      Review of Systems   Constitutional: Negative for activity change, appetite change, fatigue and fever. HENT: Negative for congestion, rhinorrhea and sore throat. Respiratory: Negative for cough, shortness of breath and wheezing. Cardiovascular: Negative for chest pain, palpitations and leg swelling. Gastrointestinal: Positive for abdominal distention. Negative for constipation, diarrhea, nausea and vomiting. Genitourinary: Negative for decreased urine volume and dysuria. Skin: Negative for rash. Neurological: Negative for dizziness, weakness, light-headedness, numbness and headaches.        PHYSICAL EXAM   (up to 7 for level 4, 8 or more for level 5)     INITIAL VITALS:   /78   Pulse 98   Temp 97.3 °F (36.3 °C) (Oral)   Resp 16   Ht 5' 4\" (1.626 m)   Wt 150 lb (68 kg)   LMP 11/15/2021 SpO2 98%   BMI 25.75 kg/m²     Physical Exam  Constitutional:       General: She is not in acute distress. Appearance: Normal appearance. She is not ill-appearing. HENT:      Head: Normocephalic and atraumatic. Eyes:      General:         Right eye: No discharge. Left eye: No discharge. Extraocular Movements: Extraocular movements intact. Pupils: Pupils are equal, round, and reactive to light. Cardiovascular:      Rate and Rhythm: Normal rate. Pulmonary:      Effort: Pulmonary effort is normal. No respiratory distress. Abdominal:      Comments: Distended abdomen, with positive fluid wave, nontender to palpation. No ecchymosis noted to the periumbilical region or flanks bilaterally. Musculoskeletal:      Right lower leg: No edema. Left lower leg: No edema. Neurological:      General: No focal deficit present. Mental Status: She is alert and oriented to person, place, and time. DIFFERENTIAL  DIAGNOSIS     Patient on exam with positive fluid wave, liver cirrhosis. Plan to speak with IR for paracentesis. I do not have any concerns for SBP. She is well-appearing labs were done yesterday. PLAN (LABS / IMAGING / EKG):  Orders Placed This Encounter   Procedures    Inpatient consult to IR       MEDICATIONS ORDERED:  No orders of the defined types were placed in this encounter. DIAGNOSTIC RESULTS / EMERGENCY DEPARTMENT COURSE / MDM     LABS:  No results found for this visit on 12/15/21. IMPRESSION: ascites        EMERGENCY DEPARTMENT COURSE:  11:30 AM EST  Spoke with IR and patient to get paracentesis today. No concern for infections,  PAtient has appointment tomorrow with her pcp    PROCEDURES:      CONSULTS:  [address]     CRITICAL CARE:      FINAL IMPRESSION      1. Other ascites          DISPOSITION / PLAN     DISPOSITION        PATIENT REFERRED TO:  No follow-up provider specified.     DISCHARGE MEDICATIONS:  New Prescriptions    No medications on file       Corrine Pandya, DO  12:18 PM    Attending Emergency Physician  [facility] ED    (Please note that portions of this note were completed with a voice recognition program.  Effortswere made to edit the dictations but occasionally words are mis-transcribed.)              Susannah Griffith, DO  12/15/21 [id number]

## 2021-12-15 NOTE — ED NOTES
Bed: 35  Expected date:   Expected time:   Means of arrival:   Comments:     Zulema Brunner, RN  12/15/21 1115

## 2021-12-15 NOTE — BRIEF OP NOTE
Brief Postoperative Note for Paracentesis    Jsohua Govea  YOB: 1980  [id number]    Pre-operative Diagnosis:  Ascites     Post-operative Diagnosis: Same    Procedure: Ultrasound guided Paracentesis     Anesthesia: 1% Lidocaine     Surgeons/Assistants: Jimmie Scruggs PA-C    Complications: none    EBL: Minimal    Specimens: Were obtained; no specimen orders placed    Ultrasound guided paracentesis performed. 2850 ml clear yellow fluid obtained. Dressing applied.      Electronically signed by HOMAR Horner on 12/15/2021 at 2:46 PM

## 2021-12-15 NOTE — PROGRESS NOTES
PT FROM ER HAD OP PARACENTESIS TODAY. 2.8 LITERS OF YELLOW ASCITIC FLUID COLLECTED. 2X2 GAUZE AND TEGADERM TO CATHETER SITE. DRY AND INTACT. NO ORDERS FOR SPECIMENS. PT TOLERATED WELL. BP STABLE THROUGHOUT PROCEDURE. REPORT CALLED. READY FOR TRANSPORT.

## 2021-12-16 NOTE — ED PROVIDER NOTES
[address] [place] [address] ED  Emergency Department  Emergency Medicine Resident Sign-out     Care of [place] was assumed from Dr. Jonn Gomez and is being seen for Cirrhosis (was seen yesterday, told to come bacl today for a paracentesis)  . The patient's initial evaluation and plan have been discussed with the prior provider who initially evaluated the patient. EMERGENCY DEPARTMENT COURSE / MEDICAL DECISION MAKING:       MEDICATIONS GIVEN:  No orders of the defined types were placed in this encounter. LABS / RADIOLOGY:     Labs Reviewed - No data to display    IR US GUIDED PARACENTESIS    Result Date: 12/15/2021  PROCEDURE: Therapeutic ultrasound guided paracentesis 12/15/2021 HISTORY: ORDERING SYSTEM PROVIDED HISTORY: cirrhosis TECHNOLOGIST PROVIDED HISTORY: cirrhosis Is the patient pregnant?->No Ascites TECHNIQUE: This procedure was performed by Leesa Champion PA-C under indirect supervision of . Informed consent was obtained after a detailed explanation of the procedure including the risks, benefits, and alternatives. Universal protocol was followed. The area was prepped and draped in sterile fashion using maximum barrier technique and local anesthesia was achieved with lidocaine. Pre-procedure ultrasound shows a dominant fluid pocket in the right upperquadrant. Using ultrasound guidance (image attached to the medical record), the fluid pocket was accessed with a 5 [employer] needle with aspiration of clear yellow fluid. [employer] vacuum machine was connected and paracentesis was performed and approximately 2850 mL was removed. Post procedural ultrasound demonstrated no residual fluid. A sample was not sent for laboratory analysis. Estimated blood loss was minimum. The patient tolerated the procedure well and left the department in good condition. FINDINGS: A total of 2850 mL clear yellow fluid was removed. Successful ultrasound paracentesis.        RECENT VITALS:     Temp:

## 2022-01-01 ENCOUNTER — HOSPITAL ENCOUNTER (EMERGENCY)
Age: 42
Discharge: HOME | End: 2022-03-11
Attending: EMERGENCY MEDICINE
Payer: MEDICARE

## 2022-01-01 ENCOUNTER — HOSPITAL ENCOUNTER (OUTPATIENT)
Age: 42
Setting detail: OBSERVATION
Discharge: HOME | End: 2022-06-01
Attending: EMERGENCY MEDICINE | Admitting: INTERNAL MEDICINE
Payer: MEDICARE

## 2022-01-01 ENCOUNTER — APPOINTMENT (OUTPATIENT)
Dept: GENERAL RADIOLOGY | Age: 42
DRG: 710 | End: 2022-01-01
Payer: MEDICARE

## 2022-01-01 ENCOUNTER — APPOINTMENT (OUTPATIENT)
Dept: ULTRASOUND IMAGING | Age: 42
DRG: 280 | End: 2022-01-01
Payer: MEDICARE

## 2022-01-01 ENCOUNTER — HOSPITAL ENCOUNTER (EMERGENCY)
Age: 42
Discharge: HOME | End: 2022-07-03
Attending: EMERGENCY MEDICINE
Payer: MEDICARE

## 2022-01-01 ENCOUNTER — HOSPITAL ENCOUNTER (EMERGENCY)
Age: 42
Discharge: HOME | End: 2022-06-30
Attending: EMERGENCY MEDICINE
Payer: MEDICARE

## 2022-01-01 ENCOUNTER — APPOINTMENT (OUTPATIENT)
Dept: CT IMAGING | Age: 42
DRG: 280 | End: 2022-01-01
Payer: MEDICARE

## 2022-01-01 ENCOUNTER — APPOINTMENT (OUTPATIENT)
Dept: ULTRASOUND IMAGING | Age: 42
DRG: 710 | End: 2022-01-01
Payer: MEDICARE

## 2022-01-01 ENCOUNTER — APPOINTMENT (OUTPATIENT)
Dept: GENERAL RADIOLOGY | Age: 42
End: 2022-01-01
Payer: MEDICARE

## 2022-01-01 ENCOUNTER — HOSPITAL ENCOUNTER (OUTPATIENT)
Dept: ULTRASOUND IMAGING | Age: 42
Discharge: HOME | End: 2022-05-14
Payer: MEDICARE

## 2022-01-01 ENCOUNTER — HOSPITAL ENCOUNTER (INPATIENT)
Age: 42
LOS: 2 days | Discharge: HOME | DRG: 384 | End: 2022-07-10
Attending: EMERGENCY MEDICINE | Admitting: SURGERY
Payer: MEDICARE

## 2022-01-01 ENCOUNTER — HOSPITAL ENCOUNTER (EMERGENCY)
Age: 42
Discharge: HOME | End: 2022-06-28
Attending: EMERGENCY MEDICINE
Payer: MEDICARE

## 2022-01-01 ENCOUNTER — APPOINTMENT (OUTPATIENT)
Dept: INTERVENTIONAL RADIOLOGY/VASCULAR | Age: 42
DRG: 710 | End: 2022-01-01
Payer: MEDICARE

## 2022-01-01 ENCOUNTER — ANESTHESIA EVENT (OUTPATIENT)
Dept: ICU | Age: 42
DRG: 710 | End: 2022-01-01
Payer: MEDICARE

## 2022-01-01 ENCOUNTER — APPOINTMENT (OUTPATIENT)
Dept: CT IMAGING | Age: 42
DRG: 384 | End: 2022-01-01
Payer: MEDICARE

## 2022-01-01 ENCOUNTER — APPOINTMENT (OUTPATIENT)
Dept: ULTRASOUND IMAGING | Age: 42
End: 2022-01-01
Payer: MEDICARE

## 2022-01-01 ENCOUNTER — APPOINTMENT (OUTPATIENT)
Dept: CT IMAGING | Age: 42
DRG: 710 | End: 2022-01-01
Payer: MEDICARE

## 2022-01-01 ENCOUNTER — HOSPITAL ENCOUNTER (EMERGENCY)
Age: 42
Discharge: HOME | End: 2022-03-05
Attending: EMERGENCY MEDICINE
Payer: MEDICARE

## 2022-01-01 ENCOUNTER — ANESTHESIA (OUTPATIENT)
Dept: ICU | Age: 42
DRG: 710 | End: 2022-01-01
Payer: MEDICARE

## 2022-01-01 ENCOUNTER — HOSPITAL ENCOUNTER (INPATIENT)
Age: 42
LOS: 7 days | DRG: 710 | End: 2022-08-03
Attending: EMERGENCY MEDICINE | Admitting: INTERNAL MEDICINE
Payer: MEDICARE

## 2022-01-01 ENCOUNTER — HOSPITAL ENCOUNTER (EMERGENCY)
Age: 42
Discharge: HOME | End: 2022-04-16
Attending: EMERGENCY MEDICINE
Payer: MEDICARE

## 2022-01-01 ENCOUNTER — HOSPITAL ENCOUNTER (OUTPATIENT)
Dept: ULTRASOUND IMAGING | Age: 42
Discharge: HOME | End: 2022-07-14
Payer: MEDICARE

## 2022-01-01 ENCOUNTER — HOSPITAL ENCOUNTER (INPATIENT)
Age: 42
LOS: 1 days | Discharge: LEFT BEFORE DISCHARGE | DRG: 280 | End: 2022-05-18
Attending: EMERGENCY MEDICINE | Admitting: INTERNAL MEDICINE
Payer: MEDICARE

## 2022-01-01 ENCOUNTER — APPOINTMENT (OUTPATIENT)
Dept: GENERAL RADIOLOGY | Age: 42
DRG: 384 | End: 2022-01-01
Payer: MEDICARE

## 2022-01-01 ENCOUNTER — HOSPITAL ENCOUNTER (EMERGENCY)
Age: 42
Discharge: HOME | End: 2022-04-18
Attending: EMERGENCY MEDICINE
Payer: MEDICARE

## 2022-01-01 ENCOUNTER — HOSPITAL ENCOUNTER (OUTPATIENT)
Age: 42
Discharge: HOME | End: 2022-05-12
Payer: MEDICARE

## 2022-01-01 ENCOUNTER — HOSPITAL ENCOUNTER (EMERGENCY)
Age: 42
Discharge: HOME | End: 2022-04-14
Attending: EMERGENCY MEDICINE
Payer: MEDICARE

## 2022-01-01 VITALS
WEIGHT: 163 LBS | TEMPERATURE: 100.1 F | SYSTOLIC BLOOD PRESSURE: 121 MMHG | OXYGEN SATURATION: 96 % | HEART RATE: 104 BPM | HEIGHT: 64 IN | DIASTOLIC BLOOD PRESSURE: 77 MMHG | RESPIRATION RATE: 28 BRPM | BODY MASS INDEX: 27.83 KG/M2

## 2022-01-01 VITALS
SYSTOLIC BLOOD PRESSURE: 111 MMHG | OXYGEN SATURATION: 96 % | BODY MASS INDEX: 28.64 KG/M2 | WEIGHT: 167.77 LBS | HEIGHT: 64 IN | TEMPERATURE: 98.8 F | DIASTOLIC BLOOD PRESSURE: 60 MMHG | HEART RATE: 87 BPM | RESPIRATION RATE: 20 BRPM

## 2022-01-01 VITALS
RESPIRATION RATE: 14 BRPM | OXYGEN SATURATION: 100 % | DIASTOLIC BLOOD PRESSURE: 72 MMHG | HEART RATE: 88 BPM | TEMPERATURE: 98.7 F | SYSTOLIC BLOOD PRESSURE: 127 MMHG

## 2022-01-01 VITALS
RESPIRATION RATE: 16 BRPM | TEMPERATURE: 97.1 F | DIASTOLIC BLOOD PRESSURE: 74 MMHG | OXYGEN SATURATION: 99 % | HEART RATE: 87 BPM | SYSTOLIC BLOOD PRESSURE: 128 MMHG

## 2022-01-01 VITALS
OXYGEN SATURATION: 99 % | TEMPERATURE: 98 F | HEART RATE: 24 BPM | WEIGHT: 176.37 LBS | SYSTOLIC BLOOD PRESSURE: 95 MMHG | HEIGHT: 64 IN | BODY MASS INDEX: 30.11 KG/M2 | DIASTOLIC BLOOD PRESSURE: 60 MMHG | RESPIRATION RATE: 6 BRPM

## 2022-01-01 VITALS
OXYGEN SATURATION: 95 % | RESPIRATION RATE: 22 BRPM | BODY MASS INDEX: 25.61 KG/M2 | HEART RATE: 85 BPM | HEIGHT: 64 IN | TEMPERATURE: 98.1 F | SYSTOLIC BLOOD PRESSURE: 120 MMHG | DIASTOLIC BLOOD PRESSURE: 61 MMHG | WEIGHT: 150 LBS

## 2022-01-01 VITALS
SYSTOLIC BLOOD PRESSURE: 135 MMHG | DIASTOLIC BLOOD PRESSURE: 80 MMHG | HEART RATE: 92 BPM | RESPIRATION RATE: 13 BRPM | TEMPERATURE: 97.2 F | OXYGEN SATURATION: 98 %

## 2022-01-01 VITALS
RESPIRATION RATE: 14 BRPM | BODY MASS INDEX: 25.58 KG/M2 | DIASTOLIC BLOOD PRESSURE: 78 MMHG | SYSTOLIC BLOOD PRESSURE: 116 MMHG | WEIGHT: 149 LBS | OXYGEN SATURATION: 96 % | TEMPERATURE: 98.1 F | HEART RATE: 89 BPM

## 2022-01-01 VITALS
HEART RATE: 78 BPM | DIASTOLIC BLOOD PRESSURE: 77 MMHG | TEMPERATURE: 97.9 F | RESPIRATION RATE: 23 BRPM | OXYGEN SATURATION: 100 % | SYSTOLIC BLOOD PRESSURE: 118 MMHG

## 2022-01-01 VITALS
DIASTOLIC BLOOD PRESSURE: 66 MMHG | WEIGHT: 146 LBS | HEART RATE: 84 BPM | OXYGEN SATURATION: 100 % | SYSTOLIC BLOOD PRESSURE: 102 MMHG | BODY MASS INDEX: 25.06 KG/M2 | TEMPERATURE: 97.2 F | RESPIRATION RATE: 12 BRPM

## 2022-01-01 VITALS
OXYGEN SATURATION: 97 % | HEART RATE: 88 BPM | SYSTOLIC BLOOD PRESSURE: 109 MMHG | RESPIRATION RATE: 16 BRPM | DIASTOLIC BLOOD PRESSURE: 74 MMHG

## 2022-01-01 VITALS
DIASTOLIC BLOOD PRESSURE: 59 MMHG | HEART RATE: 98 BPM | SYSTOLIC BLOOD PRESSURE: 108 MMHG | TEMPERATURE: 99.9 F | RESPIRATION RATE: 11 BRPM | OXYGEN SATURATION: 99 % | WEIGHT: 145 LBS | BODY MASS INDEX: 24.89 KG/M2

## 2022-01-01 VITALS
TEMPERATURE: 98.1 F | RESPIRATION RATE: 16 BRPM | DIASTOLIC BLOOD PRESSURE: 67 MMHG | SYSTOLIC BLOOD PRESSURE: 106 MMHG | OXYGEN SATURATION: 96 % | HEART RATE: 89 BPM

## 2022-01-01 VITALS
SYSTOLIC BLOOD PRESSURE: 114 MMHG | OXYGEN SATURATION: 99 % | DIASTOLIC BLOOD PRESSURE: 72 MMHG | RESPIRATION RATE: 16 BRPM | HEART RATE: 88 BPM

## 2022-01-01 DIAGNOSIS — R74.01 TRANSAMINITIS: ICD-10-CM

## 2022-01-01 DIAGNOSIS — Z98.890 HISTORY OF ABDOMINAL PARACENTESIS: ICD-10-CM

## 2022-01-01 DIAGNOSIS — I95.89 OTHER SPECIFIED HYPOTENSION: Primary | ICD-10-CM

## 2022-01-01 DIAGNOSIS — R05.9 COUGH: Primary | ICD-10-CM

## 2022-01-01 DIAGNOSIS — K72.00 ACUTE LIVER FAILURE WITHOUT HEPATIC COMA: Primary | ICD-10-CM

## 2022-01-01 DIAGNOSIS — T79.6XXA TRAUMATIC RHABDOMYOLYSIS, INITIAL ENCOUNTER (HCC): Primary | ICD-10-CM

## 2022-01-01 DIAGNOSIS — E80.6 HYPERBILIRUBINEMIA: ICD-10-CM

## 2022-01-01 DIAGNOSIS — M79.89 ARM SWELLING: Primary | ICD-10-CM

## 2022-01-01 DIAGNOSIS — F10.20 THROMBOCYTOPENIA CONCURRENT WITH AND DUE TO ALCOHOLISM (HCC): ICD-10-CM

## 2022-01-01 DIAGNOSIS — E87.6 HYPOKALEMIA: ICD-10-CM

## 2022-01-01 DIAGNOSIS — M79.602 LEFT ARM PAIN: Primary | ICD-10-CM

## 2022-01-01 DIAGNOSIS — F10.920 ACUTE ALCOHOLIC INTOXICATION WITHOUT COMPLICATION (HCC): ICD-10-CM

## 2022-01-01 DIAGNOSIS — F10.20 ALCOHOL USE DISORDER, SEVERE, DEPENDENCE (HCC): ICD-10-CM

## 2022-01-01 DIAGNOSIS — R17 ELEVATED BILIRUBIN: ICD-10-CM

## 2022-01-01 DIAGNOSIS — A41.9 SEPTICEMIA (HCC): ICD-10-CM

## 2022-01-01 DIAGNOSIS — K70.31 ALCOHOLIC CIRRHOSIS OF LIVER WITH ASCITES (HCC): Primary | ICD-10-CM

## 2022-01-01 DIAGNOSIS — D64.9 ANEMIA, UNSPECIFIED TYPE: ICD-10-CM

## 2022-01-01 DIAGNOSIS — R10.84 GENERALIZED ABDOMINAL PAIN: Primary | ICD-10-CM

## 2022-01-01 DIAGNOSIS — I85.00 ESOPHAGEAL VARICES WITHOUT BLEEDING, UNSPECIFIED ESOPHAGEAL VARICES TYPE (HCC): ICD-10-CM

## 2022-01-01 DIAGNOSIS — D69.6 THROMBOCYTOPENIA (HCC): ICD-10-CM

## 2022-01-01 DIAGNOSIS — K04.7 DENTAL INFECTION: ICD-10-CM

## 2022-01-01 DIAGNOSIS — K70.31 ASCITES DUE TO ALCOHOLIC CIRRHOSIS (HCC): Primary | ICD-10-CM

## 2022-01-01 DIAGNOSIS — D69.59 THROMBOCYTOPENIA CONCURRENT WITH AND DUE TO ALCOHOLISM (HCC): ICD-10-CM

## 2022-01-01 DIAGNOSIS — K70.30 ALCOHOLIC CIRRHOSIS, UNSPECIFIED WHETHER ASCITES PRESENT (HCC): ICD-10-CM

## 2022-01-01 DIAGNOSIS — R77.8 ELEVATED TROPONIN: ICD-10-CM

## 2022-01-01 DIAGNOSIS — R18.8 OTHER ASCITES: ICD-10-CM

## 2022-01-01 DIAGNOSIS — R14.0 ABDOMINAL DISTENTION: Primary | ICD-10-CM

## 2022-01-01 DIAGNOSIS — E83.42 HYPOMAGNESEMIA: ICD-10-CM

## 2022-01-01 DIAGNOSIS — I86.8 SPLENIC VARICES: ICD-10-CM

## 2022-01-01 DIAGNOSIS — K70.31 ALCOHOLIC CIRRHOSIS OF LIVER WITH ASCITES (HCC): ICD-10-CM

## 2022-01-01 DIAGNOSIS — R04.0 EPISTAXIS: Primary | ICD-10-CM

## 2022-01-01 DIAGNOSIS — K70.31 ASCITES DUE TO ALCOHOLIC CIRRHOSIS (HCC): ICD-10-CM

## 2022-01-01 LAB
-: ABNORMAL
ABO/RH: NORMAL
ABO/RH: NORMAL
ABSOLUTE EOS #: 0 K/UL (ref 0–0.4)
ABSOLUTE EOS #: 0 K/UL (ref 0–0.4)
ABSOLUTE EOS #: 0.06 K/UL (ref 0–0.44)
ABSOLUTE EOS #: 0.11 K/UL (ref 0–0.44)
ABSOLUTE EOS #: 0.13 K/UL (ref 0–0.44)
ABSOLUTE EOS #: 0.13 K/UL (ref 0–0.44)
ABSOLUTE EOS #: 0.14 K/UL (ref 0–0.44)
ABSOLUTE EOS #: 0.15 K/UL (ref 0–0.44)
ABSOLUTE EOS #: 0.21 K/UL (ref 0–0.4)
ABSOLUTE EOS #: 0.33 K/UL (ref 0–0.44)
ABSOLUTE EOS #: 0.36 K/UL (ref 0–0.4)
ABSOLUTE EOS #: 0.56 K/UL (ref 0–0.4)
ABSOLUTE EOS #: 1.05 K/UL (ref 0–0.4)
ABSOLUTE EOS #: <0.03 K/UL (ref 0–0.44)
ABSOLUTE IMMATURE GRANULOCYTE: 0 K/UL (ref 0–0.3)
ABSOLUTE IMMATURE GRANULOCYTE: 0.03 K/UL (ref 0–0.3)
ABSOLUTE IMMATURE GRANULOCYTE: 0.04 K/UL (ref 0–0.3)
ABSOLUTE IMMATURE GRANULOCYTE: 0.05 K/UL (ref 0–0.3)
ABSOLUTE IMMATURE GRANULOCYTE: 0.06 K/UL (ref 0–0.3)
ABSOLUTE IMMATURE GRANULOCYTE: 0.11 K/UL (ref 0–0.3)
ABSOLUTE IMMATURE GRANULOCYTE: 0.16 K/UL (ref 0–0.3)
ABSOLUTE IMMATURE GRANULOCYTE: 0.82 K/UL (ref 0–0.3)
ABSOLUTE IMMATURE GRANULOCYTE: 3.13 K/UL (ref 0–0.3)
ABSOLUTE IMMATURE GRANULOCYTE: 6.96 K/UL (ref 0–0.3)
ABSOLUTE IMMATURE GRANULOCYTE: <0.03 K/UL (ref 0–0.3)
ABSOLUTE LYMPH #: 1.13 K/UL (ref 1.1–3.7)
ABSOLUTE LYMPH #: 1.13 K/UL (ref 1–4.8)
ABSOLUTE LYMPH #: 1.25 K/UL (ref 1.1–3.7)
ABSOLUTE LYMPH #: 1.47 K/UL (ref 1.1–3.7)
ABSOLUTE LYMPH #: 1.61 K/UL (ref 1–4.8)
ABSOLUTE LYMPH #: 1.82 K/UL (ref 1.1–3.7)
ABSOLUTE LYMPH #: 1.83 K/UL (ref 1.1–3.7)
ABSOLUTE LYMPH #: 1.84 K/UL (ref 1–4.8)
ABSOLUTE LYMPH #: 2.38 K/UL (ref 1.1–3.7)
ABSOLUTE LYMPH #: 2.47 K/UL (ref 1.1–3.7)
ABSOLUTE LYMPH #: 2.64 K/UL (ref 1–4.8)
ABSOLUTE LYMPH #: 2.8 K/UL (ref 1.1–3.7)
ABSOLUTE LYMPH #: 3.13 K/UL (ref 1–4.8)
ABSOLUTE LYMPH #: 3.28 K/UL (ref 1–4.8)
ABSOLUTE MONO #: 0.44 K/UL (ref 0.1–1.2)
ABSOLUTE MONO #: 0.46 K/UL (ref 0.1–1.2)
ABSOLUTE MONO #: 0.54 K/UL (ref 0.1–0.8)
ABSOLUTE MONO #: 0.62 K/UL (ref 0.1–1.2)
ABSOLUTE MONO #: 0.62 K/UL (ref 0.1–1.2)
ABSOLUTE MONO #: 0.79 K/UL (ref 0.1–0.8)
ABSOLUTE MONO #: 0.82 K/UL (ref 0.1–1.2)
ABSOLUTE MONO #: 0.85 K/UL (ref 0.1–0.8)
ABSOLUTE MONO #: 0.86 K/UL (ref 0.1–1.2)
ABSOLUTE MONO #: 0.89 K/UL (ref 0.1–1.2)
ABSOLUTE MONO #: 1.08 K/UL (ref 0.1–0.8)
ABSOLUTE MONO #: 1.13 K/UL (ref 0.1–1.2)
ABSOLUTE MONO #: 1.39 K/UL (ref 0.1–0.8)
ABSOLUTE MONO #: 1.85 K/UL (ref 0.1–0.8)
ACTION: NORMAL
ALBUMIN FLUID: 0.4 G/DL
ALBUMIN FLUID: 0.5 G/DL
ALBUMIN SERPL-MCNC: 1.4 G/DL (ref 3.5–5.2)
ALBUMIN SERPL-MCNC: 1.5 G/DL (ref 3.5–5.2)
ALBUMIN SERPL-MCNC: 1.7 G/DL (ref 3.5–5.2)
ALBUMIN SERPL-MCNC: 1.7 G/DL (ref 3.5–5.2)
ALBUMIN SERPL-MCNC: 1.8 G/DL (ref 3.5–5.2)
ALBUMIN SERPL-MCNC: 2.2 G/DL (ref 3.5–5.2)
ALBUMIN SERPL-MCNC: 2.3 G/DL (ref 3.5–5.2)
ALBUMIN SERPL-MCNC: 2.3 G/DL (ref 3.5–5.2)
ALBUMIN SERPL-MCNC: 3 G/DL (ref 3.5–5.2)
ALBUMIN/GLOBULIN RATIO: 0.2 (ref 1–2.5)
ALBUMIN/GLOBULIN RATIO: 0.3 (ref 1–2.5)
ALBUMIN/GLOBULIN RATIO: 0.5 (ref 1–2.5)
ALBUMIN/GLOBULIN RATIO: 0.6 (ref 1–2.5)
ALLEN TEST: ABNORMAL
ALP BLD-CCNC: 105 U/L (ref 35–104)
ALP BLD-CCNC: 115 U/L (ref 35–104)
ALP BLD-CCNC: 119 U/L (ref 35–104)
ALP BLD-CCNC: 120 U/L (ref 35–104)
ALP BLD-CCNC: 135 U/L (ref 35–104)
ALP BLD-CCNC: 142 U/L (ref 35–104)
ALP BLD-CCNC: 156 U/L (ref 35–104)
ALP BLD-CCNC: 40 U/L (ref 35–104)
ALP BLD-CCNC: 56 U/L (ref 35–104)
ALP BLD-CCNC: 58 U/L (ref 35–104)
ALP BLD-CCNC: 97 U/L (ref 35–104)
ALT SERPL-CCNC: 18 U/L (ref 5–33)
ALT SERPL-CCNC: 24 U/L (ref 5–33)
ALT SERPL-CCNC: 25 U/L (ref 5–33)
ALT SERPL-CCNC: 32 U/L (ref 5–33)
ALT SERPL-CCNC: 32 U/L (ref 5–33)
ALT SERPL-CCNC: 39 U/L (ref 5–33)
ALT SERPL-CCNC: 41 U/L (ref 5–33)
ALT SERPL-CCNC: 42 U/L (ref 5–33)
ALT SERPL-CCNC: 42 U/L (ref 5–33)
ALT SERPL-CCNC: 43 U/L (ref 5–33)
ALT SERPL-CCNC: 50 U/L (ref 5–33)
AMMONIA: 57 UMOL/L (ref 11–51)
AMMONIA: 66 UMOL/L (ref 11–51)
AMMONIA: 67 UMOL/L (ref 11–51)
AMMONIA: 68 UMOL/L (ref 11–51)
AMMONIA: 71 UMOL/L (ref 11–51)
AMMONIA: 78 UMOL/L (ref 11–51)
AMORPHOUS: ABNORMAL
AMYLASE FLUID: 29 U/L
AMYLASE FLUID: 46 U/L
ANION GAP SERPL CALCULATED.3IONS-SCNC: 10 MMOL/L (ref 9–17)
ANION GAP SERPL CALCULATED.3IONS-SCNC: 11 MMOL/L (ref 9–17)
ANION GAP SERPL CALCULATED.3IONS-SCNC: 12 MMOL/L (ref 9–17)
ANION GAP SERPL CALCULATED.3IONS-SCNC: 12 MMOL/L (ref 9–17)
ANION GAP SERPL CALCULATED.3IONS-SCNC: 13 MMOL/L (ref 9–17)
ANION GAP SERPL CALCULATED.3IONS-SCNC: 15 MMOL/L (ref 9–17)
ANION GAP SERPL CALCULATED.3IONS-SCNC: 22 MMOL/L (ref 9–17)
ANION GAP SERPL CALCULATED.3IONS-SCNC: 4 MMOL/L (ref 9–17)
ANION GAP SERPL CALCULATED.3IONS-SCNC: 6 MMOL/L (ref 9–17)
ANION GAP SERPL CALCULATED.3IONS-SCNC: 7 MMOL/L (ref 9–17)
ANION GAP SERPL CALCULATED.3IONS-SCNC: 8 MMOL/L (ref 9–17)
ANION GAP SERPL CALCULATED.3IONS-SCNC: 8 MMOL/L (ref 9–17)
ANION GAP SERPL CALCULATED.3IONS-SCNC: 9 MMOL/L (ref 9–17)
ANTI DNA DOUBLE STRANDED: 5.2 IU/ML
ANTI-NUCLEAR ANTIBODY (ANA): ABNORMAL
ANTIBODY SCREEN: NEGATIVE
ANTIBODY SCREEN: NEGATIVE
ARM BAND NUMBER: NORMAL
ARM BAND NUMBER: NORMAL
AST SERPL-CCNC: 117 U/L
AST SERPL-CCNC: 156 U/L
AST SERPL-CCNC: 198 U/L
AST SERPL-CCNC: 203 U/L
AST SERPL-CCNC: 217 U/L
AST SERPL-CCNC: 225 U/L
AST SERPL-CCNC: 226 U/L
AST SERPL-CCNC: 243 U/L
AST SERPL-CCNC: 53 U/L
AST SERPL-CCNC: 58 U/L
AST SERPL-CCNC: 68 U/L
BACTERIA: ABNORMAL
BASOPHILS # BLD: 0 % (ref 0–2)
BASOPHILS # BLD: 1 % (ref 0–2)
BASOPHILS ABSOLUTE: 0 K/UL (ref 0–0.2)
BASOPHILS ABSOLUTE: 0.04 K/UL (ref 0–0.2)
BASOPHILS ABSOLUTE: 0.05 K/UL (ref 0–0.2)
BASOPHILS ABSOLUTE: 0.05 K/UL (ref 0–0.2)
BASOPHILS ABSOLUTE: 0.06 K/UL (ref 0–0.2)
BASOPHILS ABSOLUTE: 0.12 K/UL (ref 0–0.2)
BILIRUB SERPL-MCNC: 1.38 MG/DL (ref 0.3–1.2)
BILIRUB SERPL-MCNC: 11.22 MG/DL (ref 0.3–1.2)
BILIRUB SERPL-MCNC: 13.45 MG/DL (ref 0.3–1.2)
BILIRUB SERPL-MCNC: 18.46 MG/DL (ref 0.3–1.2)
BILIRUB SERPL-MCNC: 18.59 MG/DL (ref 0.3–1.2)
BILIRUB SERPL-MCNC: 2.28 MG/DL (ref 0.3–1.2)
BILIRUB SERPL-MCNC: 2.52 MG/DL (ref 0.3–1.2)
BILIRUB SERPL-MCNC: 2.59 MG/DL (ref 0.3–1.2)
BILIRUB SERPL-MCNC: 3.15 MG/DL (ref 0.3–1.2)
BILIRUB SERPL-MCNC: 6.04 MG/DL (ref 0.3–1.2)
BILIRUB SERPL-MCNC: 6.16 MG/DL (ref 0.3–1.2)
BILIRUBIN DIRECT: 0.68 MG/DL
BILIRUBIN DIRECT: 1.25 MG/DL
BILIRUBIN DIRECT: 1.28 MG/DL
BILIRUBIN DIRECT: 1.43 MG/DL
BILIRUBIN DIRECT: 10.35 MG/DL
BILIRUBIN DIRECT: 13.23 MG/DL
BILIRUBIN DIRECT: 2.17 MG/DL
BILIRUBIN URINE: ABNORMAL
BILIRUBIN URINE: ABNORMAL
BILIRUBIN URINE: NEGATIVE
BILIRUBIN, INDIRECT: 0.7 MG/DL (ref 0–1)
BILIRUBIN, INDIRECT: 1.27 MG/DL (ref 0–1)
BILIRUBIN, INDIRECT: 1.31 MG/DL (ref 0–1)
BILIRUBIN, INDIRECT: 3.87 MG/DL (ref 0–1)
BILIRUBIN, INDIRECT: 5.36 MG/DL (ref 0–1)
BILIRUBIN, INDIRECT: 8.11 MG/DL (ref 0–1)
BLD PROD TYP BPU: NORMAL
BLOOD BANK BLOOD PRODUCT EXPIRATION DATE: NORMAL
BLOOD BANK ISBT PRODUCT BLOOD TYPE: 5100
BLOOD BANK ISBT PRODUCT BLOOD TYPE: 600
BLOOD BANK PRODUCT CODE: NORMAL
BLOOD BANK UNIT TYPE AND RH: NORMAL
BPU ID: NORMAL
BUN BLDV-MCNC: 19 MG/DL (ref 6–20)
BUN BLDV-MCNC: 25 MG/DL (ref 6–20)
BUN BLDV-MCNC: 26 MG/DL (ref 6–20)
BUN BLDV-MCNC: 3 MG/DL (ref 6–20)
BUN BLDV-MCNC: 34 MG/DL (ref 6–20)
BUN BLDV-MCNC: 37 MG/DL (ref 6–20)
BUN BLDV-MCNC: 4 MG/DL (ref 6–20)
BUN BLDV-MCNC: 4 MG/DL (ref 6–20)
BUN BLDV-MCNC: 44 MG/DL (ref 6–20)
BUN BLDV-MCNC: 47 MG/DL (ref 6–20)
BUN BLDV-MCNC: 5 MG/DL (ref 6–20)
BUN BLDV-MCNC: 6 MG/DL (ref 6–20)
BUN BLDV-MCNC: 7 MG/DL (ref 6–20)
BUN BLDV-MCNC: 9 MG/DL (ref 6–20)
C-REACTIVE PROTEIN: 11.2 MG/L (ref 0–5)
C-REACTIVE PROTEIN: 16.3 MG/L (ref 0–5)
CALCIUM IONIZED: 1.08 MMOL/L (ref 1.13–1.33)
CALCIUM SERPL-MCNC: 6.9 MG/DL (ref 8.6–10.4)
CALCIUM SERPL-MCNC: 7.3 MG/DL (ref 8.6–10.4)
CALCIUM SERPL-MCNC: 7.4 MG/DL (ref 8.6–10.4)
CALCIUM SERPL-MCNC: 7.4 MG/DL (ref 8.6–10.4)
CALCIUM SERPL-MCNC: 7.5 MG/DL (ref 8.6–10.4)
CALCIUM SERPL-MCNC: 7.6 MG/DL (ref 8.6–10.4)
CALCIUM SERPL-MCNC: 7.7 MG/DL (ref 8.6–10.4)
CALCIUM SERPL-MCNC: 7.7 MG/DL (ref 8.6–10.4)
CALCIUM SERPL-MCNC: 7.8 MG/DL (ref 8.6–10.4)
CALCIUM SERPL-MCNC: 7.8 MG/DL (ref 8.6–10.4)
CALCIUM SERPL-MCNC: 7.9 MG/DL (ref 8.6–10.4)
CALCIUM SERPL-MCNC: 8 MG/DL (ref 8.6–10.4)
CALCIUM SERPL-MCNC: 8.1 MG/DL (ref 8.6–10.4)
CALCIUM SERPL-MCNC: 8.4 MG/DL (ref 8.6–10.4)
CALCIUM SERPL-MCNC: 8.5 MG/DL (ref 8.6–10.4)
CASE NUMBER:: NORMAL
CASTS UA: ABNORMAL /LPF (ref 0–2)
CASTS UA: ABNORMAL /LPF (ref 0–2)
CASTS UA: ABNORMAL /LPF (ref 0–8)
CHLORIDE BLD-SCNC: 101 MMOL/L (ref 98–107)
CHLORIDE BLD-SCNC: 103 MMOL/L (ref 98–107)
CHLORIDE BLD-SCNC: 103 MMOL/L (ref 98–107)
CHLORIDE BLD-SCNC: 104 MMOL/L (ref 98–107)
CHLORIDE BLD-SCNC: 105 MMOL/L (ref 98–107)
CHLORIDE BLD-SCNC: 105 MMOL/L (ref 98–107)
CHLORIDE BLD-SCNC: 95 MMOL/L (ref 98–107)
CHLORIDE BLD-SCNC: 95 MMOL/L (ref 98–107)
CHLORIDE BLD-SCNC: 96 MMOL/L (ref 98–107)
CHLORIDE BLD-SCNC: 97 MMOL/L (ref 98–107)
CHLORIDE BLD-SCNC: 97 MMOL/L (ref 98–107)
CHLORIDE BLD-SCNC: 98 MMOL/L (ref 98–107)
CHLORIDE BLD-SCNC: 99 MMOL/L (ref 98–107)
CHLORIDE BLD-SCNC: 99 MMOL/L (ref 98–107)
CO2: 15 MMOL/L (ref 20–31)
CO2: 19 MMOL/L (ref 20–31)
CO2: 20 MMOL/L (ref 20–31)
CO2: 22 MMOL/L (ref 20–31)
CO2: 22 MMOL/L (ref 20–31)
CO2: 23 MMOL/L (ref 20–31)
CO2: 23 MMOL/L (ref 20–31)
CO2: 24 MMOL/L (ref 20–31)
CO2: 25 MMOL/L (ref 20–31)
CO2: 25 MMOL/L (ref 20–31)
CO2: 26 MMOL/L (ref 20–31)
CO2: 27 MMOL/L (ref 20–31)
CO2: 27 MMOL/L (ref 20–31)
CO2: 29 MMOL/L (ref 20–31)
COLOR: ABNORMAL
COLOR: ABNORMAL
COLOR: YELLOW
COMPLEMENT C3: 38 MG/DL (ref 90–180)
COMPLEMENT C4: 10 MG/DL (ref 10–40)
CREAT SERPL-MCNC: 0.25 MG/DL (ref 0.5–0.9)
CREAT SERPL-MCNC: 0.31 MG/DL (ref 0.5–0.9)
CREAT SERPL-MCNC: 0.33 MG/DL (ref 0.5–0.9)
CREAT SERPL-MCNC: 0.34 MG/DL (ref 0.5–0.9)
CREAT SERPL-MCNC: 0.34 MG/DL (ref 0.5–0.9)
CREAT SERPL-MCNC: 0.36 MG/DL (ref 0.5–0.9)
CREAT SERPL-MCNC: 0.38 MG/DL (ref 0.5–0.9)
CREAT SERPL-MCNC: 0.39 MG/DL (ref 0.5–0.9)
CREAT SERPL-MCNC: 0.46 MG/DL (ref 0.5–0.9)
CREAT SERPL-MCNC: 0.48 MG/DL (ref 0.5–0.9)
CREAT SERPL-MCNC: 1.61 MG/DL (ref 0.5–0.9)
CREAT SERPL-MCNC: 2.79 MG/DL (ref 0.5–0.9)
CREAT SERPL-MCNC: 3.18 MG/DL (ref 0.5–0.9)
CREAT SERPL-MCNC: 3.31 MG/DL (ref 0.5–0.9)
CREAT SERPL-MCNC: 3.73 MG/DL (ref 0.5–0.9)
CREAT SERPL-MCNC: 3.88 MG/DL (ref 0.5–0.9)
CREAT SERPL-MCNC: 5.07 MG/DL (ref 0.5–0.9)
CREATININE URINE: 239.8 MG/DL (ref 28–217)
CROSSMATCH RESULT: NORMAL
CROSSMATCH RESULT: NORMAL
CULTURE: ABNORMAL
CULTURE: NORMAL
CULTURE: NORMAL
D-DIMER QUANTITATIVE: 27.21 MG/L FEU
DATE AND TIME: NORMAL
DIRECT EXAM: ABNORMAL
DISPENSE STATUS BLOOD BANK: NORMAL
EKG ATRIAL RATE: 81 BPM
EKG ATRIAL RATE: 91 BPM
EKG ATRIAL RATE: 96 BPM
EKG P AXIS: 1 DEGREES
EKG P AXIS: 29 DEGREES
EKG P AXIS: 53 DEGREES
EKG P-R INTERVAL: 134 MS
EKG P-R INTERVAL: 164 MS
EKG P-R INTERVAL: 176 MS
EKG Q-T INTERVAL: 358 MS
EKG Q-T INTERVAL: 376 MS
EKG Q-T INTERVAL: 406 MS
EKG QRS DURATION: 78 MS
EKG QRS DURATION: 88 MS
EKG QRS DURATION: 88 MS
EKG QTC CALCULATION (BAZETT): 452 MS
EKG QTC CALCULATION (BAZETT): 462 MS
EKG QTC CALCULATION (BAZETT): 471 MS
EKG R AXIS: -10 DEGREES
EKG R AXIS: 2 DEGREES
EKG T AXIS: 32 DEGREES
EKG T AXIS: 46 DEGREES
EKG T AXIS: 46 DEGREES
EKG VENTRICULAR RATE: 81 BPM
EKG VENTRICULAR RATE: 91 BPM
EKG VENTRICULAR RATE: 96 BPM
ENA ANTIBODIES SCREEN: 0.7 U/ML
EOSINOPHIL,URINE: NORMAL
EOSINOPHILS RELATIVE PERCENT: 0 % (ref 1–4)
EOSINOPHILS RELATIVE PERCENT: 1 % (ref 1–4)
EOSINOPHILS RELATIVE PERCENT: 2 % (ref 1–4)
EOSINOPHILS RELATIVE PERCENT: 3 % (ref 1–4)
EOSINOPHILS RELATIVE PERCENT: 3 % (ref 1–4)
EOSINOPHILS RELATIVE PERCENT: 4 % (ref 1–4)
EPITHELIAL CELLS UA: ABNORMAL /HPF (ref 0–5)
ETHANOL PERCENT: 0.22 %
ETHANOL PERCENT: 0.24 %
ETHANOL PERCENT: 0.4 %
ETHANOL PERCENT: <0.01 %
ETHANOL: 224 MG/DL
ETHANOL: 242 MG/DL
ETHANOL: 400 MG/DL
ETHANOL: <10 MG/DL
EXPIRATION DATE: NORMAL
EXPIRATION DATE: NORMAL
FIBRINOGEN, FUNCTIONAL TEG: 16.9 MM (ref 15–32)
FIO2: 100
FOLATE: 19.8 NG/ML
GFR AFRICAN AMERICAN: 11 ML/MIN
GFR AFRICAN AMERICAN: 16 ML/MIN
GFR AFRICAN AMERICAN: 16 ML/MIN
GFR AFRICAN AMERICAN: 19 ML/MIN
GFR AFRICAN AMERICAN: 20 ML/MIN
GFR AFRICAN AMERICAN: 23 ML/MIN
GFR AFRICAN AMERICAN: 43 ML/MIN
GFR AFRICAN AMERICAN: >60 ML/MIN
GFR NON-AFRICAN AMERICAN: 13 ML/MIN
GFR NON-AFRICAN AMERICAN: 13 ML/MIN
GFR NON-AFRICAN AMERICAN: 15 ML/MIN
GFR NON-AFRICAN AMERICAN: 16 ML/MIN
GFR NON-AFRICAN AMERICAN: 19 ML/MIN
GFR NON-AFRICAN AMERICAN: 35 ML/MIN
GFR NON-AFRICAN AMERICAN: 9 ML/MIN
GFR NON-AFRICAN AMERICAN: >60 ML/MIN
GFR SERPL CREATININE-BSD FRML MDRD: ABNORMAL ML/MIN/{1.73_M2}
GLUCOSE BLD-MCNC: 101 MG/DL (ref 70–99)
GLUCOSE BLD-MCNC: 103 MG/DL (ref 70–99)
GLUCOSE BLD-MCNC: 104 MG/DL (ref 65–105)
GLUCOSE BLD-MCNC: 106 MG/DL (ref 70–99)
GLUCOSE BLD-MCNC: 109 MG/DL (ref 65–105)
GLUCOSE BLD-MCNC: 112 MG/DL (ref 70–99)
GLUCOSE BLD-MCNC: 112 MG/DL (ref 70–99)
GLUCOSE BLD-MCNC: 114 MG/DL (ref 65–105)
GLUCOSE BLD-MCNC: 128 MG/DL (ref 65–105)
GLUCOSE BLD-MCNC: 131 MG/DL (ref 65–105)
GLUCOSE BLD-MCNC: 132 MG/DL (ref 65–105)
GLUCOSE BLD-MCNC: 132 MG/DL (ref 70–99)
GLUCOSE BLD-MCNC: 197 MG/DL (ref 65–105)
GLUCOSE BLD-MCNC: 53 MG/DL (ref 65–105)
GLUCOSE BLD-MCNC: 56 MG/DL (ref 65–105)
GLUCOSE BLD-MCNC: 59 MG/DL (ref 65–105)
GLUCOSE BLD-MCNC: 59 MG/DL (ref 70–99)
GLUCOSE BLD-MCNC: 64 MG/DL (ref 70–99)
GLUCOSE BLD-MCNC: 69 MG/DL (ref 65–105)
GLUCOSE BLD-MCNC: 69 MG/DL (ref 65–105)
GLUCOSE BLD-MCNC: 78 MG/DL (ref 65–105)
GLUCOSE BLD-MCNC: 79 MG/DL (ref 70–99)
GLUCOSE BLD-MCNC: 80 MG/DL (ref 70–99)
GLUCOSE BLD-MCNC: 80 MG/DL (ref 70–99)
GLUCOSE BLD-MCNC: 80 MG/DL (ref 74–100)
GLUCOSE BLD-MCNC: 83 MG/DL (ref 70–99)
GLUCOSE BLD-MCNC: 85 MG/DL (ref 65–105)
GLUCOSE BLD-MCNC: 85 MG/DL (ref 70–99)
GLUCOSE BLD-MCNC: 86 MG/DL (ref 70–99)
GLUCOSE BLD-MCNC: 92 MG/DL (ref 70–99)
GLUCOSE BLD-MCNC: 93 MG/DL (ref 65–105)
GLUCOSE BLD-MCNC: 95 MG/DL (ref 65–105)
GLUCOSE BLD-MCNC: 98 MG/DL (ref 70–99)
GLUCOSE BLD-MCNC: 99 MG/DL (ref 70–99)
GLUCOSE URINE: NEGATIVE
GLUCOSE, FLUID: 82 MG/DL
HBV SURFACE AB TITR SER: 42.54 MIU/ML
HCG QUALITATIVE: NEGATIVE
HCT VFR BLD CALC: 17.2 % (ref 36.3–47.1)
HCT VFR BLD CALC: 18.7 % (ref 36.3–47.1)
HCT VFR BLD CALC: 21 % (ref 36.3–47.1)
HCT VFR BLD CALC: 21.2 % (ref 36.3–47.1)
HCT VFR BLD CALC: 21.8 % (ref 36.3–47.1)
HCT VFR BLD CALC: 21.8 % (ref 36.3–47.1)
HCT VFR BLD CALC: 23.7 % (ref 36.3–47.1)
HCT VFR BLD CALC: 23.8 % (ref 36.3–47.1)
HCT VFR BLD CALC: 24.2 % (ref 36.3–47.1)
HCT VFR BLD CALC: 24.6 % (ref 36.3–47.1)
HCT VFR BLD CALC: 25.1 % (ref 36.3–47.1)
HCT VFR BLD CALC: 25.2 % (ref 36.3–47.1)
HCT VFR BLD CALC: 26.5 % (ref 36.3–47.1)
HCT VFR BLD CALC: 27.4 % (ref 36.3–47.1)
HCT VFR BLD CALC: 28.2 % (ref 36.3–47.1)
HCT VFR BLD CALC: 30.8 % (ref 36.3–47.1)
HCT VFR BLD CALC: 31.1 % (ref 36.3–47.1)
HCT VFR BLD CALC: 31.4 % (ref 36.3–47.1)
HCT VFR BLD CALC: 35.1 % (ref 36.3–47.1)
HCT VFR BLD CALC: 8.4 % (ref 36.3–47.1)
HEMOGLOBIN: 10.3 G/DL (ref 11.9–15.1)
HEMOGLOBIN: 10.4 G/DL (ref 11.9–15.1)
HEMOGLOBIN: 10.7 G/DL (ref 11.9–15.1)
HEMOGLOBIN: 11.7 G/DL (ref 11.9–15.1)
HEMOGLOBIN: 2.7 G/DL (ref 11.9–15.1)
HEMOGLOBIN: 5.7 G/DL (ref 11.9–15.1)
HEMOGLOBIN: 6.3 G/DL (ref 11.9–15.1)
HEMOGLOBIN: 6.9 G/DL (ref 11.9–15.1)
HEMOGLOBIN: 7.2 G/DL (ref 11.9–15.1)
HEMOGLOBIN: 7.2 G/DL (ref 11.9–15.1)
HEMOGLOBIN: 7.3 G/DL (ref 11.9–15.1)
HEMOGLOBIN: 7.7 G/DL (ref 11.9–15.1)
HEMOGLOBIN: 7.9 G/DL (ref 11.9–15.1)
HEMOGLOBIN: 7.9 G/DL (ref 11.9–15.1)
HEMOGLOBIN: 8.1 G/DL (ref 11.9–15.1)
HEMOGLOBIN: 8.2 G/DL (ref 11.9–15.1)
HEMOGLOBIN: 8.2 G/DL (ref 11.9–15.1)
HEMOGLOBIN: 8.6 G/DL (ref 11.9–15.1)
HEMOGLOBIN: 8.7 G/DL (ref 11.9–15.1)
HEMOGLOBIN: 9.8 G/DL (ref 11.9–15.1)
HEPATITIS B CORE TOTAL ANTIBODY: NONREACTIVE
HEPATITIS B SURFACE ANTIGEN: NONREACTIVE
HEPATITIS C ANTIBODY: NONREACTIVE
IMMATURE GRANULOCYTES: 0 %
IMMATURE GRANULOCYTES: 1 %
IMMATURE GRANULOCYTES: 13 %
IMMATURE GRANULOCYTES: 4 %
IMMATURE GRANULOCYTES: 9 %
INR BLD: 1.3
INR BLD: 1.3
INR BLD: 1.7
INR BLD: 1.7
INR BLD: 1.8
INR BLD: 1.8
INR BLD: 1.9
INR BLD: 2.2
INR BLD: 2.4
INR BLD: 2.4
KETONES, URINE: NEGATIVE
LACTATE DEHYDROGENASE, FLUID: 173 U/L
LACTIC ACID, SEPSIS WHOLE BLOOD: 1.8 MMOL/L (ref 0.5–1.9)
LACTIC ACID, SEPSIS WHOLE BLOOD: 1.9 MMOL/L (ref 0.5–1.9)
LACTIC ACID, SEPSIS WHOLE BLOOD: 13.5 MMOL/L (ref 0.5–1.9)
LACTIC ACID, SEPSIS WHOLE BLOOD: 3.1 MMOL/L (ref 0.5–1.9)
LACTIC ACID, SEPSIS WHOLE BLOOD: 3.6 MMOL/L (ref 0.5–1.9)
LACTIC ACID, SEPSIS WHOLE BLOOD: 4.6 MMOL/L (ref 0.5–1.9)
LACTIC ACID, SEPSIS WHOLE BLOOD: 6 MMOL/L (ref 0.5–1.9)
LACTIC ACID, SEPSIS WHOLE BLOOD: 7.8 MMOL/L (ref 0.5–1.9)
LACTIC ACID, WHOLE BLOOD: 1.2 MMOL/L (ref 0.7–2.1)
LACTIC ACID, WHOLE BLOOD: 14.1 MMOL/L (ref 0.7–2.1)
LACTIC ACID, WHOLE BLOOD: 2.3 MMOL/L (ref 0.7–2.1)
LEUKOCYTE ESTERASE, URINE: ABNORMAL
LIPASE: 106 U/L (ref 13–60)
LIPASE: 55 U/L (ref 13–60)
LIPASE: 86 U/L (ref 13–60)
LY30 (LYSIS) TEG: 0 % (ref 0–2.6)
LYMPHOCYTES # BLD: 12 % (ref 24–43)
LYMPHOCYTES # BLD: 13 % (ref 24–43)
LYMPHOCYTES # BLD: 16 % (ref 24–44)
LYMPHOCYTES # BLD: 20 % (ref 24–43)
LYMPHOCYTES # BLD: 22 % (ref 24–43)
LYMPHOCYTES # BLD: 22 % (ref 24–44)
LYMPHOCYTES # BLD: 26 % (ref 24–43)
LYMPHOCYTES # BLD: 3 % (ref 24–44)
LYMPHOCYTES # BLD: 32 % (ref 24–43)
LYMPHOCYTES # BLD: 38 % (ref 24–43)
LYMPHOCYTES # BLD: 4 % (ref 24–44)
LYMPHOCYTES # BLD: 6 % (ref 24–43)
LYMPHOCYTES # BLD: 7 % (ref 24–44)
LYMPHOCYTES # BLD: 9 % (ref 24–44)
LYMPHOCYTES, BODY FLUID: 10 %
LYMPHOCYTES, BODY FLUID: 13 %
Lab: ABNORMAL
Lab: ABNORMAL
MA(MAX CLOT) RAPID TEG: 47.3 MM (ref 52–70)
MAGNESIUM: 1.4 MG/DL (ref 1.6–2.6)
MAGNESIUM: 1.5 MG/DL (ref 1.6–2.6)
MCH RBC QN AUTO: 31.5 PG (ref 25.2–33.5)
MCH RBC QN AUTO: 31.6 PG (ref 25.2–33.5)
MCH RBC QN AUTO: 32 PG (ref 25.2–33.5)
MCH RBC QN AUTO: 32.3 PG (ref 25.2–33.5)
MCH RBC QN AUTO: 32.3 PG (ref 25.2–33.5)
MCH RBC QN AUTO: 32.6 PG (ref 25.2–33.5)
MCH RBC QN AUTO: 32.7 PG (ref 25.2–33.5)
MCH RBC QN AUTO: 32.7 PG (ref 25.2–33.5)
MCH RBC QN AUTO: 33.2 PG (ref 25.2–33.5)
MCH RBC QN AUTO: 33.3 PG (ref 25.2–33.5)
MCH RBC QN AUTO: 35.3 PG (ref 25.2–33.5)
MCH RBC QN AUTO: 35.4 PG (ref 25.2–33.5)
MCH RBC QN AUTO: 35.4 PG (ref 25.2–33.5)
MCH RBC QN AUTO: 35.5 PG (ref 25.2–33.5)
MCH RBC QN AUTO: 36 PG (ref 25.2–33.5)
MCH RBC QN AUTO: 36.4 PG (ref 25.2–33.5)
MCH RBC QN AUTO: 36.6 PG (ref 25.2–33.5)
MCH RBC QN AUTO: 37 PG (ref 25.2–33.5)
MCHC RBC AUTO-ENTMCNC: 29.9 G/DL (ref 28.4–34.8)
MCHC RBC AUTO-ENTMCNC: 32.1 G/DL (ref 28.4–34.8)
MCHC RBC AUTO-ENTMCNC: 32.1 G/DL (ref 28.4–34.8)
MCHC RBC AUTO-ENTMCNC: 32.4 G/DL (ref 28.4–34.8)
MCHC RBC AUTO-ENTMCNC: 32.5 G/DL (ref 28.4–34.8)
MCHC RBC AUTO-ENTMCNC: 32.5 G/DL (ref 28.4–34.8)
MCHC RBC AUTO-ENTMCNC: 32.6 G/DL (ref 28.4–34.8)
MCHC RBC AUTO-ENTMCNC: 32.9 G/DL (ref 28.4–34.8)
MCHC RBC AUTO-ENTMCNC: 33 G/DL (ref 28.4–34.8)
MCHC RBC AUTO-ENTMCNC: 33.1 G/DL (ref 28.4–34.8)
MCHC RBC AUTO-ENTMCNC: 33.1 G/DL (ref 28.4–34.8)
MCHC RBC AUTO-ENTMCNC: 33.3 G/DL (ref 28.4–34.8)
MCHC RBC AUTO-ENTMCNC: 33.4 G/DL (ref 28.4–34.8)
MCHC RBC AUTO-ENTMCNC: 33.7 G/DL (ref 28.4–34.8)
MCHC RBC AUTO-ENTMCNC: 34.2 G/DL (ref 28.4–34.8)
MCHC RBC AUTO-ENTMCNC: 34.4 G/DL (ref 28.4–34.8)
MCHC RBC AUTO-ENTMCNC: 34.7 G/DL (ref 28.4–34.8)
MCHC RBC AUTO-ENTMCNC: 34.8 G/DL (ref 28.4–34.8)
MCV RBC AUTO: 100.6 FL (ref 82.6–102.9)
MCV RBC AUTO: 106.8 FL (ref 82.6–102.9)
MCV RBC AUTO: 108.6 FL (ref 82.6–102.9)
MCV RBC AUTO: 109.7 FL (ref 82.6–102.9)
MCV RBC AUTO: 110.3 FL (ref 82.6–102.9)
MCV RBC AUTO: 110.9 FL (ref 82.6–102.9)
MCV RBC AUTO: 112 FL (ref 82.6–102.9)
MCV RBC AUTO: 115.1 FL (ref 82.6–102.9)
MCV RBC AUTO: 121.8 FL (ref 82.6–102.9)
MCV RBC AUTO: 91.7 FL (ref 82.6–102.9)
MCV RBC AUTO: 93.7 FL (ref 82.6–102.9)
MCV RBC AUTO: 94.4 FL (ref 82.6–102.9)
MCV RBC AUTO: 95.6 FL (ref 82.6–102.9)
MCV RBC AUTO: 95.9 FL (ref 82.6–102.9)
MCV RBC AUTO: 95.9 FL (ref 82.6–102.9)
MCV RBC AUTO: 96.6 FL (ref 82.6–102.9)
MCV RBC AUTO: 97.8 FL (ref 82.6–102.9)
MCV RBC AUTO: 98.4 FL (ref 82.6–102.9)
MODE: ABNORMAL
MONOCYTES # BLD: 1 % (ref 1–7)
MONOCYTES # BLD: 10 % (ref 3–12)
MONOCYTES # BLD: 10 % (ref 3–12)
MONOCYTES # BLD: 3 % (ref 1–7)
MONOCYTES # BLD: 3 % (ref 1–7)
MONOCYTES # BLD: 4 % (ref 1–7)
MONOCYTES # BLD: 4 % (ref 3–12)
MONOCYTES # BLD: 4 % (ref 3–12)
MONOCYTES # BLD: 8 % (ref 3–12)
MONOCYTES # BLD: 9 % (ref 1–7)
MONOCYTES # BLD: 9 % (ref 1–7)
MONOCYTES # BLD: 9 % (ref 3–12)
MORPHOLOGY: ABNORMAL
MRSA, DNA, NASAL: NEGATIVE
MUCUS: ABNORMAL
MUCUS: ABNORMAL
MYOGLOBIN: 105 NG/ML (ref 25–58)
MYOGLOBIN: 121 NG/ML (ref 25–58)
MYOGLOBIN: 43 NG/ML (ref 25–58)
MYOGLOBIN: 89 NG/ML (ref 25–58)
NEGATIVE BASE EXCESS, ART: 19 (ref 0–2)
NEUTROPHIL, FLUID: 70 %
NEUTROPHIL, FLUID: 85 %
NITRITE, URINE: NEGATIVE
NITRITE, URINE: POSITIVE
NITRITE, URINE: POSITIVE
NOTIFY: NORMAL
NRBC AUTOMATED: 0 PER 100 WBC
NRBC AUTOMATED: 0.1 PER 100 WBC
NRBC AUTOMATED: 0.2 PER 100 WBC
NRBC AUTOMATED: 0.3 PER 100 WBC
NRBC AUTOMATED: 0.6 PER 100 WBC
NRBC AUTOMATED: 0.6 PER 100 WBC
NRBC AUTOMATED: 0.7 PER 100 WBC
O2 DEVICE/FLOW/%: ABNORMAL
OTHER CELLS FLUID: NORMAL %
OTHER CELLS FLUID: NORMAL %
PARTIAL THROMBOPLASTIN TIME: 34.5 SEC (ref 20.5–30.5)
PARTIAL THROMBOPLASTIN TIME: 38.2 SEC (ref 20.5–30.5)
PARTIAL THROMBOPLASTIN TIME: 38.3 SEC (ref 20.5–30.5)
PARTIAL THROMBOPLASTIN TIME: 38.7 SEC (ref 20.5–30.5)
PARTIAL THROMBOPLASTIN TIME: 39.9 SEC (ref 20.5–30.5)
PARTIAL THROMBOPLASTIN TIME: >120 SEC (ref 20.5–30.5)
PDW BLD-RTO: 16.2 % (ref 11.8–14.4)
PDW BLD-RTO: 16.3 % (ref 11.8–14.4)
PDW BLD-RTO: 17.1 % (ref 11.8–14.4)
PDW BLD-RTO: 17.2 % (ref 11.8–14.4)
PDW BLD-RTO: 17.4 % (ref 11.8–14.4)
PDW BLD-RTO: 17.5 % (ref 11.8–14.4)
PDW BLD-RTO: 17.5 % (ref 11.8–14.4)
PDW BLD-RTO: 17.7 % (ref 11.8–14.4)
PDW BLD-RTO: 18.1 % (ref 11.8–14.4)
PDW BLD-RTO: 18.4 % (ref 11.8–14.4)
PDW BLD-RTO: 18.6 % (ref 11.8–14.4)
PDW BLD-RTO: 18.9 % (ref 11.8–14.4)
PDW BLD-RTO: 19.1 % (ref 11.8–14.4)
PDW BLD-RTO: 19.4 % (ref 11.8–14.4)
PDW BLD-RTO: 19.6 % (ref 11.8–14.4)
PDW BLD-RTO: 19.9 % (ref 11.8–14.4)
PDW BLD-RTO: 20.7 % (ref 11.8–14.4)
PDW BLD-RTO: 21.8 % (ref 11.8–14.4)
PH FLUID: 8
PH UA: 5.5 (ref 5–8)
PH UA: 5.5 (ref 5–8)
PH UA: 6 (ref 5–8)
PHOSPHORUS: 3.2 MG/DL (ref 2.6–4.5)
PHOSPHORUS: 4.1 MG/DL (ref 2.6–4.5)
PHOSPHORUS: 4.3 MG/DL (ref 2.6–4.5)
PLATELET # BLD: 56 K/UL (ref 138–453)
PLATELET # BLD: 67 K/UL (ref 138–453)
PLATELET # BLD: 67 K/UL (ref 138–453)
PLATELET # BLD: 71 K/UL (ref 138–453)
PLATELET # BLD: 72 K/UL (ref 138–453)
PLATELET # BLD: 73 K/UL (ref 138–453)
PLATELET # BLD: 83 K/UL (ref 138–453)
PLATELET # BLD: 86 K/UL (ref 138–453)
PLATELET # BLD: ABNORMAL K/UL (ref 138–453)
PLATELET # BLD: NORMAL K/UL (ref 138–453)
PLATELET, FLUORESCENCE: 33 K/UL (ref 138–453)
PLATELET, FLUORESCENCE: 36 K/UL (ref 138–453)
PLATELET, FLUORESCENCE: 38 K/UL (ref 138–453)
PLATELET, FLUORESCENCE: 41 K/UL (ref 138–453)
PLATELET, FLUORESCENCE: 41 K/UL (ref 138–453)
PLATELET, FLUORESCENCE: 43 K/UL (ref 138–453)
PLATELET, FLUORESCENCE: 46 K/UL (ref 138–453)
PLATELET, FLUORESCENCE: 48 K/UL (ref 138–453)
PLATELET, FLUORESCENCE: 75 K/UL (ref 138–453)
PLATELET, FLUORESCENCE: 82 K/UL (ref 138–453)
PLATELET, FLUORESCENCE: NORMAL K/UL (ref 138–453)
PLATELET, IMMATURE FRACTION: 10.4 % (ref 1.1–10.3)
PLATELET, IMMATURE FRACTION: 10.7 % (ref 1.1–10.3)
PLATELET, IMMATURE FRACTION: 10.8 % (ref 1.1–10.3)
PLATELET, IMMATURE FRACTION: 16.3 % (ref 1.1–10.3)
PLATELET, IMMATURE FRACTION: 7.7 % (ref 1.1–10.3)
PLATELET, IMMATURE FRACTION: 9.3 % (ref 1.1–10.3)
PLATELET, IMMATURE FRACTION: 9.7 % (ref 1.1–10.3)
PLATELET, IMMATURE FRACTION: 9.8 % (ref 1.1–10.3)
PLATELET, IMMATURE FRACTION: 9.9 % (ref 1.1–10.3)
PMV BLD AUTO: 11.6 FL (ref 8.1–13.5)
PMV BLD AUTO: 12.1 FL (ref 8.1–13.5)
PMV BLD AUTO: 12.2 FL (ref 8.1–13.5)
PMV BLD AUTO: 12.2 FL (ref 8.1–13.5)
PMV BLD AUTO: 12.3 FL (ref 8.1–13.5)
PMV BLD AUTO: 12.7 FL (ref 8.1–13.5)
PMV BLD AUTO: 12.8 FL (ref 8.1–13.5)
PMV BLD AUTO: 9.9 FL (ref 8.1–13.5)
POC HCO3: 10.7 MMOL/L (ref 21–28)
POC O2 SATURATION: 98 % (ref 94–98)
POC PCO2: 43.2 MM HG (ref 35–48)
POC PH: 7 (ref 7.35–7.45)
POC PO2: 142.8 MM HG (ref 83–108)
POTASSIUM SERPL-SCNC: 2.9 MMOL/L (ref 3.7–5.3)
POTASSIUM SERPL-SCNC: 3.2 MMOL/L (ref 3.7–5.3)
POTASSIUM SERPL-SCNC: 3.3 MMOL/L (ref 3.7–5.3)
POTASSIUM SERPL-SCNC: 3.6 MMOL/L (ref 3.7–5.3)
POTASSIUM SERPL-SCNC: 3.7 MMOL/L (ref 3.7–5.3)
POTASSIUM SERPL-SCNC: 3.8 MMOL/L (ref 3.7–5.3)
POTASSIUM SERPL-SCNC: 3.9 MMOL/L (ref 3.7–5.3)
POTASSIUM SERPL-SCNC: 4 MMOL/L (ref 3.7–5.3)
POTASSIUM SERPL-SCNC: 4.2 MMOL/L (ref 3.7–5.3)
POTASSIUM SERPL-SCNC: 4.3 MMOL/L (ref 3.7–5.3)
POTASSIUM SERPL-SCNC: 4.4 MMOL/L (ref 3.7–5.3)
POTASSIUM SERPL-SCNC: 4.4 MMOL/L (ref 3.7–5.3)
POTASSIUM SERPL-SCNC: 4.6 MMOL/L (ref 3.7–5.3)
POTASSIUM SERPL-SCNC: 4.7 MMOL/L (ref 3.7–5.3)
POTASSIUM SERPL-SCNC: 4.9 MMOL/L (ref 3.7–5.3)
PRO-BNP: 48 PG/ML
PROCALCITONIN: 1.99 NG/ML
PROTEIN UA: ABNORMAL
PROTEIN UA: NEGATIVE
PROTEIN UA: NEGATIVE
PROTHROMBIN TIME: 13.4 SEC (ref 9.1–12.3)
PROTHROMBIN TIME: 13.4 SEC (ref 9.1–12.3)
PROTHROMBIN TIME: 17.4 SEC (ref 9.1–12.3)
PROTHROMBIN TIME: 17.7 SEC (ref 9.1–12.3)
PROTHROMBIN TIME: 18.1 SEC (ref 9.1–12.3)
PROTHROMBIN TIME: 18.1 SEC (ref 9.1–12.3)
PROTHROMBIN TIME: 19 SEC (ref 9.1–12.3)
PROTHROMBIN TIME: 22.4 SEC (ref 9.1–12.3)
PROTHROMBIN TIME: 23.8 SEC (ref 9.1–12.3)
PROTHROMBIN TIME: 24.1 SEC (ref 9.1–12.3)
RBC # BLD: 0.73 M/UL (ref 3.95–5.11)
RBC # BLD: 1.71 M/UL (ref 3.95–5.11)
RBC # BLD: 1.9 M/UL (ref 3.95–5.11)
RBC # BLD: 2.16 M/UL (ref 3.95–5.11)
RBC # BLD: 2.17 M/UL (ref 3.95–5.11)
RBC # BLD: 2.19 M/UL (ref 3.95–5.11)
RBC # BLD: 2.23 M/UL (ref 3.95–5.11)
RBC # BLD: 2.23 M/UL (ref 3.95–5.11)
RBC # BLD: 2.25 M/UL (ref 3.95–5.11)
RBC # BLD: 2.32 M/UL (ref 3.95–5.11)
RBC # BLD: 2.39 M/UL (ref 3.95–5.11)
RBC # BLD: 2.48 M/UL (ref 3.95–5.11)
RBC # BLD: 2.66 M/UL (ref 3.95–5.11)
RBC # BLD: 2.94 M/UL (ref 3.95–5.11)
RBC # BLD: 3.01 M/UL (ref 3.95–5.11)
RBC # BLD: 3.19 M/UL (ref 3.95–5.11)
RBC # BLD: 3.39 M/UL (ref 3.95–5.11)
RBC # BLD: 3.66 M/UL (ref 3.95–5.11)
RBC # BLD: ABNORMAL 10*6/UL
RBC FLUID: <3000 /MM3
RBC FLUID: <3000 /MM3
RBC UA: ABNORMAL /HPF (ref 0–2)
RBC UA: ABNORMAL /HPF (ref 0–2)
RBC UA: ABNORMAL /HPF (ref 0–4)
REACTION TIME TEG: 8.8 MIN (ref 4.6–9.1)
READ BACK: YES
REASON FOR REJECTION: NORMAL
REASON FOR REJECTION: NORMAL
SAMPLE SITE: ABNORMAL
SARS-COV-2, RAPID: NOT DETECTED
SEDIMENTATION RATE, ERYTHROCYTE: 113 MM/HR (ref 0–20)
SEG NEUTROPHILS: 51 % (ref 36–65)
SEG NEUTROPHILS: 58 % (ref 36–65)
SEG NEUTROPHILS: 63 % (ref 36–65)
SEG NEUTROPHILS: 65 % (ref 36–66)
SEG NEUTROPHILS: 69 % (ref 36–65)
SEG NEUTROPHILS: 70 % (ref 36–66)
SEG NEUTROPHILS: 71 % (ref 36–65)
SEG NEUTROPHILS: 75 % (ref 36–65)
SEG NEUTROPHILS: 77 % (ref 36–65)
SEG NEUTROPHILS: 78 % (ref 36–66)
SEG NEUTROPHILS: 83 % (ref 36–66)
SEG NEUTROPHILS: 86 % (ref 36–66)
SEG NEUTROPHILS: 89 % (ref 36–65)
SEG NEUTROPHILS: 91 % (ref 36–66)
SEGMENTED NEUTROPHILS ABSOLUTE COUNT: 14.34 K/UL (ref 1.8–7.7)
SEGMENTED NEUTROPHILS ABSOLUTE COUNT: 15.71 K/UL (ref 1.5–8.1)
SEGMENTED NEUTROPHILS ABSOLUTE COUNT: 22.62 K/UL (ref 1.8–7.7)
SEGMENTED NEUTROPHILS ABSOLUTE COUNT: 25.66 K/UL (ref 1.8–7.7)
SEGMENTED NEUTROPHILS ABSOLUTE COUNT: 27.15 K/UL (ref 1.8–7.7)
SEGMENTED NEUTROPHILS ABSOLUTE COUNT: 3.3 K/UL (ref 1.5–8.1)
SEGMENTED NEUTROPHILS ABSOLUTE COUNT: 3.82 K/UL (ref 1.5–8.1)
SEGMENTED NEUTROPHILS ABSOLUTE COUNT: 44.39 K/UL (ref 1.8–7.7)
SEGMENTED NEUTROPHILS ABSOLUTE COUNT: 5.65 K/UL (ref 1.5–8.1)
SEGMENTED NEUTROPHILS ABSOLUTE COUNT: 6.43 K/UL (ref 1.5–8.1)
SEGMENTED NEUTROPHILS ABSOLUTE COUNT: 7.8 K/UL (ref 1.8–7.7)
SEGMENTED NEUTROPHILS ABSOLUTE COUNT: 7.82 K/UL (ref 1.5–8.1)
SEGMENTED NEUTROPHILS ABSOLUTE COUNT: 7.91 K/UL (ref 1.5–8.1)
SEGMENTED NEUTROPHILS ABSOLUTE COUNT: 8.48 K/UL (ref 1.5–8.1)
SODIUM BLD-SCNC: 128 MMOL/L (ref 135–144)
SODIUM BLD-SCNC: 129 MMOL/L (ref 135–144)
SODIUM BLD-SCNC: 130 MMOL/L (ref 135–144)
SODIUM BLD-SCNC: 131 MMOL/L (ref 135–144)
SODIUM BLD-SCNC: 132 MMOL/L (ref 135–144)
SODIUM BLD-SCNC: 133 MMOL/L (ref 135–144)
SODIUM BLD-SCNC: 134 MMOL/L (ref 135–144)
SODIUM BLD-SCNC: 135 MMOL/L (ref 135–144)
SODIUM BLD-SCNC: 135 MMOL/L (ref 135–144)
SODIUM BLD-SCNC: 136 MMOL/L (ref 135–144)
SODIUM BLD-SCNC: 138 MMOL/L (ref 135–144)
SODIUM BLD-SCNC: 141 MMOL/L (ref 135–144)
SODIUM,UR: 20 MMOL/L
SPECIFIC GRAVITY UA: 1.01 (ref 1–1.03)
SPECIFIC GRAVITY UA: 1.04 (ref 1–1.03)
SPECIFIC GRAVITY UA: 1.04 (ref 1–1.03)
SPECIMEN DESCRIPTION: ABNORMAL
SPECIMEN DESCRIPTION: NORMAL
SPECIMEN TYPE: NORMAL
SURGICAL PATHOLOGY REPORT: NORMAL
SURGICAL PATHOLOGY REPORT: NORMAL
TOTAL CK: 1109 U/L (ref 26–192)
TOTAL CK: 1215 U/L (ref 26–192)
TOTAL CK: 920 U/L (ref 26–192)
TOTAL PROTEIN, BODY FLUID: 1.3 G/DL
TOTAL PROTEIN, BODY FLUID: 1.9 G/DL
TOTAL PROTEIN: 4 G/DL (ref 6.4–8.3)
TOTAL PROTEIN: 6.6 G/DL (ref 6.4–8.3)
TOTAL PROTEIN: 6.6 G/DL (ref 6.4–8.3)
TOTAL PROTEIN: 7.4 G/DL (ref 6.4–8.3)
TOTAL PROTEIN: 7.9 G/DL (ref 6.4–8.3)
TOTAL PROTEIN: 8.3 G/DL (ref 6.4–8.3)
TOTAL PROTEIN: 8.4 G/DL (ref 6.4–8.3)
TOTAL PROTEIN: 8.4 G/DL (ref 6.4–8.3)
TOTAL PROTEIN: 8.8 G/DL (ref 6.4–8.3)
TOTAL PROTEIN: 8.8 G/DL (ref 6.4–8.3)
TOTAL PROTEIN: 8.9 G/DL (ref 6.4–8.3)
TRANSFUSION STATUS: NORMAL
TRIGL SERPL-MCNC: 64 MG/DL
TROPONIN, HIGH SENSITIVITY: 17 NG/L (ref 0–14)
TROPONIN, HIGH SENSITIVITY: 17 NG/L (ref 0–14)
TROPONIN, HIGH SENSITIVITY: 18 NG/L (ref 0–14)
TROPONIN, HIGH SENSITIVITY: 22 NG/L (ref 0–14)
TROPONIN, HIGH SENSITIVITY: 22 NG/L (ref 0–14)
TROPONIN, HIGH SENSITIVITY: 23 NG/L (ref 0–14)
TROPONIN, HIGH SENSITIVITY: 23 NG/L (ref 0–14)
TURBIDITY: ABNORMAL
TURBIDITY: ABNORMAL
TURBIDITY: CLEAR
UNIT DIVISION: 0
UNIT ISSUE DATE/TIME: NORMAL
URINE HGB: ABNORMAL
URINE HGB: NEGATIVE
URINE HGB: NEGATIVE
UROBILINOGEN, URINE: NORMAL
VITAMIN B-12: 1687 PG/ML (ref 232–1245)
WBC # BLD: 10.3 K/UL (ref 3.5–11.3)
WBC # BLD: 11.2 K/UL (ref 3.5–11.3)
WBC # BLD: 11.3 K/UL (ref 3.5–11.3)
WBC # BLD: 12 K/UL (ref 3.5–11.3)
WBC # BLD: 17.7 K/UL (ref 3.5–11.3)
WBC # BLD: 18.1 K/UL (ref 3.5–11.3)
WBC # BLD: 18.5 K/UL (ref 3.5–11.3)
WBC # BLD: 20.5 K/UL (ref 3.5–11.3)
WBC # BLD: 22.1 K/UL (ref 3.5–11.3)
WBC # BLD: 26.3 K/UL (ref 3.5–11.3)
WBC # BLD: 28.2 K/UL (ref 3.5–11.3)
WBC # BLD: 34.8 K/UL (ref 3.5–11.3)
WBC # BLD: 5.7 K/UL (ref 3.5–11.3)
WBC # BLD: 53.5 K/UL (ref 3.5–11.3)
WBC # BLD: 7.4 K/UL (ref 3.5–11.3)
WBC # BLD: 7.4 K/UL (ref 3.5–11.3)
WBC # BLD: 9.1 K/UL (ref 3.5–11.3)
WBC # BLD: 9.4 K/UL (ref 3.5–11.3)
WBC FLUID: 2905 /MM3
WBC FLUID: 3084 /MM3
WBC UA: ABNORMAL /HPF (ref 0–5)
ZZ NTE CLEAN UP: ORDERED TEST: NORMAL
ZZ NTE CLEAN UP: ORDERED TEST: NORMAL
ZZ NTE WITH NAME CLEAN UP: SPECIMEN SOURCE: NORMAL
ZZ NTE WITH NAME CLEAN UP: SPECIMEN SOURCE: NORMAL

## 2022-01-01 PROCEDURE — 86901 BLOOD TYPING SEROLOGIC RH(D): CPT

## 2022-01-01 PROCEDURE — 6360000002 HC RX W HCPCS: Performed by: STUDENT IN AN ORGANIZED HEALTH CARE EDUCATION/TRAINING PROGRAM

## 2022-01-01 PROCEDURE — 6370000000 HC RX 637 (ALT 250 FOR IP): Performed by: STUDENT IN AN ORGANIZED HEALTH CARE EDUCATION/TRAINING PROGRAM

## 2022-01-01 PROCEDURE — 80048 BASIC METABOLIC PNL TOTAL CA: CPT

## 2022-01-01 PROCEDURE — C1052 HEMOSTATIC AGENT, GI, TOPIC: HCPCS | Performed by: INTERNAL MEDICINE

## 2022-01-01 PROCEDURE — 83880 ASSAY OF NATRIURETIC PEPTIDE: CPT

## 2022-01-01 PROCEDURE — 86900 BLOOD TYPING SEROLOGIC ABO: CPT

## 2022-01-01 PROCEDURE — 99291 CRITICAL CARE FIRST HOUR: CPT | Performed by: INTERNAL MEDICINE

## 2022-01-01 PROCEDURE — 85730 THROMBOPLASTIN TIME PARTIAL: CPT

## 2022-01-01 PROCEDURE — 6360000002 HC RX W HCPCS: Performed by: INTERNAL MEDICINE

## 2022-01-01 PROCEDURE — 85027 COMPLETE CBC AUTOMATED: CPT

## 2022-01-01 PROCEDURE — 85025 COMPLETE CBC W/AUTO DIFF WBC: CPT

## 2022-01-01 PROCEDURE — 84157 ASSAY OF PROTEIN OTHER: CPT

## 2022-01-01 PROCEDURE — 2580000003 HC RX 258: Performed by: NURSE PRACTITIONER

## 2022-01-01 PROCEDURE — 85576 BLOOD PLATELET AGGREGATION: CPT

## 2022-01-01 PROCEDURE — 86920 COMPATIBILITY TEST SPIN: CPT

## 2022-01-01 PROCEDURE — 82150 ASSAY OF AMYLASE: CPT

## 2022-01-01 PROCEDURE — 2709999900 US GUIDED PARACENTESIS

## 2022-01-01 PROCEDURE — P9017 PLASMA 1 DONOR FRZ W/IN 8 HR: HCPCS

## 2022-01-01 PROCEDURE — 36415 COLL VENOUS BLD VENIPUNCTURE: CPT

## 2022-01-01 PROCEDURE — C1894 INTRO/SHEATH, NON-LASER: HCPCS

## 2022-01-01 PROCEDURE — P9016 RBC LEUKOCYTES REDUCED: HCPCS

## 2022-01-01 PROCEDURE — 84484 ASSAY OF TROPONIN QUANT: CPT

## 2022-01-01 PROCEDURE — 2500000003 HC RX 250 WO HCPCS: Performed by: STUDENT IN AN ORGANIZED HEALTH CARE EDUCATION/TRAINING PROGRAM

## 2022-01-01 PROCEDURE — 80069 RENAL FUNCTION PANEL: CPT

## 2022-01-01 PROCEDURE — 82550 ASSAY OF CK (CPK): CPT

## 2022-01-01 PROCEDURE — 80053 COMPREHEN METABOLIC PANEL: CPT

## 2022-01-01 PROCEDURE — 73502 X-RAY EXAM HIP UNI 2-3 VIEWS: CPT

## 2022-01-01 PROCEDURE — 2500000003 HC RX 250 WO HCPCS: Performed by: INTERNAL MEDICINE

## 2022-01-01 PROCEDURE — 02HV33Z INSERTION OF INFUSION DEVICE INTO SUPERIOR VENA CAVA, PERCUTANEOUS APPROACH: ICD-10-PCS | Performed by: INTERNAL MEDICINE

## 2022-01-01 PROCEDURE — 82746 ASSAY OF FOLIC ACID SERUM: CPT

## 2022-01-01 PROCEDURE — P9047 ALBUMIN (HUMAN), 25%, 50ML: HCPCS | Performed by: STUDENT IN AN ORGANIZED HEALTH CARE EDUCATION/TRAINING PROGRAM

## 2022-01-01 PROCEDURE — 86160 COMPLEMENT ANTIGEN: CPT

## 2022-01-01 PROCEDURE — 99285 EMERGENCY DEPT VISIT HI MDM: CPT

## 2022-01-01 PROCEDURE — 2580000003 HC RX 258: Performed by: STUDENT IN AN ORGANIZED HEALTH CARE EDUCATION/TRAINING PROGRAM

## 2022-01-01 PROCEDURE — 96365 THER/PROPH/DIAG IV INF INIT: CPT

## 2022-01-01 PROCEDURE — 82947 ASSAY GLUCOSE BLOOD QUANT: CPT

## 2022-01-01 PROCEDURE — 97530 THERAPEUTIC ACTIVITIES: CPT

## 2022-01-01 PROCEDURE — 99223 1ST HOSP IP/OBS HIGH 75: CPT | Performed by: INTERNAL MEDICINE

## 2022-01-01 PROCEDURE — 89051 BODY FLUID CELL COUNT: CPT

## 2022-01-01 PROCEDURE — 85018 HEMOGLOBIN: CPT

## 2022-01-01 PROCEDURE — 82140 ASSAY OF AMMONIA: CPT

## 2022-01-01 PROCEDURE — 80076 HEPATIC FUNCTION PANEL: CPT

## 2022-01-01 PROCEDURE — G0480 DRUG TEST DEF 1-7 CLASSES: HCPCS

## 2022-01-01 PROCEDURE — B51T1ZZ FLUOROSCOPY OF PORTAL AND SPLANCHNIC VEINS USING LOW OSMOLAR CONTRAST: ICD-10-PCS | Performed by: RADIOLOGY

## 2022-01-01 PROCEDURE — 83605 ASSAY OF LACTIC ACID: CPT

## 2022-01-01 PROCEDURE — 85610 PROTHROMBIN TIME: CPT

## 2022-01-01 PROCEDURE — 2000000000 HC ICU R&B

## 2022-01-01 PROCEDURE — 85014 HEMATOCRIT: CPT

## 2022-01-01 PROCEDURE — 85055 RETICULATED PLATELET ASSAY: CPT

## 2022-01-01 PROCEDURE — 2580000003 HC RX 258: Performed by: CLINICAL NURSE SPECIALIST

## 2022-01-01 PROCEDURE — 73110 X-RAY EXAM OF WRIST: CPT

## 2022-01-01 PROCEDURE — 99254 IP/OBS CNSLTJ NEW/EST MOD 60: CPT | Performed by: INTERNAL MEDICINE

## 2022-01-01 PROCEDURE — 73701 CT LOWER EXTREMITY W/DYE: CPT

## 2022-01-01 PROCEDURE — 2709999900 IR TIPS INSERTION

## 2022-01-01 PROCEDURE — P9047 ALBUMIN (HUMAN), 25%, 50ML: HCPCS | Performed by: INTERNAL MEDICINE

## 2022-01-01 PROCEDURE — 86850 RBC ANTIBODY SCREEN: CPT

## 2022-01-01 PROCEDURE — 6360000004 HC RX CONTRAST MEDICATION: Performed by: STUDENT IN AN ORGANIZED HEALTH CARE EDUCATION/TRAINING PROGRAM

## 2022-01-01 PROCEDURE — 99232 SBSQ HOSP IP/OBS MODERATE 35: CPT | Performed by: INTERNAL MEDICINE

## 2022-01-01 PROCEDURE — 5A1935Z RESPIRATORY VENTILATION, LESS THAN 24 CONSECUTIVE HOURS: ICD-10-PCS | Performed by: ANESTHESIOLOGY

## 2022-01-01 PROCEDURE — 84100 ASSAY OF PHOSPHORUS: CPT

## 2022-01-01 PROCEDURE — 82607 VITAMIN B-12: CPT

## 2022-01-01 PROCEDURE — 83690 ASSAY OF LIPASE: CPT

## 2022-01-01 PROCEDURE — 71045 X-RAY EXAM CHEST 1 VIEW: CPT

## 2022-01-01 PROCEDURE — 82042 OTHER SOURCE ALBUMIN QUAN EA: CPT

## 2022-01-01 PROCEDURE — C1769 GUIDE WIRE: HCPCS

## 2022-01-01 PROCEDURE — 85049 AUTOMATED PLATELET COUNT: CPT

## 2022-01-01 PROCEDURE — 6370000000 HC RX 637 (ALT 250 FOR IP): Performed by: HEALTH CARE PROVIDER

## 2022-01-01 PROCEDURE — 86140 C-REACTIVE PROTEIN: CPT

## 2022-01-01 PROCEDURE — 97162 PT EVAL MOD COMPLEX 30 MIN: CPT

## 2022-01-01 PROCEDURE — 83874 ASSAY OF MYOGLOBIN: CPT

## 2022-01-01 PROCEDURE — 99254 IP/OBS CNSLTJ NEW/EST MOD 60: CPT | Performed by: STUDENT IN AN ORGANIZED HEALTH CARE EDUCATION/TRAINING PROGRAM

## 2022-01-01 PROCEDURE — P9073 PLATELETS PHERESIS PATH REDU: HCPCS

## 2022-01-01 PROCEDURE — 93010 ELECTROCARDIOGRAM REPORT: CPT | Performed by: INTERNAL MEDICINE

## 2022-01-01 PROCEDURE — 94761 N-INVAS EAR/PLS OXIMETRY MLT: CPT

## 2022-01-01 PROCEDURE — 2709999900 HC NON-CHARGEABLE SUPPLY

## 2022-01-01 PROCEDURE — 93005 ELECTROCARDIOGRAM TRACING: CPT | Performed by: STUDENT IN AN ORGANIZED HEALTH CARE EDUCATION/TRAINING PROGRAM

## 2022-01-01 PROCEDURE — 43255 EGD CONTROL BLEEDING ANY: CPT | Performed by: INTERNAL MEDICINE

## 2022-01-01 PROCEDURE — 84300 ASSAY OF URINE SODIUM: CPT

## 2022-01-01 PROCEDURE — 99284 EMERGENCY DEPT VISIT MOD MDM: CPT

## 2022-01-01 PROCEDURE — 73080 X-RAY EXAM OF ELBOW: CPT

## 2022-01-01 PROCEDURE — 85384 FIBRINOGEN ACTIVITY: CPT

## 2022-01-01 PROCEDURE — 94003 VENT MGMT INPAT SUBQ DAY: CPT

## 2022-01-01 PROCEDURE — 87040 BLOOD CULTURE FOR BACTERIA: CPT

## 2022-01-01 PROCEDURE — 85379 FIBRIN DEGRADATION QUANT: CPT

## 2022-01-01 PROCEDURE — 49083 ABD PARACENTESIS W/IMAGING: CPT

## 2022-01-01 PROCEDURE — 99283 EMERGENCY DEPT VISIT LOW MDM: CPT

## 2022-01-01 PROCEDURE — 74177 CT ABD & PELVIS W/CONTRAST: CPT

## 2022-01-01 PROCEDURE — 76705 ECHO EXAM OF ABDOMEN: CPT

## 2022-01-01 PROCEDURE — 87205 SMEAR GRAM STAIN: CPT

## 2022-01-01 PROCEDURE — 2060000000 HC ICU INTERMEDIATE R&B

## 2022-01-01 PROCEDURE — 82945 GLUCOSE OTHER FLUID: CPT

## 2022-01-01 PROCEDURE — 96361 HYDRATE IV INFUSION ADD-ON: CPT

## 2022-01-01 PROCEDURE — 0W9G3ZZ DRAINAGE OF PERITONEAL CAVITY, PERCUTANEOUS APPROACH: ICD-10-PCS | Performed by: EMERGENCY MEDICINE

## 2022-01-01 PROCEDURE — 85652 RBC SED RATE AUTOMATED: CPT

## 2022-01-01 PROCEDURE — 87075 CULTR BACTERIA EXCEPT BLOOD: CPT

## 2022-01-01 PROCEDURE — 99232 SBSQ HOSP IP/OBS MODERATE 35: CPT | Performed by: STUDENT IN AN ORGANIZED HEALTH CARE EDUCATION/TRAINING PROGRAM

## 2022-01-01 PROCEDURE — 31500 INSERT EMERGENCY AIRWAY: CPT | Performed by: NURSE ANESTHETIST, CERTIFIED REGISTERED

## 2022-01-01 PROCEDURE — 6360000004 HC RX CONTRAST MEDICATION: Performed by: INTERNAL MEDICINE

## 2022-01-01 PROCEDURE — 86704 HEP B CORE ANTIBODY TOTAL: CPT

## 2022-01-01 PROCEDURE — 0W9G3ZZ DRAINAGE OF PERITONEAL CAVITY, PERCUTANEOUS APPROACH: ICD-10-PCS | Performed by: RADIOLOGY

## 2022-01-01 PROCEDURE — 1200000000 HC SEMI PRIVATE

## 2022-01-01 PROCEDURE — 86927 PLASMA FRESH FROZEN: CPT

## 2022-01-01 PROCEDURE — 0D968ZZ DRAINAGE OF STOMACH, VIA NATURAL OR ARTIFICIAL OPENING ENDOSCOPIC: ICD-10-PCS | Performed by: INTERNAL MEDICINE

## 2022-01-01 PROCEDURE — C9113 INJ PANTOPRAZOLE SODIUM, VIA: HCPCS | Performed by: STUDENT IN AN ORGANIZED HEALTH CARE EDUCATION/TRAINING PROGRAM

## 2022-01-01 PROCEDURE — 88305 TISSUE EXAM BY PATHOLOGIST: CPT

## 2022-01-01 PROCEDURE — 37799 UNLISTED PX VASCULAR SURGERY: CPT

## 2022-01-01 PROCEDURE — 96375 TX/PRO/DX INJ NEW DRUG ADDON: CPT

## 2022-01-01 PROCEDURE — 81001 URINALYSIS AUTO W/SCOPE: CPT

## 2022-01-01 PROCEDURE — 87340 HEPATITIS B SURFACE AG IA: CPT

## 2022-01-01 PROCEDURE — 83735 ASSAY OF MAGNESIUM: CPT

## 2022-01-01 PROCEDURE — 84703 CHORIONIC GONADOTROPIN ASSAY: CPT

## 2022-01-01 PROCEDURE — 99292 CRITICAL CARE ADDL 30 MIN: CPT | Performed by: INTERNAL MEDICINE

## 2022-01-01 PROCEDURE — 82248 BILIRUBIN DIRECT: CPT

## 2022-01-01 PROCEDURE — 6360000002 HC RX W HCPCS: Performed by: HEALTH CARE PROVIDER

## 2022-01-01 PROCEDURE — 36620 INSERTION CATHETER ARTERY: CPT

## 2022-01-01 PROCEDURE — 73060 X-RAY EXAM OF HUMERUS: CPT

## 2022-01-01 PROCEDURE — APPSS30 APP SPLIT SHARED TIME 16-30 MINUTES: Performed by: INTERNAL MEDICINE

## 2022-01-01 PROCEDURE — 90935 HEMODIALYSIS ONE EVALUATION: CPT

## 2022-01-01 PROCEDURE — 75825 VEIN X-RAY TRUNK: CPT

## 2022-01-01 PROCEDURE — 82330 ASSAY OF CALCIUM: CPT

## 2022-01-01 PROCEDURE — 87635 SARS-COV-2 COVID-19 AMP PRB: CPT

## 2022-01-01 PROCEDURE — 51702 INSERT TEMP BLADDER CATH: CPT

## 2022-01-01 PROCEDURE — G0378 HOSPITAL OBSERVATION PER HR: HCPCS

## 2022-01-01 PROCEDURE — 99233 SBSQ HOSP IP/OBS HIGH 50: CPT | Performed by: INTERNAL MEDICINE

## 2022-01-01 PROCEDURE — 6360000002 HC RX W HCPCS

## 2022-01-01 PROCEDURE — 99282 EMERGENCY DEPT VISIT SF MDM: CPT

## 2022-01-01 PROCEDURE — 83615 LACTATE (LD) (LDH) ENZYME: CPT

## 2022-01-01 PROCEDURE — 87154 CUL TYP ID BLD PTHGN 6+ TRGT: CPT

## 2022-01-01 PROCEDURE — 6370000000 HC RX 637 (ALT 250 FOR IP): Performed by: INTERNAL MEDICINE

## 2022-01-01 PROCEDURE — 36430 TRANSFUSION BLD/BLD COMPNT: CPT

## 2022-01-01 PROCEDURE — 6370000000 HC RX 637 (ALT 250 FOR IP): Performed by: CLINICAL NURSE SPECIALIST

## 2022-01-01 PROCEDURE — 86317 IMMUNOASSAY INFECTIOUS AGENT: CPT

## 2022-01-01 PROCEDURE — 2500000003 HC RX 250 WO HCPCS: Performed by: FAMILY MEDICINE

## 2022-01-01 PROCEDURE — 3609013000 HC EGD TRANSORAL CONTROL BLEEDING ANY METHOD: Performed by: INTERNAL MEDICINE

## 2022-01-01 PROCEDURE — 87070 CULTURE OTHR SPECIMN AEROBIC: CPT

## 2022-01-01 PROCEDURE — 82570 ASSAY OF URINE CREATININE: CPT

## 2022-01-01 PROCEDURE — 96366 THER/PROPH/DIAG IV INF ADDON: CPT

## 2022-01-01 PROCEDURE — 31500 INSERT EMERGENCY AIRWAY: CPT

## 2022-01-01 PROCEDURE — 99232 SBSQ HOSP IP/OBS MODERATE 35: CPT | Performed by: NURSE PRACTITIONER

## 2022-01-01 PROCEDURE — 75889 VEIN X-RAY LIVER W/HEMODYNAM: CPT

## 2022-01-01 PROCEDURE — 2580000003 HC RX 258: Performed by: HEALTH CARE PROVIDER

## 2022-01-01 PROCEDURE — 94002 VENT MGMT INPAT INIT DAY: CPT

## 2022-01-01 PROCEDURE — 36011 PLACE CATHETER IN VEIN: CPT

## 2022-01-01 PROCEDURE — 90935 HEMODIALYSIS ONE EVALUATION: CPT | Performed by: INTERNAL MEDICINE

## 2022-01-01 PROCEDURE — 82803 BLOOD GASES ANY COMBINATION: CPT

## 2022-01-01 PROCEDURE — 84478 ASSAY OF TRIGLYCERIDES: CPT

## 2022-01-01 PROCEDURE — 87186 SC STD MICRODIL/AGAR DIL: CPT

## 2022-01-01 PROCEDURE — 85390 FIBRINOLYSINS SCREEN I&R: CPT

## 2022-01-01 PROCEDURE — 6370000000 HC RX 637 (ALT 250 FOR IP): Performed by: NURSE PRACTITIONER

## 2022-01-01 PROCEDURE — 99222 1ST HOSP IP/OBS MODERATE 55: CPT | Performed by: FAMILY MEDICINE

## 2022-01-01 PROCEDURE — 87077 CULTURE AEROBIC IDENTIFY: CPT

## 2022-01-01 PROCEDURE — 88112 CYTOPATH CELL ENHANCE TECH: CPT

## 2022-01-01 PROCEDURE — 85347 COAGULATION TIME ACTIVATED: CPT

## 2022-01-01 PROCEDURE — 73206 CT ANGIO UPR EXTRM W/O&W/DYE: CPT

## 2022-01-01 PROCEDURE — C1757 CATH, THROMBECTOMY/EMBOLECT: HCPCS

## 2022-01-01 PROCEDURE — 87641 MR-STAPH DNA AMP PROBE: CPT

## 2022-01-01 PROCEDURE — 75827 VEIN X-RAY CHEST: CPT

## 2022-01-01 PROCEDURE — 84145 PROCALCITONIN (PCT): CPT

## 2022-01-01 PROCEDURE — 99220 PR INITIAL OBSERVATION CARE/DAY 70 MINUTES: CPT | Performed by: INTERNAL MEDICINE

## 2022-01-01 PROCEDURE — 87086 URINE CULTURE/COLONY COUNT: CPT

## 2022-01-01 PROCEDURE — 93971 EXTREMITY STUDY: CPT

## 2022-01-01 PROCEDURE — P9047 ALBUMIN (HUMAN), 25%, 50ML: HCPCS

## 2022-01-01 PROCEDURE — 86803 HEPATITIS C AB TEST: CPT

## 2022-01-01 PROCEDURE — 5A1D70Z PERFORMANCE OF URINARY FILTRATION, INTERMITTENT, LESS THAN 6 HOURS PER DAY: ICD-10-PCS | Performed by: INTERNAL MEDICINE

## 2022-01-01 PROCEDURE — 73090 X-RAY EXAM OF FOREARM: CPT

## 2022-01-01 PROCEDURE — 2500000003 HC RX 250 WO HCPCS

## 2022-01-01 PROCEDURE — 06183J4 BYPASS PORTAL VEIN TO HEPATIC VEIN WITH SYNTHETIC SUBSTITUTE, PERCUTANEOUS APPROACH: ICD-10-PCS | Performed by: RADIOLOGY

## 2022-01-01 PROCEDURE — 83986 ASSAY PH BODY FLUID NOS: CPT

## 2022-01-01 PROCEDURE — C1887 CATHETER, GUIDING: HCPCS

## 2022-01-01 PROCEDURE — 86225 DNA ANTIBODY NATIVE: CPT

## 2022-01-01 PROCEDURE — 73030 X-RAY EXAM OF SHOULDER: CPT

## 2022-01-01 PROCEDURE — 76775 US EXAM ABDO BACK WALL LIM: CPT

## 2022-01-01 PROCEDURE — 0BH18EZ INSERTION OF ENDOTRACHEAL AIRWAY INTO TRACHEA, VIA NATURAL OR ARTIFICIAL OPENING ENDOSCOPIC: ICD-10-PCS | Performed by: ANESTHESIOLOGY

## 2022-01-01 PROCEDURE — 6360000002 HC RX W HCPCS: Performed by: FAMILY MEDICINE

## 2022-01-01 PROCEDURE — 86038 ANTINUCLEAR ANTIBODIES: CPT

## 2022-01-01 PROCEDURE — 0W3P8ZZ CONTROL BLEEDING IN GASTROINTESTINAL TRACT, VIA NATURAL OR ARTIFICIAL OPENING ENDOSCOPIC: ICD-10-PCS | Performed by: INTERNAL MEDICINE

## 2022-01-01 RX ORDER — SODIUM CHLORIDE 9 MG/ML
INJECTION, SOLUTION INTRAVENOUS PRN
Status: DISCONTINUED | OUTPATIENT
Start: 2022-01-01 | End: 2022-01-01

## 2022-01-01 RX ORDER — OXYCODONE HCL 5 MG/5 ML
5 SOLUTION, ORAL ORAL ONCE
Status: COMPLETED | OUTPATIENT
Start: 2022-01-01 | End: 2022-01-01

## 2022-01-01 RX ORDER — SODIUM CHLORIDE 9 MG/ML
INJECTION, SOLUTION INTRAVENOUS PRN
Status: DISCONTINUED | OUTPATIENT
Start: 2022-01-01 | End: 2022-08-04 | Stop reason: HOSPADM

## 2022-01-01 RX ORDER — OXYCODONE HYDROCHLORIDE 5 MG/1
5 TABLET ORAL EVERY 8 HOURS PRN
Qty: 3 TABLET | Refills: 0 | Status: SHIPPED | OUTPATIENT
Start: 2022-01-01 | End: 2022-01-01

## 2022-01-01 RX ORDER — FENTANYL CITRATE 50 UG/ML
25 INJECTION, SOLUTION INTRAMUSCULAR; INTRAVENOUS ONCE
Status: COMPLETED | OUTPATIENT
Start: 2022-01-01 | End: 2022-01-01

## 2022-01-01 RX ORDER — POLYVINYL ALCOHOL 14 MG/ML
2 SOLUTION/ DROPS OPHTHALMIC PRN
Status: DISCONTINUED | OUTPATIENT
Start: 2022-01-01 | End: 2022-01-01 | Stop reason: HOSPADM

## 2022-01-01 RX ORDER — SODIUM CHLORIDE 0.9 % (FLUSH) 0.9 %
5-40 SYRINGE (ML) INJECTION PRN
Status: DISCONTINUED | OUTPATIENT
Start: 2022-01-01 | End: 2022-01-01 | Stop reason: HOSPADM

## 2022-01-01 RX ORDER — FUROSEMIDE 20 MG/1
TABLET ORAL
Status: ON HOLD | COMMUNITY
Start: 2022-01-01 | End: 2022-01-01 | Stop reason: HOSPADM

## 2022-01-01 RX ORDER — ALBUMIN (HUMAN) 12.5 G/50ML
25 SOLUTION INTRAVENOUS ONCE
Status: COMPLETED | OUTPATIENT
Start: 2022-01-01 | End: 2022-01-01

## 2022-01-01 RX ORDER — NAPROXEN SODIUM 220 MG
TABLET ORAL
Status: ON HOLD | COMMUNITY
Start: 2022-01-01 | End: 2022-01-01 | Stop reason: HOSPADM

## 2022-01-01 RX ORDER — FENTANYL CITRATE 50 UG/ML
50 INJECTION, SOLUTION INTRAMUSCULAR; INTRAVENOUS ONCE
Status: DISCONTINUED | OUTPATIENT
Start: 2022-01-01 | End: 2022-08-04 | Stop reason: HOSPADM

## 2022-01-01 RX ORDER — FAMOTIDINE 20 MG/1
20 TABLET, FILM COATED ORAL DAILY
Status: DISCONTINUED | OUTPATIENT
Start: 2022-01-01 | End: 2022-01-01

## 2022-01-01 RX ORDER — SENNA PLUS 8.6 MG/1
1 TABLET ORAL NIGHTLY PRN
Status: DISCONTINUED | OUTPATIENT
Start: 2022-01-01 | End: 2022-01-01 | Stop reason: HOSPADM

## 2022-01-01 RX ORDER — KETOROLAC TROMETHAMINE 15 MG/ML
30 INJECTION, SOLUTION INTRAMUSCULAR; INTRAVENOUS EVERY 6 HOURS PRN
Status: CANCELLED | OUTPATIENT
Start: 2022-01-01 | End: 2022-01-01

## 2022-01-01 RX ORDER — OXYCODONE HYDROCHLORIDE 5 MG/1
5 TABLET ORAL ONCE
Status: COMPLETED | OUTPATIENT
Start: 2022-01-01 | End: 2022-01-01

## 2022-01-01 RX ORDER — POLYETHYLENE GLYCOL 3350 17 G/17G
17 POWDER, FOR SOLUTION ORAL DAILY PRN
Status: DISCONTINUED | OUTPATIENT
Start: 2022-01-01 | End: 2022-08-04 | Stop reason: HOSPADM

## 2022-01-01 RX ORDER — MAGNESIUM SULFATE IN WATER 40 MG/ML
2000 INJECTION, SOLUTION INTRAVENOUS ONCE
Status: COMPLETED | OUTPATIENT
Start: 2022-01-01 | End: 2022-01-01

## 2022-01-01 RX ORDER — MAGNESIUM SULFATE 1 G/100ML
1000 INJECTION INTRAVENOUS PRN
Status: DISCONTINUED | OUTPATIENT
Start: 2022-01-01 | End: 2022-01-01 | Stop reason: HOSPADM

## 2022-01-01 RX ORDER — ONDANSETRON 2 MG/ML
4 INJECTION INTRAMUSCULAR; INTRAVENOUS EVERY 6 HOURS PRN
Status: DISCONTINUED | OUTPATIENT
Start: 2022-01-01 | End: 2022-01-01 | Stop reason: HOSPADM

## 2022-01-01 RX ORDER — SODIUM CHLORIDE 0.9 % (FLUSH) 0.9 %
5-40 SYRINGE (ML) INJECTION EVERY 12 HOURS SCHEDULED
Status: DISCONTINUED | OUTPATIENT
Start: 2022-01-01 | End: 2022-01-01 | Stop reason: HOSPADM

## 2022-01-01 RX ORDER — LANOLIN ALCOHOL/MO/W.PET/CERES
100 CREAM (GRAM) TOPICAL DAILY
Status: ON HOLD | COMMUNITY
Start: 2022-01-01 | End: 2022-01-01 | Stop reason: HOSPADM

## 2022-01-01 RX ORDER — ACETAMINOPHEN 325 MG/1
650 TABLET ORAL ONCE
Status: COMPLETED | OUTPATIENT
Start: 2022-01-01 | End: 2022-01-01

## 2022-01-01 RX ORDER — ACETAMINOPHEN 325 MG/1
650 TABLET ORAL EVERY 6 HOURS PRN
Status: DISCONTINUED | OUTPATIENT
Start: 2022-01-01 | End: 2022-01-01 | Stop reason: HOSPADM

## 2022-01-01 RX ORDER — MAGNESIUM HYDROXIDE/ALUMINUM HYDROXICE/SIMETHICONE 120; 1200; 1200 MG/30ML; MG/30ML; MG/30ML
30 SUSPENSION ORAL ONCE
Status: COMPLETED | OUTPATIENT
Start: 2022-01-01 | End: 2022-01-01

## 2022-01-01 RX ORDER — POTASSIUM CHLORIDE 20 MEQ/1
40 TABLET, EXTENDED RELEASE ORAL ONCE
Status: COMPLETED | OUTPATIENT
Start: 2022-01-01 | End: 2022-01-01

## 2022-01-01 RX ORDER — FENTANYL CITRATE 50 UG/ML
50 INJECTION, SOLUTION INTRAMUSCULAR; INTRAVENOUS ONCE
Status: COMPLETED | OUTPATIENT
Start: 2022-01-01 | End: 2022-01-01

## 2022-01-01 RX ORDER — POLYETHYLENE GLYCOL 3350 17 G/17G
POWDER, FOR SOLUTION ORAL
COMMUNITY
Start: 2022-01-01

## 2022-01-01 RX ORDER — POLYETHYLENE GLYCOL 3350 17 G/17G
17 POWDER, FOR SOLUTION ORAL DAILY PRN
Status: DISCONTINUED | OUTPATIENT
Start: 2022-01-01 | End: 2022-01-01 | Stop reason: HOSPADM

## 2022-01-01 RX ORDER — CYCLOBENZAPRINE HCL 5 MG
5 TABLET ORAL 2 TIMES DAILY PRN
Qty: 10 TABLET | Refills: 0 | Status: SHIPPED | OUTPATIENT
Start: 2022-01-01 | End: 2022-01-01

## 2022-01-01 RX ORDER — LORAZEPAM 2 MG/ML
2 INJECTION INTRAMUSCULAR
Status: DISCONTINUED | OUTPATIENT
Start: 2022-01-01 | End: 2022-01-01 | Stop reason: HOSPADM

## 2022-01-01 RX ORDER — SODIUM CHLORIDE 9 MG/ML
INJECTION, SOLUTION INTRAVENOUS CONTINUOUS
Status: DISCONTINUED | OUTPATIENT
Start: 2022-01-01 | End: 2022-01-01

## 2022-01-01 RX ORDER — MIDAZOLAM HYDROCHLORIDE 2 MG/2ML
2 INJECTION, SOLUTION INTRAMUSCULAR; INTRAVENOUS ONCE
Status: COMPLETED | OUTPATIENT
Start: 2022-01-01 | End: 2022-01-01

## 2022-01-01 RX ORDER — LANOLIN ALCOHOL/MO/W.PET/CERES
100 CREAM (GRAM) TOPICAL DAILY
Status: DISCONTINUED | OUTPATIENT
Start: 2022-01-01 | End: 2022-01-01

## 2022-01-01 RX ORDER — FUROSEMIDE 20 MG/1
TABLET ORAL
COMMUNITY
Start: 2022-01-01

## 2022-01-01 RX ORDER — ACETAMINOPHEN 650 MG/1
650 SUPPOSITORY RECTAL EVERY 6 HOURS PRN
Status: DISCONTINUED | OUTPATIENT
Start: 2022-01-01 | End: 2022-01-01 | Stop reason: HOSPADM

## 2022-01-01 RX ORDER — HEPARIN SODIUM 1000 [USP'U]/ML
1000 INJECTION, SOLUTION INTRAVENOUS; SUBCUTANEOUS ONCE
Status: COMPLETED | OUTPATIENT
Start: 2022-01-01 | End: 2022-01-01

## 2022-01-01 RX ORDER — PHYTONADIONE 5 MG/1
10 TABLET ORAL DAILY
Status: COMPLETED | OUTPATIENT
Start: 2022-01-01 | End: 2022-01-01

## 2022-01-01 RX ORDER — IBUPROFEN 800 MG/1
800 TABLET ORAL ONCE
Status: COMPLETED | OUTPATIENT
Start: 2022-01-01 | End: 2022-01-01

## 2022-01-01 RX ORDER — SODIUM CHLORIDE 9 MG/ML
INJECTION, SOLUTION INTRAVENOUS PRN
Status: DISCONTINUED | OUTPATIENT
Start: 2022-01-01 | End: 2022-01-01 | Stop reason: HOSPADM

## 2022-01-01 RX ORDER — NOREPINEPHRINE BIT/0.9 % NACL 16MG/250ML
1-100 INFUSION BOTTLE (ML) INTRAVENOUS CONTINUOUS
Status: DISCONTINUED | OUTPATIENT
Start: 2022-01-01 | End: 2022-01-01

## 2022-01-01 RX ORDER — POLYETHYLENE GLYCOL 3350 17 G/17G
17 POWDER, FOR SOLUTION ORAL DAILY
Status: DISCONTINUED | OUTPATIENT
Start: 2022-01-01 | End: 2022-01-01 | Stop reason: HOSPADM

## 2022-01-01 RX ORDER — FOLIC ACID 1 MG/1
1 TABLET ORAL DAILY
Status: DISCONTINUED | OUTPATIENT
Start: 2022-01-01 | End: 2022-01-01 | Stop reason: HOSPADM

## 2022-01-01 RX ORDER — LANOLIN ALCOHOL/MO/W.PET/CERES
300 CREAM (GRAM) TOPICAL DAILY
Status: DISCONTINUED | OUTPATIENT
Start: 2022-01-01 | End: 2022-01-01 | Stop reason: HOSPADM

## 2022-01-01 RX ORDER — METHOCARBAMOL 750 MG/1
750 TABLET, FILM COATED ORAL EVERY 6 HOURS
Status: DISCONTINUED | OUTPATIENT
Start: 2022-01-01 | End: 2022-01-01

## 2022-01-01 RX ORDER — HEPARIN SODIUM 5000 [USP'U]/ML
5000 INJECTION, SOLUTION INTRAVENOUS; SUBCUTANEOUS EVERY 8 HOURS
Status: DISCONTINUED | OUTPATIENT
Start: 2022-01-01 | End: 2022-01-01

## 2022-01-01 RX ORDER — LACTULOSE 10 G/15ML
20 SOLUTION ORAL 3 TIMES DAILY
Status: DISCONTINUED | OUTPATIENT
Start: 2022-01-01 | End: 2022-01-01

## 2022-01-01 RX ORDER — SPIRONOLACTONE 25 MG/1
25 TABLET ORAL DAILY
Qty: 30 TABLET | Refills: 3 | Status: SHIPPED | OUTPATIENT
Start: 2022-01-01

## 2022-01-01 RX ORDER — POTASSIUM CHLORIDE 20 MEQ/1
40 TABLET, EXTENDED RELEASE ORAL PRN
Status: DISCONTINUED | OUTPATIENT
Start: 2022-01-01 | End: 2022-01-01 | Stop reason: HOSPADM

## 2022-01-01 RX ORDER — LANOLIN ALCOHOL/MO/W.PET/CERES
400 CREAM (GRAM) TOPICAL DAILY
Status: DISCONTINUED | OUTPATIENT
Start: 2022-01-01 | End: 2022-01-01 | Stop reason: HOSPADM

## 2022-01-01 RX ORDER — LORAZEPAM 2 MG/ML
1 CONCENTRATE ORAL
Status: DISCONTINUED | OUTPATIENT
Start: 2022-01-01 | End: 2022-08-04 | Stop reason: HOSPADM

## 2022-01-01 RX ORDER — LACTULOSE 10 G/15ML
20 SOLUTION ORAL 3 TIMES DAILY
Status: DISCONTINUED | OUTPATIENT
Start: 2022-01-01 | End: 2022-01-01 | Stop reason: HOSPADM

## 2022-01-01 RX ORDER — POTASSIUM CHLORIDE 7.45 MG/ML
10 INJECTION INTRAVENOUS PRN
Status: DISCONTINUED | OUTPATIENT
Start: 2022-01-01 | End: 2022-01-01 | Stop reason: HOSPADM

## 2022-01-01 RX ORDER — ALBUMIN (HUMAN) 12.5 G/50ML
25 SOLUTION INTRAVENOUS PRN
Status: DISCONTINUED | OUTPATIENT
Start: 2022-01-01 | End: 2022-08-04 | Stop reason: HOSPADM

## 2022-01-01 RX ORDER — FENTANYL CITRATE 50 UG/ML
50 INJECTION, SOLUTION INTRAMUSCULAR; INTRAVENOUS ONCE
Status: DISCONTINUED | OUTPATIENT
Start: 2022-01-01 | End: 2022-01-01 | Stop reason: HOSPADM

## 2022-01-01 RX ORDER — LORAZEPAM 1 MG/1
1 TABLET ORAL
Status: DISCONTINUED | OUTPATIENT
Start: 2022-01-01 | End: 2022-01-01 | Stop reason: HOSPADM

## 2022-01-01 RX ORDER — DEXTROSE MONOHYDRATE 100 MG/ML
INJECTION, SOLUTION INTRAVENOUS CONTINUOUS
Status: DISCONTINUED | OUTPATIENT
Start: 2022-01-01 | End: 2022-08-04 | Stop reason: HOSPADM

## 2022-01-01 RX ORDER — SODIUM CHLORIDE 0.9 % (FLUSH) 0.9 %
10 SYRINGE (ML) INJECTION PRN
Status: DISCONTINUED | OUTPATIENT
Start: 2022-01-01 | End: 2022-01-01 | Stop reason: HOSPADM

## 2022-01-01 RX ORDER — FAMOTIDINE 10 MG/ML
20 INJECTION, SOLUTION INTRAVENOUS ONCE
Status: COMPLETED | OUTPATIENT
Start: 2022-01-01 | End: 2022-01-01

## 2022-01-01 RX ORDER — ONDANSETRON 4 MG/1
4 TABLET, ORALLY DISINTEGRATING ORAL EVERY 8 HOURS PRN
Status: DISCONTINUED | OUTPATIENT
Start: 2022-01-01 | End: 2022-01-01 | Stop reason: HOSPADM

## 2022-01-01 RX ORDER — ACETAMINOPHEN 500 MG
1000 TABLET ORAL ONCE
Status: COMPLETED | OUTPATIENT
Start: 2022-01-01 | End: 2022-01-01

## 2022-01-01 RX ORDER — ENOXAPARIN SODIUM 100 MG/ML
40 INJECTION SUBCUTANEOUS DAILY
Status: DISCONTINUED | OUTPATIENT
Start: 2022-01-01 | End: 2022-01-01 | Stop reason: HOSPADM

## 2022-01-01 RX ORDER — KETOROLAC TROMETHAMINE 30 MG/ML
30 INJECTION, SOLUTION INTRAMUSCULAR; INTRAVENOUS ONCE
Status: COMPLETED | OUTPATIENT
Start: 2022-01-01 | End: 2022-01-01

## 2022-01-01 RX ORDER — LORAZEPAM 2 MG/1
2 TABLET ORAL
Status: DISCONTINUED | OUTPATIENT
Start: 2022-01-01 | End: 2022-01-01 | Stop reason: HOSPADM

## 2022-01-01 RX ORDER — KETOROLAC TROMETHAMINE 15 MG/ML
15 INJECTION, SOLUTION INTRAMUSCULAR; INTRAVENOUS ONCE
Status: COMPLETED | OUTPATIENT
Start: 2022-01-01 | End: 2022-01-01

## 2022-01-01 RX ORDER — 0.9 % SODIUM CHLORIDE 0.9 %
500 INTRAVENOUS SOLUTION INTRAVENOUS ONCE
Status: COMPLETED | OUTPATIENT
Start: 2022-01-01 | End: 2022-01-01

## 2022-01-01 RX ORDER — FENTANYL CITRATE 50 UG/ML
50 INJECTION, SOLUTION INTRAMUSCULAR; INTRAVENOUS
Status: DISCONTINUED | OUTPATIENT
Start: 2022-01-01 | End: 2022-01-01

## 2022-01-01 RX ORDER — METOCLOPRAMIDE HYDROCHLORIDE 5 MG/ML
10 INJECTION INTRAMUSCULAR; INTRAVENOUS ONCE
Status: COMPLETED | OUTPATIENT
Start: 2022-01-01 | End: 2022-01-01

## 2022-01-01 RX ORDER — ENOXAPARIN SODIUM 100 MG/ML
40 INJECTION SUBCUTANEOUS DAILY
Status: DISCONTINUED | OUTPATIENT
Start: 2022-01-01 | End: 2022-01-01

## 2022-01-01 RX ORDER — CALCIUM GLUCONATE 20 MG/ML
2000 INJECTION, SOLUTION INTRAVENOUS ONCE
Status: DISCONTINUED | OUTPATIENT
Start: 2022-01-01 | End: 2022-08-04 | Stop reason: HOSPADM

## 2022-01-01 RX ORDER — DEXTROSE MONOHYDRATE 100 MG/ML
INJECTION, SOLUTION INTRAVENOUS CONTINUOUS
Status: DISCONTINUED | OUTPATIENT
Start: 2022-01-01 | End: 2022-01-01

## 2022-01-01 RX ORDER — MIDAZOLAM HYDROCHLORIDE 1 MG/ML
INJECTION INTRAMUSCULAR; INTRAVENOUS
Status: COMPLETED
Start: 2022-01-01 | End: 2022-01-01

## 2022-01-01 RX ORDER — SUCCINYLCHOLINE CHLORIDE 20 MG/ML
100 INJECTION INTRAMUSCULAR; INTRAVENOUS ONCE
Status: COMPLETED | OUTPATIENT
Start: 2022-01-01 | End: 2022-01-01

## 2022-01-01 RX ORDER — SODIUM CHLORIDE, SODIUM LACTATE, POTASSIUM CHLORIDE, AND CALCIUM CHLORIDE .6; .31; .03; .02 G/100ML; G/100ML; G/100ML; G/100ML
1000 INJECTION, SOLUTION INTRAVENOUS ONCE
Status: COMPLETED | OUTPATIENT
Start: 2022-01-01 | End: 2022-01-01

## 2022-01-01 RX ORDER — OXYCODONE HYDROCHLORIDE 5 MG/1
5 TABLET ORAL EVERY 6 HOURS PRN
Status: DISCONTINUED | OUTPATIENT
Start: 2022-01-01 | End: 2022-01-01

## 2022-01-01 RX ORDER — SODIUM CHLORIDE 0.9 % (FLUSH) 0.9 %
5-40 SYRINGE (ML) INJECTION PRN
Status: DISCONTINUED | OUTPATIENT
Start: 2022-01-01 | End: 2022-08-04 | Stop reason: HOSPADM

## 2022-01-01 RX ORDER — PENICILLIN V POTASSIUM 500 MG/1
500 TABLET ORAL 4 TIMES DAILY
Qty: 40 TABLET | Refills: 0 | Status: SHIPPED | OUTPATIENT
Start: 2022-01-01 | End: 2022-01-01

## 2022-01-01 RX ORDER — DEXTROSE MONOHYDRATE 25 G/50ML
INJECTION, SOLUTION INTRAVENOUS
Status: COMPLETED
Start: 2022-01-01 | End: 2022-01-01

## 2022-01-01 RX ORDER — KETOROLAC TROMETHAMINE 15 MG/ML
30 INJECTION, SOLUTION INTRAMUSCULAR; INTRAVENOUS ONCE
Status: COMPLETED | OUTPATIENT
Start: 2022-01-01 | End: 2022-01-01

## 2022-01-01 RX ORDER — FENTANYL CITRATE 50 UG/ML
25 INJECTION, SOLUTION INTRAMUSCULAR; INTRAVENOUS
Status: DISCONTINUED | OUTPATIENT
Start: 2022-01-01 | End: 2022-08-04 | Stop reason: HOSPADM

## 2022-01-01 RX ORDER — LANOLIN ALCOHOL/MO/W.PET/CERES
100 CREAM (GRAM) TOPICAL DAILY
Status: DISCONTINUED | OUTPATIENT
Start: 2022-01-01 | End: 2022-01-01 | Stop reason: HOSPADM

## 2022-01-01 RX ORDER — FOLIC ACID 1 MG/1
TABLET ORAL
Status: ON HOLD | COMMUNITY
Start: 2022-01-01 | End: 2022-01-01

## 2022-01-01 RX ORDER — LACTULOSE 10 G/15ML
10 SOLUTION ORAL 2 TIMES DAILY
Status: DISCONTINUED | OUTPATIENT
Start: 2022-01-01 | End: 2022-08-04 | Stop reason: HOSPADM

## 2022-01-01 RX ORDER — MORPHINE SULFATE 4 MG/ML
4 INJECTION, SOLUTION INTRAMUSCULAR; INTRAVENOUS
Status: DISCONTINUED | OUTPATIENT
Start: 2022-01-01 | End: 2022-08-04 | Stop reason: HOSPADM

## 2022-01-01 RX ORDER — MAGNESIUM SULFATE 1 G/100ML
1000 INJECTION INTRAVENOUS ONCE
Status: COMPLETED | OUTPATIENT
Start: 2022-01-01 | End: 2022-01-01

## 2022-01-01 RX ORDER — FUROSEMIDE 20 MG/1
20 TABLET ORAL DAILY
Status: DISCONTINUED | OUTPATIENT
Start: 2022-01-01 | End: 2022-01-01 | Stop reason: HOSPADM

## 2022-01-01 RX ORDER — MORPHINE SULFATE 2 MG/ML
2 INJECTION, SOLUTION INTRAMUSCULAR; INTRAVENOUS
Status: DISCONTINUED | OUTPATIENT
Start: 2022-01-01 | End: 2022-08-04 | Stop reason: HOSPADM

## 2022-01-01 RX ORDER — ALBUMIN (HUMAN) 12.5 G/50ML
75 SOLUTION INTRAVENOUS ONCE
Status: COMPLETED | OUTPATIENT
Start: 2022-01-01 | End: 2022-01-01

## 2022-01-01 RX ORDER — 0.9 % SODIUM CHLORIDE 0.9 %
250 INTRAVENOUS SOLUTION INTRAVENOUS PRN
Status: DISCONTINUED | OUTPATIENT
Start: 2022-01-01 | End: 2022-01-01

## 2022-01-01 RX ORDER — 0.9 % SODIUM CHLORIDE 0.9 %
30 INTRAVENOUS SOLUTION INTRAVENOUS ONCE
Status: COMPLETED | OUTPATIENT
Start: 2022-01-01 | End: 2022-01-01

## 2022-01-01 RX ORDER — ETOMIDATE 2 MG/ML
INJECTION INTRAVENOUS
Status: COMPLETED
Start: 2022-01-01 | End: 2022-01-01

## 2022-01-01 RX ORDER — ONDANSETRON 4 MG/1
4 TABLET, ORALLY DISINTEGRATING ORAL EVERY 8 HOURS PRN
Status: DISCONTINUED | OUTPATIENT
Start: 2022-01-01 | End: 2022-08-04 | Stop reason: HOSPADM

## 2022-01-01 RX ORDER — LANOLIN ALCOHOL/MO/W.PET/CERES
100 CREAM (GRAM) TOPICAL DAILY
Status: DISCONTINUED | OUTPATIENT
Start: 2022-01-01 | End: 2022-08-04 | Stop reason: HOSPADM

## 2022-01-01 RX ORDER — LORAZEPAM 0.5 MG/1
1 TABLET ORAL
Status: DISCONTINUED | OUTPATIENT
Start: 2022-01-01 | End: 2022-01-01 | Stop reason: HOSPADM

## 2022-01-01 RX ORDER — HEPARIN SODIUM 1000 [USP'U]/ML
1500 INJECTION, SOLUTION INTRAVENOUS; SUBCUTANEOUS PRN
Status: DISCONTINUED | OUTPATIENT
Start: 2022-01-01 | End: 2022-08-04 | Stop reason: HOSPADM

## 2022-01-01 RX ORDER — ONDANSETRON 2 MG/ML
4 INJECTION INTRAMUSCULAR; INTRAVENOUS ONCE
Status: COMPLETED | OUTPATIENT
Start: 2022-01-01 | End: 2022-01-01

## 2022-01-01 RX ORDER — FUROSEMIDE 40 MG/1
40 TABLET ORAL DAILY
Qty: 30 TABLET | Refills: 0 | Status: ON HOLD | OUTPATIENT
Start: 2022-01-01 | End: 2022-01-01

## 2022-01-01 RX ORDER — FERROUS SULFATE 325(65) MG
TABLET ORAL
COMMUNITY
Start: 2022-01-01

## 2022-01-01 RX ORDER — LORAZEPAM 2 MG/ML
1 INJECTION INTRAMUSCULAR
Status: DISCONTINUED | OUTPATIENT
Start: 2022-01-01 | End: 2022-01-01 | Stop reason: HOSPADM

## 2022-01-01 RX ORDER — LANOLIN ALCOHOL/MO/W.PET/CERES
325 CREAM (GRAM) TOPICAL
Status: DISCONTINUED | OUTPATIENT
Start: 2022-01-01 | End: 2022-01-01 | Stop reason: HOSPADM

## 2022-01-01 RX ORDER — LORAZEPAM 2 MG/ML
3 INJECTION INTRAMUSCULAR
Status: DISCONTINUED | OUTPATIENT
Start: 2022-01-01 | End: 2022-01-01 | Stop reason: HOSPADM

## 2022-01-01 RX ORDER — PROPOFOL 10 MG/ML
5-50 INJECTION, EMULSION INTRAVENOUS CONTINUOUS
Status: DISCONTINUED | OUTPATIENT
Start: 2022-01-01 | End: 2022-08-04 | Stop reason: HOSPADM

## 2022-01-01 RX ORDER — ALBUMIN (HUMAN) 12.5 G/50ML
75 SOLUTION INTRAVENOUS ONCE
Status: DISCONTINUED | OUTPATIENT
Start: 2022-01-01 | End: 2022-01-01

## 2022-01-01 RX ORDER — MIDODRINE HYDROCHLORIDE 5 MG/1
10 TABLET ORAL
Status: DISCONTINUED | OUTPATIENT
Start: 2022-01-01 | End: 2022-08-04 | Stop reason: HOSPADM

## 2022-01-01 RX ORDER — SPIRONOLACTONE 25 MG/1
25 TABLET ORAL DAILY
Status: DISCONTINUED | OUTPATIENT
Start: 2022-01-01 | End: 2022-01-01 | Stop reason: HOSPADM

## 2022-01-01 RX ORDER — FUROSEMIDE 40 MG/1
40 TABLET ORAL 2 TIMES DAILY
Status: DISCONTINUED | OUTPATIENT
Start: 2022-01-01 | End: 2022-01-01 | Stop reason: HOSPADM

## 2022-01-01 RX ORDER — PENICILLIN V POTASSIUM 250 MG/1
500 TABLET ORAL ONCE
Status: COMPLETED | OUTPATIENT
Start: 2022-01-01 | End: 2022-01-01

## 2022-01-01 RX ORDER — DEXTROSE MONOHYDRATE 25 G/50ML
12.5 INJECTION, SOLUTION INTRAVENOUS PRN
Status: DISCONTINUED | OUTPATIENT
Start: 2022-01-01 | End: 2022-08-04 | Stop reason: HOSPADM

## 2022-01-01 RX ORDER — PANTOPRAZOLE SODIUM 40 MG/1
40 TABLET, DELAYED RELEASE ORAL
Status: DISCONTINUED | OUTPATIENT
Start: 2022-01-01 | End: 2022-01-01 | Stop reason: HOSPADM

## 2022-01-01 RX ORDER — LANOLIN ALCOHOL/MO/W.PET/CERES
300 CREAM (GRAM) TOPICAL DAILY
Qty: 30 TABLET | Refills: 3 | Status: SHIPPED | OUTPATIENT
Start: 2022-01-01

## 2022-01-01 RX ORDER — FOLIC ACID 1 MG/1
1 TABLET ORAL DAILY
Status: DISCONTINUED | OUTPATIENT
Start: 2022-01-01 | End: 2022-08-04 | Stop reason: HOSPADM

## 2022-01-01 RX ORDER — VITAMIN B COMPLEX
2000 TABLET ORAL DAILY
Status: DISCONTINUED | OUTPATIENT
Start: 2022-01-01 | End: 2022-01-01 | Stop reason: HOSPADM

## 2022-01-01 RX ORDER — FOLIC ACID 1 MG/1
1 TABLET ORAL ONCE
Status: COMPLETED | OUTPATIENT
Start: 2022-01-01 | End: 2022-01-01

## 2022-01-01 RX ORDER — SODIUM CHLORIDE 0.9 % (FLUSH) 0.9 %
5-40 SYRINGE (ML) INJECTION EVERY 12 HOURS SCHEDULED
Status: DISCONTINUED | OUTPATIENT
Start: 2022-01-01 | End: 2022-08-04 | Stop reason: HOSPADM

## 2022-01-01 RX ORDER — ONDANSETRON 2 MG/ML
4 INJECTION INTRAMUSCULAR; INTRAVENOUS EVERY 6 HOURS PRN
Status: DISCONTINUED | OUTPATIENT
Start: 2022-01-01 | End: 2022-01-01 | Stop reason: SDUPTHER

## 2022-01-01 RX ORDER — HEPARIN SODIUM 1000 [USP'U]/ML
1600 INJECTION, SOLUTION INTRAVENOUS; SUBCUTANEOUS PRN
Status: DISCONTINUED | OUTPATIENT
Start: 2022-01-01 | End: 2022-08-04 | Stop reason: HOSPADM

## 2022-01-01 RX ORDER — NOREPINEPHRINE BIT/0.9 % NACL 16MG/250ML
1-100 INFUSION BOTTLE (ML) INTRAVENOUS CONTINUOUS
Status: DISCONTINUED | OUTPATIENT
Start: 2022-01-01 | End: 2022-08-04 | Stop reason: HOSPADM

## 2022-01-01 RX ORDER — 0.9 % SODIUM CHLORIDE 0.9 %
1000 INTRAVENOUS SOLUTION INTRAVENOUS ONCE
Status: COMPLETED | OUTPATIENT
Start: 2022-01-01 | End: 2022-01-01

## 2022-01-01 RX ORDER — SUCCINYLCHOLINE CHLORIDE 20 MG/ML
INJECTION INTRAMUSCULAR; INTRAVENOUS
Status: COMPLETED
Start: 2022-01-01 | End: 2022-01-01

## 2022-01-01 RX ORDER — GABAPENTIN 300 MG/1
300 CAPSULE ORAL EVERY 8 HOURS
Status: DISCONTINUED | OUTPATIENT
Start: 2022-01-01 | End: 2022-01-01

## 2022-01-01 RX ORDER — FUROSEMIDE 10 MG/ML
80 INJECTION INTRAMUSCULAR; INTRAVENOUS ONCE
Status: COMPLETED | OUTPATIENT
Start: 2022-01-01 | End: 2022-01-01

## 2022-01-01 RX ORDER — LACTULOSE 10 G/15ML
20 SOLUTION ORAL 3 TIMES DAILY
Qty: 1260 ML | Refills: 0 | Status: SHIPPED | OUTPATIENT
Start: 2022-01-01 | End: 2022-01-01

## 2022-01-01 RX ORDER — POLYETHYLENE GLYCOL 3350 17 G/17G
17 POWDER, FOR SOLUTION ORAL DAILY
Qty: 527 G | Refills: 1 | Status: ON HOLD | OUTPATIENT
Start: 2022-01-01 | End: 2022-01-01

## 2022-01-01 RX ORDER — LORAZEPAM 0.5 MG/1
2 TABLET ORAL
Status: DISCONTINUED | OUTPATIENT
Start: 2022-01-01 | End: 2022-01-01 | Stop reason: HOSPADM

## 2022-01-01 RX ORDER — LORAZEPAM 2 MG/ML
4 INJECTION INTRAMUSCULAR
Status: DISCONTINUED | OUTPATIENT
Start: 2022-01-01 | End: 2022-01-01 | Stop reason: HOSPADM

## 2022-01-01 RX ORDER — ONDANSETRON 2 MG/ML
4 INJECTION INTRAMUSCULAR; INTRAVENOUS EVERY 6 HOURS PRN
Status: DISCONTINUED | OUTPATIENT
Start: 2022-01-01 | End: 2022-08-04 | Stop reason: HOSPADM

## 2022-01-01 RX ORDER — THIAMINE HYDROCHLORIDE 100 MG/ML
100 INJECTION, SOLUTION INTRAMUSCULAR; INTRAVENOUS DAILY
Status: DISCONTINUED | OUTPATIENT
Start: 2022-01-01 | End: 2022-01-01

## 2022-01-01 RX ORDER — LANOLIN ALCOHOL/MO/W.PET/CERES
CREAM (GRAM) TOPICAL
COMMUNITY
Start: 2022-01-01

## 2022-01-01 RX ORDER — LORAZEPAM 2 MG/1
4 TABLET ORAL
Status: DISCONTINUED | OUTPATIENT
Start: 2022-01-01 | End: 2022-01-01 | Stop reason: HOSPADM

## 2022-01-01 RX ORDER — ETOMIDATE 2 MG/ML
10 INJECTION INTRAVENOUS ONCE
Status: COMPLETED | OUTPATIENT
Start: 2022-01-01 | End: 2022-01-01

## 2022-01-01 RX ORDER — PROPOFOL 10 MG/ML
INJECTION, EMULSION INTRAVENOUS
Status: COMPLETED
Start: 2022-01-01 | End: 2022-01-01

## 2022-01-01 RX ORDER — HEPARIN SODIUM (PORCINE) LOCK FLUSH IV SOLN 100 UNIT/ML 100 UNIT/ML
300 SOLUTION INTRAVENOUS ONCE
Status: DISCONTINUED | OUTPATIENT
Start: 2022-01-01 | End: 2022-08-04 | Stop reason: HOSPADM

## 2022-01-01 RX ADMIN — HEPARIN SODIUM 1600 UNITS: 1000 INJECTION INTRAVENOUS; SUBCUTANEOUS at 12:45

## 2022-01-01 RX ADMIN — IOPAMIDOL 30 ML: 755 INJECTION, SOLUTION INTRAVENOUS at 13:33

## 2022-01-01 RX ADMIN — PHYTONADIONE 10 MG: 5 TABLET ORAL at 17:52

## 2022-01-01 RX ADMIN — KETOROLAC TROMETHAMINE 15 MG: 15 INJECTION, SOLUTION INTRAMUSCULAR; INTRAVENOUS at 05:57

## 2022-01-01 RX ADMIN — FERROUS SULFATE TAB EC 325 MG (65 MG FE EQUIVALENT) 325 MG: 325 (65 FE) TABLET DELAYED RESPONSE at 08:41

## 2022-01-01 RX ADMIN — SODIUM CHLORIDE, PRESERVATIVE FREE 10 ML: 5 INJECTION INTRAVENOUS at 19:48

## 2022-01-01 RX ADMIN — SODIUM CHLORIDE: 9 INJECTION, SOLUTION INTRAVENOUS at 03:31

## 2022-01-01 RX ADMIN — Medication 100 MG: at 09:03

## 2022-01-01 RX ADMIN — METHOCARBAMOL TABLETS 750 MG: 750 TABLET, COATED ORAL at 11:47

## 2022-01-01 RX ADMIN — CEFTRIAXONE SODIUM 2000 MG: 10 INJECTION, POWDER, FOR SOLUTION INTRAVENOUS at 12:43

## 2022-01-01 RX ADMIN — MIDODRINE HYDROCHLORIDE 10 MG: 5 TABLET ORAL at 17:17

## 2022-01-01 RX ADMIN — ENOXAPARIN SODIUM 40 MG: 100 INJECTION SUBCUTANEOUS at 08:54

## 2022-01-01 RX ADMIN — SODIUM BICARBONATE 50 MEQ: 84 INJECTION, SOLUTION INTRAVENOUS at 07:56

## 2022-01-01 RX ADMIN — OXYCODONE HYDROCHLORIDE 5 MG: 5 SOLUTION ORAL at 21:04

## 2022-01-01 RX ADMIN — Medication 100 MG: at 16:59

## 2022-01-01 RX ADMIN — FAMOTIDINE 20 MG: 10 INJECTION, SOLUTION INTRAVENOUS at 02:13

## 2022-01-01 RX ADMIN — FENTANYL CITRATE 50 MCG: 50 INJECTION, SOLUTION INTRAMUSCULAR; INTRAVENOUS at 07:28

## 2022-01-01 RX ADMIN — SODIUM CHLORIDE, PRESERVATIVE FREE 10 ML: 5 INJECTION INTRAVENOUS at 22:40

## 2022-01-01 RX ADMIN — SODIUM CHLORIDE, PRESERVATIVE FREE 10 ML: 5 INJECTION INTRAVENOUS at 20:23

## 2022-01-01 RX ADMIN — SODIUM CHLORIDE, PRESERVATIVE FREE 20 MG: 5 INJECTION INTRAVENOUS at 09:09

## 2022-01-01 RX ADMIN — IOPAMIDOL 75 ML: 755 INJECTION, SOLUTION INTRAVENOUS at 16:45

## 2022-01-01 RX ADMIN — SODIUM CHLORIDE 8 MG/HR: 9 INJECTION, SOLUTION INTRAVENOUS at 05:00

## 2022-01-01 RX ADMIN — MIDODRINE HYDROCHLORIDE 10 MG: 5 TABLET ORAL at 08:31

## 2022-01-01 RX ADMIN — HEPARIN SODIUM 1000 UNITS: 1000 INJECTION INTRAVENOUS; SUBCUTANEOUS at 13:00

## 2022-01-01 RX ADMIN — IOPAMIDOL 75 ML: 755 INJECTION, SOLUTION INTRAVENOUS at 03:57

## 2022-01-01 RX ADMIN — SODIUM CHLORIDE, PRESERVATIVE FREE 10 ML: 5 INJECTION INTRAVENOUS at 08:57

## 2022-01-01 RX ADMIN — IOPAMIDOL 75 ML: 755 INJECTION, SOLUTION INTRAVENOUS at 02:19

## 2022-01-01 RX ADMIN — ONDANSETRON 4 MG: 2 INJECTION INTRAMUSCULAR; INTRAVENOUS at 02:01

## 2022-01-01 RX ADMIN — SODIUM CHLORIDE, PRESERVATIVE FREE 20 MG: 5 INJECTION INTRAVENOUS at 08:53

## 2022-01-01 RX ADMIN — FENTANYL CITRATE 25 MCG: 50 INJECTION, SOLUTION INTRAMUSCULAR; INTRAVENOUS at 03:53

## 2022-01-01 RX ADMIN — FAMOTIDINE 20 MG: 20 TABLET, FILM COATED ORAL at 08:31

## 2022-01-01 RX ADMIN — FUROSEMIDE 80 MG: 10 INJECTION, SOLUTION INTRAMUSCULAR; INTRAVENOUS at 18:05

## 2022-01-01 RX ADMIN — PHYTONADIONE 10 MG: 5 TABLET ORAL at 09:09

## 2022-01-01 RX ADMIN — ALUMINUM HYDROXIDE, MAGNESIUM HYDROXIDE, AND SIMETHICONE 30 ML: 200; 200; 20 SUSPENSION ORAL at 06:28

## 2022-01-01 RX ADMIN — GABAPENTIN 300 MG: 300 CAPSULE ORAL at 09:04

## 2022-01-01 RX ADMIN — CEFTRIAXONE SODIUM 2000 MG: 10 INJECTION, POWDER, FOR SOLUTION INTRAVENOUS at 12:46

## 2022-01-01 RX ADMIN — MAGNESIUM SULFATE 2000 MG: 2 INJECTION INTRAVENOUS at 14:14

## 2022-01-01 RX ADMIN — Medication 10 MCG/MIN: at 03:05

## 2022-01-01 RX ADMIN — MIDODRINE HYDROCHLORIDE 10 MG: 5 TABLET ORAL at 12:17

## 2022-01-01 RX ADMIN — SODIUM CHLORIDE, PRESERVATIVE FREE 10 ML: 5 INJECTION INTRAVENOUS at 08:35

## 2022-01-01 RX ADMIN — METHOCARBAMOL TABLETS 750 MG: 750 TABLET, COATED ORAL at 09:04

## 2022-01-01 RX ADMIN — Medication 50 MCG/HR: at 06:51

## 2022-01-01 RX ADMIN — Medication 100 MG: at 08:41

## 2022-01-01 RX ADMIN — THIAMINE HYDROCHLORIDE: 100 INJECTION, SOLUTION INTRAMUSCULAR; INTRAVENOUS at 09:51

## 2022-01-01 RX ADMIN — LACTULOSE 20 G: 20 SOLUTION ORAL at 19:48

## 2022-01-01 RX ADMIN — KETOROLAC TROMETHAMINE 30 MG: 30 INJECTION, SOLUTION INTRAMUSCULAR at 01:33

## 2022-01-01 RX ADMIN — ENOXAPARIN SODIUM 40 MG: 100 INJECTION SUBCUTANEOUS at 09:09

## 2022-01-01 RX ADMIN — Medication 5 MCG/MIN: at 03:05

## 2022-01-01 RX ADMIN — DEXTROSE MONOHYDRATE: 100 INJECTION, SOLUTION INTRAVENOUS at 08:30

## 2022-01-01 RX ADMIN — Medication 300 MG: at 20:22

## 2022-01-01 RX ADMIN — SODIUM CHLORIDE, PRESERVATIVE FREE 20 MG: 5 INJECTION INTRAVENOUS at 20:51

## 2022-01-01 RX ADMIN — ALBUMIN (HUMAN) 25 G: 0.25 INJECTION, SOLUTION INTRAVENOUS at 15:16

## 2022-01-01 RX ADMIN — HEPARIN SODIUM 1500 UNITS: 1000 INJECTION INTRAVENOUS; SUBCUTANEOUS at 18:24

## 2022-01-01 RX ADMIN — MIDODRINE HYDROCHLORIDE 10 MG: 5 TABLET ORAL at 07:29

## 2022-01-01 RX ADMIN — LACTULOSE 20 G: 20 SOLUTION ORAL at 07:55

## 2022-01-01 RX ADMIN — OXYCODONE 5 MG: 5 TABLET ORAL at 11:47

## 2022-01-01 RX ADMIN — IBUPROFEN 800 MG: 800 TABLET, FILM COATED ORAL at 20:58

## 2022-01-01 RX ADMIN — CEFTRIAXONE SODIUM 2000 MG: 10 INJECTION, POWDER, FOR SOLUTION INTRAVENOUS at 13:13

## 2022-01-01 RX ADMIN — MIDODRINE HYDROCHLORIDE 10 MG: 5 TABLET ORAL at 16:59

## 2022-01-01 RX ADMIN — HEPARIN SODIUM 1000 UNITS: 1000 INJECTION INTRAVENOUS; SUBCUTANEOUS at 12:57

## 2022-01-01 RX ADMIN — ONDANSETRON 4 MG: 2 INJECTION INTRAMUSCULAR; INTRAVENOUS at 02:12

## 2022-01-01 RX ADMIN — Medication 300 MG: at 07:55

## 2022-01-01 RX ADMIN — POLYETHYLENE GLYCOL 3350 17 G: 17 POWDER, FOR SOLUTION ORAL at 09:04

## 2022-01-01 RX ADMIN — CEFTRIAXONE SODIUM 2000 MG: 10 INJECTION, POWDER, FOR SOLUTION INTRAVENOUS at 09:35

## 2022-01-01 RX ADMIN — SODIUM CHLORIDE, PRESERVATIVE FREE 10 ML: 5 INJECTION INTRAVENOUS at 09:13

## 2022-01-01 RX ADMIN — ONDANSETRON 4 MG: 2 INJECTION INTRAMUSCULAR; INTRAVENOUS at 06:59

## 2022-01-01 RX ADMIN — FUROSEMIDE 40 MG: 40 TABLET ORAL at 08:42

## 2022-01-01 RX ADMIN — SODIUM CHLORIDE, PRESERVATIVE FREE 10 ML: 5 INJECTION INTRAVENOUS at 20:51

## 2022-01-01 RX ADMIN — MIDAZOLAM 2 MG: 1 INJECTION INTRAMUSCULAR; INTRAVENOUS at 07:35

## 2022-01-01 RX ADMIN — GABAPENTIN 300 MG: 300 CAPSULE ORAL at 18:22

## 2022-01-01 RX ADMIN — SODIUM CHLORIDE 80 MG: 9 INJECTION, SOLUTION INTRAVENOUS at 03:50

## 2022-01-01 RX ADMIN — Medication 5 MCG/MIN: at 06:15

## 2022-01-01 RX ADMIN — METHOCARBAMOL TABLETS 750 MG: 750 TABLET, COATED ORAL at 00:47

## 2022-01-01 RX ADMIN — SODIUM CHLORIDE 8 MG/HR: 9 INJECTION, SOLUTION INTRAVENOUS at 08:42

## 2022-01-01 RX ADMIN — HEPARIN SODIUM 1600 UNITS: 1000 INJECTION INTRAVENOUS; SUBCUTANEOUS at 18:24

## 2022-01-01 RX ADMIN — METHOCARBAMOL TABLETS 750 MG: 750 TABLET, COATED ORAL at 18:22

## 2022-01-01 RX ADMIN — SODIUM CHLORIDE, PRESERVATIVE FREE 10 ML: 5 INJECTION INTRAVENOUS at 09:00

## 2022-01-01 RX ADMIN — FAMOTIDINE 20 MG: 20 TABLET, FILM COATED ORAL at 08:10

## 2022-01-01 RX ADMIN — ALBUMIN (HUMAN) 25 G: 0.25 INJECTION, SOLUTION INTRAVENOUS at 17:15

## 2022-01-01 RX ADMIN — HEPARIN SODIUM 1500 UNITS: 1000 INJECTION INTRAVENOUS; SUBCUTANEOUS at 12:45

## 2022-01-01 RX ADMIN — MIDODRINE HYDROCHLORIDE 10 MG: 5 TABLET ORAL at 12:45

## 2022-01-01 RX ADMIN — LACTULOSE 20 G: 20 SOLUTION ORAL at 17:36

## 2022-01-01 RX ADMIN — SODIUM BICARBONATE 50 MEQ: 84 INJECTION, SOLUTION INTRAVENOUS at 12:01

## 2022-01-01 RX ADMIN — MIDODRINE HYDROCHLORIDE 10 MG: 5 TABLET ORAL at 17:07

## 2022-01-01 RX ADMIN — VASOPRESSIN 0.01 UNITS/MIN: 20 INJECTION PARENTERAL at 08:34

## 2022-01-01 RX ADMIN — SODIUM CHLORIDE 1000 ML: 9 INJECTION, SOLUTION INTRAVENOUS at 12:58

## 2022-01-01 RX ADMIN — GABAPENTIN 300 MG: 300 CAPSULE ORAL at 07:56

## 2022-01-01 RX ADMIN — THIAMINE HYDROCHLORIDE: 100 INJECTION, SOLUTION INTRAMUSCULAR; INTRAVENOUS at 08:55

## 2022-01-01 RX ADMIN — FOLIC ACID 1 MG: 1 TABLET ORAL at 08:10

## 2022-01-01 RX ADMIN — SODIUM CHLORIDE 500 ML: 9 INJECTION, SOLUTION INTRAVENOUS at 03:39

## 2022-01-01 RX ADMIN — ALBUMIN (HUMAN) 25 G: 0.25 INJECTION, SOLUTION INTRAVENOUS at 09:45

## 2022-01-01 RX ADMIN — Medication 100 MG: at 08:10

## 2022-01-01 RX ADMIN — MIDODRINE HYDROCHLORIDE 10 MG: 5 TABLET ORAL at 16:43

## 2022-01-01 RX ADMIN — LACTULOSE 20 G: 20 SOLUTION ORAL at 20:22

## 2022-01-01 RX ADMIN — IOPAMIDOL 75 ML: 755 INJECTION, SOLUTION INTRAVENOUS at 18:33

## 2022-01-01 RX ADMIN — ETOMIDATE 10 MG: 2 INJECTION INTRAVENOUS at 06:15

## 2022-01-01 RX ADMIN — CEFTRIAXONE SODIUM 2000 MG: 10 INJECTION, POWDER, FOR SOLUTION INTRAVENOUS at 10:59

## 2022-01-01 RX ADMIN — CEFTRIAXONE SODIUM 1000 MG: 1 INJECTION, POWDER, FOR SOLUTION INTRAMUSCULAR; INTRAVENOUS at 13:07

## 2022-01-01 RX ADMIN — PIPERACILLIN AND TAZOBACTAM 4500 MG: 4; .5 INJECTION, POWDER, LYOPHILIZED, FOR SOLUTION INTRAVENOUS; PARENTERAL at 09:32

## 2022-01-01 RX ADMIN — MIDODRINE HYDROCHLORIDE 10 MG: 5 TABLET ORAL at 12:50

## 2022-01-01 RX ADMIN — POTASSIUM BICARBONATE 40 MEQ: 782 TABLET, EFFERVESCENT ORAL at 16:17

## 2022-01-01 RX ADMIN — SODIUM CHLORIDE, PRESERVATIVE FREE 10 ML: 5 INJECTION INTRAVENOUS at 07:56

## 2022-01-01 RX ADMIN — POTASSIUM CHLORIDE 40 MEQ: 1500 TABLET, EXTENDED RELEASE ORAL at 01:02

## 2022-01-01 RX ADMIN — SUCCINYLCHOLINE CHLORIDE 100 MG: 20 INJECTION, SOLUTION INTRAMUSCULAR; INTRAVENOUS at 06:16

## 2022-01-01 RX ADMIN — FENTANYL CITRATE 25 MCG: 50 INJECTION, SOLUTION INTRAMUSCULAR; INTRAVENOUS at 15:52

## 2022-01-01 RX ADMIN — FENTANYL CITRATE 50 MCG: 50 INJECTION, SOLUTION INTRAMUSCULAR; INTRAVENOUS at 12:52

## 2022-01-01 RX ADMIN — PROPOFOL 50 MCG/KG/MIN: 10 INJECTION, EMULSION INTRAVENOUS at 08:28

## 2022-01-01 RX ADMIN — Medication 100 MG: at 08:31

## 2022-01-01 RX ADMIN — Medication 50 MEQ: at 07:56

## 2022-01-01 RX ADMIN — SODIUM CHLORIDE, PRESERVATIVE FREE 10 ML: 5 INJECTION INTRAVENOUS at 21:15

## 2022-01-01 RX ADMIN — SODIUM CHLORIDE 2217 ML: 9 INJECTION, SOLUTION INTRAVENOUS at 01:34

## 2022-01-01 RX ADMIN — MORPHINE SULFATE 2 MG: 2 INJECTION, SOLUTION INTRAMUSCULAR; INTRAVENOUS at 14:44

## 2022-01-01 RX ADMIN — THIAMINE HYDROCHLORIDE: 100 INJECTION, SOLUTION INTRAMUSCULAR; INTRAVENOUS at 09:07

## 2022-01-01 RX ADMIN — MIDODRINE HYDROCHLORIDE 10 MG: 5 TABLET ORAL at 12:06

## 2022-01-01 RX ADMIN — FOLIC ACID 1 MG: 1 TABLET ORAL at 01:44

## 2022-01-01 RX ADMIN — SUCCINYLCHOLINE CHLORIDE 100 MG: 20 INJECTION INTRAMUSCULAR; INTRAVENOUS at 06:16

## 2022-01-01 RX ADMIN — MAGNESIUM SULFATE 2000 MG: 2 INJECTION INTRAVENOUS at 01:02

## 2022-01-01 RX ADMIN — ALBUMIN (HUMAN) 75 G: 0.25 INJECTION, SOLUTION INTRAVENOUS at 10:16

## 2022-01-01 RX ADMIN — FENTANYL CITRATE 25 MCG: 50 INJECTION, SOLUTION INTRAMUSCULAR; INTRAVENOUS at 05:50

## 2022-01-01 RX ADMIN — MIDODRINE HYDROCHLORIDE 10 MG: 5 TABLET ORAL at 17:36

## 2022-01-01 RX ADMIN — PANTOPRAZOLE SODIUM 40 MG: 40 TABLET, DELAYED RELEASE ORAL at 06:25

## 2022-01-01 RX ADMIN — MIDODRINE HYDROCHLORIDE 10 MG: 5 TABLET ORAL at 11:37

## 2022-01-01 RX ADMIN — SODIUM BICARBONATE: 84 INJECTION, SOLUTION INTRAVENOUS at 08:22

## 2022-01-01 RX ADMIN — FAMOTIDINE 20 MG: 20 TABLET, FILM COATED ORAL at 09:03

## 2022-01-01 RX ADMIN — HEPARIN SODIUM 1500 UNITS: 1000 INJECTION INTRAVENOUS; SUBCUTANEOUS at 18:10

## 2022-01-01 RX ADMIN — ALBUMIN (HUMAN) 25 G: 0.25 INJECTION, SOLUTION INTRAVENOUS at 03:45

## 2022-01-01 RX ADMIN — MIDAZOLAM HYDROCHLORIDE 2 MG: 2 INJECTION, SOLUTION INTRAMUSCULAR; INTRAVENOUS at 07:35

## 2022-01-01 RX ADMIN — PROPOFOL 30 MCG/KG/MIN: 10 INJECTION, EMULSION INTRAVENOUS at 07:24

## 2022-01-01 RX ADMIN — SPIRONOLACTONE 25 MG: 25 TABLET ORAL at 11:46

## 2022-01-01 RX ADMIN — ALBUMIN (HUMAN) 25 G: 0.25 INJECTION, SOLUTION INTRAVENOUS at 06:33

## 2022-01-01 RX ADMIN — SODIUM CHLORIDE, PRESERVATIVE FREE 10 ML: 5 INJECTION INTRAVENOUS at 13:14

## 2022-01-01 RX ADMIN — HEPARIN SODIUM 1600 UNITS: 1000 INJECTION INTRAVENOUS; SUBCUTANEOUS at 18:09

## 2022-01-01 RX ADMIN — ACETAMINOPHEN 1000 MG: 500 TABLET ORAL at 22:29

## 2022-01-01 RX ADMIN — MAGNESIUM SULFATE 2000 MG: 2 INJECTION INTRAVENOUS at 04:12

## 2022-01-01 RX ADMIN — GABAPENTIN 300 MG: 300 CAPSULE ORAL at 00:47

## 2022-01-01 RX ADMIN — CEFTRIAXONE SODIUM 2000 MG: 10 INJECTION, POWDER, FOR SOLUTION INTRAVENOUS at 12:12

## 2022-01-01 RX ADMIN — MIDODRINE HYDROCHLORIDE 10 MG: 5 TABLET ORAL at 13:16

## 2022-01-01 RX ADMIN — MAGNESIUM GLUCONATE 500 MG ORAL TABLET 400 MG: 500 TABLET ORAL at 08:41

## 2022-01-01 RX ADMIN — Medication 75 MCG/MIN: at 06:42

## 2022-01-01 RX ADMIN — MIDODRINE HYDROCHLORIDE 10 MG: 5 TABLET ORAL at 08:10

## 2022-01-01 RX ADMIN — DEXTROSE MONOHYDRATE 12.5 G: 25 INJECTION, SOLUTION INTRAVENOUS at 05:05

## 2022-01-01 RX ADMIN — SODIUM CHLORIDE, PRESERVATIVE FREE 10 ML: 5 INJECTION INTRAVENOUS at 09:09

## 2022-01-01 RX ADMIN — PENICILLIN V POTASSIUM 500 MG: 250 TABLET ORAL at 16:20

## 2022-01-01 RX ADMIN — FENTANYL CITRATE 25 MCG: 50 INJECTION, SOLUTION INTRAMUSCULAR; INTRAVENOUS at 12:14

## 2022-01-01 RX ADMIN — Medication 100 MG: at 09:02

## 2022-01-01 RX ADMIN — FOLIC ACID 1 MG: 1 TABLET ORAL at 09:26

## 2022-01-01 RX ADMIN — FOLIC ACID 1 MG: 1 TABLET ORAL at 08:40

## 2022-01-01 RX ADMIN — POTASSIUM CHLORIDE 40 MEQ: 1500 TABLET, EXTENDED RELEASE ORAL at 12:58

## 2022-01-01 RX ADMIN — FOLIC ACID 1 MG: 1 TABLET ORAL at 08:31

## 2022-01-01 RX ADMIN — PIPERACILLIN AND TAZOBACTAM 4500 MG: 4; .5 INJECTION, POWDER, LYOPHILIZED, FOR SOLUTION INTRAVENOUS; PARENTERAL at 00:35

## 2022-01-01 RX ADMIN — SODIUM CHLORIDE, PRESERVATIVE FREE 10 ML: 5 INJECTION INTRAVENOUS at 09:04

## 2022-01-01 RX ADMIN — ACETAMINOPHEN 1000 MG: 500 TABLET ORAL at 11:58

## 2022-01-01 RX ADMIN — FUROSEMIDE 20 MG: 20 TABLET ORAL at 07:56

## 2022-01-01 RX ADMIN — SODIUM CHLORIDE, PRESERVATIVE FREE 10 ML: 5 INJECTION INTRAVENOUS at 08:12

## 2022-01-01 RX ADMIN — DEXTROSE MONOHYDRATE: 100 INJECTION, SOLUTION INTRAVENOUS at 06:30

## 2022-01-01 RX ADMIN — Medication 50 MEQ: at 12:01

## 2022-01-01 RX ADMIN — POTASSIUM BICARBONATE 40 MEQ: 782 TABLET, EFFERVESCENT ORAL at 04:11

## 2022-01-01 RX ADMIN — KETOROLAC TROMETHAMINE 30 MG: 15 INJECTION, SOLUTION INTRAMUSCULAR; INTRAVENOUS at 22:08

## 2022-01-01 RX ADMIN — KETOROLAC TROMETHAMINE 15 MG: 15 INJECTION, SOLUTION INTRAMUSCULAR; INTRAVENOUS at 20:44

## 2022-01-01 RX ADMIN — MAGNESIUM SULFATE 2000 MG: 2 INJECTION INTRAVENOUS at 13:07

## 2022-01-01 RX ADMIN — MIDODRINE HYDROCHLORIDE 10 MG: 5 TABLET ORAL at 17:52

## 2022-01-01 RX ADMIN — Medication 2000 UNITS: at 08:40

## 2022-01-01 RX ADMIN — LACTULOSE 20 G: 20 SOLUTION ORAL at 13:25

## 2022-01-01 RX ADMIN — SODIUM CHLORIDE, PRESERVATIVE FREE 10 ML: 5 INJECTION INTRAVENOUS at 20:16

## 2022-01-01 RX ADMIN — Medication 3 MCG/MIN: at 18:05

## 2022-01-01 RX ADMIN — OCTREOTIDE ACETATE 25 MCG/HR: 500 INJECTION, SOLUTION INTRAVENOUS; SUBCUTANEOUS at 04:32

## 2022-01-01 RX ADMIN — SODIUM CHLORIDE, PRESERVATIVE FREE 10 ML: 5 INJECTION INTRAVENOUS at 20:00

## 2022-01-01 RX ADMIN — FOLIC ACID 1 MG: 1 TABLET ORAL at 09:03

## 2022-01-01 RX ADMIN — PHYTONADIONE 10 MG: 5 TABLET ORAL at 09:28

## 2022-01-01 RX ADMIN — METHOCARBAMOL TABLETS 750 MG: 750 TABLET, COATED ORAL at 07:56

## 2022-01-01 RX ADMIN — KETOROLAC TROMETHAMINE 15 MG: 15 INJECTION, SOLUTION INTRAMUSCULAR; INTRAVENOUS at 14:53

## 2022-01-01 RX ADMIN — MIDODRINE HYDROCHLORIDE 10 MG: 5 TABLET ORAL at 14:00

## 2022-01-01 RX ADMIN — SODIUM CHLORIDE, PRESERVATIVE FREE 10 ML: 5 INJECTION INTRAVENOUS at 09:02

## 2022-01-01 RX ADMIN — ERYTHROMYCIN LACTOBIONATE 250 MG: 500 INJECTION, POWDER, LYOPHILIZED, FOR SOLUTION INTRAVENOUS at 09:39

## 2022-01-01 RX ADMIN — METOCLOPRAMIDE 10 MG: 5 INJECTION, SOLUTION INTRAMUSCULAR; INTRAVENOUS at 08:40

## 2022-01-01 RX ADMIN — FAMOTIDINE 20 MG: 20 TABLET, FILM COATED ORAL at 09:26

## 2022-01-01 RX ADMIN — FENTANYL CITRATE 50 MCG: 50 INJECTION, SOLUTION INTRAMUSCULAR; INTRAVENOUS at 02:11

## 2022-01-01 RX ADMIN — CEFTRIAXONE 1000 MG: 10 INJECTION, POWDER, FOR SOLUTION INTRAVENOUS at 13:26

## 2022-01-01 RX ADMIN — SODIUM CHLORIDE, POTASSIUM CHLORIDE, SODIUM LACTATE AND CALCIUM CHLORIDE 1000 ML: 600; 310; 30; 20 INJECTION, SOLUTION INTRAVENOUS at 02:10

## 2022-01-01 RX ADMIN — OXYCODONE 5 MG: 5 TABLET ORAL at 15:24

## 2022-01-01 RX ADMIN — SODIUM CHLORIDE, PRESERVATIVE FREE 10 ML: 5 INJECTION INTRAVENOUS at 20:33

## 2022-01-01 RX ADMIN — CEFTRIAXONE SODIUM 2000 MG: 10 INJECTION, POWDER, FOR SOLUTION INTRAVENOUS at 11:08

## 2022-01-01 RX ADMIN — OXYCODONE HYDROCHLORIDE 5 MG: 5 SOLUTION ORAL at 16:57

## 2022-01-01 RX ADMIN — MIDODRINE HYDROCHLORIDE 10 MG: 5 TABLET ORAL at 17:19

## 2022-01-01 RX ADMIN — SODIUM CHLORIDE, PRESERVATIVE FREE 10 ML: 5 INJECTION INTRAVENOUS at 12:45

## 2022-01-01 RX ADMIN — ACETAMINOPHEN 650 MG: 325 TABLET ORAL at 22:54

## 2022-01-01 RX ADMIN — MAGNESIUM SULFATE HEPTAHYDRATE 1000 MG: 1 INJECTION, SOLUTION INTRAVENOUS at 11:04

## 2022-01-01 RX ADMIN — FENTANYL CITRATE 50 MCG: 50 INJECTION, SOLUTION INTRAMUSCULAR; INTRAVENOUS at 07:23

## 2022-01-01 RX ADMIN — MIDODRINE HYDROCHLORIDE 10 MG: 5 TABLET ORAL at 08:47

## 2022-01-01 RX ADMIN — FUROSEMIDE 20 MG: 20 TABLET ORAL at 20:22

## 2022-01-01 RX ADMIN — MIDODRINE HYDROCHLORIDE 10 MG: 5 TABLET ORAL at 09:09

## 2022-01-01 RX ADMIN — MIDODRINE HYDROCHLORIDE 10 MG: 5 TABLET ORAL at 09:03

## 2022-01-01 ASSESSMENT — PAIN SCALES - GENERAL
PAINLEVEL_OUTOF10: 8
PAINLEVEL_OUTOF10: 6
PAINLEVEL_OUTOF10: 7
PAINLEVEL_OUTOF10: 8
PAINLEVEL_OUTOF10: 0
PAINLEVEL_OUTOF10: 0
PAINLEVEL_OUTOF10: 6
PAINLEVEL_OUTOF10: 0
PAINLEVEL_OUTOF10: 3
PAINLEVEL_OUTOF10: 0
PAINLEVEL_OUTOF10: 5
PAINLEVEL_OUTOF10: 7
PAINLEVEL_OUTOF10: 7
PAINLEVEL_OUTOF10: 0
PAINLEVEL_OUTOF10: 5
PAINLEVEL_OUTOF10: 10
PAINLEVEL_OUTOF10: 8
PAINLEVEL_OUTOF10: 3
PAINLEVEL_OUTOF10: 8
PAINLEVEL_OUTOF10: 7
PAINLEVEL_OUTOF10: 7
PAINLEVEL_OUTOF10: 8
PAINLEVEL_OUTOF10: 3
PAINLEVEL_OUTOF10: 0
PAINLEVEL_OUTOF10: 8
PAINLEVEL_OUTOF10: 9
PAINLEVEL_OUTOF10: 5
PAINLEVEL_OUTOF10: 1
PAINLEVEL_OUTOF10: 3
PAINLEVEL_OUTOF10: 7
PAINLEVEL_OUTOF10: 5
PAINLEVEL_OUTOF10: 8
PAINLEVEL_OUTOF10: 0
PAINLEVEL_OUTOF10: 0
PAINLEVEL_OUTOF10: 5
PAINLEVEL_OUTOF10: 7
PAINLEVEL_OUTOF10: 8
PAINLEVEL_OUTOF10: 8
PAINLEVEL_OUTOF10: 9

## 2022-01-01 ASSESSMENT — ENCOUNTER SYMPTOMS
TACHYPNEA: 1
RHINORRHEA: 0
CHOKING: 0
VOMITING: 0
ABDOMINAL PAIN: 0
COLOR CHANGE: 0
SHORTNESS OF BREATH: 0
ABDOMINAL DISTENTION: 1
COUGH: 0
SHORTNESS OF BREATH: 0
PHOTOPHOBIA: 0
SORE THROAT: 0
COUGH: 0
BACK PAIN: 0
ABDOMINAL PAIN: 1
VOMITING: 0
EYE DISCHARGE: 0
COLOR CHANGE: 0
COLOR CHANGE: 1
ABDOMINAL PAIN: 0
NAUSEA: 0
COUGH: 0
CHEST TIGHTNESS: 0
EYE DISCHARGE: 0
SORE THROAT: 0
ABDOMINAL PAIN: 1
EYE PAIN: 0
SINUS PAIN: 0
ABDOMINAL DISTENTION: 1
EYE DISCHARGE: 0
EYE PAIN: 0
SORE THROAT: 1
EYE REDNESS: 0
SORE THROAT: 0
EYE PAIN: 0
ABDOMINAL PAIN: 1
SORE THROAT: 0
RHINORRHEA: 0
RHINORRHEA: 0
SHORTNESS OF BREATH: 0
VOMITING: 0
ABDOMINAL DISTENTION: 1
ABDOMINAL DISTENTION: 1
SHORTNESS OF BREATH: 0
BACK PAIN: 0
ABDOMINAL DISTENTION: 1
NAUSEA: 0
ABDOMINAL PAIN: 0
VOMITING: 0
SINUS PAIN: 0
FACIAL SWELLING: 0
VOMITING: 0
EYE DISCHARGE: 0
NAUSEA: 0
SORE THROAT: 1
SHORTNESS OF BREATH: 0
EYE DISCHARGE: 0
NAUSEA: 1
COUGH: 0
COUGH: 0
SORE THROAT: 0
ABDOMINAL PAIN: 0
CHEST TIGHTNESS: 0
SORE THROAT: 1
CHEST TIGHTNESS: 0
ABDOMINAL PAIN: 1
EYE DISCHARGE: 0
RHINORRHEA: 0
VOMITING: 0
VOMITING: 0
COUGH: 0
BACK PAIN: 0
COLOR CHANGE: 0
SHORTNESS OF BREATH: 0
ABDOMINAL DISTENTION: 1
RHINORRHEA: 0
COUGH: 0
SORE THROAT: 1
SHORTNESS OF BREATH: 0
COLOR CHANGE: 0
DIARRHEA: 0
CHEST TIGHTNESS: 0
CONSTIPATION: 0
COLOR CHANGE: 1
DIARRHEA: 0
RECTAL PAIN: 0
ROS SKIN COMMENTS: JAUNDICE
TROUBLE SWALLOWING: 0
NAUSEA: 0
SINUS PAIN: 0
ABDOMINAL PAIN: 0
ABDOMINAL PAIN: 0
COLOR CHANGE: 1
SORE THROAT: 1
ABDOMINAL PAIN: 0
SHORTNESS OF BREATH: 0
ABDOMINAL DISTENTION: 1
ABDOMINAL DISTENTION: 1
SORE THROAT: 1
EYE PAIN: 0
ABDOMINAL PAIN: 0
SHORTNESS OF BREATH: 0
NAUSEA: 1
COLOR CHANGE: 0
COUGH: 0
SHORTNESS OF BREATH: 0
ABDOMINAL DISTENTION: 0
COLOR CHANGE: 0
SHORTNESS OF BREATH: 0
COUGH: 0
ABDOMINAL PAIN: 0
COUGH: 0
EYE DISCHARGE: 0
COUGH: 0
VOMITING: 0
BACK PAIN: 0
ABDOMINAL PAIN: 0
BACK PAIN: 0
ABDOMINAL DISTENTION: 1
BACK PAIN: 0
SHORTNESS OF BREATH: 0
ABDOMINAL DISTENTION: 1
SHORTNESS OF BREATH: 0
CHEST TIGHTNESS: 0
CONSTIPATION: 0
APNEA: 0
SHORTNESS OF BREATH: 0
EYE REDNESS: 0
VOMITING: 0
DIARRHEA: 0
COUGH: 0
COLOR CHANGE: 1
DIARRHEA: 0
SHORTNESS OF BREATH: 0
SHORTNESS OF BREATH: 0
ABDOMINAL PAIN: 1
EYE REDNESS: 0
EYE REDNESS: 0
ABDOMINAL DISTENTION: 1
EYE PAIN: 0
ABDOMINAL PAIN: 0
SHORTNESS OF BREATH: 0
CHEST TIGHTNESS: 0
ABDOMINAL PAIN: 1
APNEA: 0
ABDOMINAL DISTENTION: 1
SINUS PRESSURE: 0
EYE ITCHING: 0
CHEST TIGHTNESS: 0
SHORTNESS OF BREATH: 0
SORE THROAT: 0
COLOR CHANGE: 0
BACK PAIN: 0
ANAL BLEEDING: 0
NAUSEA: 0

## 2022-01-01 ASSESSMENT — PAIN - FUNCTIONAL ASSESSMENT
PAIN_FUNCTIONAL_ASSESSMENT: PREVENTS OR INTERFERES SOME ACTIVE ACTIVITIES AND ADLS
PAIN_FUNCTIONAL_ASSESSMENT: ACTIVITIES ARE NOT PREVENTED
PAIN_FUNCTIONAL_ASSESSMENT: 0-10
PAIN_FUNCTIONAL_ASSESSMENT: ACTIVITIES ARE NOT PREVENTED
PAIN_FUNCTIONAL_ASSESSMENT: 0-10
PAIN_FUNCTIONAL_ASSESSMENT: 0-10
PAIN_FUNCTIONAL_ASSESSMENT: NONE - DENIES PAIN
PAIN_FUNCTIONAL_ASSESSMENT: NONE - DENIES PAIN

## 2022-01-01 ASSESSMENT — PULMONARY FUNCTION TESTS
PIF_VALUE: 21
PIF_VALUE: 14
PIF_VALUE: 15
PIF_VALUE: 19
PIF_VALUE: 26
PIF_VALUE: 32
PIF_VALUE: 18
PIF_VALUE: 20
PIF_VALUE: 23
PIF_VALUE: 14
PIF_VALUE: 13
PIF_VALUE: 17
PIF_VALUE: 20
PIF_VALUE: 9
PIF_VALUE: 17
PIF_VALUE: 14
PIF_VALUE: 12
PIF_VALUE: 23
PIF_VALUE: 25
PIF_VALUE: 21
PIF_VALUE: 16
PIF_VALUE: 18
PIF_VALUE: 21
PIF_VALUE: 24
PIF_VALUE: 22
PIF_VALUE: 19
PIF_VALUE: 14
PIF_VALUE: 17
PIF_VALUE: 14
PIF_VALUE: 17
PIF_VALUE: 14

## 2022-01-01 ASSESSMENT — PAIN DESCRIPTION - ONSET
ONSET: SUDDEN
ONSET: PROGRESSIVE

## 2022-01-01 ASSESSMENT — PAIN DESCRIPTION - LOCATION
LOCATION: ABDOMEN
LOCATION: ABDOMEN
LOCATION: ARM
LOCATION: ABDOMEN
LOCATION: ABDOMEN
LOCATION: ARM
LOCATION: ABDOMEN
LOCATION: ABDOMEN
LOCATION: ARM
LOCATION: ARM
LOCATION: ABDOMEN
LOCATION: ARM
LOCATION: ABDOMEN

## 2022-01-01 ASSESSMENT — PAIN DESCRIPTION - DESCRIPTORS
DESCRIPTORS: DULL
DESCRIPTORS: ACHING
DESCRIPTORS: ACHING
DESCRIPTORS: ACHING;CRAMPING
DESCRIPTORS: CRAMPING
DESCRIPTORS: HEAVINESS
DESCRIPTORS: DULL

## 2022-01-01 ASSESSMENT — PAIN DESCRIPTION - ORIENTATION
ORIENTATION: LEFT
ORIENTATION: MID
ORIENTATION: LEFT;UPPER
ORIENTATION: LEFT
ORIENTATION: MID
ORIENTATION: LEFT
ORIENTATION: LEFT
ORIENTATION: MID
ORIENTATION: RIGHT;LEFT;OUTER

## 2022-01-01 ASSESSMENT — PAIN DESCRIPTION - FREQUENCY
FREQUENCY: INTERMITTENT

## 2022-01-01 ASSESSMENT — PAIN DESCRIPTION - PAIN TYPE
TYPE: ACUTE PAIN

## 2022-03-05 NOTE — ED NOTES
Patient expressed concern for liver problems and reports persistent ETOH use on occasion. The patient expressed interest in outpatient options regarding ETOH consumption.      Rubin Loja RN  03/05/22 [id number]

## 2022-03-05 NOTE — ED NOTES
Writer met with patient at bedside regarding concerns of alcohol use. Upon social work arrival to the room, patient asks many questions related to her physical health. Writer offered to ask the nurse to step back in the room to provide additional information and answer any medical questions. Writer directed the conversation to alcohol resource, patient expressed interest  Writer reviewed both outpatient and inpatient alcohol treatment services. Patient denied questions regarding resources and continued concerns related to physical health. Writer notified the nurse.       JANNET Merino  03/05/22 [id number]

## 2022-03-05 NOTE — ED PROVIDER NOTES
[facility] ED     Emergency Department     Faculty Attestation    I performed a history and physical examination of the patient and discussed management with the resident. I reviewed the residents note and agree with the documented findings and plan of care. Any areas of disagreement are noted on the chart. I was personally present for the key portions of any procedures. I have documented in the chart those procedures where I was not present during the key portions. I have reviewed the emergency nurses triage note. I agree with the chief complaint, past medical history, past surgical history, allergies, medications, social and family history as documented unless otherwise noted below. For Physician Assistant/ Nurse Practitioner cases/documentation I have personally evaluated this patient and have completed at least one if not all key elements of the E/M (history, physical exam, and MDM). Additional findings are as noted. This patient was evaluated in the Emergency Department for symptoms described in the history of present illness. He/she was evaluated in the context of the global COVID-19 pandemic, which necessitated consideration that the patient might be at risk for infection with the SARS-CoV-2 virus that causes COVID-19. Institutional protocols and algorithms that pertain to the evaluation of patients at risk for COVID-19 are in a state of rapid change based on information released by regulatory bodies including the CDC and federal and state organizations. These policies and algorithms were followed during the patient's care in the ED. Patient with 2 complaints. 1.  Nosebleeds. Patient admits to picking her nose because. Does have a history of cirrhosis and thrombocytopenia, denies any history of bleeding from the past.  On exam bilateral stigmata of recent bleeding anteriorly no active bleeding no blood in the posterior pharynx.   Discussed with patient treatment for epistaxis digitorum. 2 right-sided tooth pain. No abscess no trismus no malocclusion.   Will discharge on antibiotics        Critical Care     none    Manuel Ireland MD, [facility]  Attending Emergency  Physician             Manuel Ireland MD  03/05/22 [id number]

## 2022-03-05 NOTE — ED PROVIDER NOTES
[facility] ED  Emergency Department Encounter  Emergency Medicine Resident     Pt Name: Melanie Goldberg  MRN: [id number]  [place] 1980  Date of evaluation: 3/5/22  PCP:  No primary care provider on file. CHIEF COMPLAINT       Chief Complaint   Patient presents with    Epistaxis       HISTORY OFPRESENT ILLNESS  (Location/Symptom, Timing/Onset, Context/Setting, Quality, Duration, Modifying Factors,Severity.)      Melanie Goldberg is a 38 y.o. female with history of cirrhosis and thrombocytopenia who presents with plaints of nosebleed. Started 5 days ago. Has been bleeding intermittently and stops on its own. Patient admits to picking her nose because she \"likes to keep it clean. \"  Patient also complaining of right-sided upper tooth pain. Started last week when she broke a tooth. States pain has been worsening since that time and she also woke up with \"a mouthful of blood this morning. \"  Denies trauma. Denies fever, chills, headache, blurred vision, tinnitus, difficulty swallowing, chest pain, shortness breath, abdominal pain, nausea, vomiting, or diarrhea. Patient does have a history of alcoholic cirrhosis and thrombocytopenia. She has been recently seen for spontaneous bleeds in her gums. PAST MEDICAL / SURGICAL / SOCIAL / FAMILY HISTORY      has a past medical history of Anxiety, GERD (gastroesophageal reflux disease), History of irregular heartbeat, Hypertension, and Seasonal allergies. has a past surgical history that includes Tubal ligation and Dilation and curettage of uterus.      Social History     Socioeconomic History    Marital status: Single     Spouse name: Not on file    Number of children: Not on file    Years of education: Not on file    Highest education level: Not on file   Occupational History    Not on file   Tobacco Use    Smoking status: Current Every Day Smoker     Packs/day: 3.00     Types: Cigars    Smokeless tobacco: Never Used   Sofia Tobacco comment: black and cornelia, 3 packs/day   Vaping Use    Vaping Use: Never used   Substance and Sexual Activity    Alcohol use: Yes     Alcohol/week: 2.0 standard drinks     Types: 2 Cans of beer per week     Comment: daily 3-4 beers    Drug use: Yes     Types: Marijuana Lanis Makenzie)    Sexual activity: Yes     Partners: Male   Other Topics Concern    Not on file   Social History Narrative    ** Merged History Encounter **          Social Determinants of Health     Financial Resource Strain:     Difficulty of Paying Living Expenses: Not on file   Food Insecurity:     Worried About Running Out of Food in the Last Year: Not on file    Scottie of Food in the Last Year: Not on file   Transportation Needs:     Lack of Transportation (Medical): Not on file    Lack of Transportation (Non-Medical):  Not on file   Physical Activity:     Days of Exercise per Week: Not on file    Minutes of Exercise per Session: Not on file   Stress:     Feeling of Stress : Not on file   Social Connections:     Frequency of Communication with Friends and Family: Not on file    Frequency of Social Gatherings with Friends and Family: Not on file    Attends Muslim Services: Not on file    Active Member of Clubs or Organizations: Not on file    Attends Club or Organization Meetings: Not on file    Marital Status: Not on file   Intimate Partner Violence:     Fear of Current or Ex-Partner: Not on file    Emotionally Abused: Not on file    Physically Abused: Not on file    Sexually Abused: Not on file   Housing Stability:     Unable to Pay for Housing in the Last Year: Not on file    Number of [place] in the Last Year: Not on file    Unstable Housing in the Last Year: Not on file       Family History   Problem Relation Age of Onset    Heart Disease Mother     Other Mother         HIV    Cancer Mother         female cancer    Anxiety Disorder Sister     Anxiety Disorder Brother         Allergies:  Motrin [ibuprofen], Seasonal, and Motrin [ibuprofen]    Home Medications:  Prior to Admission medications    Medication Sig Start Date End Date Taking? Authorizing Provider   penicillin v potassium (VEETID) 500 MG tablet Take 1 tablet by mouth 4 times daily for 10 days 3/5/22 3/15/22 Yes Suzie Schneider MD   famotidine (PEPCID) 20 MG tablet Take 1 tablet by mouth 2 times daily 11/23/20   More Mcgrath, DO   Acetaminophen (ACETAMINOPHEN EXTRA STRENGTH) 500 MG CAPS Take 1-2 tablets by mouth every 6 hours as needed for pain. Do not take more than 8 pills in a 24 hour period. 9/4/20   Monicamera Burgess,    ibuprofen (IBU) 800 MG tablet Take 1 tablet by mouth every 6 hours as needed for Pain 9/4/20   Monicamera Burgess, DO   pantoprazole (PROTONIX) 40 MG tablet Take 1 tablet by mouth 2 times daily (before meals) 7/13/20   Arya Alonso MD   pantoprazole (PROTONIX) 20 MG tablet Take 2 tablets by mouth daily 7/9/20   CARMEN Rosales MD   omeprazole (PRILOSEC) 40 MG delayed release capsule Take 1 capsule by mouth every morning (before breakfast) 7/8/20   Ifrah Figueroa MD   benzocaine (ORAJEL) 20 % GEL mucosal gel Apply to affected area three times daily as needed 2/3/20   Alicia Rogers MD   ondansetron (ZOFRAN ODT) 4 MG disintegrating tablet Take 1 tablet by mouth every 8 hours as needed for Nausea or Vomiting 7/13/19   Edgar Amato MD   Carboxymethylcellul-Glycerin 0.5-0.9 % SOLN Apply 2 drops to eye as needed (dry and irritated eyes) 5/21/19   Oneida Crowe, DO       REVIEW OFSYSTEMS    (2-9 systems for level 4, 10 or more for level 5)      Review of Systems   Constitutional: Negative for chills, fatigue and fever. HENT: Positive for dental problem and nosebleeds. Negative for congestion, rhinorrhea, sore throat and tinnitus. Eyes: Negative for redness and visual disturbance. Respiratory: Negative for cough and shortness of breath. Cardiovascular: Negative for chest pain.    Gastrointestinal: Negative for abdominal pain, nausea and vomiting. Genitourinary: Negative for dysuria. Musculoskeletal: Negative for myalgias, neck pain and neck stiffness. Allergic/Immunologic: Negative for immunocompromised state. Neurological: Negative for dizziness, light-headedness and headaches. Hematological: Negative for adenopathy. Bruises/bleeds easily. PHYSICAL EXAM   (up to 7 for level 4, 8 or more forlevel 5)      INITIAL VITALS:   ED Triage Vitals [03/05/22 1424]   BP Temp Temp Source Pulse Resp SpO2 Height Weight   135/80 97.2 °F (36.2 °C) Oral 92 13 98 % -- --       Physical Exam  Vitals reviewed. Constitutional:       General: She is not in acute distress. Appearance: Normal appearance. She is not ill-appearing. HENT:      Head: Normocephalic and atraumatic. Right Ear: Tympanic membrane, ear canal and external ear normal.      Left Ear: Tympanic membrane, ear canal and external ear normal.      Nose: No congestion. Comments: Dried blood with scabbing in the anterior nares bilaterally. No active bleeding. Mouth/Throat:      Mouth: Mucous membranes are moist.      Pharynx: Oropharynx is clear. Comments: Mild periodontal swelling in the right upper teeth. There is an area of punctate hematoma in the gums posterior to the molars. No active bleeding. Eyes:      Conjunctiva/sclera: Conjunctivae normal.      Pupils: Pupils are equal, round, and reactive to light. Cardiovascular:      Rate and Rhythm: Normal rate and regular rhythm. Pulses: Normal pulses. Heart sounds: Normal heart sounds. Pulmonary:      Effort: Pulmonary effort is normal.      Breath sounds: Normal breath sounds. Abdominal:      General: There is distension. Palpations: Abdomen is soft. Tenderness: There is no abdominal tenderness. Comments: Soft, moderately distended with positive fluid wave. Musculoskeletal:      Cervical back: Normal range of motion. No rigidity.       Right lower leg: No edema. Left lower leg: No edema. Lymphadenopathy:      Cervical: No cervical adenopathy. Skin:     General: Skin is warm and dry. Capillary Refill: Capillary refill takes less than 2 seconds. Neurological:      General: No focal deficit present. Mental Status: She is alert and oriented to person, place, and time. Psychiatric:         Mood and Affect: Mood normal.         Behavior: Behavior normal.         DIFFERENTIAL  DIAGNOSIS     PLAN (LABS / IMAGING / EKG):  Orders Placed This Encounter   Procedures    Basic Metabolic Panel w/ Reflex to MG    Hepatic Function Panel    CBC with Auto Differential    Protime-INR    APTT    Immature Platelet Fraction       MEDICATIONS ORDERED:  Orders Placed This Encounter   Medications    penicillin v potassium (VEETID) tablet 500 mg     Order Specific Question:   Antimicrobial Indications     Answer: Other     Order Specific Question:   Other Abx Indication     Answer:   dental infection    penicillin v potassium (VEETID) 500 MG tablet     Sig: Take 1 tablet by mouth 4 times daily for 10 days     Dispense:  40 tablet     Refill:  0       DDX: Epistaxis, dental infection, worsening liver functions and synthetic function, worsening thrombocytopenia    Initial MDM/Plan: 38 y.o. female who presents with epistaxis and bleeding of her gums. Epistaxis likely due to patient's ongoing picking of her nose. Mouth pain likely due to dental infection. Bleeding could be from broken tooth. No active bleeding noted. No nasal packing is warranted at this time. Will check LFTs, coags and CBC. Will give penicillin for dental infection. Likely discharge home.     DIAGNOSTIC RESULTS / EMERGENCYDEPARTMENT COURSE / MDM     LABS:  Labs Reviewed   BASIC METABOLIC PANEL W/ REFLEX TO MG FOR LOW K - Abnormal; Notable for the following components:       Result Value    BUN 4 (*)     CREATININE 0.33 (*)     Calcium 8.1 (*)     Potassium 3.6 (*)     All other components within normal limits   HEPATIC FUNCTION PANEL - Abnormal; Notable for the following components:    Albumin 3.0 (*)     Alkaline Phosphatase 135 (*)     ALT 42 (*)      (*)     Total Bilirubin 1.38 (*)     Bilirubin, Direct 0.68 (*)     Total Protein 8.8 (*)     Albumin/Globulin Ratio 0.5 (*)     All other components within normal limits   CBC WITH AUTO DIFFERENTIAL - Abnormal; Notable for the following components:    RBC 3.39 (*)     Hemoglobin 10.7 (*)     Hematocrit 31.1 (*)     RDW 16.3 (*)     All other components within normal limits   PROTIME-INR - Abnormal; Notable for the following components:    Protime 13.4 (*)     All other components within normal limits   APTT - Abnormal; Notable for the following components:    PTT 34.5 (*)     All other components within normal limits   IMMATURE PLATELET FRACTION - Abnormal; Notable for the following components:    Platelet, Fluorescence 36 (*)     All other components within normal limits         RADIOLOGY:  No results found. EMERGENCY DEPARTMENT COURSE:  ED Course as of 03/05/22 1704   Sat Mar 05, 2022   1654 Her work-up significant for mildly worsening of LFTs, in particular elevated bilirubin. Platelets slightly lower than last measurement: 36 from 39. Recommend patient follow-up with gastroenterology as an outpatient. Patient does appear to have a dental infection without abscess. Will give course of penicillin and recommend patient follow-up with her dentist.  Discussed plan and medications with patient, as well as return precautions. Patient acknowledged understanding and intent to comply. [GG]      ED Course User Index  [GG] Suzie Schneider MD          PROCEDURES:  None    CONSULTS:  None    CRITICAL CARE:  Please see attending note    FINAL IMPRESSION      1. Epistaxis    2. Dental infection    3. Alcoholic cirrhosis, unspecified whether ascites present ([facility] [address]    4.  Hyperbilirubinemia          DISPOSITION / PLAN     DISPOSITION Decision To Discharge 03/05/2022 04:47:29 PM      PATIENT REFERRED TO:  [facility] ED  [address] [facility] [address]  [phone]    If symptoms worsen    [address] [facility] [address]  [phone]  Schedule an appointment as soon as possible for a visit in 3 days  To discuss your worsening liver functions      DISCHARGE MEDICATIONS:  New Prescriptions    PENICILLIN V POTASSIUM (VEETID) 500 MG TABLET    Take 1 tablet by mouth 4 times daily for 10 days       Hanna Kennedy MD  Emergency Medicine Resident    (Please note that portions of this note were completed with a voice recognition program.Efforts were made to edit the dictations but occasionally words are mis-transcribed.)      Hanna Kennedy MD  Resident  03/05/22 9728

## 2022-03-11 NOTE — ED NOTES
Patient presents to ED with complaints of blood in her mouth that is enough she has to spit it out. Also reports nose bleeds multiple times per day. Patient states she is anemic and she \"wakes up with blood clots\" and was told she \"doesn't have a clotting factor. \" States she was here a couple days ago and was given rx for penicillin for teeth on R side of mouth but she has not noticed a change and she wants answers. States she is \"spitting out blood clots at night\" and it wakes her up.      Elodia Lomax, YAQUELIN  63/87/05 7059

## 2022-03-11 NOTE — ED PROVIDER NOTES
[facility] ED  Emergency Department Encounter  Emergency Medicine Resident     Pt Name: Tayler Lyles  MRN: [id number]  [place] 1980  Date of evaluation: 3/11/22  PCP:  TALYA Montez CNP    CHIEF COMPLAINT       Chief Complaint   Patient presents with    Other     intermittment bleeding from mouth, nose, and throat per patient       HISTORY OFPRESENT ILLNESS  (Location/Symptom, Timing/Onset, Context/Setting, Quality, Duration, Modifying Factors,Severity.)      Tayler Lyles is a 38 y.o. female with past medical history significant for cirrhosis and anemia with concerns for \"coughing up clots\". Patient states that she was seen here couple days ago and has been taking her penicillin for her dental caries as prescribed. Patient states that at that time she was having a nosebleed and bleeding in her mouth and was told to come back if she had continued or worsening bleeding. Patient noted that in the past 2 mornings when she wakes up in the morning she is coughing and spitting out clots of blood. Describes these as old dark blood appearing. Does not happen throughout the day. Only happens in the morning after she has been asleep for the evening. Has not had any recurrent active bleeding from the nose or the mouth. Denies any other acute complaints at this time. Has not been able to follow-up with the dentist yet. Denies any chest pain, shortness of breath, abdominal pain, nausea or vomiting. Denies any fevers. PAST MEDICAL / SURGICAL / SOCIAL / FAMILY HISTORY      has a past medical history of Anxiety, GERD (gastroesophageal reflux disease), History of irregular heartbeat, Hypertension, and Seasonal allergies. has a past surgical history that includes Tubal ligation and Dilation and curettage of uterus. Social:  reports that she has been smoking cigars. She has been smoking about 3.00 packs per day.  She has never used smokeless tobacco. She reports current alcohol use of about 2.0 standard drinks of alcohol per week. She reports current drug use. Drug: Marijuana [place]). Family Hx:   Family History   Problem Relation Age of Onset    Heart Disease Mother     Other Mother         HIV    Cancer Mother         female cancer    Anxiety Disorder Sister     Anxiety Disorder Brother         Allergies:  Motrin [ibuprofen], Seasonal, and Motrin [ibuprofen]    Home Medications:  Prior to Admission medications    Medication Sig Start Date End Date Taking? Authorizing Provider   penicillin v potassium (VEETID) 500 MG tablet Take 1 tablet by mouth 4 times daily for 10 days 3/5/22 3/15/22  Aline Patel MD   famotidine (PEPCID) 20 MG tablet Take 1 tablet by mouth 2 times daily 11/23/20   Pennie Mcgrath DO   Acetaminophen (ACETAMINOPHEN EXTRA STRENGTH) 500 MG CAPS Take 1-2 tablets by mouth every 6 hours as needed for pain. Do not take more than 8 pills in a 24 hour period.  9/4/20   Harris Cheema DO   ibuprofen (IBU) 800 MG tablet Take 1 tablet by mouth every 6 hours as needed for Pain 9/4/20   Harris Cheema DO   pantoprazole (PROTONIX) 40 MG tablet Take 1 tablet by mouth 2 times daily (before meals) 7/13/20   Kamryn Harper MD   pantoprazole (PROTONIX) 20 MG tablet Take 2 tablets by mouth daily 7/9/20   CARMEN John MD   omeprazole (PRILOSEC) 40 MG delayed release capsule Take 1 capsule by mouth every morning (before breakfast) 7/8/20   Jarett Reed MD   benzocaine (ORAJEL) 20 % GEL mucosal gel Apply to affected area three times daily as needed 2/3/20   Rachel Richards MD   ondansetron (ZOFRAN ODT) 4 MG disintegrating tablet Take 1 tablet by mouth every 8 hours as needed for Nausea or Vomiting 7/13/19   Dawna Blandon MD   Carboxymethylcellul-Glycerin 0.5-0.9 % SOLN Apply 2 drops to eye as needed (dry and irritated eyes) 5/21/19   Oneida Maurer, DO       REVIEW OFSYSTEMS    (2-9 systems for level 4, 10 or more for level 5)      Review of Systems Constitutional: Negative for activity change, chills and fever. HENT: Negative for congestion, rhinorrhea and sore throat. Spitting out clots in the am    Eyes: Negative for pain and visual disturbance. Respiratory: Negative for cough and shortness of breath. Cardiovascular: Negative for chest pain and palpitations. Gastrointestinal: Negative for abdominal pain, constipation and diarrhea. Neurological: Negative for dizziness and headaches. PHYSICAL EXAM   (up to 7 for level 4, 8 or more forlevel 5)      INITIAL VITALS:   Vitals:    03/11/22 1118   BP: 102/66   Pulse: 84   Resp: 12   Temp: 97.2 °F (36.2 °C)   SpO2: 100%        Physical Exam  Constitutional:       General: She is not in acute distress. Appearance: She is not ill-appearing, toxic-appearing or diaphoretic. HENT:      Head: Normocephalic and atraumatic. Nose: Nose normal.      Comments: Active bleeding from either nares, no active bleeding noted in the oropharynx. Diffusely poor dentition. No bleeding of the gums noted. Mouth/Throat:      Mouth: Mucous membranes are moist.      Pharynx: Oropharynx is clear. Eyes:      General:         Right eye: No discharge. Left eye: No discharge. Extraocular Movements: Extraocular movements intact. Pupils: Pupils are equal, round, and reactive to light. Cardiovascular:      Rate and Rhythm: Normal rate and regular rhythm. Pulses: Normal pulses. Heart sounds: Normal heart sounds. Pulmonary:      Effort: Pulmonary effort is normal. No respiratory distress. Breath sounds: Normal breath sounds. Musculoskeletal:      Cervical back: Normal range of motion. No rigidity. Skin:     General: Skin is warm and dry. Capillary Refill: Capillary refill takes less than 2 seconds. Neurological:      General: No focal deficit present. Mental Status: She is alert and oriented to person, place, and time.    Psychiatric:         Mood and Affect: Mood normal.         DIFFERENTIAL  DIAGNOSIS       DDX: Epistaxis    Initial MDM/Plan: 38 y.o. female who presents with spitting up of blood clots after waking in the morning. Initial examination patient is resting comfortably on cart, no acute distress, no respiratory distress, vital signs are within normal limits patient is alert, oriented, answering questions appropriately, nontachycardic, non-lethargic, nontoxic-appearing. There is no active bleeding from the nares or the mouth. Upon further discussion with patient she is only spitting out clots in the morning and has been happening for the past 2 days. Patient has not had any yao red blood or blood from the nares or oropharynx. Discussed with patient that this is less nasal drainage and residual blood from previous nosebleed that she was seen for multiple days ago. Treat patient that if she has worsening bleeding including any new bleeding from the nares or the oropharynx to return to the emergency department. Patient is compliant with this. Instructed patient to continue to take her penicillin as prescribed for the full duration. Instructed patient of importance of follow-up with the dental clinic. She is complaining understanding of this. Strict return precautions provided. Patient expresses understanding of discharge instructions and is able to repeat strict return precautions back to me. Patient discharged in stable condition after remained vitally stable throughout emergency department stay. DIAGNOSTIC RESULTS / EMERGENCYDEPARTMENT COURSE / MDM             PROCEDURES:  None    CONSULTS:  None      FINAL IMPRESSION      1.  Cough          DISPOSITION / PLAN     DISPOSITION Decision To Discharge 03/11/2022 11:56:31 AM      PATIENT REFERRED TO:  Ajit Chan, TALYA - CNP  [address] [facility] [address]  [phone]    Call   for post emergency department follow up    [facility]  [address]   [phone]    As needed, If symptoms worsen      DISCHARGE MEDICATIONS:  Discharge Medication List as of 3/11/2022 12:14 PM          Jacinta Ralph DO  Emergency Medicine Resident    (Please note that portions of this note were completed with a voice recognition program.Efforts were made to edit the dictations but occasionally words are mis-transcribed.)       Jacinta Ralph DO  Resident  03/12/22 1523

## 2022-03-11 NOTE — ED PROVIDER NOTES
[facility] ED     Emergency Department     Faculty Attestation    I performed a history and physical examination of the patient and discussed management with the resident. I reviewed the residents note and agree with the documented findings and plan of care. Any areas of disagreement are noted on the chart. I was personally present for the key portions of any procedures. I have documented in the chart those procedures where I was not present during the key portions. I have reviewed the emergency nurses triage note. I agree with the chief complaint, past medical history, past surgical history, allergies, medications, social and family history as documented unless otherwise noted below. For Physician Assistant/ Nurse Practitioner cases/documentation I have personally evaluated this patient and have completed at least one if not all key elements of the E/M (history, physical exam, and MDM). Additional findings are as noted. This patient was evaluated in the Emergency Department for symptoms described in the history of present illness. He/she was evaluated in the context of the global COVID-19 pandemic, which necessitated consideration that the patient might be at risk for infection with the SARS-CoV-2 virus that causes COVID-19. Institutional protocols and algorithms that pertain to the evaluation of patients at risk for COVID-19 are in a state of rapid change based on information released by regulatory bodies including the CDC and federal and state organizations. These policies and algorithms were followed during the patient's care in the ED. Patient here with bleeding from her mouth and nose concerned that she is coughing up blood. Patient seen several days ago actually by me for epistaxis no active bleeding at that time. Patient does have a history of thrombocytopenia and cirrhosis. Labs were checked platelet count was baseline as was her INR.   Patient is concerned because she states she is waking up coughing up blood when she has a nosebleed. No actual chest pain shortness of breath. No blood in the stools no black tarry stools no hematuria no easy bruising from her baseline. On exam patient does have a stigmata of recent bleeding in her anterior naris no active bleeding this time there is no clots, no blood in the posterior pharynx. Do not feel patient needs additional work-up at this time recommend she follow-up with her PCP, voiced reasons to return will discharge.       Critical Care     none    Milly Walton MD, [place]  Attending Emergency  Physician             Milly Walton MD  03/11/22 0452

## 2022-04-14 NOTE — ED NOTES
Pt alert and oriented x 4, respirations even and unlabored, NAD noted at this time. Will continue to monitor.      [address] [facility], [address] [place] [address]  04/14/22 1911

## 2022-04-14 NOTE — ED PROVIDER NOTES
Packs/day: 3.00     Types: Cigars    Smokeless tobacco: Never Used    Tobacco comment: black and milds, 3 packs/day   Vaping Use    Vaping Use: Never used   Substance and Sexual Activity    Alcohol use: Yes     Alcohol/week: 2.0 standard drinks     Types: 2 Cans of beer per week     Comment: daily 3-4 beers    Drug use: Yes     Types: Marijuana Kristin Loja)    Sexual activity: Yes     Partners: Male   Other Topics Concern    Not on file   Social History Narrative    ** Merged History Encounter **          Social Determinants of Health     Financial Resource Strain:     Difficulty of Paying Living Expenses: Not on file   Food Insecurity:     Worried About Running Out of Food in the Last Year: Not on file    Scottie of Food in the Last Year: Not on file   Transportation Needs:     Lack of Transportation (Medical): Not on file    Lack of Transportation (Non-Medical):  Not on file   Physical Activity:     Days of Exercise per Week: Not on file    Minutes of Exercise per Session: Not on file   Stress:     Feeling of Stress : Not on file   Social Connections:     Frequency of Communication with Friends and Family: Not on file    Frequency of Social Gatherings with Friends and Family: Not on file    Attends Yarsani Services: Not on file    Active Member of [address] [place] [address] [employer] or Organizations: Not on file    Attends Club or Organization Meetings: Not on file    Marital Status: Not on file   Intimate Partner Violence:     Fear of Current or Ex-Partner: Not on file    Emotionally Abused: Not on file    Physically Abused: Not on file    Sexually Abused: Not on file   Housing Stability:     Unable to Pay for Housing in the Last Year: Not on file    Number of [place] in the Last Year: Not on file    Unstable Housing in the Last Year: Not on file       Family History   Problem Relation Age of Onset    Heart Disease Mother     Other Mother         HIV    Cancer Mother         female cancer    Anxiety Disorder Sister     Anxiety Disorder Brother        Allergies:  Motrin [ibuprofen], Seasonal, and Motrin [ibuprofen]    Home Medications:  Prior to Admission medications    Medication Sig Start Date End Date Taking? Authorizing Provider   famotidine (PEPCID) 20 MG tablet Take 1 tablet by mouth 2 times daily 11/23/20   Brock Mcgrath DO   Acetaminophen (ACETAMINOPHEN EXTRA STRENGTH) 500 MG CAPS Take 1-2 tablets by mouth every 6 hours as needed for pain. Do not take more than 8 pills in a 24 hour period. 9/4/20   Velinda Po, DO   ibuprofen (IBU) 800 MG tablet Take 1 tablet by mouth every 6 hours as needed for Pain 9/4/20   Velinda Po, DO   pantoprazole (PROTONIX) 40 MG tablet Take 1 tablet by mouth 2 times daily (before meals) 7/13/20   Libra Church MD   pantoprazole (PROTONIX) 20 MG tablet Take 2 tablets by mouth daily 7/9/20   CARMEN Caballero MD   omeprazole (PRILOSEC) 40 MG delayed release capsule Take 1 capsule by mouth every morning (before breakfast) 7/8/20   Abigail Flores MD   benzocaine (ORAJEL) 20 % GEL mucosal gel Apply to affected area three times daily as needed 2/3/20   Roberta Lira MD   ondansetron (ZOFRAN ODT) 4 MG disintegrating tablet Take 1 tablet by mouth every 8 hours as needed for Nausea or Vomiting 7/13/19   Adair Childers MD   Carboxymethylcellul-Glycerin 0.5-0.9 % SOLN Apply 2 drops to eye as needed (dry and irritated eyes) 5/21/19   Oneida Bobby, DO       REVIEW OF SYSTEMS    (2-9 systems for level 4, 10 or more forlevel 5)      Review of Systems   Constitutional: Negative for activity change, chills and fever. HENT: Negative for congestion, sinus pain and sore throat. Eyes: Negative for pain and visual disturbance. Respiratory: Negative for cough and shortness of breath. Cardiovascular: Negative for chest pain. Gastrointestinal: Positive for abdominal distention. Negative for abdominal pain, diarrhea, nausea and vomiting.    Genitourinary: Negative for difficulty urinating, dysuria and hematuria. Musculoskeletal: Negative for back pain and myalgias. Skin: Negative for rash and wound. Neurological: Negative for dizziness, light-headedness and headaches. Psychiatric/Behavioral: Negative for agitation and confusion. PHYSICAL EXAM   (up to 7 for level 4, 8 or more forlevel 5)      ED TRIAGE VITALS BP: 106/67, Temp: 98.1 °F (36.7 °C), Pulse: 89, Resp: 16, SpO2: 96 %    Vitals:    04/14/22 1821   BP: 106/67   Pulse: 89   Resp: 16   Temp: 98.1 °F (36.7 °C)   TempSrc: Oral   SpO2: 96%         Physical Exam  Vitals and nursing note reviewed. Constitutional:       Appearance: Normal appearance. HENT:      Head: Normocephalic and atraumatic. Nose: Nose normal.      Mouth/Throat:      Mouth: Mucous membranes are moist.   Eyes:      Extraocular Movements: Extraocular movements intact. Pupils: Pupils are equal, round, and reactive to light. Cardiovascular:      Rate and Rhythm: Normal rate and regular rhythm. Pulses: Normal pulses. Heart sounds: Normal heart sounds. Pulmonary:      Effort: Pulmonary effort is normal.      Breath sounds: Normal breath sounds. Abdominal:      General: Abdomen is flat. There is distension. Palpations: Abdomen is soft. There is no mass. Tenderness: There is no abdominal tenderness. There is no right CVA tenderness, guarding or rebound. Hernia: No hernia is present. Musculoskeletal:         General: Normal range of motion. Cervical back: Normal range of motion. Skin:     General: Skin is warm and dry. Capillary Refill: Capillary refill takes less than 2 seconds. Neurological:      General: No focal deficit present. Mental Status: She is alert and oriented to person, place, and time.    Psychiatric:         Mood and Affect: Mood normal.         Behavior: Behavior normal.           DIFFERENTIAL  DIAGNOSIS     PLAN (LABS / IMAGING / EKG):  Orders Placed This Encounter   Procedures    IR US GUIDED PARACENTESIS    Vital signs       MEDICATIONS ORDERED:  ED Medication Orders (From admission, onward)    None              DIAGNOSTIC RESULTS / EMERGENCY DEPARTMENT COURSE / MDM     IMPRESSION & INITIAL PLAN:  Patient with chronic alcoholic liver cirrhosis presenting today for evaluation of abdominal distention. Patient was told at some point to turn to the emergency department anytime she has distention for paracentesis. The patient has no abdominal pain, fever, chills or tachycardia. I have very low suspicion for SBP. Due to the chronic nature of the patient's presentation I do find her situation to be best managed in the outpatient side rather than return the emergency department every time for paracentesis. Restrain I have called IR personally and set up outpatient care for her tomorrow morning. The patient is agreeable to our plan of care. LABS:  No results found for this visit on 04/14/22. RADIOLOGY:  IR US GUIDED PARACENTESIS    (Results Pending)       CONSULTS:  None    CRITICAL CARE:  See attending physician note    FINAL IMPRESSION      1.  Alcoholic cirrhosis of liver with ascites [facility])          DISPOSITION / PLAN     DISPOSITION Decision To Discharge 04/14/2022 06:58:26 PM      PATIENT REFERRED TO:  [address], [facility] [address]  [phone]    Call in 1 day        Please follow up with IR tomorrow, Please follow-up with GI on Tuesday          DISCHARGE MEDICATIONS:  Discharge Medication List as of 4/14/2022  7:00 PM        Discharge Medication List as of 4/14/2022  7:00 PM           Grey Hawkins MD  Emergency Medicine Resident    (Please note that portions of this note were completed with a voice recognition program.  Efforts were made to edit the dictations but occasionally words are mis-transcribed.)       Grey Hawkins MD  Resident  04/15/22 6496

## 2022-04-15 NOTE — ED PROVIDER NOTES
days ago, but it has improved. Believes that she is retaining fluid due to over indulging on cranberry juice, water, and a couple of beers a day. We discussed abstinence from alcohol. No fever or chills. Tolerating oral intake without vomiting. No difficulty breathing. Exam:  General: Laying on the bed, awake, alert and in no acute distress  CV: normal rate and regular rhythm  Lungs: Breathing comfortably on room air with no tachypnea, hypoxia, or increased work of breathing  Abdomen: Mildly distended with fluid wave. Internal hernia palpable under the liver in the right middle quadrant. Hernia is soft and reducible. Plan:  Low suspicion for SBP based on lack of current abdominal pain, no fever, does not appear septic. Patient's last paracentesis was in December, done by IR here. Given presence of a hernia and no signs concerning for infection, will refer patient to IR for large-volume paracentesis. Patient does see a physician on Tuesday for her hernia, and states that she will follow up with her PCP. I did recommend to her that she start discussing scheduled paracentesis if there is starts to become repetitive. Patient in agreement with plan of care, does not wish to stay in the observation unit overnight for paracentesis to be done tomorrow morning, and will return for IR appointment tomorrow for paracentesis.         Jose Anaya MD   Attending Emergency  Physician    (Please note that portions of this note were completed with a voice recognition program. Efforts were made to edit the dictations but occasionally words are mis-transcribed.)              Jose Anaya MD  04/14/22 4889

## 2022-04-16 NOTE — ED NOTES
Pt arrived with complaints of abdominal bloating and needing her stomach drained. Pt stated that she has known liver failure and see her pcp for this issue. Pt stated that last time she had to have her stomach drained like this was in December. Pt has no other complaints at this time. Resident at the bedside. Call light within reach.  Will continue plan of care      Nain Valencia RN  04/16/22 2923

## 2022-04-16 NOTE — PROGRESS NOTES
This patient was assigned to me by error. This patient was never interviewed nor examined by me. I did not participate in the care of this patient.     Yevgeniy Ruby, PGY-2

## 2022-04-16 NOTE — ED PROVIDER NOTES
[address]  ED  Emergency Department Encounter  Emergency Medicine Resident     Pt Name: Geo Marx  MRN: [id number]  Kurtgfpamela 1980  Date of evaluation: 4/16/22  PCP:  TALYA Sahu CNP    CHIEF COMPLAINT       Chief Complaint   Patient presents with    Abdominal Pain       HISTORY OFPRESENT ILLNESS  (Location/Symptom, Timing/Onset, Context/Setting, Quality, Duration, Modifying Factors,Severity.)      Geo Marx is a 38 y. o.yo female who presents with abdominal distension. Patient here for routine paracentesis has history of cirrhosis secondary to alcohol abuse. Patient states that she was supposed to get paracenteses today from GI was supposed to go there and states that nobody called from GI missed her appointment. Patient here with abdominal distention however no fevers no abdominal pain, normal state, denies any chest pain or shortness of breath. Denies headache vision changes or focal deficits. Denies traumatic injuries to the abdomen, urinary symptoms. Denies any vaginal symptoms as well. States that she is here for her paracentesis and that is it. PAST MEDICAL / SURGICAL / SOCIAL / FAMILY HISTORY      has a past medical history of Anxiety, GERD (gastroesophageal reflux disease), History of irregular heartbeat, Hypertension, and Seasonal allergies. has a past surgical history that includes Tubal ligation and Dilation and curettage of uterus.      Social History     Socioeconomic History    Marital status: Single     Spouse name: Not on file    Number of children: Not on file    Years of education: Not on file    Highest education level: Not on file   Occupational History    Not on file   Tobacco Use    Smoking status: Current Every Day Smoker     Packs/day: 3.00     Types: Cigars    Smokeless tobacco: Never Used    Tobacco comment: black and milds, 3 packs/day   Vaping Use    Vaping Use: Never used   Substance and Sexual Activity    Alcohol use: Yes     Alcohol/week: 2.0 standard drinks     Types: 2 Cans of beer per week     Comment: daily 3-4 beers    Drug use: Yes     Types: Marijuana Dauna Other)    Sexual activity: Yes     Partners: Male   Other Topics Concern    Not on file   Social History Narrative    ** Merged History Encounter **          Social Determinants of Health     Financial Resource Strain:     Difficulty of Paying Living Expenses: Not on file   Food Insecurity:     Worried About Running Out of Food in the Last Year: Not on file    Scottie of Food in the Last Year: Not on file   Transportation Needs:     Lack of Transportation (Medical): Not on file    Lack of Transportation (Non-Medical):  Not on file   Physical Activity:     Days of Exercise per Week: Not on file    Minutes of Exercise per Session: Not on file   Stress:     Feeling of Stress : Not on file   Social Connections:     Frequency of Communication with Friends and Family: Not on file    Frequency of Social Gatherings with Friends and Family: Not on file    Attends Mormon Services: Not on file    Active Member of Clubs or Organizations: Not on file    Attends Club or Organization Meetings: Not on file    Marital Status: Not on file   Intimate Partner Violence:     Fear of Current or Ex-Partner: Not on file    Emotionally Abused: Not on file    Physically Abused: Not on file    Sexually Abused: Not on file   Housing Stability:     Unable to Pay for Housing in the Last Year: Not on file    Number of [place] in the Last Year: Not on file    Unstable Housing in the Last Year: Not on file       Family History   Problem Relation Age of Onset    Heart Disease Mother     Other Mother         HIV    Cancer Mother         female cancer    Anxiety Disorder Sister     Anxiety Disorder Brother         Allergies:  Motrin [ibuprofen], Seasonal, and Motrin [ibuprofen]    Home Medications:  Prior to Admission medications    Medication Sig Start Date End Date Taking? Authorizing Provider   famotidine (PEPCID) 20 MG tablet Take 1 tablet by mouth 2 times daily 11/23/20   Kenna Mcgrath, DO   Acetaminophen (ACETAMINOPHEN EXTRA STRENGTH) 500 MG CAPS Take 1-2 tablets by mouth every 6 hours as needed for pain. Do not take more than 8 pills in a 24 hour period. 9/4/20   Ham Marion DO   ibuprofen (IBU) 800 MG tablet Take 1 tablet by mouth every 6 hours as needed for Pain 9/4/20   Ham Marion DO   pantoprazole (PROTONIX) 40 MG tablet Take 1 tablet by mouth 2 times daily (before meals) 7/13/20   Dennise Kirk MD   pantoprazole (PROTONIX) 20 MG tablet Take 2 tablets by mouth daily 7/9/20   CARMEN Schneider MD   omeprazole (PRILOSEC) 40 MG delayed release capsule Take 1 capsule by mouth every morning (before breakfast) 7/8/20   Julieta Burton MD   benzocaine (ORAJEL) 20 % GEL mucosal gel Apply to affected area three times daily as needed 2/3/20   Michelle Desai MD   ondansetron (ZOFRAN ODT) 4 MG disintegrating tablet Take 1 tablet by mouth every 8 hours as needed for Nausea or Vomiting 7/13/19   Jaquan Ayers MD   Carboxymethylcellul-Glycerin 0.5-0.9 % SOLN Apply 2 drops to eye as needed (dry and irritated eyes) 5/21/19   Oneida Mejia, DO       REVIEW OFSYSTEMS    (2-9 systems for level 4, 10 or more for level 5)      Review of Systems   Constitutional: Negative for diaphoresis and fever. HENT: Negative for congestion. Eyes: Negative for visual disturbance. Respiratory: Negative for shortness of breath. Cardiovascular: Negative for chest pain. Gastrointestinal: Positive for abdominal distention. Negative for abdominal pain, nausea and vomiting. Endocrine: Negative for polyuria. Genitourinary: Negative for dysuria. Musculoskeletal: Negative for back pain. Skin: Negative for wound. Neurological: Negative for headaches. Psychiatric/Behavioral: Negative for confusion.        PHYSICAL EXAM   (up to 7 for level 4, 8 or more forlevel 5) ED TRIAGE VITALS BP: 138/89, Temp: 98.1 °F (36.7 °C), Pulse: 89, Resp: 14, SpO2: 97 %    Vitals:    04/16/22 0432 04/16/22 0502 04/16/22 0508 04/16/22 0623   BP: 110/73 116/78     Pulse:       Resp:       Temp:       TempSrc:       SpO2: 96% 95% 97% 96%   Weight:           Physical Exam  Constitutional:       General: She is not in acute distress. Appearance: She is well-developed. HENT:      Head: Normocephalic and atraumatic. Nose: Nose normal.   Eyes:      Pupils: Pupils are equal, round, and reactive to light. Cardiovascular:      Rate and Rhythm: Normal rate and regular rhythm. Heart sounds: No murmur heard. Pulmonary:      Effort: Pulmonary effort is normal. No respiratory distress. Breath sounds: No stridor. No wheezing. Abdominal:      General: Abdomen is protuberant. Bowel sounds are normal. There is distension. Palpations: Abdomen is soft. There is fluid wave. Tenderness: There is no abdominal tenderness. Musculoskeletal:         General: No tenderness. Normal range of motion. Cervical back: Normal range of motion and neck supple. Skin:     General: Skin is warm and dry. Capillary Refill: Capillary refill takes less than 2 seconds. Findings: No erythema or rash. Neurological:      Mental Status: She is alert and oriented to person, place, and time. Sensory: No sensory deficit. Deep Tendon Reflexes: Reflexes normal.   Psychiatric:         Behavior: Behavior normal.         DIFFERENTIAL  DIAGNOSIS     PLAN (LABS / IMAGING / EKG):  No orders of the defined types were placed in this encounter. MEDICATIONS ORDERED:  No orders of the defined types were placed in this encounter.       DDX:     Here for routine paracentesis, history of cirrhosis    Initial MDM/Plan: 38 y.o. female who presents with routine paracentesis, abdominal distention    Here for routine paracentesis, does have abdominal distention however no abdominal pain or fevers  History of cirrhosis which is the etiology of her ascites  No other traumatic injuries  Well-appearing no pain  Afebrile  Missed her appointment  Try to reach out to IR for paracentesis unable  On the weekend likely will not be able to get elective paracentesis  Discussed patient outpatient follow with GI or come back Monday morning for IR paracentesis    Disposition:  Discharge with outpatient follow-up, return precautions given        DIAGNOSTIC RESULTS / EMERGENCYDEPARTMENT COURSE / MDM     LABS:  No results found for this visit on 04/16/22. RADIOLOGY:  No orders to display           EMERGENCY DEPARTMENT COURSE:  ED Course as of 04/16/22 0715   Sat Apr 16, 2022   0355 Patient seen and assessed in the emergency department no acute respiratory cardiovascular distress. Patient here for routine paracentesis has history of cirrhosis secondary to alcohol abuse. Patient states that she was supposed to get paracenteses today from GI was supposed to go there and states that nobody called from GI missed her appointment. Patient here with abdominal distention however no fevers no abdominal pain, normal state, denies any chest pain or shortness of breath. Denies headache vision changes or focal deficits. Denies traumatic injuries to the abdomen, urinary symptoms. Denies any vaginal symptoms as well. States that she is here for her paracentesis and that is it. [PS]   5598 Tolerating PO in bed  Ambulating  [PS]   1418 Trying to reach IR  [PS]   3280 Unable to retired, unable to paracentesis today discussed with patient to be discharged and return on Monday for IR paracentesis. [PS]      ED Course User Index  [PS] Scott Del Angel MD          PROCEDURES:  None    CONSULTS:  None    CRITICAL CARE:  Please see attending note    FINAL IMPRESSION      1. Abdominal distention    2.  History of abdominal paracentesis          DISPOSITION / PLAN     DISPOSITION Decision To Discharge 04/16/2022 07:12:57 AM PATIENT REFERRED TO:  Rc Wilks, APRN - CNP  [address], [place] [facility] [address]  [phone]    In 2 days      [facility]  [address] [facility] [address]  [phone]  In 2 days      [facility], [address] [address]  [phone]      Make an appointment soon as possible      DISCHARGE MEDICATIONS:  New Prescriptions    No medications on file       Franky Gramajo MD  Emergency Medicine Resident    (Please note that portions of this note were completed with a voice recognition program.Efforts were made to edit the dictations but occasionally words are mis-transcribed.)       Franky Gramajo MD  Resident  04/16/22 9780

## 2022-04-18 NOTE — BRIEF OP NOTE
Brief Postoperative Note for Paracentesis    Brayden Noel  YOB: 1980  [id number]    Pre-operative Diagnosis:  Ascites     Post-operative Diagnosis: Same    Procedure: Ultrasound guided Paracentesis     Anesthesia: 1% Lidocaine     Surgeons/Assistants: Dhruv Scruggs PA-C    Complications: none    EBL: Minimal    Specimens: Were obtained    Ultrasound guided paracentesis performed. 4800 ml clear yellow fluid obtained. Dressing applied.      Electronically signed by HOMAR Jean on 4/18/2022 at 4:21 PM

## 2022-04-18 NOTE — ED TRIAGE NOTES
Pt ambulated to room 46 from triage with boyfriend at side. Pt has a grossly distended stomach due to fluid accumulation that she states needs drained again. She tried to have it drained Saturday here but wasn't able to. Pt has history of cirrhosis and anemia. Pt respirations are even and unlabored, pt is oriented X 4, speaking in complete sentences, bed is in the lowest position, call light is within reach. Will continue to monitor.

## 2022-04-18 NOTE — Clinical Note
Dominic Nuno was seen and treated in our emergency department on 4/18/2022. She may return to work on 04/19/2022. If you have any questions or concerns, please don't hesitate to call.       Amy Bennett MD

## 2022-04-18 NOTE — ED PROVIDER NOTES
[address]  ED  Emergency Department Encounter  Emergency Medicine Resident     Pt Name: Geo Marx  MRN: [id number]  Armsdaliagfpamela 1980  Date of evaluation: 4/18/22  PCP:  TALYA Sahu CNP    This patient was evaluated in the Emergency Department for symptoms described in the history of present illness. The patient was evaluated in the context of the global COVID-19 pandemic, which necessitated consideration that the patient might be at risk for infection with the SARS-CoV-2 virus that causes COVID-19. Institutional protocols and algorithms that pertain to the evaluation of patients at risk for COVID-19 are in a state of rapid change based on information released by regulatory bodies including the CDC and federal and state organizations. These policies and algorithms were followed during the patient's care in the ED. CHIEF COMPLAINT       Chief Complaint   Patient presents with    Abdominal Pain     reports here to have abd drained, was suppossed to have it done saturday but it was cancelled     HISTORY OFPRESENT ILLNESS  (Location/Symptom, Timing/Onset, Context/Setting, Quality, Duration, Modifying Factors,Severity.)      Geo Marx is a 38 y.o. female who presents with abdominal distention and request for paracentesis due to history of cirrhosis secondary to alcohol abuse. Abdominal distention has progressed over the last month and a half. Patient states that she has felt uncomfortable but denies any abdominal pain at this time. No fevers at home. Denies any nausea, emesis, diarrhea. States she has been drinking a lot of cranberry juice and water to try and supplement her alcohol intake. Her last paracentesis was done in December by IR. She states that she needs her abdomen drained so that her primary care physician can evaluate her hernia. Patient was evaluated in the ED on 4/14/22 but missed outpatient appointment with IR on 4/15/22.   She again represented to the emergency department on 4/16 and attempts to get paracentesis but this was unable to be performed by IR as it was the weekend and this is an elective procedure. Hx of thrombocytopenia and hematemesis, history of transaminitis    PAST MEDICAL / SURGICAL / SOCIAL / FAMILY HISTORY      has a past medical history of Anxiety, GERD (gastroesophageal reflux disease), History of irregular heartbeat, Hypertension, and Seasonal allergies. has a past surgical history that includes Tubal ligation and Dilation and curettage of uterus. Social History     Socioeconomic History    Marital status: Single     Spouse name: Not on file    Number of children: Not on file    Years of education: Not on file    Highest education level: Not on file   Occupational History    Not on file   Tobacco Use    Smoking status: Current Every Day Smoker     Packs/day: 3.00     Types: Cigars    Smokeless tobacco: Never Used    Tobacco comment: black and milds, 3 packs/day   Vaping Use    Vaping Use: Never used   Substance and Sexual Activity    Alcohol use: Yes     Alcohol/week: 2.0 standard drinks     Types: 2 Cans of beer per week     Comment: daily 3-4 beers    Drug use: Yes     Types: Marijuana Kassy Dinning)    Sexual activity: Yes     Partners: Male   Other Topics Concern    Not on file   Social History Narrative    ** Merged History Encounter **          Social Determinants of Health     Financial Resource Strain:     Difficulty of Paying Living Expenses: Not on file   Food Insecurity:     Worried About Running Out of Food in the Last Year: Not on file    Scottie of Food in the Last Year: Not on file   Transportation Needs:     Lack of Transportation (Medical): Not on file    Lack of Transportation (Non-Medical):  Not on file   Physical Activity:     Days of Exercise per Week: Not on file    Minutes of Exercise per Session: Not on file   Stress:     Feeling of Stress : Not on file   Social Connections: daily as needed 2/3/20   Pablo Valdez MD   ondansetron (ZOFRAN ODT) 4 MG disintegrating tablet Take 1 tablet by mouth every 8 hours as needed for Nausea or Vomiting 7/13/19   Edith Santizo MD   Carboxymethylcellul-Glycerin 0.5-0.9 % SOLN Apply 2 drops to eye as needed (dry and irritated eyes) 5/21/19   Oneida Saldivar, DO       REVIEW OF SYSTEMS    (2-9 systems for level 4, 10 or more for level 5)      Review of Systems   Constitutional: Negative for activity change, appetite change and fever. HENT: Negative for congestion. Eyes: Negative for redness. Respiratory: Negative for cough and shortness of breath. Cardiovascular: Negative for chest pain. Gastrointestinal: Positive for abdominal distention and abdominal pain (intermittent, no pain currently). Genitourinary: Negative for dysuria. Musculoskeletal: Negative for gait problem. Skin: Negative for rash and wound. Neurological: Negative for headaches. PHYSICAL EXAM   (up to 7 for level 4, 8 or more for level 5)     INITIAL VITALS:    oral temperature is 97.9 °F (36.6 °C). Her blood pressure is 118/77 and her pulse is 78. Her respiration is 23 and oxygen saturation is 100%. Physical Exam  HENT:      Head: Normocephalic and atraumatic. Mouth/Throat:      Mouth: Mucous membranes are moist.   Eyes:      Extraocular Movements: Extraocular movements intact. Pupils: Pupils are equal, round, and reactive to light. Cardiovascular:      Rate and Rhythm: Normal rate and regular rhythm. Heart sounds: Normal heart sounds. Pulmonary:      Effort: Pulmonary effort is normal. No respiratory distress. Breath sounds: Normal breath sounds. No wheezing. Abdominal:      General: Bowel sounds are normal. There is distension. Palpations: There is fluid wave. Tenderness: There is no abdominal tenderness. There is no guarding. Negative signs include Escobar's sign and McBurney's sign.    Lymphadenopathy:      Cervical: No cervical adenopathy. Skin:     Capillary Refill: Capillary refill takes less than 2 seconds. Neurological:      Mental Status: She is alert and oriented to person, place, and time. DIFFERENTIAL  DIAGNOSIS     PLAN (LABS / IMAGING / EKG):  Orders Placed This Encounter   Procedures    US GUIDED PARACENTESIS     MEDICATIONS ORDERED:  No orders of the defined types were placed in this encounter. DDX: Ascites, SBP    Initial MDM/Plan: 38 y.o. female who presents with increasing abdominal distention. Denying pain currently. Will speak with interventional radiology to see if they can add this onto their book today. DIAGNOSTIC RESULTS / EMERGENCY DEPARTMENT COURSE / MDM     LABS:  Labs Reviewed - No data to display    RADIOLOGY:  No results found. EMERGENCY DEPARTMENT COURSE:  ED Course as of 04/20/22 1433   Mon Apr 18, 2022   1500 Patient updated that IR guided paracentesis can occur today. Patient asking if she can get it done tomorrow morning. Indicated that I cannot view the schedule and recommended patient wait for the paracentesis today as scheduled tomorrow cannot be guaranteed she has missed her outpatient appointment previously. [CP]   3670 I have looked numerous times for patient and stopped in room multiple times over the last hour and a half and have not seen patient. Assumed to have eloped from the emergency department after IR paracentesis. [CP]      ED Course User Index  [CP] Kate Pardo MD     Patient is a 82-year-old female presenting to the emergency department for paracentesis due to ascites from chronic liver cirrhosis secondary to alcohol use. .  Patient had previously been seen on 4/14 and was scheduled to have an outpatient appointment with IR on 4/15 for the paracentesis, but then again represented to the emergency department on 4/16 in hopes of getting the paracentesis performed.   Patient states that she needs a paracentesis done so that her primary doctor can evaluate her hernia. Patient has only had paracentesis performed 1 time back in December. Due to large ventral hernia, IR was consulted to perform paracentesis. They stated they would be able to do this procedure today. Patient was updated that IR could not perform procedure today was but then asked if procedure could be done tomorrow morning. I recommended the patient go ahead and get the procedure done today as she is currently here in the department and we know that it can be performed. Patient agreed to wait for the procedure to be performed. After procedure was completed, I did attempt to reevaluate the patient on multiple occasions however each time she was not in her room. After an hour and a half of attempting to reevaluate the patient, it was presumed that the patient eloped from the emergency department. PROCEDURES:  None    CONSULTS:  None    CRITICAL CARE:  Please see attending note    FINAL IMPRESSION      1. Ascites due to alcoholic cirrhosis ([address]        DISPOSITION / PLAN     DISPOSITION      Eloped from the emergency department prior to completion of medical evaluation and treatment.     PATIENTREFERRED TO:  Nona Vang, APRN - CNP  [address], [facility] [address] [place] [address]  [phone]      to discuss scheduling routine paracentesis    [facility]  [address] [place] [address]  [phone]    As needed      DISCHARGE MEDICATIONS:  Discharge Medication List as of 4/18/2022  4:51 PM          Sharron Keenan MD  Emergency Medicine Resident    (Please note that portions of this note were completed with a voice recognition program.  Efforts were made to edit the dictations but occasionally words are mis-transcribed.)        Jaquan Toussaint MD  Resident  04/20/22 5731

## 2022-05-12 NOTE — BRIEF OP NOTE
Brief Postoperative Note for Paracentesis    Annamaria Kanner  YOB: 1980  [id number]    Pre-operative Diagnosis:  Ascites     Post-operative Diagnosis: Same    Procedure: Ultrasound guided Paracentesis     Anesthesia: 1% Lidocaine     Surgeons/Assistants: Samra Scruggs PA-C    Complications: none    EBL: Minimal    Specimens: Were obtained    Ultrasound guided paracentesis performed. 3000 ml clear yellow fluid obtained. Dressing applied.      Electronically signed by HOMAR Torres on 5/12/2022 at 12:34 PM

## 2022-05-12 NOTE — PROGRESS NOTES
Patient in room 8  Pulse ox and BP taken  HOMAR GAUTHIER in room  RS- [facility] tech in room  Site prepped and draped  Access obtained  Draining STRAW colored fluid  TOTAL OF 3 LITERS DRAINED   Access removed  Site covered with 2x2 and tegaderm  Patient tolerated well  Stable upon transport.

## 2022-05-18 PROBLEM — K74.60 CIRRHOSIS OF LIVER (HCC): Status: ACTIVE | Noted: 2022-01-01

## 2022-05-18 NOTE — H&P
[facility]  Office: [address], DO, Aryashish Welsh, DO, Miriam Wheatley, DO, Willi Renteria Blood, DO, Chloe Fernandez MD, Faye Luu MD, Victoriano Herman MD, Hitesh Oseguera MD, Kristy Sow MD, Susannah Obrien MD, Vipin Medina MD, Carline Bedoya, DO, Gladys Guillory, DO, Rea Smith MD,  Dennis Da Silva DO, Romelia Ellis MD, Jerson Donahue MD, Ivonne Carroll MD, El Ly DO, Dawna Ellis MD, Sonam Sims MD, Huey Mckeon MD, Jose [facility], [facility], CNP, Kishore Vaughn, CNP, Javy Kline, CNP, Ted Rowley, CNP, Kyree Bernard, CNP, Henri Lopez PA-C, Ana Marroquin, [facility], Jarvis Corrales, CNP, Jeannie Schaefer, CNP, Ilana Mina, CNP, Best Berry, CNS, Jose Juan Vegas, [facility], Rony Duff, CNP, Abelardo Sands, CNP, Jayashree Watters, [facility]    HISTORY AND PHYSICAL EXAMINATION            Date:   5/18/2022  Patient name:  Priscilla Styles  Date of admission:  5/18/2022  1:35 AM  MRN:   [id number]  Account:  [de-identified]  YOB: 1980  PCP:    TALYA Berrios CNP  Room:   06/06  Code Status:    Full Code    Chief Complaint:     Chief Complaint   Patient presents with    Abdominal Pain        History Obtained From:     patient    History of Present Illness: This is a 38year old female patient who presented to the ED with a chief complaint of abdominal pain in all four quadrants and nausea for the past two days. She is a daily drinker and consumes approximately 2 beers daily which is a decrease from her previous baseline alcohol consumption. She undergoes paracentesis outpatient every 2 weeks with her last one being 5/12 and had 3 L removed. Patient has a history of decompensated alcoholic cirrhosis and non compliance with medical recommendations. She has no plans to quit alcohol at this time.      Past Medical History:     Past Medical History:   Diagnosis Date    Anxiety     GERD (gastroesophageal reflux disease)     History of irregular heartbeat     Hypertension     Seasonal allergies         Past Surgical History:     Past Surgical History:   Procedure Laterality Date    DILATION AND CURETTAGE OF UTERUS      TUBAL LIGATION          Medications Prior to Admission:     Prior to Admission medications    Medication Sig Start Date End Date Taking? Authorizing Provider   famotidine (PEPCID) 20 MG tablet Take 1 tablet by mouth 2 times daily 11/23/20   Deberah Osgood Gruenbaum, DO   Acetaminophen (ACETAMINOPHEN EXTRA STRENGTH) 500 MG CAPS Take 1-2 tablets by mouth every 6 hours as needed for pain. Do not take more than 8 pills in a 24 hour period. 9/4/20   Coreen Powers DO   ibuprofen (IBU) 800 MG tablet Take 1 tablet by mouth every 6 hours as needed for Pain 9/4/20   Coreen Powers DO   pantoprazole (PROTONIX) 40 MG tablet Take 1 tablet by mouth 2 times daily (before meals) 7/13/20   Radha Portillo MD   pantoprazole (PROTONIX) 20 MG tablet Take 2 tablets by mouth daily 7/9/20   CARMEN Zazueta MD   omeprazole (PRILOSEC) 40 MG delayed release capsule Take 1 capsule by mouth every morning (before breakfast) 7/8/20   Modesto Gonzales MD   benzocaine (ORAJEL) 20 % GEL mucosal gel Apply to affected area three times daily as needed 2/3/20   Lennox Gasman, MD   ondansetron (ZOFRAN ODT) 4 MG disintegrating tablet Take 1 tablet by mouth every 8 hours as needed for Nausea or Vomiting 7/13/19   Lucina Cruz MD   Carboxymethylcellul-Glycerin 0.5-0.9 % SOLN Apply 2 drops to eye as needed (dry and irritated eyes) 5/21/19   Oneida Gómez DO        Allergies:     Motrin [ibuprofen], Seasonal, and Motrin [ibuprofen]    Social History:     Tobacco:    reports that she has been smoking cigars. She has been smoking about 3.00 packs per day. She has never used smokeless tobacco.  Alcohol:      reports current alcohol use of about 2.0 standard drinks of alcohol per week.   Drug Use:  reports current drug use. Drug: Marijuana Zollie Axe). Family History:     Family History   Problem Relation Age of Onset    Heart Disease Mother     Other Mother         HIV    Cancer Mother         female cancer    Anxiety Disorder Sister     Anxiety Disorder Brother        Review of Systems:     Positive and Negative as described in HPI. 12 point ROS performed and negative for anything other than what was stated in HPI     Physical Exam:   BP (!) 98/52   Pulse 83   Temp 98.4 °F (36.9 °C) (Oral)   Resp 24   Wt 145 lb (65.8 kg)   SpO2 95%   BMI 24.89 kg/m²   Temp (24hrs), Av.4 °F (36.9 °C), Min:98.4 °F (36.9 °C), Max:98.4 °F (36.9 °C)    No results for input(s): POCGLU in the last 72 hours.   No intake or output data in the 24 hours ending 22 0724    General Appearance: alert, well appearing, and in no acute distress  Mental status: oriented to person, place, and time  Head: normocephalic, atraumatic  Eye: no icterus, redness, pupils equal and reactive, extraocular eye movements intact, conjunctiva clear  Ear: normal external ear, no discharge, hearing intact  Nose: no drainage noted  Mouth: mucous membranes moist  Neck: supple, no carotid bruits, thyroid not palpable  Lungs: Bilateral equal air entry, clear to ausculation, no wheezing, rales or rhonchi, normal effort  Cardiovascular: normal rate, regular rhythm, no murmur, gallop, rub  Abdomen: Soft, ttp, distended, normal bowel sounds, positive hepatomegaly   Neurologic: There are no new focal motor or sensory deficits, normal muscle tone and bulk, no abnormal sensation, normal speech  Skin: No gross lesions, rashes, bruising or bleeding on exposed skin area  Extremities: peripheral pulses palpable, no pedal edema or calf pain with palpation  Psych: normal affect    Investigations:      Laboratory Testing:  Recent Results (from the past 24 hour(s))   CBC with Auto Differential    Collection Time: 22  2:01 AM   Result Value Ref Range    WBC 11.2 3.5 - 11.3 k/uL    RBC 3.66 (L) 3.95 - 5.11 m/uL    Hemoglobin 11.7 (L) 11.9 - 15.1 g/dL    Hematocrit 35.1 (L) 36.3 - 47.1 %    MCV 95.9 82.6 - 102.9 fL    MCH 32.0 25.2 - 33.5 pg    MCHC 33.3 28.4 - 34.8 g/dL    RDW 18.1 (H) 11.8 - 14.4 %    Platelets 86 (L) 127 - 453 k/uL    MPV 11.6 8.1 - 13.5 fL    NRBC Automated 0.0 0.0 per 100 WBC    Seg Neutrophils 71 (H) 36 - 65 %    Lymphocytes 22 (L) 24 - 43 %    Monocytes 4 3 - 12 %    Eosinophils % 3 1 - 4 %    Basophils 0 0 - 2 %    Immature Granulocytes 0 0 %    Segs Absolute 7.82 1.50 - 8.10 k/uL    Absolute Lymph # 2.47 1.10 - 3.70 k/uL    Absolute Mono # 0.44 0.10 - 1.20 k/uL    Absolute Eos # 0.33 0.00 - 0.44 k/uL    Basophils Absolute 0.05 0.00 - 0.20 k/uL    Absolute Immature Granulocyte 0.04 0.00 - 0.30 k/uL    RBC Morphology ANISOCYTOSIS PRESENT    Basic Metabolic Panel    Collection Time: 05/18/22  2:01 AM   Result Value Ref Range    Glucose 101 (H) 70 - 99 mg/dL    BUN 4 (L) 6 - 20 mg/dL    CREATININE 0.31 (L) 0.50 - 0.90 mg/dL    Calcium 7.9 (L) 8.6 - 10.4 mg/dL    Sodium 136 135 - 144 mmol/L    Potassium 3.3 (L) 3.7 - 5.3 mmol/L    Chloride 104 98 - 107 mmol/L    CO2 24 20 - 31 mmol/L    Anion Gap 8 (L) 9 - 17 mmol/L    GFR Non-African American >60 >60 mL/min    GFR African American >60 >60 mL/min    GFR Comment         Hepatic Function Panel    Collection Time: 05/18/22  2:01 AM   Result Value Ref Range    Albumin 1.8 (L) 3.5 - 5.2 g/dL    Alkaline Phosphatase 156 (H) 35 - 104 U/L    ALT 39 (H) 5 - 33 U/L     (H) <32 U/L    Total Bilirubin 2.52 (H) 0.3 - 1.2 mg/dL    Bilirubin, Direct 1.25 (H) <0.31 mg/dL    Bilirubin, Indirect 1.27 (H) 0.00 - 1.00 mg/dL    Total Protein 8.8 (H) 6.4 - 8.3 g/dL    Albumin/Globulin Ratio 0.3 (L) 1.0 - 2.5   Lactic Acid    Collection Time: 05/18/22  2:01 AM   Result Value Ref Range    Lactic Acid, Whole Blood 2.3 (H) 0.7 - 2.1 mmol/L   Magnesium    Collection Time: 05/18/22  2:01 AM   Result Value Ref Range Magnesium 1.4 (L) 1.6 - 2.6 mg/dL   HCG Qualitative, Serum    Collection Time: 05/18/22  2:01 AM   Result Value Ref Range    hCG Qual NEGATIVE NEGATIVE   Troponin    Collection Time: 05/18/22  2:01 AM   Result Value Ref Range    Troponin, High Sensitivity 18 (H) 0 - 14 ng/L   Ethanol    Collection Time: 05/18/22  2:01 AM   Result Value Ref Range    Ethanol 224 (H) <10 mg/dL    Ethanol percent 0.224 (H) <0.010 %   Protime-INR    Collection Time: 05/18/22  2:01 AM   Result Value Ref Range    Protime 17.7 (H) 9.1 - 12.3 sec    INR 1.7    EKG 12 Lead    Collection Time: 05/18/22  3:20 AM   Result Value Ref Range    Ventricular Rate 81 BPM    Atrial Rate 81 BPM    P-R Interval 176 ms    QRS Duration 88 ms    Q-T Interval 406 ms    QTc Calculation (Bazett) 471 ms    P Axis 29 degrees    T Axis 46 degrees   Troponin    Collection Time: 05/18/22  5:14 AM   Result Value Ref Range    Troponin, High Sensitivity 17 (H) 0 - 14 ng/L       Imaging/Diagnostics:  CT ABDOMEN PELVIS W IV CONTRAST Additional Contrast? None    Result Date: 5/18/2022  Cirrhosis with associated moderate to large amount of ascites Mild diffuse enhancement of the small bowel which may represent an enteritis likely viral Distal esophageal varices, splenic varices and varices along the left ovarian vein are noted Borderline splenomegaly     US GUIDED PARACENTESIS    Result Date: 5/12/2022  Successful ultrasound-guided paracentesis. Assessment :      Hospital Problems           Last Modified POA    * (Principal) Cirrhosis of liver ([address] 5/18/2022 Yes          Plan:     Patient status inpatient in the Med/Surge    1. Cirrhosis of liver   2. Ascites   3. Abdominal pain   4. Hypomagnesemia   5. Lactic acidosis   6.  Alcoholism   - GI consult   - IR consult for paracentesis   - will consider starting patient on lasix and rifaximin after consulting with GI   - CLD   - repeat lactic acid   - repeat Mg level   - [facility] protocol   - MELD score of 17  - check UA Consultations:   IP CONSULT TO HOSPITALIST  IP CONSULT TO SOCIAL WORK      Patient is admitted as inpatient status because of co-morbidities listed above, severity of signs and symptoms as outlined, requirement for current medical therapies and most importantly because of direct risk to patient if care not provided in a hospital setting. Expected length of stay > 48 hours.     Ary García MD  5/18/2022  7:24 AM    Copy sent to Dr. Lazarus Saavedra, APRN - CNP

## 2022-05-18 NOTE — ED PROVIDER NOTES
[facility] ED  Emergency Department Encounter  EmergencyMedicine Resident     Pt Vivek Yeager Best Whalen  MRN: [id number]  [place] 1980  Date of evaluation: 5/18/22  PCP:  TALYA Dolan CNP    This patient was evaluated in the Emergency Department for symptoms described in the history of present illness. The patient was evaluated in the context of the global COVID-19 pandemic, which necessitated consideration that the patient might be at risk for infection with the SARS-CoV-2 virus that causes COVID-19. Institutional protocols and algorithms that pertain to the evaluation of patients at risk for COVID-19 are in a state of rapid change based on information released by regulatory bodies including the CDC and federal and state organizations. These policies and algorithms were followed during the patient's care in the ED. CHIEF COMPLAINT       Chief Complaint   Patient presents with    Abdominal Pain       HISTORY OF PRESENT ILLNESS  (Location/Symptom, Timing/Onset, Context/Setting, Quality, Duration, Modifying Factors, Severity.)      Jocelyne Sanchez is a 38 y.o. female who presents with complaint of abdominal pain patient has a history of alcohol abuse with cirrhosis and ascites. Had \"2 beers\" earlier today when sleeping and abdominal pain awoke her from sleep just prior to calling EMS. Patient has a history of paracenteses last of which was 5/12/2022 removing 3 L. Afebrile mild nausea no vomiting no urinary symptoms    PAST MEDICAL / SURGICAL / SOCIAL / FAMILY HISTORY      has a past medical history of Anxiety, GERD (gastroesophageal reflux disease), History of irregular heartbeat, Hypertension, and Seasonal allergies.   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  Added automatically from request for surgery [id number]   Thrombocytopenia 07/14/2020          has a past surgical history that includes Tubal ligation and Dilation and curettage of uterus. Social History     Socioeconomic History    Marital status: Single     Spouse name: Not on file    Number of children: Not on file    Years of education: Not on file    Highest education level: Not on file   Occupational History    Not on file   Tobacco Use    Smoking status: Current Every Day Smoker     Packs/day: 3.00     Types: Cigars    Smokeless tobacco: Never Used    Tobacco comment: black and milds, 3 packs/day   Vaping Use    Vaping Use: Never used   Substance and Sexual Activity    Alcohol use: Yes     Alcohol/week: 2.0 standard drinks     Types: 2 Cans of beer per week     Comment: daily 3-4 beers    Drug use: Yes     Types: Marijuana Dauna Other)    Sexual activity: Yes     Partners: Male   Other Topics Concern    Not on file   Social History Narrative    ** Merged History Encounter **          Social Determinants of Health     Financial Resource Strain:     Difficulty of Paying Living Expenses: Not on file   Food Insecurity:     Worried About Running Out of Food in the Last Year: Not on file    Scottie of Food in the Last Year: Not on file   Transportation Needs:     Lack of Transportation (Medical): Not on file    Lack of Transportation (Non-Medical):  Not on file   Physical Activity:     Days of Exercise per Week: Not on file    Minutes of Exercise per Session: Not on file   Stress:     Feeling of Stress : Not on file   Social Connections:     Frequency of Communication with Friends and Family: Not on file    Frequency of Social Gatherings with Friends and Family: Not on file    Attends Zoroastrian Services: Not on file    Active Member of Clubs or Organizations: Not on file    Attends Club or Organization Meetings: Not on file    Marital Status: Not on file   Intimate Partner Violence:     Fear of Current or Ex-Partner: Not on file    Emotionally Abused: Not on file    Physically Abused: Not on file    Sexually Abused: Not on file   Housing Stability:     Unable to Pay for Housing in the Last Year: Not on file    Number of [place] in the Last Year: Not on file    Unstable Housing in the Last Year: Not on file       Family History   Problem Relation Age of Onset    Heart Disease Mother     Other Mother         HIV    Cancer Mother         female cancer    Anxiety Disorder Sister     Anxiety Disorder Brother        Allergies:  Motrin [ibuprofen], Seasonal, and Motrin [ibuprofen]    Home Medications:  Prior to Admission medications    Medication Sig Start Date End Date Taking? Authorizing Provider   famotidine (PEPCID) 20 MG tablet Take 1 tablet by mouth 2 times daily 11/23/20   Mckinley Mcgrath, DO   Acetaminophen (ACETAMINOPHEN EXTRA STRENGTH) 500 MG CAPS Take 1-2 tablets by mouth every 6 hours as needed for pain. Do not take more than 8 pills in a 24 hour period.  9/4/20   Cain Situ, DO   ibuprofen (IBU) 800 MG tablet Take 1 tablet by mouth every 6 hours as needed for Pain 9/4/20   Cain Situ, DO   pantoprazole (PROTONIX) 40 MG tablet Take 1 tablet by mouth 2 times daily (before meals) 7/13/20   Federico Garcia MD   pantoprazole (PROTONIX) 20 MG tablet Take 2 tablets by mouth daily 7/9/20   CARMEN Hood MD   omeprazole (PRILOSEC) 40 MG delayed release capsule Take 1 capsule by mouth every morning (before breakfast) 7/8/20   Terry Baker MD   benzocaine (ORAJEL) 20 % GEL mucosal gel Apply to affected area three times daily as needed 2/3/20   Rudy Quintanilla MD   ondansetron (ZOFRAN ODT) 4 MG disintegrating tablet Take 1 tablet by mouth every Coloration: Skin is jaundiced. Neurological:      Mental Status: She is alert and oriented to person, place, and time.    Psychiatric:         Mood and Affect: Mood normal.         DIFFERENTIAL  DIAGNOSIS     PLAN (LABS / IMAGING / EKG):  Orders Placed This Encounter   Procedures    CT ABDOMEN PELVIS W IV CONTRAST Additional Contrast? None    CBC with Auto Differential    Basic Metabolic Panel    Hepatic Function Panel    Lactic Acid    Magnesium    HCG Qualitative, Serum    Urinalysis with Reflex to Culture    Troponin    Ethanol    Troponin    Protime-INR    Inpatient consult to Hospitalist    EKG 12 Lead       MEDICATIONS ORDERED:  Orders Placed This Encounter   Medications    famotidine (PEPCID) injection 20 mg    ondansetron (ZOFRAN) injection 4 mg    lactated ringers bolus    fentaNYL (SUBLIMAZE) injection 50 mcg    magnesium sulfate 2000 mg in 50 mL IVPB premix    potassium bicarb-citric acid (EFFER-K) effervescent tablet 40 mEq    iopamidol (ISOVUE-370) 76 % injection 75 mL    aluminum & magnesium hydroxide-simethicone (MAALOX) 200-200-20 MG/5ML suspension 30 mL       DDX: SBP, gastritis, worsening liver failure, UTI, pyelonephritis    DIAGNOSTIC RESULTS / EMERGENCY DEPARTMENT COURSE / MDM   LAB RESULTS:  Results for orders placed or performed during the hospital encounter of 05/18/22   CBC with Auto Differential   Result Value Ref Range    WBC 11.2 3.5 - 11.3 k/uL    RBC 3.66 (L) 3.95 - 5.11 m/uL    Hemoglobin 11.7 (L) 11.9 - 15.1 g/dL    Hematocrit 35.1 (L) 36.3 - 47.1 %    MCV 95.9 82.6 - 102.9 fL    MCH 32.0 25.2 - 33.5 pg    MCHC 33.3 28.4 - 34.8 g/dL    RDW 18.1 (H) 11.8 - 14.4 %    Platelets 86 (L) 622 - 453 k/uL    MPV 11.6 8.1 - 13.5 fL    NRBC Automated 0.0 0.0 per 100 WBC    Seg Neutrophils 71 (H) 36 - 65 %    Lymphocytes 22 (L) 24 - 43 %    Monocytes 4 3 - 12 %    Eosinophils % 3 1 - 4 %    Basophils 0 0 - 2 %    Immature Granulocytes 0 0 %    Segs Absolute 7.82 1.50 - 8.10 k/uL    Absolute Lymph # 2.47 1.10 - 3.70 k/uL    Absolute Mono # 0.44 0.10 - 1.20 k/uL    Absolute Eos # 0.33 0.00 - 0.44 k/uL    Basophils Absolute 0.05 0.00 - 0.20 k/uL    Absolute Immature Granulocyte 0.04 0.00 - 0.30 k/uL    RBC Morphology ANISOCYTOSIS PRESENT    Basic Metabolic Panel   Result Value Ref Range    Glucose 101 (H) 70 - 99 mg/dL    BUN 4 (L) 6 - 20 mg/dL    CREATININE 0.31 (L) 0.50 - 0.90 mg/dL    Calcium 7.9 (L) 8.6 - 10.4 mg/dL    Sodium 136 135 - 144 mmol/L    Potassium 3.3 (L) 3.7 - 5.3 mmol/L    Chloride 104 98 - 107 mmol/L    CO2 24 20 - 31 mmol/L    Anion Gap 8 (L) 9 - 17 mmol/L    GFR Non-African American >60 >60 mL/min    GFR African American >60 >60 mL/min    GFR Comment         Hepatic Function Panel   Result Value Ref Range    Albumin 1.8 (L) 3.5 - 5.2 g/dL    Alkaline Phosphatase 156 (H) 35 - 104 U/L    ALT 39 (H) 5 - 33 U/L     (H) <32 U/L    Total Bilirubin 2.52 (H) 0.3 - 1.2 mg/dL    Bilirubin, Direct 1.25 (H) <0.31 mg/dL    Bilirubin, Indirect 1.27 (H) 0.00 - 1.00 mg/dL    Total Protein 8.8 (H) 6.4 - 8.3 g/dL    Albumin/Globulin Ratio 0.3 (L) 1.0 - 2.5   Lactic Acid   Result Value Ref Range    Lactic Acid, Whole Blood 2.3 (H) 0.7 - 2.1 mmol/L   Magnesium   Result Value Ref Range    Magnesium 1.4 (L) 1.6 - 2.6 mg/dL   HCG Qualitative, Serum   Result Value Ref Range    hCG Qual NEGATIVE NEGATIVE   Troponin   Result Value Ref Range    Troponin, High Sensitivity 18 (H) 0 - 14 ng/L   Ethanol   Result Value Ref Range    Ethanol 224 (H) <10 mg/dL    Ethanol percent 0.224 (H) <0.010 %   Troponin   Result Value Ref Range    Troponin, High Sensitivity 17 (H) 0 - 14 ng/L   Protime-INR   Result Value Ref Range    Protime 17.7 (H) 9.1 - 12.3 sec    INR 1.7    EKG 12 Lead   Result Value Ref Range    Ventricular Rate 81 BPM    Atrial Rate 81 BPM    P-R Interval 176 ms    QRS Duration 88 ms    Q-T Interval 406 ms    QTc Calculation (Bazett) 471 ms    P Axis 29 degrees    T Axis 46 degrees IMPRESSION: Alert chronically ill-appearing nontoxic 53-year-old female no acute distress with abdominal distention and ascites in the setting of known liver disease she is jaundiced abdomen is tender without any rigidity or guarding no urinary symptoms afebrile concern for the above differential diagnosis plan of the labs, supportive care, IV fluids, urinalysis, hCG, twelve-lead EKG, troponin. No recent ct imaging of abdomen will include during this workup.      RADIOLOGY:  CT ABDOMEN PELVIS W IV CONTRAST Additional Contrast? None    Result Date: 5/18/2022  Cirrhosis with associated moderate to large amount of ascites Mild diffuse enhancement of the small bowel which may represent an enteritis likely viral Distal esophageal varices, splenic varices and varices along the left ovarian vein are noted Borderline splenomegaly       EKG  Sinus rhythm rhythm at a rate of 81 leftward axis QTC is 471 MN interval 176 QRS duration is 88 ms normal R wave progression unremarkable ekg    All EKG's are interpreted by the Emergency Department Physician who either signs or Co-signs this chart in the absence of a cardiologist.    EMERGENCY DEPARTMENT COURSE:  ED Course as of 05/18/22 0629   Wed May 18, 2022   0218 CBC with Auto Differential(!):    WBC 11.2   RBC 3.66(!)   Hemoglobin Quant 11.7(!)   Hematocrit 35.1(!)   MCV 95.9   MCH 32.0   MCHC 33.3   RDW 18.1(!)   Platelet Count 86(!)   MPV 11.6   NRBC Automated 0.0   Seg Neutrophils 71(!)   Lymphocytes 22(!)   Monocytes 4   Eosinophils % 3   Basophils 0   Immature Granulocytes 0   Segs Absolute 7.82   Absolute Lymph # 2.47   Absolute Mono # 0.44   Absolute Eos # 0.33   Basophils Absolute 0.05   Absolute Immature Granulocyte 0.04   RBC Morphology ANISOCYTOSIS PRESENT [BG]   0229 Lactic Acid(!):    Lactic Acid, Whole Blood 2.3(!) [BG]   0229 HCG Qualitative, Serum:    hCG Qual NEGATIVE [BG]   0331 Sober 0700 [BG]   0359 Ct reviewed [BG]   0402 CO2: 24 [BG]   0403 Corrected calcium 9.7 [BG]   0421 Meld 17  [BG]   0626 Admitted to [facility] [BG]      ED Course User Index  [BG] Ambrocio Srivastava DO         No notes of EC Admission Criteria type on file. PROCEDURES:      CONSULTS:  IP CONSULT TO HOSPITALIST    CRITICAL CARE:      FINAL IMPRESSION      1. Generalized abdominal pain    2. Thrombocytopenia ([address]    3. Alcohol use disorder, severe, dependence ([address]    4. Acute alcoholic intoxication without complication ([address]    5. Hypomagnesemia    6. Hypokalemia    7. Transaminitis    8. Elevated bilirubin    9. Esophageal varices without bleeding, unspecified esophageal varices type ([address]    10. Splenic varices    11. Elevated troponin          DISPOSITION / PLAN     DISPOSITION   admitted      PATIENT REFERRED TO:  No follow-up provider specified.     DISCHARGE MEDICATIONS:  New Prescriptions    No medications on file       Ambrocio Srivastava DO  Emergency Medicine Resident    (Please note that portions of thisnote were completed with a voice recognition program.  Efforts were made to edit the dictations but occasionally words are mis-transcribed.)       Ambrocio Srivastava DO  Resident  05/18/22 0074

## 2022-05-18 NOTE — CARE COORDINATION
Consult for consideration of rehab  Met with pt his date was awake and cooperative  Pt admits to drinking 1-2 beers daily. Pt states she used to drink 6+ beers everyday. Denies any drug use. No previous rehab. Pt declines rehab at this time. Treatment resources offered and pt receptive. Provided a list of alcohol treatment programs if decides to seek assistance after d/c.   Insurance is [place] [employer]

## 2022-05-18 NOTE — ED NOTES
Pt arrived to ED via EMS with c/o of abdominal pain and nausea. Pt states she had a paracentesis 6 days ago. Pt states she normally has one every few weeks. Pt states she drank 2 beers tonight. Pt's stomach is distended upon assessment. Pt is alert and oriented x4. Pt was placed on cardiac monitor.       Kaveh Nichole RN  05/18/22 [id number]

## 2022-05-18 NOTE — PLAN OF CARE
Problem: Discharge Planning  Goal: Discharge to home or other facility with appropriate resources  5/18/2022 1526 by Lopez Lackey RN  Outcome: Completed  5/18/2022 0910 by Lopez Lackey RN  Outcome: Progressing     Problem: Pain  Goal: Verbalizes/displays adequate comfort level or baseline comfort level  5/18/2022 1526 by Lopez Lackey RN  Outcome: Completed  5/18/2022 0910 by Lopez Lackey RN  Outcome: Progressing     Problem: Safety - Adult  Goal: Free from fall injury  5/18/2022 1526 by Lopez Lackey RN  Outcome: Completed  5/18/2022 0910 by Lopez Lackey RN  Outcome: Progressing

## 2022-05-18 NOTE — ED PROVIDER NOTES
[facility]     Emergency Department     Faculty Attestation    I performed a history and physical examination of the patient and discussed management with the resident. I have reviewed and agree with the residents findings including all diagnostic interpretations, and treatment plans as written. Any areas of disagreement are noted on the chart. I was personally present for the key portions of any procedures. I have documented in the chart those procedures where I was not present during the key portions. I have reviewed the emergency nurses triage note. I agree with the chief complaint, past medical history, past surgical history, allergies, medications, social and family history as documented unless otherwise noted below. Documentation of the HPI, Physical Exam and Medical Decision Making performed by scribsahara is based on my personal performance of the HPI, PE and MDM. For Physician Assistant/ Nurse Practitioner cases/documentation I have personally evaluated this patient and have completed at least one if not all key elements of the E/M (history, physical exam, and MDM). Additional findings are as noted. 39 yo F c/o abdominal pain & nausea, no fever, no cp, had paracentesis on 5/12,   pe vss gcs 15, [employer] for exam:   Protuberant abdomen, diffuse tenderness, mild ascites, + guarding, no cva tenderness,   No calf tenderness, trace ankle edema,     -replaced mag / k, admitting     EKG Interpretation    Interpreted by me  Normal sinus, heart rate 81, no ischemia, left axis, QT corrected 471    CRITICAL CARE: There was a high probability of clinically significant/life threatening deterioration in this patient's condition which required my urgent intervention. Total critical care time was 10 minutes. This excludes any time for separately reportable procedures.        Susy 24, DO  05/18/22 Yovani 12, DO  05/18/22 350 Adan Canales, DO  05/18/22 0402

## 2022-05-18 NOTE — PROGRESS NOTES
PT HAD IN PT PARACENTESIS TODAY. 3.3 LITERS OF YELLOW ASCITIC FLUID COLLECTED. 2X2 GAUZE AND TEGADERM TO CATHETER SITE. DRY AND INTACT. NO ORDERS FOR  SPECIMEN IN Epic, CONFIRMED WITH FLOOR RN . PT TOLERATED WELL. BP STABLE THROUGHOUT PROCEDURE. REPORT CALLED. READY FOR TRANSPORT.

## 2022-05-18 NOTE — BRIEF OP NOTE
Brief Postoperative Note for Paracentesis    Pedro Damian  YOB: 1980  [id number]    Pre-operative Diagnosis:  Ascites     Post-operative Diagnosis: Same    Procedure: Ultrasound guided Paracentesis     Anesthesia: 1% Lidocaine     Surgeons/Assistants: Jihan Scruggs PA-C    Complications: none    EBL: Minimal    Specimens: Were obtained    Ultrasound guided paracentesis performed. 3300 ml clear yellow fluid obtained. Dressing applied.      Electronically signed by HOMAR Lazaro on 5/18/2022 at 11:52 AM

## 2022-05-18 NOTE — PROGRESS NOTES
ms sent to Dr Ирина Hoff pt requesting to be discharged due to lack of care for her children. No DC order since pt just arrived and still needs further monitoring. Pt requested to leave AMA. Education provided that it would be in her best interest to stay and continue to be monitored. Pt declined. Education provided on ETOH and stopping or limiting drink. Education was well received. Pt also educated on vitamin and electrolytes needed for chronic drinkers. Pt signed AMA paper. IVs removed by writer. Pt left unit without assistance, gait steady balance intact.  Dr Tyler Robles notified via [employer]

## 2022-05-18 NOTE — CONSULTS
[facility] CONSULT    Patient:   Anel Yao   :    1980   Facility:   [address] [place] [address]   Date:    2022  Admission Dx:  Cirrhosis of liver ([facility] [K74.60]  Requesting physician: Valentina Tompkins MD  Reason for consult:  Liver cirrhosis    CC : Lower abdominal pain    SUBJECTIVE     HISTORY OF PRESENT ILLNESS  This is a 38 y.o. AA  female who was admitted 2022 with Cirrhosis of liver ([facility] [K74.60]. We have been asked to see the patient in consultation by Valentina Tompkins MD for liver cirrhosis    47-year-old female with a history of anxiety, GERD, hypertension, daily alcohol use with risk associated decompensated alcoholic cirrhosis requiring frequent paracentesis and severe noncompliance with medical recommendations presented to the ED with reports of abdominal pain and distention after drinking 2 beers earlier in the day. Patient reports she developed acute lower abdominal pain that woke her from her sleep approximately 2 AM.  She describes the pain as constant, crampy in the lower abdomens, suprapubic region. She had associated mild nausea but no vomiting. Patient reports her cirrhosis is currently being managed by her PCP. She was requiring paracentesis every 1 to 2 weeks. Last paracentesis completed 2022 with 3 L clear yellow fluid removed. She continues to consume alcohol 1 or 2 drinks daily. She is not following any low sodium restrictions. She denies any melena or hematochezia. No prior EGD or colonoscopy. Work-up in the ED showed WBCs 11.2, hemoglobin 11.7, hematocrit 35.1, platelets 86. INR 1.7. LFTs show a alkaline phosphatase 156, ALT 39, , total bilirubin 1.52.  UA suggestive of UTI      Previous GI history:   Alcohol cirrhosis. Patient has been noncompliant with recommendations and follow-up.        OBJECTIVE:     PAST MEDICAL/SURGICAL HISTORY  Past Medical History:   Diagnosis Date    Anxiety     GERD (gastroesophageal reflux disease)     History of irregular heartbeat     Hypertension     Seasonal allergies      Past Surgical History:   Procedure Laterality Date    DILATION AND CURETTAGE OF UTERUS      TUBAL LIGATION         ALLERGIES:  Allergies   Allergen Reactions    Motrin [Ibuprofen]     Seasonal     Motrin [Ibuprofen] Nausea Only     Feels better when taken with food       HOME MEDICATIONS:  Prior to Admission medications    Medication Sig Start Date End Date Taking? Authorizing Provider   famotidine (PEPCID) 20 MG tablet Take 1 tablet by mouth 2 times daily 11/23/20   Allyson Mcgrath DO   Acetaminophen (ACETAMINOPHEN EXTRA STRENGTH) 500 MG CAPS Take 1-2 tablets by mouth every 6 hours as needed for pain. Do not take more than 8 pills in a 24 hour period.  9/4/20   Matthew Ruth DO   ibuprofen (IBU) 800 MG tablet Take 1 tablet by mouth every 6 hours as needed for Pain 9/4/20   Matthew Ruth DO   pantoprazole (PROTONIX) 40 MG tablet Take 1 tablet by mouth 2 times daily (before meals) 7/13/20   Van Lazo MD   pantoprazole (PROTONIX) 20 MG tablet Take 2 tablets by mouth daily 7/9/20   CARMEN Grande MD   omeprazole (PRILOSEC) 40 MG delayed release capsule Take 1 capsule by mouth every morning (before breakfast) 7/8/20   Janet Camacho MD   benzocaine (ORAJEL) 20 % GEL mucosal gel Apply to affected area three times daily as needed 2/3/20   Jennifer Oconnell MD   ondansetron (ZOFRAN ODT) 4 MG disintegrating tablet Take 1 tablet by mouth every 8 hours as needed for Nausea or Vomiting 7/13/19   Royal Garcia MD   Carboxymethylcellul-Glycerin 0.5-0.9 % SOLN Apply 2 drops to eye as needed (dry and irritated eyes) 5/21/19   Oneida Grace DO       CURRENT MEDICATIONS:  Scheduled Meds:   sodium chloride flush  5-40 mL IntraVENous 2 times per day    thiamine  100 mg Oral Daily     Continuous Infusions:   sodium chloride      sodium chloride       PRN Meds:Carboxymethylcellul-Glycerin, sodium chloride flush, sodium chloride, potassium chloride **OR** potassium alternative oral replacement **OR** potassium chloride, magnesium sulfate, ondansetron **OR** ondansetron, acetaminophen **OR** acetaminophen, polyethylene glycol, LORazepam **OR** LORazepam **OR** LORazepam **OR** LORazepam **OR** LORazepam **OR** LORazepam **OR** LORazepam **OR** LORazepam    SOCIAL HISTORY:     Tobacco:   reports that she has been smoking cigars. She has been smoking about 3.00 packs per day. She has never used smokeless tobacco.  Alcohol:   reports current alcohol use of about 2.0 standard drinks of alcohol per week. Illicit drugs:  reports current drug use. Drug: Marijuana Garon Kettle). FAMILY HISTORY:     Family History   Problem Relation Age of Onset    Heart Disease Mother     Other Mother         HIV    Cancer Mother         female cancer    Anxiety Disorder Sister     Anxiety Disorder Brother        REVIEW OF SYSTEMS:    Constitutional: No fever, no chills, no lethargy, no weakness. HEENT:  No headache, otalgia, itchy eyes, nasal discharge or sore throat. Cardiac:  No chest pain, dyspnea, orthopnea or PND. Chest:   No cough, phlegm or wheezing. Abdomen:  + abdominal pain, no nausea or vomiting. Neuro:  No focal weakness, abnormal movements or seizure like activity. Skin:   No rashes, no itching. :   No hematuria, no pyuria, no dysuria, no flank pain. Extremities:  No swelling or joint pains. ROS was otherwise negative except as mentioned in the [address]      PHYSICAL EXAM:    BP (!) 98/52   Pulse 83   Temp 98.4 °F (36.9 °C) (Oral)   Resp 24   Wt 145 lb (65.8 kg)   SpO2 95%   BMI 24.89 kg/m²     GENERAL:   Well developed, Well nourished, No apparent distress  HEAD:   Normocephalic, Atraumatic  EENT:   EOMI, Sclera not icteric, Oropharynx moist   NECK:   Supple, Trachea midline  LUNGS:  CTA Bilaterally  HEART:  RRR, No murmur  ABDOMEN:   Soft, lower abdominal/suprapubic tender, mildly distended, BS WNL  EXT:   No clubbing. No cyanosis. No edema. SKIN:   No rashes. No jaundice. No stigmata of liver disease. MUSC/SKEL:   Adequate muscle bulk for patient's age, No significant synovitis, No deformities  NEURO:  A&O x Three, CN II- XII grossly intact      LABS AND IMAGING:     CBC  Recent Labs     05/18/22 0201   WBC 11.2   HGB 11.7*   HCT 35.1*   MCV 95.9   MCH 32.0   MCHC 33.3   PLT 86*       IMMATURE PLTs  Lab Results   Component Value Date    PLTFLUORE 75 05/12/2022    PLTFLUORE 36 03/05/2022    PLTFLUORE 39 12/14/2021       BMP  Recent Labs     05/18/22 0201      K 3.3*      CO2 24   BUN 4*   CREATININE 0.31*   GLUCOSE 101*   CALCIUM 7.9*       LFTS  Recent Labs     05/18/22  0201   ALKPHOS 156*   ALT 39*   *   PROT 8.8*   BILITOT 2.52*   BILIDIR 1.25*   LABALBU 1.8*       AMYLASE/LIPASE/AMMONIA  No results for input(s): AMYLASE, LIPASE, AMMONIA in the last 72 hours. PT/INR  Recent Labs     05/18/22 0201   PROTIME 17.7*   INR 1.7       ANEMIA STUDIES  No results for input(s): IRON, LABIRON, TIBC, UIBC, FERRITIN, [id number], FOLATE, OCCULTBLD in the last 72 hours.       LIVER WORK UP:    Acute Hepatitis Panel   Lab Results   Component Value Date    HEPBSAG NONREACTIVE 05/13/2020    HEPCAB NONREACTIVE 05/13/2020    HEPBIGM NONREACTIVE 05/13/2020    HEPAIGM NONREACTIVE 05/13/2020       HCV Genotype   No results found for: HEPATITISCGENOTYPE    HCV Quantitative   No results found for: HCVQNT    AFP  No results found for: AFP    Alpha 1 antitrypsin   No results found for: A1A    Anti - Liver/Kidney Ab  No results found for: LIVER-KIDNEYMICROSOMALAB    FADIA  No results found for: FADIA    AMA  No results found for: MITOAB    ASMA  No results found for: SMOOTHMUSCAB    Ceruloplasmin  No results found for: CERULOPLSM    Celiac panel  No results found for: TISSTRNTIIGG, TTGIGA, IGA    IgG  No results found for: IGG    IgM  No results found for: IGM    GGT   No results found for: LABGGT    PT/INR  Recent Labs     05/18/22  0201   PROTIME 17.7*   INR 1.7       Cancer Markers:  CEA:  No results found for: CEA  Ca 125:  No results found for:   Ca 19-9:   No results found for:   AFP: No results found for: AFP    Lactic acid:  Recent Labs     05/18/22  0201   LACTACIDWB 2.3*                 IMAGING  CT ABDOMEN PELVIS W IV CONTRAST Additional Contrast? None    Result Date: 5/18/2022  Cirrhosis with associated moderate to large amount of ascites Mild diffuse enhancement of the small bowel which may represent an enteritis likely viral Distal esophageal varices, splenic varices and varices along the left ovarian vein are noted Borderline splenomegaly     US GUIDED PARACENTESIS    Result Date: 5/12/2022  PROCEDURE: Ultrasound-guided paracentesis 5/12/2022 HISTORY: ORDERING SYSTEM PROVIDED HISTORY: Ascites due to alcoholic cirrhosis ([address] Alcoholic cirrhosis with ascites TECHNIQUE: This procedure was performed by Anny Casey PA-C under indirect supervision of . Informed consent was obtained after a detailed explanation of the procedure including the risks, benefits, and alternatives. Universal protocol was followed. The area was prepped and draped in sterile fashion using maximum barrier technique and local anesthesia was achieved with lidocaine. Pre-procedure ultrasound shows a dominant fluid pocket in the right upperquadrant. Using ultrasound guidance (image attached to the medical record), the fluid pocket was accessed with a 5 [place] Yueh needle with aspiration of clear yellow fluid. Eye-Q vacuum machine was connected and paracentesis was performed and approximately 3000 mL was removed. Post procedural ultrasound demonstrated minimal residual fluid. A sample was not sent for laboratory analysis. Estimated blood loss was minimum. The patient tolerated the procedure well and left the department in good condition.  FINDINGS: Limited ultrasound of the abdomen demonstrates ascites. A total of 3000 mL of clear yellow fluid was removed. Successful ultrasound-guided paracentesis. US GUIDED PARACENTESIS    Result Date: 4/18/2022  PROCEDURE: Ultrasound-guided paracentesis 4/18/2022 HISTORY: ORDERING SYSTEM PROVIDED HISTORY: ASCITES TECHNOLOGIST PROVIDED HISTORY: ASCITES Ascites TECHNIQUE: This procedure was performed by Marisa Richards PA-C under indirect supervision of . Informed consent was obtained after a detailed explanation of the procedure including the risks, benefits, and alternatives. Universal protocol was followed. The area was prepped and draped in sterile fashion using maximum barrier technique and local anesthesia was achieved with lidocaine. Pre-procedure ultrasound shows a dominant fluid pocket in the right upperquadrant. Using ultrasound guidance (image attached to the medical record), the fluid pocket was accessed with a 5 [place] Yueh needle with aspiration of clear yellow fluid. [employer] vacuum machine was connected and paracentesis was performed and approximately 4800 mL was removed. Post procedural ultrasound demonstrated minimal residual fluid. A sample was not sent for laboratory analysis. Estimated blood loss was minimum. The patient tolerated the procedure well and left the department in good condition. FINDINGS: Limited ultrasound of the abdomen demonstrates ascites. A total of 4800 mL clear yellow fluid was removed. Successful ultrasound-guided paracentesis. IMPRESSION:     1. Liver cirrhosis (alcohol suspected) decompensated by ascites, esophageal varices and portal hypertension is noted on CT. MELD 17, Child Dean score - class C  -No signs of GI bleed  -No signs of hepatic encephalopathy    2. Abdominal pain -likely secondary to UTI, SBP not excluded  3. Recurrent ascites -suspect secondary to noncompliance with dietary restrictions  4. Alcohol use disorder.   Patient continues to drink alcohol on a daily basis  5. Abnormal LFTs consistent with alcoholic hepatitis  Tanya's discriminant function: 26.  No role for glucocorticoids  6. Proteinemia -albumin 1.8      Old records, labs and imaging reviewed. PLAN   1. Follow-up on paracentesis  2. Recommend high-protein, low-sodium diet. Discussed with patient  3. Recommend alcohol abstinence-discussed with patient. Patient does not appear to be willing to stop at this time and states she is Deloris Brady drinking 1-2 beers'. 4.  Patient will need eventual EGD for variceal screening and comprehensive management of her cirrhosis. .  Patient has refused these in the past and has been noncompliant with follow-up. She is considering follow-up with [facility]  5.  GI will sign off      This plan was formulated in collaboration with Dr. Lakeisha Arreola  Thank you for allowing us to participate in the care of your patient. Electronically signed by: TALYA Meza CNP on 5/18/2022 at 7:41 AM     Please note that this note was generated using a voice recognition dictation software. Although every effort was made to ensure the accuracy of this automated transcription, some errors in transcription may have occurred.

## 2022-05-18 NOTE — PROGRESS NOTES
Pt magnesium and potassium are low from overnight labs. Writer attempted to replace and explained to patient what it was and why. She stated that the ER gave her the fizzy potassium and the magnesium through her IV last night.

## 2022-05-23 NOTE — DISCHARGE SUMMARY
[facility]  Office: [address], DO, Ho Trace, DO, Evans May, DO, [facility], DO, Erica Mcneal MD, Uche Simmons MD, Nick Davalos MD, Shankar Miranda MD, Mendy Parekh MD, Shelly Smith MD, Jeanie Ruth MD, Radha Mendoza, DO, Cristofer Kumar, DO, Lilly Metcalf MD,  Nathanial Apgar, DO, Rupesh Yang MD, Tiara Fisher MD, Luis Fisher MD, Krysta Ames DO, Ole Mora MD, Cholo Savage MD, Tempie Mcburney, MD, Sallie Clarke, [facility], [facility], CNP, Lizbeth Gerard, CNP, Rico Sawant, CNP, Kae Dubin, CNP, Britney Funk, CNP, Milton Foster PA-C, Noah Hartman, [facility], Graciela Calloway, CNP, Rukhsana Anderson, CNP, Viri Epstein, CNP, Linda Broderick, CNS, Lucrecia Yao, [facility], Lili Mendoza, CNP, Marcelo Montes, CNP, Carlita Taveras, [facility] 19    Discharge Summary     Patient ID: Alice Lieberman  :  1980   MRN: [id number]     ACCOUNT:  [de-identified]   Patient's PCP: TALYA Galan CNP  Admit Date: 2022   ama Date: 2022      Length of Stay: 1  Code Status:  Prior  Admitting Physician: Kwabena Santos DO  Discharge Physician: Rupesh Yang MD     Active Discharge Diagnoses:     Hospital Problem Lists:  Principal Problem:    Cirrhosis of liver ([facility]  Resolved Problems:    * No resolved hospital problems.  *      Admission Condition:  stable     ama:      Hospital Stay:     Hospital Course:  Alice Lieberman is a 38 y.o. female who was admitted for the management of    Cirrhosis of liver ([facility] , presented to ER with   Abdominal Pain      pT LEFT AMA  SEE H&P    Significant therapeutic interventions:      Significant Diagnostic Studies:   Labs / Micro:  CBC:   Lab Results   Component Value Date    WBC 11.2 2022    RBC 3.66 2022    RBC 4.58 04/15/2012    HGB 11.7 2022    HCT 35.1 2022    MCV 95.9 2022    MCH 32.0 2022 MCHC 33.3 05/18/2022    RDW 18.1 05/18/2022    PLT 86 05/18/2022     04/15/2012     BMP:    Lab Results   Component Value Date    GLUCOSE 98 05/18/2022    GLUCOSE 99 04/15/2012     05/18/2022    K 3.7 05/18/2022     05/18/2022    CO2 25 05/18/2022    ANIONGAP 6 05/18/2022    BUN 3 05/18/2022    CREATININE 0.34 05/18/2022    BUNCRER NOT REPORTED 12/14/2021    CALCIUM 7.4 05/18/2022    LABGLOM >60 05/18/2022    GFRAA >60 05/18/2022    GFR      05/18/2022     HFP:    Lab Results   Component Value Date    PROT 7.4 05/18/2022     CMP:    Lab Results   Component Value Date    GLUCOSE 98 05/18/2022    GLUCOSE 99 04/15/2012     05/18/2022    K 3.7 05/18/2022     05/18/2022    CO2 25 05/18/2022    BUN 3 05/18/2022    CREATININE 0.34 05/18/2022    ANIONGAP 6 05/18/2022    ALKPHOS 105 05/18/2022    ALT 32 05/18/2022     05/18/2022    BILITOT 2.28 05/18/2022    LABALBU 1.4 05/18/2022    ALBUMIN 0.2 05/18/2022    LABGLOM >60 05/18/2022    GFRAA >60 05/18/2022    GFR      05/18/2022    PROT 7.4 05/18/2022    CALCIUM 7.4 05/18/2022     PT/INR:    Lab Results   Component Value Date    PROTIME 17.7 05/18/2022    INR 1.7 05/18/2022     PTT:   Lab Results   Component Value Date    APTT 38.2 05/12/2022     FLP:  No results found for: CHOL, TRIG, HDL  U/A:    Lab Results   Component Value Date    COLORU Yellow 05/18/2022    TURBIDITY Clear 05/18/2022    SPECGRAV 1.037 05/18/2022    HGBUR NEGATIVE 05/18/2022    PHUR 5.5 05/18/2022    PROTEINU NEGATIVE 05/18/2022    GLUCOSEU NEGATIVE 05/18/2022    GLUCOSEU NEGATIVE 04/15/2012    KETUA NEGATIVE 05/18/2022    BILIRUBINUR NEGATIVE 05/18/2022    BILIRUBINUR NEGATIVE 04/15/2012    UROBILINOGEN Normal 05/18/2022    NITRU POSITIVE 05/18/2022    LEUKOCYTESUR SMALL 05/18/2022     TSH:  No results found for: TSH      Radiology:  CT ABDOMEN PELVIS W IV CONTRAST Additional Contrast? None    Result Date: 5/18/2022  Cirrhosis with associated moderate to large amount of ascites. Mild diffuse enhancement of the small bowel which may represent an enteritis likely viral. Distal esophageal varices, splenic varices and varices along the left ovarian vein are noted. Borderline splenomegaly. US GUIDED PARACENTESIS    Result Date: 5/18/2022  Successful ultrasound-guided paracentesis. Consultations:    Consults:     Final Specialist Recommendations/Findings:   IP CONSULT TO HOSPITALIST  IP CONSULT TO SOCIAL WORK  IP CONSULT TO GI      The patient was seen and examined on day of discharge and this discharge summary is in conjunction with any daily progress note from day of discharge. Discharge plan:     Disposition: AMA      No discharge procedures on file. Time Spent on discharge is  36 mins in patient examination, evaluation, counseling as well as medication reconciliation, prescriptions for required medications, discharge plan and follow up. Electronically signed by   Ciro Ford MD  5/23/2022  10:57 AM      Thank you Dr. Claudeen Judge, APRN - [facility] for the opportunity to be involved in this patient's care.

## 2022-06-01 PROBLEM — Z72.0 TOBACCO ABUSE: Status: ACTIVE | Noted: 2022-01-01

## 2022-06-01 PROBLEM — F10.220 ACUTE ALCOHOLIC INTOXICATION IN ALCOHOLISM (BLOOD LEVEL 0.08-0.29), UNCOMPLICATED (HCC): Status: ACTIVE | Noted: 2022-01-01

## 2022-06-01 PROBLEM — F10.129 ALCOHOL INTOXICATION WITH BLOOD LEVEL OVER 0.3 (HCC): Status: ACTIVE | Noted: 2022-01-01

## 2022-06-01 NOTE — DISCHARGE SUMMARY
[facility]  Office: [address], DO, Mahin Godwin, DO, Brenda Deleon, DO, Gililan Crane Blood, DO, Samantha Tolbert MD, Nicole Wilson MD, El Martin MD, Inge Baker MD, Mary Maza MD, Tamia Canales MD, Forrest Huggins MD, Media Sensor, DO, Giselle Ledesma MD,  Scottie Mills DO, Wally Cohn MD, Lonnie Lloyd MD, Elizabeth Glover MD, Poonam Reyes DO, Delmi Reyna MD, Jill Prieto MD, Sharron Garcia DO, Swati Portillo MD, Mel Choi [facility], [facility], CNP, Padilla Laguerre, CNP, Dipti Combs, CNP, Sang Garcia, CNP, Cami Ojeda, CNP, Lyoda Vazquez PA-C, Hellen Genao, [facility], Odin Pop, CNP, Charlotte Butcher, CNP, Wiley Whiteside, CNP, Lisa Perdue, CNS, Ebony Barrett, [facility], Courtney Combs, CNP, Ervin Ramsey, [facility], Steve [facility], [facility]    Discharge Summary     Patient ID: Cleophus Severance  :  1980   MRN: [id number]     ACCOUNT:  [de-identified]   Patient's PCP: TALYA Ren CNP  Admit Date: 2022   Discharge Date: 2022     Length of Stay: 0  Code Status:  Full Code  Admitting Physician: Amalia Buerger, MD  Discharge Physician: Sarita Powell DO     Active Discharge Diagnoses:     Hospital Problem Lists:  Principal Problem:    Alcohol intoxication with blood level over 0.3 [facility])  Active Problems:    Cirrhosis of liver ([facility]    Acute alcoholic intoxication in alcoholism (blood level 3.43-1.85), uncomplicated (HCC)    Tobacco abuse    Thrombocytopenia (HCC)    Elevated transaminase level    Transaminitis  Resolved Problems:    * No resolved hospital problems.  *      Admission Condition:  fair     Discharged Condition: fair    Hospital Stay:     Hospital Course:  Cleophus Severance is a 38 y.o. female who was admitted for the management of   Alcohol intoxication with blood level over 0.3 ([address] , presented to ER with Leg Swelling and Bloated    Patient presented with leg swelling and bloating which has been addressed previously, patient continues to drink alcohol despite having liver cirrhosis. Patient has had previous admissions requiring paracentesis. Patient is noncompliant outpatient, follows up with a primary care physician but does not see a gastroenterologist.  She had a very extensive discussion with admitting physician regarding compliance and cessation from alcohol and follow-up outpatient to control her fluid status. Patient underwent paracentesis in the emergency department, she felt much better the following day, she has plans to go to alcohol rehab later this week. We again had a very extensive discussion regarding maintenance and surveillance for her liver cirrhosis, she was provided follow-up information for a gastroenterologist, recommended 6-month abdominal ultrasound to screen for hepatocellular carcinoma, she was told to stop alcohol immediately to prevent further damage. She was explained that she would continue to have issues with liver cirrhosis, the goal at this point is to prevent further damage. Patient understands and agrees to stop drinking.   Patient was discharged to continue care outpatient      Significant therapeutic interventions: paracentesis    Significant Diagnostic Studies:   Labs / Micro:  CBC:   Lab Results   Component Value Date    WBC 7.4 06/01/2022    RBC 3.19 06/01/2022    RBC 4.58 04/15/2012    HGB 10.3 06/01/2022    HCT 30.8 06/01/2022    MCV 96.6 06/01/2022    MCH 32.3 06/01/2022    MCHC 33.4 06/01/2022    RDW 17.2 06/01/2022    PLT See Reflexed IPF Result 06/01/2022     04/15/2012     BMP:    Lab Results   Component Value Date    GLUCOSE 112 06/01/2022    GLUCOSE 99 04/15/2012     06/01/2022    K 3.8 06/01/2022     06/01/2022    CO2 27 06/01/2022    ANIONGAP 4 06/01/2022    BUN 3 06/01/2022    CREATININE 0.39 06/01/2022    BUNCRER NOT REPORTED 12/14/2021    CALCIUM 7.5 06/01/2022    LABGLOM >60 06/01/2022    GFRAA >60 06/01/2022    GFR      06/01/2022     HFP:    Lab Results   Component Value Date    PROT 8.3 06/01/2022     PT/INR:    Lab Results   Component Value Date    PROTIME 17.4 05/31/2022    INR 1.7 05/31/2022     PTT:   Lab Results   Component Value Date    APTT 38.2 05/12/2022     FLP:  No results found for: CHOL, TRIG, HDL     Radiology:  No results found. Consultations:    Consults:     Final Specialist Recommendations/Findings:   IP CONSULT TO HOSPITALIST  IP CONSULT TO SOCIAL WORK  IP CONSULT TO SOCIAL WORK  IP CONSULT TO DIETITIAN      The patient was seen and examined on day of discharge and this discharge summary is in conjunction with any daily progress note from day of discharge. Discharge plan:     Disposition: Home    Physician Follow Up:     [address], [facility]  [phone]    Schedule an appointment as soon as possible for a visit in 1 week  hospital followup    Paula Benoit, [id number] [place] [address]  [address] [facility]  [phone]    Schedule an appointment as soon as possible for a visit in 2 weeks  hospital followup       Requiring Further Evaluation/Follow Up POST HOSPITALIZATION/Incidental Findings: See GI, get [facility] screening, stop drinking alcohol    Diet: regular diet    Activity: As tolerated    Instructions to Patient: see GI, see PCP [facility] screening, stop drinking, go to rehab    Discharge Medications:      Medication List      CHANGE how you take these medications    furosemide 40 MG tablet  Commonly known as: LASIX  Take 1 tablet by mouth daily  What changed:   · medication strength  · See the new instructions.         CONTINUE taking these medications    FeroSul 325 (65 Fe) MG tablet  Generic drug: ferrous sulfate     magnesium oxide 400 (240 Mg) MG tablet  Commonly known as: MAG-OX     RA Senna 8.6 MG tablet  Generic drug: senna     thiamine 100 mg tablet     Vitamin D3 25 MCG (1000 UT) Caps STOP taking these medications    Acetaminophen 500 MG Caps  Commonly known as: Acetaminophen Extra Strength     benzocaine 20 % Gel mucosal gel  Commonly known as: ORAJEL     Carboxymethylcellul-Glycerin 0.5-0.9 % Soln     famotidine 20 MG tablet  Commonly known as: Pepcid     ibuprofen 800 MG tablet  Commonly known as: IBU     omeprazole 40 MG delayed release capsule  Commonly known as: PRILOSEC     ondansetron 4 MG disintegrating tablet  Commonly known as: Zofran ODT     pantoprazole 20 MG tablet  Commonly known as: Protonix     pantoprazole 40 MG tablet  Commonly known as: PROTONIX           Where to Get Your Medications      These medications were sent to [facility] [address] [facility] [address], [address] [place] [address] [address]    Phone: [phone]   · furosemide 40 MG tablet         No discharge procedures on file. Time Spent on discharge is  36 mins in patient examination, evaluation, counseling as well as medication reconciliation, prescriptions for required medications, discharge plan and follow up. Electronically signed by   Gricel Simms DO  6/1/2022  3:01 PM      Thank you Dr. Darlyn Casey, APRN - CNP for the opportunity to be involved in this patient's care.

## 2022-06-01 NOTE — ED NOTES
Pt going upstairs in wheelchair, RN aware that thiamine hasn't arrived and I messaged pharmacy to send to 4A.       Freya Garnica RN  06/01/22 2868

## 2022-06-01 NOTE — ED PROVIDER NOTES
[facility] [facility]     Emergency Department     Faculty Attestation    I performed a history and physical examination of the patient and discussed management with the resident. I reviewed the residents note and agree with the documented findings and plan of care. Any areas of disagreement are noted on the chart. I was personally present for the key portions of any procedures. I have documented in the chart those procedures where I was not present during the key portions. I have reviewed the emergency nurses triage note. I agree with the chief complaint, past medical history, past surgical history, allergies, medications, social and family history as documented unless otherwise noted below. For Physician Assistant/ Nurse Practitioner cases/documentation I have personally evaluated this patient and have completed at least one if not all key elements of the E/M (history, physical exam, and MDM). Additional findings are as noted. Patient with history of liver cirrhosis presents with bilateral lower extremity edema. She says she also is having abdominal bloating. She says that she last had a paracentesis about 2 weeks ago. She denies fever, chest pain, shortness of breath, abdominal pain. On exam, patient is resting comfortably in the bed. She is not in respiratory distress. Lungs clear to auscultation bilaterally heart sounds are normal.  Abdomen is mildly distended without tenderness. There is no rebound or guarding present. Patient does have moderate edema to the bilateral lower extremities without erythema or warmth. Will check labs and reassess.     EKG Interpretation    Interpreted by emergency department physician    Rhythm: normal sinus   Rate: normal  Axis: normal  Ectopy: none  Conduction: normal  ST Segments: normal  T Waves: normal  Q Waves: none    Clinical Impression: non-specific EKG    MD Yvonne Cheek MD  Attending Emergency  Physician             Edgardo Motta Laisha Jaffe MD  06/01/22 9085

## 2022-06-01 NOTE — ED NOTES
The following labs labeled with pt sticker and tubed to lab:     [x] Blue     [x] Lavender   [] on ice  [x] Green/yellow  [x] Green/black [] on ice  [] Yellow  [] Red  [] Pink      [] COVID-19 swab    [] Rapid  [] PCR  [] Flu swab  [] Peds Viral Panel     [x] Urine Sample  [] Pelvic Cultures  [] Blood Cultures            Halima Seals RN  05/31/22 2201

## 2022-06-01 NOTE — CONSULTS
Nutrition Education    · Educated on a low sodium, high protein diet. Encouraged small/frequent meals, including protein at all meals/snacks, lowering sodium intake by decreasing salt added to foods/choosing low-sodium or no-salt-added options, and increasing fiber intake to support bowel regularity. Pt reports she started to follow a low sodium diet when she found out she had high blood pressure, unsure of protein sources (thought greens had protein), and states she eats Rasin bran for fiber which helps her use the bathroom. · Learners: Patient  · Readiness: Acceptance  · Method: Explanation and Handout - Cirrhosis Nutrition Therapy, List of High Protein Foods (NCM handouts)  · Response: Verbalizes Understanding  · Contact name and number provided.     Coretta De La Fuente RD  Contact Number: [phone]

## 2022-06-01 NOTE — ED PROVIDER NOTES
Faculty Sign-Out Attestation  Handoff taken on the following patient from prior Attending Physician: Kallie Troy    I was available and discussed any additional care issues that arose and coordinated the management plans with the resident(s) caring for the patient during my duty period. Any areas of disagreement with residents documentation of care or procedures are noted on the chart. I was personally present for the key portions of any/all procedures during my duty period. I have documented in the chart those procedures where I was not present during the key portions.     Cirrhosis / leg swelling, needing admit,     Koffi Higgins DO  Attending Physician     Kofif Higgins DO  06/01/22 0209    admitted     Koffi Higgins,   06/01/22 5007

## 2022-06-01 NOTE — ED NOTES
Pt states she is having abdominal bloating and bilateral leg swelling hx of liver failure      Barby Temple RN  05/31/22 8362

## 2022-06-01 NOTE — CARE COORDINATION
Met with pt after receiving a social work consult for \"consideration of rehab\". Pt is alert and oriented. She stated that she has been drinking daily. She states that she wants to quit drinking. She states that she needs an outpatient program as she has kids at home. She states that she is scheduled for an appointment at [address] [place] [address] tomorrow at 3:00pm.  She also accepted a list of alcohol treatment programs as well.

## 2022-06-01 NOTE — H&P
[facility]  Office: [address], DO, Peewee Riff, DO, Abdirashid Da Silvaud, DO, Sharyn Ngo Blood, DO, Javi Quintero MD, Key Lilly MD, Tera Seals MD, Charlie Izaguirre MD, Liam Capone MD, Nicolle Joseph MD, Isa Liao MD, Migel Pickett, DO, Alanna Love MD,  Mandi Larsen, DO, Salina Muñoz MD, Latonia Rhodes MD, Linda Dias MD, Jared Workman DO, Carlie Busch MD, Kiera Resendez MD, Joce Mak DO, Luis Aguilar MD, German [facility], [facility], CNP, Devin Lewis, CNP, Gerilyn Cockayne, CNP, Tanja Rivas, CNP, Camillo Schaumann, CNP, Cachorro Hicks PA-C, Murray Quintero, [facility], Elizabeth Cordova, CNP, Benigno Shukla, CNP, Norah Toledo, CNP, Will Espinoza, CNS, Rico Mcrae, [facility], Melissa Willson, CNP, Heriberto Witt, CNP, Sarah Gross, [address]    HISTORY AND PHYSICAL EXAMINATION            Date:   6/1/2022  Patient name:  Serena Borden  Date of admission:  5/31/2022 11:20 PM  MRN:   [id number]  Account:  [de-identified]  YOB: 1980  PCP:    TALYA Rizzo CNP  Room:   0002/8069-21  Code Status:    Full Code    Chief Complaint:     Chief Complaint   Patient presents with    Leg Swelling    Bloated    \"because that was my choice\"    History Obtained From:     Patient with her boyfriend who did assist with H&P    History of Present Illness:     Serena Borden is a 38 y.o.  [place] female who presents with decompensated cirrhosis, end-stage liver disease with alcoholic cirrhosis with ongoing alcohol use who was recently evaluated earlier in May status post leaving AMA after presenting with worsening abdominal distention. Patient does get frequent therapeutic paracentesis for supportive management. Continues to drink alcohol.   Presented acutely intoxicated with worsening abdominal symptoms, increasing leg edema  and is admitted to the hospital for the management of Alcohol intoxication with blood level over 0.3 ([address] Per ED documentation patient complaining of abdominal bloating. Patient currently denies this complaints stating she has not had any abdominal symptoms, denies dysuria hematuria or difficulty urinating. Denies abdominal pain, no nausea or vomiting, denies diarrhea , constipation issues . denies bright red blood per rectum or melanotic stool. Initial CT imaging demonstrating suspected small bowel enteritis with bowel wall thickening in addition to splenic varices, esophageal varices and splenomegaly. patient is concerned regarding worsening lower extremity edema. Review of systems extremely limited as patient is still acutely intoxicated with rambling speech. Initially was talking about her knee arthritis with remote knee injury with an ACL tear and a Baker's cyst with chronic knee pain. Describes prior? ?? Arthrocentesis with blood in the past.  Denies history of gout or pseudogout. Denies recent falls or injuries. Denies fevers or chills. Denies chest pain shortness of breath or difficulty breathing. Denies palpitations irregular heartbeat. Patient states she does have difficulty with walking secondary to pain issues but denies any recent falls, does not use any assist devices for ambulation. Has not followed up with orthopedics as outpatient. Patient denies any issues with ongoing alcohol use admitting to only drinking 2 beers daily. Patient states she knows lots of people who \"drink a lot more than she does\". She denies ongoing binge drinking despite alcohol level of 0.4. She denies history of DTs seizures or worsening anxiety with not drinking. Denies noncompliance with medications. States she has agreed to doing outpatient medical withdrawal treatment and already has an appointment later this week.   Admits she did drink a little extra prior to coming in because \"I knew I  would not be able to drink when I was here.\"    Patient asked \"am I going to die\". She is tearful and very morbid. Per ED documentation they did discuss consideration for hospice and patient denies any of this desire, she is very forward thinking and wanting to live. She is very concerned regarding her ongoing leg swelling despite her use of diuretics and diet restrictions. She states that Lasix is ineffective. Past Medical History:     Past Medical History:   Diagnosis Date    Anxiety     GERD (gastroesophageal reflux disease)     History of irregular heartbeat     Hypertension     Seasonal allergies         Past Surgical History:     Past Surgical History:   Procedure Laterality Date    DILATION AND CURETTAGE OF UTERUS      TUBAL LIGATION          Medications Prior to Admission:     Prior to Admission medications    Medication Sig Start Date End Date Taking? Authorizing Provider   thiamine 100 mg tablet Take 100 mg by mouth daily 5/20/22  Yes Historical Provider, MD BERTRAND SENROSINA 8.6 MG tablet take 1 tablet by mouth twice a day (IN THE MORNING AND AT BEDTIME) 4/1/22   Historical Provider, MD   magnesium oxide (MAG-OX) 400 (240 Mg) MG tablet take 1 tablet by mouth once daily 5/6/22   Historical Provider, MD   furosemide (LASIX) 20 MG tablet take 1 tablet by mouth every other day 5/5/22   Historical Provider, MD   FEROSUL 325 (65 Fe) MG tablet take 1 tablet by mouth once daily WITH BREAKFAST 4/5/22   Historical Provider, MD   Cholecalciferol (VITAMIN D3) 25 MCG (1000 UT) CAPS take 2 capsules by mouth once daily 4/5/22   Historical Provider, MD        Allergies:     Motrin [ibuprofen], Seasonal, and Motrin [ibuprofen]    Social History:     Tobacco:    reports that she has been smoking cigars. She has been smoking about 0.25 packs per day. She has never used smokeless tobacco.  Alcohol:      reports current alcohol use of about 2.0 standard drinks of alcohol per week. Drug Use:  reports current drug use. Drug: Marijuana Kristin Loja). Continues to drink 2 beers daily. Denies excessive ongoing alcohol, states\" I have cut down. \" denies illegal drugs. Denies falls/ injuries. 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 Review of Systems   Patient does admit to easy bruising which has been persistent for the past several years, denies any history of bleeding. Denies recent endoscopy. Denies prior DVT PE. Denies prior stroke or TIA MI. Denies pruritus. Denies issues with lethargy or confusion. Denies headaches. Denies URI type symptoms or exposure to COVID-19. Denies any flulike symptoms. Denies any sick contacts. Review of systems otherwise negative, 12 system review and otherwise unremarkable. Physical Exam:   /73   Pulse 62   Temp 97.8 °F (36.6 °C) (Temporal)   Resp 18   Ht 5' 4\" (1.626 m)   Wt 167 lb 12.3 oz (76.1 kg)   SpO2 99%   BMI 28.80 kg/m²   Temp (24hrs), Av.3 °F (36.8 °C), Min:97.8 °F (36.6 °C), Max:98.8 °F (37.1 °C)    No results for input(s): POCGLU in the last 72 hours.     Intake/Output Summary (Last 24 hours) at 2022 2101  Last data filed at 2022 8402  Gross per 24 hour   Intake 190.13 ml   Output --   Net 190.13 ml       Physical Exam   General sleeping peacefully but arouses after repeated attempts, initially very groggy sensorium but after multiple attempts to awakening sensorium did improve, pleasant sitting up in bed appropriate, oriented,follows commands  Eye exam sclera with mild icterus, normal horizontal gaze pupils equally round reactive  ENT normocephalic atraumatic neck supple adequate dentition face symmetric, moist mucous membranes  Cardiovascular regular rate and rhythm normal S1-S2 without murmurs rubs or gallops  Lungs poor inspiratory effort, nonlabored, slightly diminished at bases with adequate air exchange nonlabored no wheezing or rhonchi  GI soft minimal ascites, nondistended, nontender, bowel sounds present  Vascular intact dorsalis pedis and posterior tibialis with trace pedal edema in feet and ankles  Neuro no asterixis, nonfocal moving all 4 extremities spontaneously, sensation grossly intact  Musculoskeletal adequate range of motion of upper and lower limbs, no synovitis or joint effusions  Derm adequate foot hygiene, no obvious rashes or lesions, exam limited as patient is still dressed    Investigations:      Laboratory Testing:  Recent Results (from the past 24 hour(s))   CBC with Auto Differential    Collection Time: 05/31/22 11:40 PM   Result Value Ref Range    WBC 7.4 3.5 - 11.3 k/uL    RBC 3.01 (L) 3.95 - 5.11 m/uL    Hemoglobin 9.8 (L) 11.9 - 15.1 g/dL    Hematocrit 28.2 (L) 36.3 - 47.1 %    MCV 93.7 82.6 - 102.9 fL    MCH 32.6 25.2 - 33.5 pg    MCHC 34.8 28.4 - 34.8 g/dL    RDW 17.1 (H) 11.8 - 14.4 %    Platelets See Reflexed IPF Result 138 - 453 k/uL    NRBC Automated 0.0 0.0 per 100 WBC    RBC Morphology ANISOCYTOSIS PRESENT     Seg Neutrophils 51 36 - 65 %    Lymphocytes 38 24 - 43 %    Monocytes 8 3 - 12 %    Eosinophils % 2 1 - 4 %    Basophils 1 0 - 2 %    Immature Granulocytes 0 0 %    Segs Absolute 3.82 1.50 - 8.10 k/uL    Absolute Lymph # 2.80 1.10 - 3.70 k/uL    Absolute Mono # 0.62 0.10 - 1.20 k/uL    Absolute Eos # 0.14 0.00 - 0.44 k/uL    Basophils Absolute 0.04 0.00 - 0.20 k/uL    Absolute Immature Granulocyte <0.03 0.00 - 0.30 k/uL   Comprehensive Metabolic Panel    Collection Time: 05/31/22 11:40 PM   Result Value Ref Range    Glucose 103 (H) 70 - 99 mg/dL    BUN 3 (L) 6 - 20 mg/dL    CREATININE 0.34 (L) 0.50 - 0.90 mg/dL    Calcium 7.6 (L) 8.6 - 10.4 mg/dL    Sodium 136 135 - 144 mmol/L    Potassium 3.2 (L) 3.7 - 5.3 mmol/L    Chloride 103 98 - 107 mmol/L CO2 24 20 - 31 mmol/L    Anion Gap 9 9 - 17 mmol/L    Alkaline Phosphatase 119 (H) 35 - 104 U/L    ALT 42 (H) 5 - 33 U/L     (H) <32 U/L    Total Bilirubin 3.15 (H) 0.3 - 1.2 mg/dL    Total Protein 8.4 (H) 6.4 - 8.3 g/dL    Albumin 1.8 (L) 3.5 - 5.2 g/dL    Albumin/Globulin Ratio 0.3 (L) 1.0 - 2.5    GFR Non-African American >60 >60 mL/min    GFR African American >60 >60 mL/min    GFR Comment         Protime-INR    Collection Time: 05/31/22 11:40 PM   Result Value Ref Range    Protime 17.4 (H) 9.1 - 12.3 sec    INR 1.7    Bilirubin, Direct    Collection Time: 05/31/22 11:40 PM   Result Value Ref Range    Bilirubin, Direct 1.43 (H) <0.31 mg/dL   Ethanol    Collection Time: 05/31/22 11:40 PM   Result Value Ref Range    Ethanol 400 (HH) <10 mg/dL    Ethanol percent 0.400 (H) <0.010 %   HCG Qualitative, Serum    Collection Time: 05/31/22 11:40 PM   Result Value Ref Range    hCG Qual NEGATIVE NEGATIVE   Lactic Acid    Collection Time: 05/31/22 11:40 PM   Result Value Ref Range    Lactic Acid, Whole Blood 1.2 0.7 - 2.1 mmol/L   Brain Natriuretic Peptide    Collection Time: 05/31/22 11:40 PM   Result Value Ref Range    Pro-BNP 48 <300 pg/mL   Troponin    Collection Time: 05/31/22 11:40 PM   Result Value Ref Range    Troponin, High Sensitivity 23 (H) 0 - 14 ng/L   Ammonia    Collection Time: 05/31/22 11:40 PM   Result Value Ref Range    Ammonia 67 (H) 11 - 51 umol/L   Magnesium    Collection Time: 05/31/22 11:40 PM   Result Value Ref Range    Magnesium 1.5 (L) 1.6 - 2.6 mg/dL   Immature Platelet Fraction    Collection Time: 05/31/22 11:40 PM   Result Value Ref Range    Platelet, Immature Fraction 9.3 1.1 - 10.3 %    Platelet, Fluorescence 41 (L) 138 - 453 k/uL   Troponin    Collection Time: 06/01/22  1:04 AM   Result Value Ref Range    Troponin, High Sensitivity 22 (H) 0 - 14 ng/L   Urinalysis with Microscopic    Collection Time: 06/01/22  1:05 AM   Result Value Ref Range    Color, UA Dark Yellow (A) Yellow Turbidity UA Cloudy (A) Clear    Glucose, Ur NEGATIVE NEGATIVE    Bilirubin Urine NEGATIVE  Verified by ictotest. (A) NEGATIVE    Ketones, Urine NEGATIVE NEGATIVE    Specific Gravity, UA 1.012 1.005 - 1.030    Urine Hgb NEGATIVE NEGATIVE    pH, UA 6.0 5.0 - 8.0    Protein, UA NEGATIVE NEGATIVE    Urobilinogen, Urine Normal Normal    Nitrite, Urine NEGATIVE NEGATIVE    Leukocyte Esterase, Urine LARGE (A) NEGATIVE    -          WBC, UA 10 TO 20 0 - 5 /HPF    RBC, UA 0 TO 2 0 - 2 /HPF    Epithelial Cells UA 5 TO 10 0 - 5 /HPF    Bacteria, UA FEW (A) None    Mucus, UA 1+ (A) None    Amorphous, UA 1+ (A) None       Imaging/Diagnostics:  No results found. Assessment :      Hospital Problems           Last Modified POA    * (Principal) Alcohol intoxication with blood level over 0.3 ([address] 6/1/2022 Yes    Cirrhosis of liver ([address] 6/1/2022 Yes    Acute alcoholic intoxication in alcoholism (blood level 8.62-7.68), uncomplicated ([address] 6/9/1477 Yes    Tobacco abuse 6/1/2022 Yes    Thrombocytopenia ([address] 6/1/2022 Yes    Elevated transaminase level 6/1/2022 Yes    Transaminitis 6/1/2022 Yes          Plan:     1. Admit for observation. Anticipate likely discharge later today pending social work and dietary evaluation. 2. Acute alcoholic intoxication with chronic decompensated alcoholic cirrhosis with esophageal varices, gastric varices, splenomegaly with portal hypertension, thrombocytopenia with ongoing alcohol use. Discussed likely etiology for worsening refractory ascites. Status post therapeutic paracentesis earlier today with improvement of symptoms. Discussed dietary restrictions with salt restriction in addition to reduce fluids in addition to abstinence from alcohol. Previously has been established for outpatient detox program later this week but has not initiated treatment. Patient does describe interest in sobriety in addition to dietary education. We will resume oral Lasix and DC further IV fluids.   Continue fluid restriction and salt restriction which was discussed and counseled with her in detail. Await input from social work and dietary. Discussed with nursing discharge later today after evaluations completed if remains clinically stable, no evidence of acute DTs. Discussed with nursing. Treatment plan discussed with patient and with boyfriend. Counseled on behavior modification, alcohol cessation. Strongly encouraged inpatient rehab for alcohol abuse . Patient states she cannot complete that with her small children at home \"nobody can take care of them'. await social work input . support offered. Consult approximately 15 minutes.     Consultations:   IP CONSULT TO HOSPITALIST  IP CONSULT TO SOCIAL WORK  IP CONSULT TO SOCIAL WORK  IP CONSULT TO DIETITIAN      Chelsie Nguyen MD  6/1/2022  7:12 AM    Copy sent to TALYA Ventura - CNP

## 2022-06-01 NOTE — ED PROVIDER NOTES
[facility] STEPDOWN  Emergency Department Encounter  EmergencyMedicine Resident     Pt Allie Sacks  MRN: [id number]  [place] 1980  Date of evaluation: 5/31/22  PCP:  TALYA Beckwith CNP    This patient was evaluated in the Emergency Department for symptoms described in the history of present illness. The patient was evaluated in the context of the global COVID-19 pandemic, which necessitated consideration that the patient might be at risk for infection with the SARS-CoV-2 virus that causes COVID-19. Institutional protocols and algorithms that pertain to the evaluation of patients at risk for COVID-19 are in a state of rapid change based on information released by regulatory bodies including the CDC and federal and state organizations. These policies and algorithms were followed during the patient's care in the ED. CHIEF COMPLAINT       Chief Complaint   Patient presents with    Leg Swelling    Bloated       HISTORY OF PRESENT ILLNESS  (Location/Symptom, Timing/Onset, Context/Setting, Quality, Duration, Modifying Factors, Severity.)      Sakshi Farah is a 38 y.o. female who presents with plaint of bilateral lower extremity swelling. Patient states that she \"thinks something is wrong with my liver\". Patient has a history of alcoholic cirrhosis. She continues to drink alcohol. She has bilateral lower extremity swelling and ascites. Last drink was earlier today. Patient recently hospitalized earlier this month for liver disease complications. No chest pain no shortness of breath review of systems in HPI positive for fatigue and generalized weakness. Last paracentesis 1 week ago    PAST MEDICAL / SURGICAL / SOCIAL / FAMILY HISTORY      has a past medical history of Anxiety, GERD (gastroesophageal reflux disease), History of irregular heartbeat, Hypertension, and Seasonal allergies.        has a past surgical history that includes Tubal ligation and Dilation and curettage of uterus. Social History     Socioeconomic History    Marital status: Single     Spouse name: Not on file    Number of children: Not on file    Years of education: Not on file    Highest education level: Not on file   Occupational History    Not on file   Tobacco Use    Smoking status: Current Every Day Smoker     Packs/day: 0.25     Types: Cigars    Smokeless tobacco: Never Used    Tobacco comment: black and milds   Vaping Use    Vaping Use: Never used   Substance and Sexual Activity    Alcohol use: Yes     Alcohol/week: 2.0 standard drinks     Types: 2 Cans of beer per week     Comment: daily 3-4 beers    Drug use: Yes     Types: Marijuana Alexienann Purvi)    Sexual activity: Yes     Partners: Male   Other Topics Concern    Not on file   Social History Narrative    ** Merged History Encounter **          Social Determinants of Health     Financial Resource Strain:     Difficulty of Paying Living Expenses: Not on file   Food Insecurity:     Worried About Running Out of Food in the Last Year: Not on file    Scottie of Food in the Last Year: Not on file   Transportation Needs:     Lack of Transportation (Medical): Not on file    Lack of Transportation (Non-Medical):  Not on file   Physical Activity:     Days of Exercise per Week: Not on file    Minutes of Exercise per Session: Not on file   Stress:     Feeling of Stress : Not on file   Social Connections:     Frequency of Communication with Friends and Family: Not on file    Frequency of Social Gatherings with Friends and Family: Not on file    Attends Jewish Services: Not on file    Active Member of Clubs or Organizations: Not on file    Attends Club or Organization Meetings: Not on file    Marital Status: Not on file   Intimate Partner Violence:     Fear of Current or Ex-Partner: Not on file    Emotionally Abused: Not on file    Physically Abused: Not on file    Sexually Abused: Not on file   Housing Stability:     Unable to Pay for Housing in the Last Year: Not on file    Number of Places Lived in the Last Year: Not on file    Unstable Housing in the Last Year: Not on file       Family History   Problem Relation Age of Onset    Heart Disease Mother     Other Mother         HIV    Cancer Mother         female cancer    Anxiety Disorder Sister     Anxiety Disorder Brother        Allergies:  Motrin [ibuprofen], Seasonal, and Motrin [ibuprofen]    Home Medications:  Prior to Admission medications    Medication Sig Start Date End Date Taking? Authorizing Provider   thiamine 100 mg tablet Take 100 mg by mouth daily 5/20/22  Yes Historical Provider, MD BERTRAND SENROSINA 8.6 MG tablet take 1 tablet by mouth twice a day (IN THE MORNING AND AT BEDTIME) 4/1/22   Historical Provider, MD   magnesium oxide (MAG-OX) 400 (240 Mg) MG tablet take 1 tablet by mouth once daily 5/6/22   Historical Provider, MD   furosemide (LASIX) 20 MG tablet take 1 tablet by mouth every other day 5/5/22   Historical Provider, MD   FEROSUL 325 (65 Fe) MG tablet take 1 tablet by mouth once daily WITH BREAKFAST 4/5/22   Historical Provider, MD   Cholecalciferol (VITAMIN D3) 25 MCG (1000 UT) CAPS take 2 capsules by mouth once daily 4/5/22   Historical Provider, MD       REVIEW OF SYSTEMS    (2-9 systems for level 4, 10 or more for level 5)      Review of Systems   Constitutional: Positive for fatigue. Negative for fever. Eyes: Negative for photophobia. Respiratory: Negative for shortness of breath. Cardiovascular: Negative for chest pain. Gastrointestinal: Positive for abdominal distention and nausea. Negative for vomiting. Genitourinary: Negative for flank pain. Musculoskeletal: Negative for gait problem. Skin: Positive for color change. Allergic/Immunologic: Positive for environmental allergies. Neurological: Negative for speech difficulty. Hematological: Bruises/bleeds easily. Psychiatric/Behavioral: Negative for confusion.        PHYSICAL EXAM   (up to 7 for level 4, 8 or more for level 5)      INITIAL VITALS:   /85   Pulse 82   Temp 97.8 °F (36.6 °C) (Temporal)   Resp 18   Ht 5' 4\" (1.626 m)   Wt 167 lb 12.3 oz (76.1 kg)   SpO2 99%   BMI 28.80 kg/m²     Physical Exam  Vitals and nursing note reviewed. Constitutional:       Appearance: She is ill-appearing. She is not toxic-appearing. HENT:      Head: Normocephalic and atraumatic. Right Ear: External ear normal.      Left Ear: External ear normal.      Nose: Nose normal.   Eyes:      General: Scleral icterus present. Cardiovascular:      Rate and Rhythm: Normal rate. Pulses: Normal pulses. Pulmonary:      Effort: Pulmonary effort is normal.   Abdominal:      General: There is distension. Palpations: Abdomen is soft. Tenderness: There is no abdominal tenderness. Musculoskeletal:         General: Swelling present. Normal range of motion. Cervical back: Normal range of motion. Right lower leg: Edema present. Left lower leg: Edema present. Skin:     Capillary Refill: Capillary refill takes less than 2 seconds. Coloration: Skin is jaundiced. Comments: Stigmata of chronic liver disease   Neurological:      Mental Status: She is alert and oriented to person, place, and time. Psychiatric:         Mood and Affect: Mood normal.         DIFFERENTIAL  DIAGNOSIS     PLAN (LABS / IMAGING / EKG):  Orders Placed This Encounter   Procedures    CBC with Auto Differential    Comprehensive Metabolic Panel    Protime-INR    Bilirubin, Direct    Ethanol    HCG Qualitative, Serum    Lactic Acid    Urinalysis with Microscopic    Brain Natriuretic Peptide    Troponin    Ammonia    Magnesium    Immature Platelet Fraction    Troponin    Comprehensive Metabolic Panel w/ Reflex to MG    Magnesium    Calcium, Ionized    Phosphorus    TSH without Reflex    Hemoglobin A1C    Protime-INR    Drug screen multi urine    ADULT DIET;  Regular    Vital signs per unit routine    Notify physician    Bedrest with bathroom privileges    Daily weights    Intake and output    Notify physician    Elevate Head of Bed     Full Code    Inpatient consult to Hospitalist    Inpatient consult to Social Work    Initiate Oxygen Therapy Protocol    Pulse oximetry, continuous    EKG 12 Lead    EKG 12 lead    ADMIT TO INPATIENT    ADMIT TO INPATIENT    Alcohol and or drug assessment    Seizure precautions    Fall precautions       MEDICATIONS ORDERED:  Orders Placed This Encounter   Medications    magnesium sulfate 2000 mg in 50 mL IVPB premix    potassium chloride (KLOR-CON M) extended release tablet 40 mEq    DISCONTD: potassium bicarb-citric acid (EFFER-K) effervescent tablet 20 mEq    DISCONTD: thiamine (B-1) 200 mg in sodium chloride 0.9 % 942 mL IVPB    folic acid (FOLVITE) tablet 1 mg    pantoprazole (PROTONIX) tablet 40 mg    0.9 % sodium chloride infusion    sodium chloride flush 0.9 % injection 5-40 mL    sodium chloride flush 0.9 % injection 10 mL    0.9 % sodium chloride infusion    OR Linked Order Group     potassium chloride (KLOR-CON M) extended release tablet 40 mEq     potassium bicarb-citric acid (EFFER-K) effervescent tablet 40 mEq     potassium chloride 10 mEq/100 mL IVPB (Peripheral Line)    enoxaparin (LOVENOX) injection 40 mg     Order Specific Question:   Indication of Use     Answer:   Prophylaxis-DVT/PE    OR Linked Order Group     ondansetron (ZOFRAN-ODT) disintegrating tablet 4 mg     ondansetron (ZOFRAN) injection 4 mg    polyethylene glycol (GLYCOLAX) packet 17 g    OR Linked Order Group     acetaminophen (TYLENOL) tablet 650 mg     acetaminophen (TYLENOL) suppository 650 mg    thiamine (B-1) injection 100 mg    OR Linked Order Group     LORazepam (ATIVAN) tablet 1 mg     LORazepam (ATIVAN) injection 1 mg     LORazepam (ATIVAN) tablet 2 mg     LORazepam (ATIVAN) injection 2 mg    magnesium sulfate 1000 mg in dextrose 5% 100 mL IVPB       DDX: Decompensated liver failure    DIAGNOSTIC RESULTS / EMERGENCY DEPARTMENT COURSE / MDM   LAB RESULTS:  Results for orders placed or performed during the hospital encounter of 05/31/22   CBC with Auto Differential   Result Value Ref Range    WBC 7.4 3.5 - 11.3 k/uL    RBC 3.01 (L) 3.95 - 5.11 m/uL    Hemoglobin 9.8 (L) 11.9 - 15.1 g/dL    Hematocrit 28.2 (L) 36.3 - 47.1 %    MCV 93.7 82.6 - 102.9 fL    MCH 32.6 25.2 - 33.5 pg    MCHC 34.8 28.4 - 34.8 g/dL    RDW 17.1 (H) 11.8 - 14.4 %    Platelets See Reflexed IPF Result 138 - 453 k/uL    NRBC Automated 0.0 0.0 per 100 WBC    RBC Morphology ANISOCYTOSIS PRESENT     Seg Neutrophils 51 36 - 65 %    Lymphocytes 38 24 - 43 %    Monocytes 8 3 - 12 %    Eosinophils % 2 1 - 4 %    Basophils 1 0 - 2 %    Immature Granulocytes 0 0 %    Segs Absolute 3.82 1.50 - 8.10 k/uL    Absolute Lymph # 2.80 1.10 - 3.70 k/uL    Absolute Mono # 0.62 0.10 - 1.20 k/uL    Absolute Eos # 0.14 0.00 - 0.44 k/uL    Basophils Absolute 0.04 0.00 - 0.20 k/uL    Absolute Immature Granulocyte <0.03 0.00 - 0.30 k/uL   Comprehensive Metabolic Panel   Result Value Ref Range    Glucose 103 (H) 70 - 99 mg/dL    BUN 3 (L) 6 - 20 mg/dL    CREATININE 0.34 (L) 0.50 - 0.90 mg/dL    Calcium 7.6 (L) 8.6 - 10.4 mg/dL    Sodium 136 135 - 144 mmol/L    Potassium 3.2 (L) 3.7 - 5.3 mmol/L    Chloride 103 98 - 107 mmol/L    CO2 24 20 - 31 mmol/L    Anion Gap 9 9 - 17 mmol/L    Alkaline Phosphatase 119 (H) 35 - 104 U/L    ALT 42 (H) 5 - 33 U/L     (H) <32 U/L    Total Bilirubin 3.15 (H) 0.3 - 1.2 mg/dL    Total Protein 8.4 (H) 6.4 - 8.3 g/dL    Albumin 1.8 (L) 3.5 - 5.2 g/dL    Albumin/Globulin Ratio 0.3 (L) 1.0 - 2.5    GFR Non-African American >60 >60 mL/min    GFR African American >60 >60 mL/min    GFR Comment         Protime-INR   Result Value Ref Range    Protime 17.4 (H) 9.1 - 12.3 sec    INR 1.7    Bilirubin, Direct   Result Value Ref Range    Bilirubin, Direct 1.43 (H) is distended but nontender lungs are clear heart is regular rate and rhythm there is lower extremity swelling symmetrically bilaterally extending up to the knees. Plan to be labs and likely admission    RADIOLOGY:  No results found. EKG      All EKG's are interpreted by the Emergency Department Physician who either signs or Co-signs this chart in the absence of a cardiologist.    EMERGENCY DEPARTMENT COURSE:  ED Course as of 06/01/22 0318   Tue May 31, 2022   2350 CBC with Auto Differential(!):    WBC 7.4   RBC 3.01(!)   Hemoglobin Quant 9.8(!)   Hematocrit 28.2(!)   MCV 93.7   MCH 32.6   MCHC 34.8   RDW 17.1(!)   Platelet Count See Reflexed IPF Result   NRBC Automated 0.0   RBC Morphology ANISOCYTOSIS PRESENT   Seg Neutrophils 51   Lymphocytes 38   Monocytes 8   Eosinophils % 2   Basophils 1   Immature Granulocytes 0   Segs Absolute 3.82   Absolute Lymph # 2.80   Absolute Mono # 0.62   Absolute Eos # 0.14   Basophils Absolute 0.04   Absolute Immature Granulocyte <0.03 [BG]   Wed Jun 01, 2022   0039 Corrected calcium 9.4 [BG]   0040 Meld score 18 [BG]   0134 Troponin(!):    Troponin, High Sensitivity 22(!) [BG]   0219 Case discussed with intermed, admitted [BG]      ED Course User Index  [BG] Kelle Cedillo DO         No notes of EC Admission Criteria type on file. PROCEDURES:      CONSULTS:  IP CONSULT TO HOSPITALIST  IP CONSULT TO SOCIAL WORK    CRITICAL CARE:      FINAL IMPRESSION      1. Acute liver failure without hepatic coma    2. Alcohol use disorder, severe, dependence ([facility] [address]    3. Thrombocytopenia concurrent with and due to alcoholism ([facility] [address]          DISPOSITION / [address] Admitted 06/01/2022 02:19:40 AM      PATIENT REFERRED TO:  No follow-up provider specified.     DISCHARGE MEDICATIONS:  Current Discharge Medication List          Kelle Cedillo DO  Emergency Medicine Resident    (Please note that portions of thisnote were completed with a voice recognition program.  Efforts were made to edit the dictations but occasionally words are mis-transcribed.)        Davis Romano DO  Resident  06/01/22 0857

## 2022-06-28 NOTE — ED NOTES
Sling and swath place with ice bag, instructions provided and pt verbalized understanding of this equipment use.       Mukund Blevins RN  06/28/22 6625
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
## 2022-06-28 NOTE — ED NOTES
Pt's family sts pt requesting pain medication for arm pain, vascular called for pt, Dr Jamaica Hamilton updated. Awaiting orders.             Diann Penn RN  06/28/22 4960
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
## 2022-06-28 NOTE — ED NOTES
Report recd. Pt resting on cot with eyes closed and even RR noted. Wakened with verbal stimuli and updated that Vascular lab would be up soon to take her for her pending doppler study to check for blood clot in arm. Pt verbalized understanding. Pt STS arm is \"ok\". Awaiting transport. Hospital gown provided.         Tao Granados RN  06/28/22 3733
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
## 2022-06-30 NOTE — CARE COORDINATION
06/30/22 1432   Service Assessment   Patient Orientation Alert and Oriented   Cognition Alert   History Provided By Patient   Primary Caregiver Self   Accompanied By/Relationship family   Support Systems Children;Family Members   PCP Verified by CM Yes   Last Visit to PCP Within last 3 months   Prior Functional Level Independent in ADLs/IADLs   Current Functional Level Independent in ADLs/IADLs   Can patient return to prior living arrangement Yes   Ability to make needs known: Good   Family able to assist with home care needs: Yes   Social/Functional History   Lives With Family   Type of Home Apartment   ADL Assistance Independent   Homemaking Assistance Independent   Homemaking Responsibilities Yes   Ambulation Assistance Independent   Transfer Assistance Independent   Active  No   Condition of Participation: Discharge Planning   The Plan for Transition of Care is related to the following treatment goals: increased comfort level   Return home with family
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
## 2022-06-30 NOTE — ED NOTES
Surgery at bedside stated pt L arm to remain elevate, pillows and ice given     Rodger Hutchinson, RN  06/30/22 [id number]
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
## 2022-07-04 NOTE — ED NOTES
This patient was assessed by the doctor only. Nurse processed and completed the orders from this doctor ie labs, meds, and/or EKG.         Zamzam Paul LPN  88/03/61 0384
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
## 2022-07-08 NOTE — ED NOTES
Called lab regarding HCG level not being in process when sample was ordered and sent down at 1545. Lab states they will get it added on.       Tello Wiseman RN  07/08/22 9179
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
## 2022-07-08 NOTE — ED NOTES
Pt updated on plan for admission and for blood transfusion. Pt states she has never had a transfusion before. Pt has brother and infant grandchild at bedside. Pt notified that child is not able to stay in room with pt when she goes up to be admitted and may not be allowed to visit due to age of 19 days old. Family calling brian mother to pick her up when able.       Rebecca Allan RN  07/08/22 5109

## 2022-07-08 NOTE — ED NOTES
Lab called with a critical HGB of 5.7.  Dr Rachael Reddy notified     Kraig Parikh, RN  07/08/22 7651
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
## 2022-07-08 NOTE — ED NOTES
Blood arrived as patient was going to ct scan. Unsure how long pt will be in ct, blood sent back and spoke with blood bank. Will call them when pt returns and ready to receive blood.       Latonia Hastings RN  07/08/22 2493

## 2022-07-09 PROBLEM — M25.832: Status: ACTIVE | Noted: 2022-01-01

## 2022-07-09 NOTE — PROGRESS NOTES
[facility]up[facility] [address] [employer]  Occupational Therapy Not Seen Note    DATE: 2022    NAME: Christiano Clay  MRN: [id number]   : 1980      Patient not seen this date for Occupational Therapy due to:    Blood Transfusion: Pts hgb 6.3    Electronically signed by STEPHEN Quiñones on 2022 at 11:41 AM
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
## 2022-07-09 NOTE — ED NOTES
Pt. Resting in bed, eyes closed, NAD. Pt resp even and non labored. Pt has no requests at this time. Will continue with plan of care.      Will Chang RN  07/08/22 2034
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
## 2022-07-10 NOTE — PROGRESS NOTES
Sent perfect serve message to NP on call Mary Hurtado making her aware of Hemoglobin level of 7.2 following blood transfusion. She stated we will monitor at this time.
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
## 2022-07-10 NOTE — PLAN OF CARE
Problem: Discharge Planning  Goal: Discharge to home or other facility with appropriate resources  7/10/2022 0131 by Vj Da Silva RN  Outcome: Progressing  7/9/2022 1207 by Bossman Judge RN  Outcome: Progressing     Problem: Pain  Goal: Verbalizes/displays adequate comfort level or baseline comfort level  7/10/2022 0131 by Vj Da Silva RN  Outcome: Progressing  7/9/2022 1207 by Bossman Judge RN  Outcome: Progressing     Problem: ABCDS Injury Assessment  Goal: Absence of physical injury  7/10/2022 0131 by Vj Da Silva RN  Outcome: Progressing  7/9/2022 1207 by Bossman Judge RN  Outcome: Progressing

## 2022-07-10 NOTE — PLAN OF CARE
Problem: Discharge Planning  Goal: Discharge to home or other facility with appropriate resources  7/10/2022 1006 by Nela German RN  Outcome: Progressing     Problem: Pain  Goal: Verbalizes/displays adequate comfort level or baseline comfort level  7/10/2022 1006 by Nela German RN  Outcome: Progressing     Problem: ABCDS Injury Assessment  Goal: Absence of physical injury  7/10/2022 1006 by Nela German RN  Outcome: Progressing

## 2022-07-10 NOTE — PROGRESS NOTES
Seen and examined, chart reviewed. Multiple medical issues. States feels good. Amludwig elevated. Will discuss potential transfer to medical service.   Reviewed with team.
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
## 2022-07-10 NOTE — PROGRESS NOTES
Physical Therapy  Facility/Department: [address] STEPDOWN  Physical Therapy Initial Assessment    Name: Mony Gonzales  : 1980  MRN: [id number]  Date of Service: 7/10/2022    Discharge Recommendations:    No PT recommended at discharge     Patient Diagnosis(es): The primary encounter diagnosis was Arm swelling. A diagnosis of Anemia, unspecified type was also pertinent to this visit. Past Medical History:  has a past medical history of Anxiety, GERD (gastroesophageal reflux disease), History of irregular heartbeat, Hypertension, and Seasonal allergies. Past Surgical History:  has a past surgical history that includes Tubal ligation and Dilation and curettage of uterus. Assessment   Assessment: The pt ambulated 300 ft without a device x SBA.  She ambulated safely and has no need for any further PT intervention at this time  Therapy Prognosis: Good  Decision Making: Medium Complexity  Requires PT Follow-Up: No  Activity Tolerance  Activity Tolerance: Patient tolerated treatment well     Plan   Plan  Plan: Discharge with evaluation only  Safety Devices  Type of Devices: Nurse notified,Left in bed,Call light within reach     Restrictions  Restrictions/Precautions  Restrictions/Precautions: Up as Tolerated     Subjective   General  Patient assessed for rehabilitation services?: Yes  Response To Previous Treatment: Not applicable  Family / Caregiver Present: No  Follows Commands: Within Functional Limits  Subjective  Subjective: Pt denies pain         Social/Functional History  Social/Functional History  Lives With: Family  Type of Home: House  Home Layout: Two level,Performs ADL's on one level  Home Access: Stairs to enter with rails  Entrance Stairs - Number of Steps: 7-8  Entrance Stairs - Rails: Left  Bathroom Shower/Tub: Tub only  Bathroom Toilet: Standard  Bathroom Accessibility: Accessible  Receives Help From: Family  ADL Assistance: Independent  Homemaking Assistance: Independent  Homemaking Responsibilities: Yes  Ambulation Assistance: Independent  Transfer Assistance: Independent  Active : No  Mode of Transportation: Walk  Occupation: Unemployed  Leisure & Hobbies: Time with her children  Vision/Hearing  Vision  Vision: Within Functional Limits  Hearing  Hearing: Within functional limits    Cognition   Orientation  Overall Orientation Status: Within Functional Limits  Cognition  Overall Cognitive Status: WFL     Objective   Heart Rate: 99  Heart Rate Source: Monitor  BP: 128/76  BP Location: Right upper arm  BP Method: Automatic  Patient Position: Semi fowlers  MAP (Calculated): 93.33  Resp: 30  SpO2: 97 %  O2 Device: None (Room air)          AROM RLE (degrees)  RLE AROM: WNL  AROM LLE (degrees)  LLE AROM : WNL  AROM RUE (degrees)  RUE AROM : WNL  AROM LUE (degrees)  LUE AROM : WNL  Strength RLE  Strength RLE: WNL  Strength LLE  Strength LLE: WNL  Strength RUE  Strength RUE: WNL  Strength LUE  Strength LUE: WNL        Bed mobility  Supine to Sit: Stand by assistance  Sit to Supine: Stand by assistance  Scooting: Stand by assistance  Transfers  Sit to Stand: Stand by assistance  Stand to sit: Stand by assistance  Ambulation  Surface: level tile  Device: No Device  Assistance: Stand by assistance  Distance: amb 300 ft without a device x SBA     Balance  Posture: Good  Sitting - Static: Good  Sitting - Dynamic: Fair  Standing - Static: Good  Standing - Dynamic: Good         OutComes Score            AM-PAC Score  AM-PAC Inpatient Mobility Raw Score : 21 (07/10/22 1031)  AM-PAC Inpatient T-Scale Score : 50.25 (07/10/22 1031)  Mobility Inpatient CMS 0-100% Score: 28.97 (07/10/22 1031)  Mobility Inpatient CMS G-Code Modifier : CJ (07/10/22 1031)     Goals  Short Term Goals  Time Frame for Short term goals: No PT needs at this time     DC   PT       Education  Patient Education  Education Given To: Patient  Education Provided: Role of Therapy;Plan of Care  Education Method: Verbal  Barriers to Learning: None  Education Outcome: Verbalized understanding      Therapy Time   Individual Concurrent Group Co-treatment   Time In 56         Time Out 0945         Minutes [facility]

## 2022-07-10 NOTE — DISCHARGE SUMMARY
Pt discharged home independently. Pt wheeled of unit by Dovie Opitz. Pt took all documented belongings with her. LDAs removed. Discharge instructions reviewed and all questions answered. Pt to  rx from personal pharmacy. Pt in no distress.  Electronically signed by Lizz Hayes RN on 7/10/2022 at 4:03 PM

## 2022-07-12 NOTE — PROGRESS NOTES
Patient to US for therapeutic paracentesis. JR GRAF and MANJU RDMS at bedside. Site prepped and draped, area numbed with lidocaine. Access obtained and draining clear yellow fluid. 3.5L obtained. Access removed and dry drsg with tegaderm placed to site. [facility] tolerated well [facility]

## 2022-07-12 NOTE — BRIEF OP NOTE
Brief Postoperative Note for Paracentesis    Chaka Kilpatrick  YOB: 1980  [id number]    Pre-operative Diagnosis:  Ascites     Post-operative Diagnosis: Same    Procedure: Ultrasound guided Paracentesis     Anesthesia: 1% Lidocaine     Surgeons/Assistants: Chilango Scruggs PA-C    Complications: none    EBL: Minimal    Specimens: Were obtained    Ultrasound guided paracentesis performed. 3500 ml clear yellow fluid obtained. Dressing applied.      Electronically signed by HOMAR Herrera on 7/12/2022 at 2:15 PM

## 2022-07-27 PROBLEM — K70.11 ALCOHOLIC HEPATITIS WITH ASCITES: Status: ACTIVE | Noted: 2022-01-01

## 2022-07-27 PROBLEM — R79.1 ELEVATED INR: Status: ACTIVE | Noted: 2022-01-01

## 2022-07-27 PROBLEM — I95.9 ARTERIAL HYPOTENSION: Status: ACTIVE | Noted: 2022-01-01

## 2022-07-27 PROBLEM — K65.2 SPONTANEOUS BACTERIAL PERITONITIS (HCC): Status: ACTIVE | Noted: 2022-01-01

## 2022-07-27 PROBLEM — F10.20 ALCOHOL USE DISORDER, MODERATE, DEPENDENCE (HCC): Status: ACTIVE | Noted: 2022-01-01

## 2022-07-27 PROBLEM — A41.9 SEPTICEMIA (HCC): Status: ACTIVE | Noted: 2022-01-01

## 2022-07-27 NOTE — VCC REMOTE MONITORING
Attempted to contact primary RN regarding the sepsis bundle.     Orienting w/ PS  Efra Man, [facility] #[phone]

## 2022-07-27 NOTE — ED PROVIDER NOTES
[facility] ED     Emergency Department     Faculty Attestation        I performed a history and physical examination of the patient and discussed management with the resident. I reviewed the residents note and agree with the documented findings and plan of care. Any areas of disagreement are noted on the chart. I was personally present for the key portions of any procedures. I have documented in the chart those procedures where I was not present during the key portions. I have reviewed the emergency nurses triage note. I agree with the chief complaint, past medical history, past surgical history, allergies, medications, social and family history as documented unless otherwise noted below. For Physician Assistant/ Nurse Practitioner cases/documentation I have personally evaluated this patient and have completed at least one if not all key elements of the E/M (history, physical exam, and MDM). Additional findings are as noted. Vital Signs: BP: 120/63  Heart Rate: 95  Resp: 16  Temp: (!) 104 °F (40 °C) SpO2: 99 %  PCP:  TALYA Freeman CNP    Pertinent Comments:     Patient is a 51-year-old female with history of hypertension as well as liver disease and intermittent ascites. Patient states over the last 24 to 48 hours began having fevers and chills as well as diffuse muscle aches and low back pain associated with some abdominal pain as well. Denies any URI symptoms such as nasal congestion or cough. No shortness of breath or chest pain either. Does have some urinary symptoms with increased urination as well. Abdomen is nonperitoneal but has diffuse tenderness and diffuse ascites but not quite tense. No obvious rashes seen on the patient and no bony midline back pain. Neurologically patient is intact with possibly slight generalized weakness.    Assessment/plan: Patient with fever of 40 °C here and will defervesce as well as obtain septic work-up. Obviously urinalysis with culture as well given urinary symptoms but will obtain diagnostic paracentesis to assess for possible SBP. Likely empiric antibiotics as well    Critical Care  None    This patient was evaluated in the Emergency Department for symptoms described in the history of present illness. He/she was evaluated in the context of the global COVID-19 pandemic, which necessitated consideration that the patient might be at risk for infection with the SARS-CoV-2 virus that causes COVID-19. Institutional protocols and algorithms that pertain to the evaluation of patients at risk for COVID-19 are in a state of rapid change based on information released by regulatory bodies including the CDC and federal and state organizations. These policies and algorithms were followed during the patient's care in the ED. (Please note that portions of this note were completed with a voice recognition program. Efforts were made to edit the dictations but occasionally words are mis-transcribed.  Whenever words are used in this note in any gender, they shall be construed as though they were used in the gender appropriate to the circumstances; and whenever words are used in this note in the singular or plural form, they shall be construed as though they were used in the form appropriate to the circumstances.)    MD Ben Coleman  Attending Emergency Medicine Physician           Elieser Burrows MD  07/26/22 2830       Elieser Burrows MD  07/27/22 0005

## 2022-07-27 NOTE — ED NOTES
Pt reports to the ED with complaints of fever and abdominal pain since midnight last night. Pt has a hx of liver cirrhosis. Pt states all she has been doing the past few days is laying on the couch eating candy. Pt febrile upon arrival. Pt does not have any other complaints at this time. Pt is A&O and speaking in complete sentences. Pt is resting in bed comfortably, NAD noted. Pt denies chest pain, SOB. EKG obtained.  Pt placed on full cardiac monitor       Lulu Schmitz RN  07/27/22 3689

## 2022-07-27 NOTE — ED NOTES
Report given to [facility], all questions answered     Andrzej Stuart RN  07/27/22 4765

## 2022-07-27 NOTE — CARE COORDINATION
07/27/22 1809   Service Assessment   Patient Orientation Alert and Oriented   Cognition Alert   History Provided By Patient   Primary [address]   PCP Verified by CM Yes  (Radha Sauer)   Last Visit to PCP Within last 3 months   Prior Functional Level Independent in ADLs/IADLs   Current Functional Level Independent in ADLs/IADLs   Can patient return to prior living arrangement Yes   Ability to make needs known: Good   Family able to assist with home care needs: Yes   Financial Resources Medicaid   Social/Functional History   Lives With Daughter   Type of [address] One level   Home Access Stairs to enter with rails   Entrance Stairs - Number of Steps [address] Independent   Transfer Assistance Independent   Active  No   Patient's  Info family, [place], [employer]   Discharge Planning   Type of Residence Apartment   Living Arrangements Children   Current Services Prior To Admission None   Potential Assistance Needed N/A   Patient expects to be discharged to: [address] [place] [address] Discharge   Mode of Transport at Discharge Other (see comment)  (medical cab)   Condition of Participation: Discharge Planning   The Plan for Transition of Care is related to the following treatment goals: comfort   The Patient and/or Patient Representative was provided with a Choice of Provider? Patient   The Patient and/Or Patient Representative agree with the Discharge Plan? Yes   Freedom of Choice list was provided with basic dialogue that supports the patient's individualized plan of care/goals, treatment preferences, and shares the quality data associated with the providers? Yes     Patient plans to return home independently.  She denies needs

## 2022-07-27 NOTE — VCC REMOTE MONITORING
Attempted to contact primary RN regarding the sepsis bundle.     Orienting w/ PS  Juan José Spence, [facility] #[phone]
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
## 2022-07-27 NOTE — ED NOTES
The following labs labeled with pt sticker and tubed to lab:     [] Blue     [] Lavender   [] on ice  [] Green/yellow  [] Green/black [] on ice  [] Yellow  [] Red  [] Pink      [] COVID-19 swab    [] Rapid  [] PCR  [] Flu swab  [] Peds Viral Panel     [] Urine Sample  [] Pelvic Cultures  [x] Blood Cultures x2    Paracentesis cultures x1           Parag Barger RN  07/27/22 2256

## 2022-07-27 NOTE — ED NOTES
The following labs labeled with pt sticker and tubed to lab:     [] Blue     [] Lavender   [] on ice  [] Green/yellow  [x] Green/black [x] on ice  [] Yellow  [] Red  [] Pink      [] COVID-19 swab    [] Rapid  [] PCR  [] Flu swab  [] Peds Viral Panel     [] Urine Sample  [] Pelvic Cultures  [] Blood Cultures            Letty Wong RN  07/27/22 9219

## 2022-07-27 NOTE — ED PROVIDER NOTES
[address]  ED  Emergency Department Encounter  EmergencyMedicineResident     This patient was seen during the COVID-19 crisis. There were limited resources and those resources we did have had to be conserved for the sickest of patients. Pt Name: Anita Can  MRN: [id number]  [place] 1980  Date of evaluation: 7/26/22  PCP: TALYA Montoya CNP    CHIEF COMPLAINT       Chief Complaint   Patient presents with    Fever    Abdominal Pain       HISTORY OF PRESENT ILLNESS  (Location/Symptom, Timing/Onset, Context/Setting, Quality, Duration, Modifying Factors, Severity.)      Anita Can is a 38 y.o. female who presents today for evaluation of abdominal pain and fever. Reports she has been feeling ill for the last few days. Patient has significant past medical history is of ascites secondary to alcoholic liver cirrhosis. Patient states that she cannot take Motrin as it upsets her stomach so she has not taken anything for fever control at this time. No bowel or bladder changes. No chest pain. PAST MEDICAL / SURGICAL /SOCIAL / FAMILY HISTORY      has a past medical history of Anxiety, GERD (gastroesophageal reflux disease), History of irregular heartbeat, Hypertension, and Seasonal allergies. has a past surgical history that includes Tubal ligation and Dilation and curettage of uterus. Social History     Socioeconomic History    Marital status: Single     Spouse name: Not on file    Number of children: Not on file    Years of education: Not on file    Highest education level: Not on file   Occupational History    Not on file   Tobacco Use    Smoking status: Every Day     Packs/day: 0.25     Types: Cigars, Cigarettes    Smokeless tobacco: Never    Tobacco comments:     black and milds   Vaping Use    Vaping Use: Never used   Substance and Sexual Activity    Alcohol use:  Yes     Alcohol/week: 2.0 standard drinks     Types: 2 Cans of beer per week Comment: daily 3-4 beers    Drug use: Yes     Types: Marijuana Champ Cue)    Sexual activity: Yes     Partners: Male   Other Topics Concern    Not on file   Social History Narrative    ** Merged History Encounter **          Social Determinants of Health     Financial Resource Strain: Not on file   Food Insecurity: Not on file   Transportation Needs: Not on file   Physical Activity: Not on file   Stress: Not on file   Social Connections: Not on file   Intimate Partner Violence: Not on file   Housing Stability: Not on file       Family History   Problem Relation Age of Onset    Heart Disease Mother     Other Mother         HIV    Cancer Mother         female cancer    Anxiety Disorder Sister     Anxiety Disorder Brother        Allergies:  Motrin [ibuprofen], Seasonal, and Motrin [ibuprofen]    Home Medications:  Prior to Admission medications    Medication Sig Start Date End Date Taking? Authorizing Provider   thiamine 100 MG tablet Take 3 tablets by mouth daily 7/11/22   Mariana Shoemaker MD   spironolactone (ALDACTONE) 25 MG tablet Take 1 tablet by mouth daily 7/11/22   Mariana Shoemaker MD   polyethylene glycol (GLYCOLAX) 17 g packet Take 17 g by mouth daily 7/11/22 8/10/22  Mariana Shoemaker MD   magnesium oxide (MAG-OX) 400 (240 Mg) MG tablet take 1 tablet by mouth once daily  Patient not taking: Reported on 7/8/2022 5/6/22   Historical Provider, MD   FEROSUL 325 (65 Fe) MG tablet take 1 tablet by mouth once daily WITH BREAKFAST 4/5/22   Historical Provider, MD   Cholecalciferol (VITAMIN D3) 25 MCG (1000 UT) CAPS take 2 capsules by mouth once daily 4/5/22   Historical Provider, MD   furosemide (LASIX) 40 MG tablet Take 1 tablet by mouth daily 6/1/22 7/1/22  Hansboro Villanueva, DO       REVIEW OF SYSTEMS    (2-9 systems for level 4, 10 or more forlevel 5)      Review of Systems   Constitutional:  Positive for activity change, chills and fever. HENT:  Negative for congestion, sinus pain and sore throat.     Eyes:  Negative for pain and visual disturbance. Respiratory:  Negative for cough and shortness of breath. Cardiovascular:  Negative for chest pain. Gastrointestinal:  Positive for abdominal distention and abdominal pain. Negative for vomiting. Genitourinary:  Negative for difficulty urinating, dysuria and hematuria. Musculoskeletal:  Negative for back pain and myalgias. Skin:  Negative for rash and wound. Neurological:  Negative for dizziness, light-headedness and headaches. Psychiatric/Behavioral:  Negative for agitation and confusion. PHYSICAL EXAM   (up to 7 for level 4, 8 or more forlevel 5)      ED TRIAGE VITALS BP: 120/63, Temp: (!) 104 °F (40 °C), Heart Rate: 95, Resp: 16, SpO2: 99 %    Vitals:    07/27/22 0254 07/27/22 0256 07/27/22 0306 07/27/22 0310   BP: (!) 88/47 (!) 90/45 (!) 88/48    Pulse: 91  90 94   Resp: (!) 8   13   Temp:       TempSrc:       SpO2: 100%  100% 100%   Weight:             Physical Exam  Vitals and nursing note reviewed. Constitutional:       Appearance: Normal appearance. She is obese. She is ill-appearing. HENT:      Head: Normocephalic and atraumatic. Nose: Nose normal.      Mouth/Throat:      Mouth: Mucous membranes are moist.   Eyes:      Extraocular Movements: Extraocular movements intact. Pupils: Pupils are equal, round, and reactive to light. Cardiovascular:      Rate and Rhythm: Normal rate and regular rhythm. Pulses: Normal pulses. Heart sounds: Normal heart sounds. Pulmonary:      Effort: Pulmonary effort is normal.      Breath sounds: Normal breath sounds. Abdominal:      General: Abdomen is flat. Bowel sounds are normal. There is distension. Palpations: Abdomen is soft. Tenderness: There is abdominal tenderness. Musculoskeletal:         General: Normal range of motion. Cervical back: Normal range of motion. Skin:     General: Skin is warm and dry. Capillary Refill: Capillary refill takes less than 2 seconds. Neurological:      General: No focal deficit present. Mental Status: She is alert and oriented to person, place, and time. Psychiatric:         Mood and Affect: Mood normal.         Behavior: Behavior normal.         DIFFERENTIAL  DIAGNOSIS     PLAN (LABS / IMAGING / EKG):  Orders Placed This Encounter   Procedures    Culture, Urine    COVID-19, Rapid    Culture, Blood 1    Culture, Blood 1    Culture, Body Fluid    XR CHEST PORTABLE    CT ABDOMEN PELVIS W IV CONTRAST Additional Contrast? None    CBC with Auto Differential    CMP    ETOH    Ammonia    Lipase    Urinalysis with Microscopic    Lactate, Sepsis    C-Reactive Protein    Procalcitonin    Protime-INR    APTT    Troponin    Cell Count with Differential, Body Fluid    Cytology, non gyne    Amylase, body fluid    Albumin, Body Fluid    Protein, body fluid    Lactate dehydrogenase, body fluid    Glucose, body fluid    HCG Qualitative, Serum    Troponin    Pulse Oximetry    Inpatient consult to Critical Care    EKG 12 Lead    Saline lock IV       MEDICATIONS ORDERED:  ED Medication Orders (From admission, onward)      Start Ordered     Status Ordering Provider    07/27/22 0245 07/27/22 0234  norepinephrine (LEVOPHED) 16 mg in sodium chloride 0.9 % 250 mL infusion  CONTINUOUS        Question Answer Comment   Titrate Infusion?  Yes    Initial Infusion Dose: 5 mcg/min    Goal of Therapy is: MAP greater than 65 mmHg    Contact Provider if: Patient is receiving the maximum dose and is not achieving the goal of therapy        Last MAR action: Rate/Dose Change - by Irish Borajs on 07/27/22 at [address]    07/27/22 0245 07/27/22 0242  albumin human 25 % IV solution 25 g  ONCE         Acknowledged KESHA OLMOS L    07/27/22 0212 07/27/22 0212  iopamidol (ISOVUE-370) 76 % injection 75 mL  IMG ONCE PRN         Last MAR action: Given - by Della Julien on 07/27/22 at [address], [place]    07/27/22 0130 07/27/22 0118  0.9 % sodium chloride bolus  ONCE         Last MAR action: New Bag - by Levomia Nail on 07/27/22 at [address], [address]    07/27/22 0130 07/27/22 0118  ketorolac (TORADOL) injection 30 mg  ONCE         Last MAR action: Given - by Levonia Nail on 07/27/22 at [address]    07/27/22 0015 07/27/22 0002  piperacillin-tazobactam (ZOSYN) 4,500 mg in dextrose 5 % 100 mL IVPB (mini-bag)  ONCE        Question:  Antimicrobial Indications  Answer:  Intra-Abdominal Infection    Last MAR action: Stopped - by Levonia Nail on 07/27/22 at [address], [address]    07/26/22 2215 07/26/22 2203  acetaminophen (TYLENOL) tablet 1,000 mg  ONCE         Last MAR action: Given - by Levonia Nail on 07/26/22 at [address]            DIAGNOSTIC RESULTS / [facility]     LABS:  Results for orders placed or performed during the hospital encounter of 07/26/22   COVID-19, Rapid    Specimen: Nasopharyngeal Swab   Result Value Ref Range    Specimen Description . NASOPHARYNGEAL SWAB     SARS-CoV-2, Rapid Not Detected Not Detected   CBC with Auto Differential   Result Value Ref Range    WBC 17.7 (H) 3.5 - 11.3 k/uL    RBC 2.94 (L) 3.95 - 5.11 m/uL    Hemoglobin 10.4 (L) 11.9 - 15.1 g/dL    Hematocrit 31.4 (L) 36.3 - 47.1 %    .8 (H) 82.6 - 102.9 fL    MCH 35.4 (H) 25.2 - 33.5 pg    MCHC 33.1 28.4 - 34.8 g/dL    RDW 19.6 (H) 11.8 - 14.4 %    Platelets 71 (L) 409 - 453 k/uL    MPV 12.8 8.1 - 13.5 fL    NRBC Automated 0.0 0.0 per 100 WBC    Seg Neutrophils 89 (H) 36 - 65 %    Lymphocytes 6 (L) 24 - 43 %    Monocytes 4 3 - 12 %    Eosinophils % 0 (L) 1 - 4 %    Basophils 0 0 - 2 %    Immature Granulocytes 1 (H) 0 %    Segs Absolute 15.71 (H) 1.50 - 8.10 k/uL    Absolute Lymph # 1.13 1.10 - 3.70 k/uL    Absolute Mono # 0.62 0.10 - 1.20 k/uL    Absolute Eos # <0.03 0.00 - 0.44 k/uL    Basophils Absolute 0.04 0.00 - 0.20 k/uL    Absolute Immature Granulocyte 0.16 0.00 - 0.30 k/uL    RBC Morphology ANISOCYTOSIS PRESENT CMP   Result Value Ref Range    Glucose 112 (H) 70 - 99 mg/dL    BUN 9 6 - 20 mg/dL    Creatinine 0.36 (L) 0.50 - 0.90 mg/dL    Calcium 7.8 (L) 8.6 - 10.4 mg/dL    Sodium 130 (L) 135 - 144 mmol/L    Potassium 4.4 3.7 - 5.3 mmol/L    Chloride 96 (L) 98 - 107 mmol/L    CO2 23 20 - 31 mmol/L    Anion Gap 11 9 - 17 mmol/L    Alkaline Phosphatase 97 35 - 104 U/L    ALT 32 5 - 33 U/L     (H) <32 U/L    Total Bilirubin 11.22 (H) 0.3 - 1.2 mg/dL    Total Protein 8.4 (H) 6.4 - 8.3 g/dL    Albumin 1.8 (L) 3.5 - 5.2 g/dL    Albumin/Globulin Ratio 0.3 (L) 1.0 - 2.5    GFR Non-African American >60 >60 mL/min    GFR African American >60 >60 mL/min    GFR Comment         ETOH   Result Value Ref Range    Ethanol <10 <10 mg/dL    Ethanol percent <0.010 <0.010 %   Ammonia   Result Value Ref Range    Ammonia 66 (H) 11 - 51 umol/L   Lipase   Result Value Ref Range    Lipase 86 (H) 13 - 60 U/L   Lactate, Sepsis   Result Value Ref Range    Lactic Acid, Sepsis, Whole Blood 3.1 (H) 0.5 - 1.9 mmol/L   C-Reactive Protein   Result Value Ref Range    CRP 11.2 (H) 0.0 - 5.0 mg/L   Procalcitonin   Result Value Ref Range    Procalcitonin 1.99 (H) <0.09 ng/mL   Protime-INR   Result Value Ref Range    Protime 22.4 (H) 9.1 - 12.3 sec    INR 2.2    APTT   Result Value Ref Range    PTT 39.9 (H) 20.5 - 30.5 sec   Troponin   Result Value Ref Range    Troponin, High Sensitivity 22 (H) 0 - 14 ng/L   Cell Count with Differential, Body Fluid   Result Value Ref Range    WBC, Fluid 2,905 /mm3    RBC, Fluid <3,000 /mm3    Neutrophil Count, Fluid 70 %    Lymphocytes, Body Fluid 13 %    Other Cells, Fluid PENDING %   Amylase, body fluid   Result Value Ref Range    Amylase, Fluid 46 U/L   Albumin, Body Fluid   Result Value Ref Range    Albumin, Fluid 0.4 g/dL   Protein, body fluid   Result Value Ref Range    Total Protein, Body Fluid 1.3 g/dL   Lactate dehydrogenase, body fluid   Result Value Ref Range    LD, Fluid 173 U/L   Glucose, body fluid   Result Value Ref Range    Glucose, Fluid 82 mg/dL   HCG Qualitative, Serum   Result Value Ref Range    hCG Qual NEGATIVE NEGATIVE       RADIOLOGY:  CT ABDOMEN PELVIS W IV CONTRAST Additional Contrast? None   Final Result   No evidence of hernia. Extensive stigmata cirrhosis and portal venous hypertension including marked   ascites, diffuse bowel wall edema, extensive varices including the gonadal   veins and recanalization of the umbilical vein. There is also anasarca of   the pannus. Subcutaneous edema however is decreased compared to previous. XR CHEST PORTABLE   Final Result   Minor left basilar atelectasis. Otherwise unremarkable exam with low lung   volumes. ED Course as of 07/27/22 0333   Tue Jul 26, 2022   2244 Plan for abdominal labs, pain control, fever control, CT abdomen. [TJ]   2319 WBC(!): 17.7 [TJ]   2319 Lipase(!): 86 [TJ]   2342 AMMONIA, PLASMA, [id number](!): 66 [TJ]   2342 Bilirubin(!): 11.22 [TJ]   0174 ALT: 32 [TJ]   2343 AST(!): 117 [TJ]   8869 ETHANOL,ETHA: <10 [TJ]   Wed Jul 27, 2022   0002 Diagnostic paracentesis performed [TJ]   0002 Patient started on Zosyn [TJ]   0305 Bilirubin(!): 11.22 [TJ]   0306 Neutrophil Count, Fluid: 70 [TJ]   0324 Patient diagnosed with SBP based on diagnostic tap, admitted to ICU for pressure support [TJ]      ED Course User Index  [TJ] Ramon Mosher MD      CT abdomen showing diffuse ascites fluid with venous distention in the portal system. No intra-abdominal surgical emergency or infection identified. PROCEDURES:  PROCEDURE NOTE - PARACENTESIS    PATIENT NAME: Jozef Snell  MEDICAL RECORD NO. [id number]  DATE: 7/27/2022  ATTENDING PHYSICIAN: Xi    PREOPERATIVE DIAGNOSIS:  Ascites  POSTOPERATIVE DIAGNOSIS:  Same  PROCEDURE PERFORMED:   Paracentesis  PERFORMING PHYSICIAN: Ramon Mosher MD      DISCUSSION:  Jozef Snell is a 38y.o.-year-old female who requires paracentesis for Ascites.   The history and physical

## 2022-07-27 NOTE — FLOWSHEET NOTE
SPIRITUAL CARE DEPARTMENT - [facility] [address]  PROGRESS NOTE    Shift date: 7/27/2022   Shift day: Wednesday   Shift # 1    Room # 3025/3025-01   Name: Galilea Samuel                Anabaptism: [address] of [facility]:     Referral: Routine Visit    Admit Date & Time: 7/26/2022  9:49 PM    Assessment:  Galilea Samuel is a 38 y.o. female. Upon entering the room patient questions why  is visiting. She was cautious at first.  Patient said her children are god support to her. She asked  to pray with her. Intervention:  Writer introduced self and title as  Writer offered space for patient  to express feelings, needs, and concerns and provided a ministry presence.  prayed with patient. Outcome:  Patient appeared comforted by prayer and smiled. She expressed appreciation for visit. Plan:  Chaplains will remain available to offer spiritual and emotional support as needed.       Electronically signed by Chilton Prader, on 7/27/2022 at 11:16 AM.  [facility] [place], [place]  [phone]        07/27/22 1115   Encounter Summary   Service Provided For: Patient   Referral/Consult From: [facility]   Last Encounter  07/27/22   Complexity of Encounter Moderate   Begin Time 1100   End Time  1115   Total Time Calculated 15 min   Encounter    Type Initial Screen/Assessment   Spiritual/Emotional needs   Type Spiritual Support   Assessment/Intervention/Outcome   Assessment Coping   Intervention Active listening;Explored/Affirmed feelings, thoughts, concerns;Prayer (assurance of)/[place]   Outcome Expressed feelings, needs, and concerns;Expressed Gratitude;Receptive

## 2022-07-27 NOTE — PROCEDURES
PROCEDURE NOTE - CENTRAL VENOUS LINE PLACEMENT    PATIENT NAME: Babita Garcia RECORD NO. [id number]  DATE: 7/27/2022  ATTENDING PHYSICIAN: Dr Kimberlee Roy DIAGNOSIS:  centrally administered medications  POSTOPERATIVE DIAGNOSIS:  Same  PROCEDURE PERFORMED:  Right Internal Jugular Vein Central Line Insertion  PERFORMING PHYSICIAN: Bruno Couch MD  ANESTHESIA:  Local utilizing 1% lidocaine  ESTIMATED BLOOD LOSS:  Less than 25 ml  COMPLICATIONS:  None immediately appreciated. DISCUSSION:  Mony Gonzales is a 38y.o.-year-old female who requires central IV access centrally administered medications. The history and physical examination were reviewed and confirmed. CONSENT: The patient was counseled regarding the procedure, its indications, risks, potential complications and alternatives, and any questions were answered. Consent was obtained to proceed. PROCEDURE:  A timeout was initiated by the bedside nurse and was confirmed by those present. The patient was placed in a supine position. The skin overlying the Right Internal Jugular Vein was prepped with chlorhexadine and draped in sterile fashion. The skin was infiltrated with local anesthetic. The vessel and surrounding anatomy was visualized using ultrasound. Through the anesthetized region, the introducer needle was inserted into the internal jugular vein returning dark red non pulsatile blood. A guidewire was placed through the center of the needle with no resistance. Ultrasound confirmed presence of wire in the vein. A small incision made in the skin with a #11 scalpel blade. The dilator was inserted into the skin and vein over guidewire using Seldinger technique. The dilator was then removed and the catheter was placed in the vein over the guidewire using Seldinger technique. The guidewire was then removed and all ports aspirated and flushed appropriately.  The catheter then secured using silk suture and a temporary sterile dressing was applied. No immediate complication was evident. All sponge, instrument and needle counts were correct at the completion of the procedure. Postprocedural chest x-ray ordered. The patient tolerated the procedure well with no immediate complication evident.      Riya Burton MD  4:49 PM, 7/27/22

## 2022-07-27 NOTE — CARE COORDINATION
07/27/22 1811   Readmission Assessment   Number of Days since last admission? 8-30 days   Previous Disposition Home with Family   Who is being Interviewed Patient   What was the patient's/caregiver's perception as to why they think they needed to return back to the hospital? Other (Comment)  (fever, abd pain)   Did you visit your Primary Care Physician after you left the hospital, before you returned this time? No   Did you see a specialist, such as Cardiac, Pulmonary, Orthopedic Physician, etc. after you left the hospital? No   Who advised the patient to return to the hospital? Self-referral   Does the patient report anything that got in the way of taking their medications? No   In our efforts to provide the best possible care to you and others like you, can you think of anything that we could have done to help you after you left the hospital the first time, so that you might not have needed to return so soon?  Other (Comment)  (nothing, pt relates that she is feeling better already)
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
## 2022-07-27 NOTE — VCC REMOTE MONITORING
Spoke with primary RN Candace regarding 3 hour  Sepsis bundle.     Orienting w/ PS  Lynne Fournier, [address] [facility] #[phone]

## 2022-07-27 NOTE — PLAN OF CARE
Problem: Safety - Adult  Goal: Free from fall injury  Outcome: Progressing Towards Goal  Goal: *Absence of infection signs and symptoms  Outcome: Progressing Towards Goal     Problem: Chronic Conditions and Co-morbidities  Goal: Patient's chronic conditions and co-morbidity symptoms are monitored and maintained or improved  Outcome: Progressing Towards Goal     Problem: Skin/Tissue Integrity  Goal: Absence of new skin breakdown  Description: 1. Monitor for areas of redness and/or skin breakdown  2. Assess vascular access sites hourly  3. Every 4-6 hours minimum:  Change oxygen saturation probe site  4. Every 4-6 hours:  If on nasal continuous positive airway pressure, respiratory therapy assess nares and determine need for appliance change or resting period.   Outcome: Progressing Towards Goal     Problem: Pain  Goal: Verbalizes/displays adequate comfort level or baseline comfort level  Outcome: Progressing Towards Goal
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
## 2022-07-28 PROBLEM — D72.825 BANDEMIA: Status: ACTIVE | Noted: 2022-01-01

## 2022-07-28 PROBLEM — A41.51 E. COLI SEPTICEMIA (HCC): Status: ACTIVE | Noted: 2022-01-01

## 2022-07-28 NOTE — PLAN OF CARE
Problem: Safety - Adult  Goal: Free from fall injury  Outcome: Progressing  Flowsheets (Taken 7/27/2022 2340)  Free From Fall Injury: Instruct family/caregiver on patient safety  Goal: *Absence of infection signs and symptoms  Outcome: Progressing     Problem: Chronic Conditions and Co-morbidities  Goal: Patient's chronic conditions and co-morbidity symptoms are monitored and maintained or improved  Outcome: Progressing     Problem: Skin/Tissue Integrity  Goal: Absence of new skin breakdown  Description: 1. Monitor for areas of redness and/or skin breakdown  2. Assess vascular access sites hourly  3. Every 4-6 hours minimum:  Change oxygen saturation probe site  4. Every 4-6 hours:  If on nasal continuous positive airway pressure, respiratory therapy assess nares and determine need for appliance change or resting period.   Outcome: Progressing     Problem: Pain  Goal: Verbalizes/displays adequate comfort level or baseline comfort level  Outcome: Progressing  Flowsheets (Taken 7/27/2022 2000)  Verbalizes/displays adequate comfort level or baseline comfort level:   Encourage patient to monitor pain and request assistance   Assess pain using appropriate pain scale   Administer analgesics based on type and severity of pain and evaluate response     Problem: Discharge Planning  Goal: Discharge to home or other facility with appropriate resources  Outcome: Progressing     Problem: ABCDS Injury Assessment  Goal: Absence of physical injury  Outcome: Progressing  Flowsheets (Taken 7/27/2022 2340)  Absence of Physical Injury: Implement safety measures based on patient assessment

## 2022-07-28 NOTE — CONSULTS
[facility] -   Infectious diseases evaluation  admission date 7/26/2022    reason for consultation:   The patient was consulted with infectious disease department because of ascites positive blood culture for E. coli. Impression :   Current:  Septicemia E coli  Spontaneous bacterial peritonitis - GNR  UTI  Alcoholic hepatitis w decompensated ascites  Bandemia  LLL  pulm atelectasis  Hepatic coagulopathy    Other:  Cirrhosis of liver  Alcoholism  Discussion / summary of stay / plan of care     Recommendations   Keep ceftriaxone 2 g iv daily and adjust to cx  Plan AB  x 10 days    Infection Control Recommendations   [place] Precautions  Contact Isolation       Antimicrobial Stewardship Recommendations   Simplification of therapy  Targeted therapy      History of Present Illness:   Initial history:  Earl Cast is a 38y.o.-year-old female who had chief complaints of fevers with abdominal pain which started 2 days ago. The patient reports that the abdominal pain started suddenly, was 8-9/10 in intensity, was pressure-like. It was associated with fever, chills, and muscle aches. The patient has history of hypertension with alcoholic liver disease and recurrent ascites. The patient usually requires twice monthly paracentesis, low-salt diet, spironolactone, and Lasix. Patient  came to the emergency department on 7/26/2022 where she had high fever and subsequently developed shock which required IV fluid and pressor support. Diagnostic paracentesis was done and the patient was started on Zosyn. During the bedside evaluation, the patient was alert, oriented, and in no acute distress. The patient reported that she feels better and has no headache, nausea, vomiting urinary symptoms, diarrhea, muscle aches the patient reported of having cough and sore throat.   The patient still had abdominal pain around 5/10 in intensity which is better than yesterday she had mild tenderness in her abdominal on the examination. She has been started with IV ceftriaxone and lactulose. Labs showed WBC of 28.2, hemoglobin 8.2, glucose 85, BUN 25, creatinine 1.61, GFR 43. Interval changes  7/28/2022   Patient Vitals for the past 8 hrs:   BP Temp Temp src Pulse Resp SpO2   07/28/22 1145 (!) 94/55 -- -- 91 27 95 %   07/28/22 1130 (!) 106/55 -- -- 88 17 94 %   07/28/22 1115 (!) 110/49 -- -- 88 21 95 %   07/28/22 1100 (!) 104/47 98.4 °F (36.9 °C) Oral 92 27 95 %   07/28/22 1030 (!) 107/49 -- -- 90 27 94 %   07/28/22 1015 (!) 99/51 -- -- 93 29 96 %   07/28/22 1000 (!) 116/59 98.4 °F (36.9 °C) Oral 97 (!) 31 95 %   07/28/22 0945 105/60 -- -- 96 29 94 %   07/28/22 0930 (!) 106/52 -- -- 99 30 94 %   07/28/22 0915 (!) 112/57 -- -- (!) 105 28 96 %   07/28/22 0901 89/76 98.4 °F (36.9 °C) Oral (!) 101 24 95 %   07/28/22 0845 (!) 99/53 -- -- 92 25 95 %   07/28/22 0830 (!) 103/56 -- -- 91 22 94 %   07/28/22 0815 (!) 99/54 -- -- 91 22 95 %   07/28/22 0800 (!) 107/54 98.4 °F (36.9 °C) Oral 91 18 96 %   07/28/22 0745 (!) 104/56 -- -- 91 21 94 %   07/28/22 0730 (!) 106/50 -- -- 94 26 94 %   07/28/22 0715 (!) 106/49 -- -- 99 27 93 %   07/28/22 0700 (!) 97/46 98.1 °F (36.7 °C) Oral 98 28 93 %   07/28/22 0645 (!) 99/51 -- -- (!) 102 28 92 %   07/28/22 0630 116/60 -- -- (!) 101 26 92 %   07/28/22 0615 (!) 114/56 -- -- 97 25 93 %   07/28/22 0600 (!) 117/50 99.2 °F (37.3 °C) Oral 98 27 94 %   07/28/22 0545 (!) 114/48 -- -- 94 27 92 %   07/28/22 0530 (!) 109/51 -- -- 91 27 94 %   07/28/22 0515 (!) 112/47 -- -- 91 25 92 %   07/28/22 0500 (!) 108/47 -- -- 90 (!) 40 93 %       Summary of relevant labs:  Labs:    Micro:  Culture, Blood 1:  Positive for E. Coli    MRSA DNA Probe, Nasal:  Negative    Ascitic Fluid Culture:   Many neutrophils, few mononuclear WBCs, Positive for gram variable rods and gram negative rods    Urine Culture:  No significant growth    Covid-19 rapid:  negative  Imaging:  X-ray of the chest on 7/26/2022 10:15 PM:    Impression   Minor left basilar atelectasis. Otherwise unremarkable exam with low lung volumes. CT of abdomen and pelvis with contrast on 7/27/2022 at 2:16 AM:    Impression   No evidence of hernia. Extensive stigmata cirrhosis and portal venous hypertension including marked ascites, diffuse bowel wall edema, extensive varices including the gonadal veins and recanalization of the umbilical vein. There is also anasarca of the pannus. Subcutaneous edema however is decreased compared to previous. X-ray of chest on 7/27/2022 at 6:36 PM:    Impression   1. Right internal jugular center venous catheter tip in the SVC. No pneumothorax. 2. Stable minimal left lung base atelectasis       I have personally reviewed the past medical history, past surgical history, medications, social history, and family history, and I haveupdated the database accordingly. Allergies:   Motrin [ibuprofen], Seasonal, and Motrin [ibuprofen]     Review of Systems:     Review of Systems   Constitutional:  Positive for fatigue. Negative for chills and fever. HENT:  Positive for sore throat. Patient reports that she has cough. Eyes:  Negative for discharge. Respiratory:  Negative for cough, chest tightness and shortness of breath. Cardiovascular:  Negative for chest pain and palpitations. Gastrointestinal:  Positive for abdominal distention and abdominal pain. Endocrine: Negative for cold intolerance. Genitourinary:  Negative for difficulty urinating and dysuria. Musculoskeletal:  Negative for arthralgias and myalgias. Skin:  Negative for color change and pallor. Allergic/Immunologic: Positive for immunocompromised state. Neurological:  Negative for numbness and headaches. Hematological:  Negative for adenopathy. Psychiatric/Behavioral:  Negative for agitation, behavioral problems and confusion.       Physical Examination :       Physical Exam  Constitutional: Appearance: Normal appearance. HENT:      Head: Normocephalic and atraumatic. Right Ear: External ear normal.      Left Ear: External ear normal.      Nose: Nose normal. No congestion or rhinorrhea. Mouth/Throat:      Mouth: Mucous membranes are moist.      Pharynx: Oropharynx is clear. Eyes:      Extraocular Movements: Extraocular movements intact. Conjunctiva/sclera: Conjunctivae normal.      Pupils: Pupils are equal, round, and reactive to light. Cardiovascular:      Rate and Rhythm: Normal rate and regular rhythm. Pulses: Normal pulses. Heart sounds: Normal heart sounds. Pulmonary:      Effort: Pulmonary effort is normal.      Breath sounds: Normal breath sounds. Abdominal:      General: There is distension. Tenderness: There is abdominal tenderness. Genitourinary:     Rectum: Guaiac result negative. Musculoskeletal:         General: No tenderness or deformity. Cervical back: Normal range of motion. No tenderness. Skin:     General: Skin is warm and dry. Neurological:      General: No focal deficit present. Mental Status: She is alert and oriented to person, place, and time. Mental status is at baseline.    Psychiatric:         Mood and Affect: Mood normal.         Behavior: Behavior normal.       Past Medical History:     Past Medical History:   Diagnosis Date    Anxiety     GERD (gastroesophageal reflux disease)     History of irregular heartbeat     Hypertension     Seasonal allergies        Past Surgical  History:     Past Surgical History:   Procedure Laterality Date    DILATION AND CURETTAGE OF UTERUS      TUBAL LIGATION         Medications:      [START ON 7/29/2022] famotidine (PEPCID) injection  20 mg IntraVENous Daily    lactulose  20 g Oral TID    sodium chloride flush  5-40 mL IntraVENous 2 times per day    enoxaparin  40 mg SubCUTAneous Daily    thiamine and folic acid IVPB   IntraVENous Daily    cefTRIAXone (ROCEPHIN) IV  2,000 mg IntraVENous Q24H    midodrine  10 mg Oral TID WC    phytonadione  10 mg Oral Daily       Social History:     Social History     Socioeconomic History    Marital status: Single     Spouse name: Not on file    Number of children: Not on file    Years of education: Not on file    Highest education level: Not on file   Occupational History    Not on file   Tobacco Use    Smoking status: Every Day     Packs/day: 0.25     Types: Cigars, Cigarettes    Smokeless tobacco: Never    Tobacco comments:     black and milds   Vaping Use    Vaping Use: Never used   Substance and Sexual Activity    Alcohol use:  Yes     Alcohol/week: 2.0 standard drinks     Types: 2 Cans of beer per week     Comment: daily 3-4 beers    Drug use: Yes     Types: Marijuana Jenna Marko)    Sexual activity: Yes     Partners: Male   Other Topics Concern    Not on file   Social History Narrative    ** Merged History Encounter **          Social Determinants of Health     Financial Resource Strain: Not on file   Food Insecurity: Not on file   Transportation Needs: Not on file   Physical Activity: Not on file   Stress: Not on file   Social Connections: Not on file   Intimate Partner Violence: Not on file   Housing Stability: Not on file       Family History:     Family History   Problem Relation Age of Onset    Heart Disease Mother     Other Mother         HIV    Cancer Mother         female cancer    Anxiety Disorder Sister     Anxiety Disorder Brother       Medical Decision Making:   I have independently reviewed/ordered the following labs:    CBC with Differential:   Recent Labs     07/26/22 2231 07/28/22  0624   WBC 17.7* 28.2*   HGB 10.4* 8.2*   HCT 31.4* 27.4*   PLT 71* 67*   LYMPHOPCT 6* 4*   MONOPCT 4 3     BMP:  Recent Labs     07/26/22 2231 07/28/22  0624   * 135   K 4.4 4.2   CL 96* 101   CO2 23 19*   BUN 9 25*   CREATININE 0.36* 1.61*     Hepatic Function Panel:   Recent Labs     07/26/22 2231 07/28/22  0624   PROT 8.4* 6.6   LABALBU 1.8* 2.3*   BILIDIR --  10.35*   IBILI  --  8.11*   BILITOT 11.22* 18.46*   ALKPHOS 97 40   ALT 32 25   * 68*     No results for input(s): RPR in the last 72 hours. No results for input(s): HIV in the last 72 hours. No results for input(s): BC in the last 72 hours. Lab Results   Component Value Date/Time    CREATININE 1.61 07/28/2022 06:24 AM    GLUCOSE 85 07/28/2022 06:24 AM    GLUCOSE 99 04/15/2012 06:57 PM       Detailed results: Thank you for allowing us to participate in the care of this patient. Please call with questions. This note is created with the assistance of a speech recognition program.  While intending to generate adocument that actually reflects the content of the visit, the document can still have some errors including those of syntax and sound a like substitutions which may escape proof reading. It such instances, actual meaningcan be extrapolated by contextual diversion. Chasity Alexandre MD  Office: [phone]  Perfect serve / office [phone]      I have discussed the care of the patient, including pertinent history and exam findings,  with the resident. I have seen and examined the patient and the key elements of all parts of the encounter have been performed by me. I agree with the assessment, plan and orders as documented by the resident.     Radha Turner, Infectious Diseases
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
## 2022-07-28 NOTE — PLAN OF CARE
Problem: Safety - Adult  Goal: Free from fall injury  7/28/2022 1158 by Scott Estrada RN  Outcome: Progressing  7/27/2022 2341 by Kane Gomez RN  Outcome: Progressing  Flowsheets (Taken 7/27/2022 2340)  Free From Fall Injury: Diamond Coelho family/caregiver on patient safety  Goal: *Absence of infection signs and symptoms  7/27/2022 2341 by Kane Gomez RN  Outcome: Progressing     Problem: Chronic Conditions and Co-morbidities  Goal: Patient's chronic conditions and co-morbidity symptoms are monitored and maintained or improved  7/27/2022 2341 by Kane Gomez RN  Outcome: Progressing     Problem: Skin/Tissue Integrity  Goal: Absence of new skin breakdown  Description: 1. Monitor for areas of redness and/or skin breakdown  2. Assess vascular access sites hourly  3. Every 4-6 hours minimum:  Change oxygen saturation probe site  4. Every 4-6 hours:  If on nasal continuous positive airway pressure, respiratory therapy assess nares and determine need for appliance change or resting period.   7/27/2022 2341 by Kane Gomez RN  Outcome: Progressing     Problem: Pain  Goal: Verbalizes/displays adequate comfort level or baseline comfort level  7/28/2022 1158 by Scott Estrada RN  Outcome: Progressing  7/27/2022 2341 by Kane Gomez RN  Outcome: Progressing  Flowsheets (Taken 7/27/2022 2000)  Verbalizes/displays adequate comfort level or baseline comfort level:   Encourage patient to monitor pain and request assistance   Assess pain using appropriate pain scale   Administer analgesics based on type and severity of pain and evaluate response     Problem: Discharge Planning  Goal: Discharge to home or other facility with appropriate resources  7/27/2022 2341 by Kane Gomez RN  Outcome: Progressing     Problem: ABCDS Injury Assessment  Goal: Absence of physical injury  7/28/2022 1158 by Scott Estrada RN  Outcome: Progressing  7/27/2022 2341 by Kane Gomez RN  Outcome: Progressing  Flowsheets (Taken 7/27/2022 2340)  Absence of Physical Injury: Implement safety measures based on patient assessment

## 2022-07-28 NOTE — PROGRESS NOTES
Mario. [facility] Gastroenterology Progress Note    Christiano Clay is a 38 y.o. female patient. Hospitalization Day:1      Chief consult reason: Alcoholic cirrhosis, admitted for sepsis      Subjective: Patient seen and examined. Patient was noted to have breakfast tray at bedside as well as a few other tall glasses of liquids. Patient reports those are her daughters drinks and not hers. Patient continues to have mild tachycardia with T-max of 99.2 over past 24 hours. Objective:   VITALS:  BP (!) 97/46   Pulse 98   Temp 99.2 °F (37.3 °C) (Oral)   Resp 28   Ht 5' 4\" (1.626 m)   Wt 171 lb 8.3 oz (77.8 kg)   LMP  (LMP Unknown)   SpO2 93%   BMI 29.44 kg/m²   TEMPERATURE:  Current - Temp: 99.2 °F (37.3 °C);  Max - Temp  Av.6 °F (37 °C)  Min: 98.1 °F (36.7 °C)  Max: 99.2 °F (37.3 °C)    Physical Assessment:  General appearance:  alert, cooperative and no distress  Mental Status:  oriented to person, place and time and normal affect  Lungs:  clear to auscultation bilaterally, normal effort  Heart:  regular rate and rhythm, no murmur  Abdomen:  soft, + diffuse tenderness, distended, + bowel sounds  Extremities:  + LE edema, no redness, No clubbing  Skin:  warm, dry, no gross lesions or rashes    CURRENT MEDICATIONS:  Scheduled Meds:   magnesium sulfate  1,000 mg IntraVENous Once    sodium chloride flush  5-40 mL IntraVENous 2 times per day    enoxaparin  40 mg SubCUTAneous Daily    thiamine and folic acid IVPB   IntraVENous Daily    famotidine (PEPCID) injection  20 mg IntraVENous BID    cefTRIAXone (ROCEPHIN) IV  2,000 mg IntraVENous Q24H    midodrine  10 mg Oral TID WC    phytonadione  10 mg Oral Daily     Continuous Infusions:   norepinephrine 9 mcg/min (22 0653)    sodium chloride       PRN Meds:sodium chloride flush, sodium chloride, ondansetron **OR** ondansetron, polyethylene glycol      Data Review:  LABS and IMAGING:     CBC  Recent Labs     22  2231 22  0624   WBC 17.7* 28.2*   HGB 10.4* 8.2*   HCT 31.4* 27.4*   .8* 121.8*   MCHC 33.1 29.9   RDW 19.6* 19.4*   PLT 71* 67*       Immature PLTs   Lab Results   Component Value Date/Time    PLTFLUORE 46 07/10/2022 06:15 AM    PLTFLUORE 43 07/09/2022 04:23 AM    PLTFLUORE 48 07/08/2022 03:22 PM       ANEMIA STUDIES  Recent Labs     07/27/22  1012   [id number] 1687*   FOLATE 19.8       BMP  Recent Labs     07/26/22 2231 07/28/22  0624   * 135   K 4.4 4.2   CL 96* 101   CO2 23 19*   BUN 9 25*   CREATININE 0.36* 1.61*   GLUCOSE 112* 85   CALCIUM 7.8* 8.4*       LFTS  Recent Labs     07/26/22 2231 07/28/22  0624   ALKPHOS 97 40   ALT 32 25   * 68*   BILITOT 11.22* 18.46*   BILIDIR  --  10.35*   LABALBU 1.8* 2.3*       AMYLASE/LIPASE/AMMONIA  Recent Labs     07/26/22 2231 07/27/22  0936   LIPASE 86* 55   AMMONIA 66* 68*       Acute Hepatitis Panel   Lab Results   Component Value Date/Time    HEPBSAG NONREACTIVE 05/13/2020 12:34 AM    HEPCAB NONREACTIVE 05/13/2020 12:34 AM    HEPBIGM NONREACTIVE 05/13/2020 12:34 AM    HEPAIGM NONREACTIVE 05/13/2020 12:34 AM       HCV Genotype   No results found for: HEPATITISCGENOTYPE    HCV Quantitative   No results found for: HCVQNT    LIVER WORK UP:    AFP  No results found for: AFP    Alpha 1 antitrypsin   No results found for: A1A    Anti - Liver/Kidney Ab  No results found for: LIVER-KIDNEYMICROSOMALAB    FADIA  No results found for: FADIA    AMA  No results found for: MITOAB    ASMA  No results found for: SMOOTHMUSCAB    Ceruloplasmin  No results found for: CERULOPLSM    Celiac panel  No results found for: TISSTRNTIIGG, TTGIGA, IGA    IgG  No results found for: IGG    IgM  No results found for: IGM    GGT   No results found for: LABGGT    PT/INR  Recent Labs     07/26/22  2231   PROTIME 22.4*   INR 2.2       Cancer Markers:  CEA:  No results found for: CEA  Ca 125:  No results found for:   Ca 19-9:   No results found for:   AFP: No results found for: AFP    Lactic acid:No results for input(s): LACTACIDWB in the last 72 hours. Radiology Review:    XR CHEST (SINGLE VIEW FRONTAL)    Result Date: 7/27/2022  EXAMINATION: ONE XRAY VIEW OF THE CHEST 7/27/2022 6:36 pm COMPARISON: 1 day prior. HISTORY: ORDERING SYSTEM PROVIDED HISTORY: cvc placement confirmation TECHNOLOGIST PROVIDED HISTORY: cvc placement confirmation Reason for Exam: port upright FINDINGS: Right internal jugular center venous catheter placed with the tip in the SVC. Stable minimal left lung base atelectasis. Right lung clear. No pneumothorax or pleural effusion. Cardiac and mediastinal contours stable. No acute osseous abnormality. 1. Right internal jugular center venous catheter tip in the SVC. No pneumothorax. 2. Stable minimal left lung base atelectasis. CT ABDOMEN PELVIS W IV CONTRAST Additional Contrast? None    Result Date: 7/27/2022  EXAMINATION: CT OF THE ABDOMEN AND PELVIS WITH CONTRAST 7/27/2022 2:16 am TECHNIQUE: CT of the abdomen and pelvis was performed with the administration of intravenous contrast. Multiplanar reformatted images are provided for review. Automated exposure control, iterative reconstruction, and/or weight based adjustment of the mA/kV was utilized to reduce the radiation dose to as low as reasonably achievable. COMPARISON: 07/08/2022 HISTORY: ORDERING SYSTEM PROVIDED HISTORY: concern for strangulated hernia, concern for SBP TECHNOLOGIST PROVIDED HISTORY: concern for strangulated hernia, concern for SBP Decision Support Exception - unselect if not a suspected or confirmed emergency medical condition->Emergency Medical Condition (MA) FINDINGS: Lower Chest: Mild cardiomegaly, pulmonary vascular congestion and minimal bibasilar atelectasis are noted as are esophageal varices. Organs: Cirrhotic liver morphology without focal abnormality evident. Gallbladder wall edema is present, likely passive. The pancreas is unremarkable. There is mild splenomegaly.   The adrenal glands, kidneys and visualized ureters are unremarkable. GI/Bowel: Stomach and duodenal sweep demonstrate no acute abnormality. No evidence of bowel obstruction. There is diffuse mural edema the small bowel and colon attributed to portal hypertension. No evidence of appendicitis. Pelvis: In the bladder and reproductive organs are unremarkable although the bilateral gonadal veins are markedly dilated and distally tortuous. Peritoneum/Retroperitoneum: Marked ascites with extensive varices and recanalization of the umbilical vein. No adenopathy identified. The aorta is normal in caliber. No mesenteric venous thrombosis. Bones/Soft Tissues: No acute osseous abnormality. Anasarca involving the pannus. No evidence of hernia. Extensive stigmata cirrhosis and portal venous hypertension including marked ascites, diffuse bowel wall edema, extensive varices including the gonadal veins and recanalization of the umbilical vein. There is also anasarca of the pannus. Subcutaneous edema however is decreased compared to previous. XR CHEST PORTABLE    Result Date: 7/26/2022  EXAMINATION: ONE XRAY VIEW OF THE CHEST 7/26/2022 10:15 pm COMPARISON: Two-view chest dated 07/13/2020 HISTORY: ORDERING SYSTEM PROVIDED HISTORY: Altered Mental Status TECHNOLOGIST PROVIDED HISTORY: Altered Mental Status FINDINGS: Heart size and pulmonary vasculature are normal given low lung volumes. There is minor left basilar atelectasis. No pneumothorax or pleural effusion. Surrounding structures are unremarkable. Minor left basilar atelectasis. Otherwise unremarkable exam with low lung volumes. ENDOSCOPY     Principal Problem:    Spontaneous bacterial peritonitis ([address]  Active Problems:    Alcoholic hepatitis with ascites    Septicemia (HCC)    Alcohol use disorder, moderate, dependence (HCC)    Elevated INR    Arterial hypotension  Resolved Problems:    * No resolved hospital problems. *       GI Assessment:    1.  Alcoholic cirrhosis decompensated by recurrent ascites and portal hypertension, and extensive varices on imaging.  - MELD NA - 28 (27-32% 90-day mortality)  - Hyperammonemia though no signs of hepatic encephalopathy     2. SBP-diagnostic paracentesis consistent with SBP.  -Patient received 1.5 g/kg of albumin [id number] for SBP. Patient received 75 gm albumin (1.0 g/kg day 3)      3. LFTs consistent with alcoholic hepatitis -stasis with sepsis also likely driving bilirubin upward. Madrey's discriminant function -59 suggesting poor prognosis. Patient is not a candidate for glucocorticoid therapy in the setting of sepsis  -Bilirubin continues to trend up and has not peaked, consistent with alcoholic hepatitis. 4. Sepsis secondary to SBP and UTI     5. Ongoing alcohol use disorder with dependance     6. Macrocytic anemia secondary to alcohol use. - No reports of overt GI bleeding     7. Coagulopathy - Elevated INR secondary to synthetic liver dysfunction. No signs of active GI bleeding      Plan of care:  Recommend therapeutic paracentesis. If cannot be completed by ICU staff, then recommend consult IR. Patient will need paracentesis today to assess for clearing of SBP. Continue low-sodium diet with fluid restriction-discussed with patient  Continue ceftriaxone 2 g IV every 24 hours for total of 5 days in the setting of SBP. Will need long term antibiotics for SBP at discharge  Strict alcohol abstinence-discussed with patient  Recommend outpatient EGD for variceal screening-discussed with patient  Patient will need to establish outpatient GI care for comprehensive management of her GI issues. Patient has been noncompliant with GI care at [facility] ([facility] and patient is currently in progress of establishing care with GI at [facility]. States she has an appointment next Wednesday   GI will sign off.        Time spent reviewing chart, seeing patient, and discussing with attending: Around 30 minutes    This plan was formulated in collaboration with Dr. Cindy Godfrey  Please feel free to contact me with any questions or concerns. Thank you for allowing me to participate in the care of your patient. TALYA Redman - CNP on 7/28/2022 at 10:35 AM   [facility] Gastroenterology    Please note that this note was generated using a voice recognition dictation software. Although every effort was made to ensure the accuracy of this automated transcription, some errors in transcription may have occurred.

## 2022-07-28 NOTE — PROGRESS NOTES
Pharmacy Note     Renal Dose Adjustment    Galilea Samuel is a 38 y.o. female. Pharmacist assessment of renally cleared medications. Recent Labs     07/26/22 2231 07/28/22  0624   BUN 9 25*       Recent Labs     07/26/22 2231 07/28/22  0624   CREATININE 0.36* 1.61*       Estimated Creatinine Clearance: 47 mL/min (A) (based on SCr of 1.61 mg/dL (H)).   Height:   Ht Readings from Last 1 Encounters:   07/27/22 5' 4\" (1.626 m)     Weight:  Wt Readings from Last 1 Encounters:   07/28/22 171 lb 8.3 oz (77.8 kg)       The following medication dose has been adjusted based upon renal function per P&T Guidelines:             Famotidine 20 mg BID changed to daily    Debbie Huertas, PharmD, [facility]  7/28/2022  11:44 AM

## 2022-07-28 NOTE — PROGRESS NOTES
Attending Physician Statement  I have discussed the case of Christiano Clay, including pertinent history and exam findings with the resident/fellow/medical student/NP/PA. I have seen and examined the patient and the key elements of the encounter have been performed by me. I agree with the assessment, plan and orders as documented by the resident/fellow/medical student/NP/PA  With changes made to the note as needed. Pt was seen during rounds. Review of Systems:   In addition to the pertinent positives and negatives as stated within HPI and the review of systems as documented in their notes, all other systems were reviewed when able to and are reported negative. Afebrile  Continues to need norepinephrine  Blood culture showing E. coli  Continue ceftriaxone as the patient is not running a fever and is hemodynamically improving  Renal function has slightly worsened  Total bilirubin also has worsened  Obtain ultrasound of the kidneys and liver and gallbladder ultrasound  Anemia slightly worse, continue to monitor  Patient has worsening leukocytosis  Total critical care time caring for this patient with life threatening, unstable organ failure, including direct patient contact, management of life support systems, review of data including imaging and labs, discussions with other team members and physicians at least 27  Min so far today, excluding procedures.         Virgilio Nick MD  7/28/2022  7:54 PM

## 2022-07-29 NOTE — PROGRESS NOTES
Physician Progress Note      Bernadette Vasquez  CSN #:                  [id number]  :                       1980  ADMIT DATE:       2022 9:49 PM  [address] [place] DATE:  RESPONDING  PROVIDER #:        ABIGAIL POWERS          QUERY TEXT:    Pt admitted with Spontaneous Bacterial Peritonitis. Pt noted to have   15.9>12.9. If possible, please document in the progress notes and discharge   summary if you are evaluating and /or treating any of the   following:[[Spontaneous Bacterial Peritonitis without Sepsis[de-identified] This patient   has SBP  without Sepsis.]    The medical record reflects the following:  Risk Factors: Alcohol induced liver cirrhosis, SBP,  Clinical Indicators: WBC 15.9>12.9, Lactic acid 3.1-7.8 Procalcitonin 1.99,   CRP 11.2, Blood Cx Gram negative Rods x2 Ascites fluid cx gram negative rods  B/P's 84/45, 82/54, 89/56 H/P On presentation to the emergency department,   patient had an initial temp of 104F. She was initially not tachycardic and   normotensive, however patient became hypotensive despite fluid resuscitation. She was started on Levophed and sepsis work-up was initiated. Per GI pt has septicemia  Treatment: IV Rocephon/Zosyn, , Levophed,  labs, monitor, Paracentesis in   ED>1000cc    Thank you please contact me for any questions! Raudel See RN, CCDS, CDI   Supervisor [phone]  Options provided:  -- Sepsis, present on admission  -- Sepsis with septic shock, present on admission  -- Other - I will add my own diagnosis  -- Disagree - Not applicable / Not valid  -- Disagree - Clinically unable to determine / Unknown  -- Refer to Clinical Documentation Reviewer    PROVIDER RESPONSE TEXT:    This patient has sepsis which was present on admission. Query created by:  Duke Canavan on 2022 9:49 AM      Electronically signed by:  Yarely Running 2022 11:04 AM

## 2022-07-29 NOTE — PLAN OF CARE
Problem: Safety - Adult  Goal: Free from fall injury  7/28/2022 2123 by Ran Hall RN  Outcome: Progressing  Flowsheets (Taken 7/28/2022 2122)  Free From Fall Injury: Instruct family/caregiver on patient safety  7/28/2022 1158 by Holly Rocha RN  Outcome: Progressing  Goal: *Absence of infection signs and symptoms  Outcome: Progressing     Problem: Chronic Conditions and Co-morbidities  Goal: Patient's chronic conditions and co-morbidity symptoms are monitored and maintained or improved  Outcome: Progressing     Problem: Skin/Tissue Integrity  Goal: Absence of new skin breakdown  Description: 1. Monitor for areas of redness and/or skin breakdown  2. Assess vascular access sites hourly  3. Every 4-6 hours minimum:  Change oxygen saturation probe site  4. Every 4-6 hours:  If on nasal continuous positive airway pressure, respiratory therapy assess nares and determine need for appliance change or resting period.   Outcome: Progressing     Problem: Pain  Goal: Verbalizes/displays adequate comfort level or baseline comfort level  7/28/2022 2123 by Ran Hall RN  Outcome: Progressing  Flowsheets (Taken 7/28/2022 2000)  Verbalizes/displays adequate comfort level or baseline comfort level:   Encourage patient to monitor pain and request assistance   Assess pain using appropriate pain scale   Administer analgesics based on type and severity of pain and evaluate response  7/28/2022 1158 by Holly Rocha RN  Outcome: Progressing     Problem: Discharge Planning  Goal: Discharge to home or other facility with appropriate resources  Outcome: Progressing  Flowsheets (Taken 7/28/2022 2000)  Discharge to home or other facility with appropriate resources:   Identify barriers to discharge with patient and caregiver   Arrange for needed discharge resources and transportation as appropriate   Identify discharge learning needs (meds, wound care, etc)     Problem: ABCDS Injury Assessment  Goal: Absence of physical injury  7/28/2022 2123 by Salvador Donovan RN  Outcome: Progressing  Flowsheets (Taken 7/28/2022 2122)  Absence of Physical Injury: Implement safety measures based on patient assessment  7/28/2022 1158 by Jake Cee RN  Outcome: Progressing

## 2022-07-29 NOTE — PROGRESS NOTES
Patient here for ultrasound paracentesis. Consent signed. Robert GRAF in room. Ultrasound done to abdomen. Right side of abdomen prepped, draped and numbed with lidocaine. Needle in without difficulty. 4.3L of yellow fluid drained. Tej Demetrius out, post scan done and dressing on. Patient discharged to room with floor RN. Patient tolerated well. Specimen sent.

## 2022-07-29 NOTE — ED PROVIDER NOTES
[facility]   Emergency Department  Emergency Medicine Attending Sign-out     Care of Moira Temple was assumed from previous attending Dr. Eran Vernon and is being seen for Fever and Abdominal Pain  . The patient's initial evaluation and plan have been discussed with the prior provider who initially evaluated the patient. Attestation  I was available and discussed any additional care issues that arose and coordinated the management plans with the resident(s) caring for the patient during my duty period. Any areas of disagreement with resident's documentation of care or procedures are noted on the chart. I was personally present for the key portions of any/all procedures, during my duty period. I have documented in the chart those procedures where I was not present during the key portions. BRIEF PATIENT SUMMARY/MDM COURSE PER INITIAL PROVIDER:   RECENT VITALS:     Temp: 98.9 °F (37.2 °C),  Heart Rate: 77, Resp: 21, BP: (!) 112/54, SpO2: 95 %    This patient is a 38 y.o. Female with abdominal pain and temperature of 104. SBP with tap already perfromed plan for admission      OUTSTANDING TASKS / ADDITIONAL COMMENTS   SBP labs  Now hypotensive, but may be due top cirrhosis  Spoke to critical care - I recommedned trying albumin with goals of SBP of 90 and map of 65 and trying to wean off Levophed.       Mary Duvall MD  Emergency Medicine Attending  [facility]        Shad Perez MD  07/29/22 6111

## 2022-07-29 NOTE — PROGRESS NOTES
[facility] -   Infectious diseases evaluation  admission date 7/26/2022    reason for consultation:   The patient was consulted with infectious disease department because of ascites positive blood culture for E. coli. Impression :   Current:  Septicemia E coli  Spontaneous bacterial peritonitis - E coli  Post 2 paracentesis  UTI  Alcoholic hepatitis w decompensated ascites  Bandemia  LLL  pulm atelectasis  Hepatic coagulopathy    Other:  Cirrhosis of liver  Alcoholism  Discussion / summary of stay / plan of care     Recommendations   Keep ceftriaxone 2 g iv daily and adjust to cx  Plan AB  x 10 days  paracentesis will help debulke the infection  Then CT AP had shown no loculations  GB wall thick, ? From ascites    Infection Control Recommendations   [place] Precautions  Contact Isolation       Antimicrobial Stewardship Recommendations   Simplification of therapy  Targeted therapy      History of Present Illness:   Initial history:  aLyla Best is a 38y.o.-year-old female who had chief complaints of fevers with abdominal pain which started 2 days ago. The patient reports that the abdominal pain started suddenly, was 8-9/10 in intensity, was pressure-like. It was associated with fever, chills, and muscle aches. The patient has history of hypertension with alcoholic liver disease and recurrent ascites. The patient usually requires twice monthly paracentesis, low-salt diet, spironolactone, and Lasix. Patient  came to the emergency department on 7/26/2022 where she had high fever and subsequently developed shock which required IV fluid and pressor support. Diagnostic paracentesis was done and the patient was started on Zosyn. During the bedside evaluation, the patient was alert, oriented, and in no acute distress.   The patient reported that she feels better and has no headache, nausea, vomiting urinary symptoms, diarrhea, muscle aches the patient reported of having cough and sore throat. The patient still had abdominal pain around 5/10 in intensity which is better than yesterday she had mild tenderness in her abdominal on the examination. She has been started with IV ceftriaxone and lactulose. Labs showed WBC of 28.2, hemoglobin 8.2, glucose 85, BUN 25, creatinine 1.61, GFR 43. Interval changes  7/29/2022   Patient Vitals for the past 8 hrs:   BP Temp Temp src Pulse Resp SpO2   07/29/22 0800 (!) 93/56 98.4 °F (36.9 °C) Oral 78 19 95 %   07/29/22 0645 (!) 102/50 -- -- 81 19 95 %   07/29/22 0630 (!) 100/53 -- -- 82 20 95 %   07/29/22 0615 (!) 97/48 -- -- 84 25 93 %   07/29/22 0600 101/75 98.6 °F (37 °C) Oral 81 25 93 %   07/29/22 0545 117/62 -- -- 81 (!) 33 93 %   07/29/22 0530 (!) 106/58 -- -- 84 27 93 %   07/29/22 0515 (!) 104/41 -- -- 77 21 95 %   07/29/22 0500 (!) 112/54 -- -- 77 21 95 %   07/29/22 0445 (!) 119/56 -- -- 76 18 95 %   07/29/22 0430 (!) 102/51 -- -- 75 20 95 %   07/29/22 0415 (!) 92/48 -- -- 79 23 94 %   07/29/22 0400 (!) 101/53 98.9 °F (37.2 °C) Oral 79 20 94 %   07/29/22 0345 (!) 102/46 -- -- 77 21 93 %   07/29/22 0330 (!) 96/48 -- -- 78 20 94 %   07/29/22 0315 (!) 104/52 -- -- 78 20 95 %   07/29/22 0300 (!) 110/56 -- -- 79 20 94 %   07/29/22 0245 (!) 112/52 -- -- 81 20 95 %   07/29/22 0230 (!) 107/53 -- -- 80 19 94 %   07/29/22 0215 (!) 108/50 -- -- 82 21 95 %   07/29/22 0200 (!) 103/49 98.4 °F (36.9 °C) Oral 80 20 94 %   07/29/22 0145 (!) 103/45 -- -- 79 20 94 %     Current evaluation:  Patient was evaluated at the bedside. The patient was having, alert, oriented, in no acute distress. The patient denies any fever, chills, headache, cough, shortness of breath, chest pain, muscular pains, difficulty urination, constipation. The patient reports that the fatigue is better than yesterday but there is still some abdominal pain and she feels pressure on her abdomen. Abdominal pain is 5/10 in intensity.   Patient is hemodynamically since yesterday. ). Negative for activity change, appetite change, chills and fever. HENT:  Positive for sore throat. Patient reports that she has cough. Eyes:  Negative for discharge. Respiratory:  Negative for cough, chest tightness and shortness of breath. Cardiovascular:  Negative for chest pain and palpitations. Gastrointestinal:  Positive for abdominal distention (Patient feels pressure on the abdomen due to the distention.) and abdominal pain (Abdominal pain has subjectively decreased since yesterday. ). Endocrine: Negative for cold intolerance. Genitourinary:  Negative for difficulty urinating and dysuria. Musculoskeletal:  Negative for arthralgias and myalgias. Skin:  Negative for color change and pallor. Allergic/Immunologic: Positive for immunocompromised state. Neurological:  Negative for numbness and headaches. Hematological:  Negative for adenopathy. Psychiatric/Behavioral:  Negative for agitation, behavioral problems and confusion. Physical Examination :       Physical Exam  Constitutional:       General: She is not in acute distress. Appearance: Normal appearance. She is not ill-appearing. HENT:      Head: Normocephalic and atraumatic. Right Ear: External ear normal.      Left Ear: External ear normal.      Nose: Nose normal. No congestion or rhinorrhea. Mouth/Throat:      Mouth: Mucous membranes are moist.      Pharynx: Oropharynx is clear. Eyes:      General: Scleral icterus present. Extraocular Movements: Extraocular movements intact. Pupils: Pupils are equal, round, and reactive to light. Cardiovascular:      Rate and Rhythm: Normal rate and regular rhythm. Pulses: Normal pulses. Heart sounds: Normal heart sounds. Pulmonary:      Effort: Pulmonary effort is normal.      Breath sounds: Normal breath sounds. Abdominal:      General: There is distension. Tenderness:  There is abdominal tenderness (There is mild tenderness present on abdominal examination. The tenderness has decreased since yesterday. ). Genitourinary:     Rectum: Guaiac result negative. Musculoskeletal:         General: No tenderness or deformity. Cervical back: Normal range of motion. No tenderness. Skin:     General: Skin is warm and dry. Neurological:      General: No focal deficit present. Mental Status: She is alert and oriented to person, place, and time. Mental status is at baseline. Psychiatric:         Mood and Affect: Mood normal.         Behavior: Behavior normal.       Past Medical History:     Past Medical History:   Diagnosis Date    Anxiety     GERD (gastroesophageal reflux disease)     History of irregular heartbeat     Hypertension     Seasonal allergies        Past Surgical  History:     Past Surgical History:   Procedure Laterality Date    DILATION AND CURETTAGE OF UTERUS      TUBAL LIGATION         Medications:      famotidine (PEPCID) injection  20 mg IntraVENous Daily    lactulose  20 g Oral TID    sodium chloride flush  5-40 mL IntraVENous 2 times per day    thiamine and folic acid IVPB   IntraVENous Daily    cefTRIAXone (ROCEPHIN) IV  2,000 mg IntraVENous Q24H    midodrine  10 mg Oral TID WC       Social History:     Social History     Socioeconomic History    Marital status: Single     Spouse name: Not on file    Number of children: Not on file    Years of education: Not on file    Highest education level: Not on file   Occupational History    Not on file   Tobacco Use    Smoking status: Every Day     Packs/day: 0.25     Types: Cigars, Cigarettes    Smokeless tobacco: Never    Tobacco comments:     black and milds   Vaping Use    Vaping Use: Never used   Substance and Sexual Activity    Alcohol use:  Yes     Alcohol/week: 2.0 standard drinks     Types: 2 Cans of beer per week     Comment: daily 3-4 beers    Drug use: Yes     Types: Marijuana Charmayne Stai)    Sexual activity: Yes     Partners: Male   Other Topics Concern Not on file   Social History Narrative    ** Merged History Encounter **          Social Determinants of Health     Financial Resource Strain: Not on file   Food Insecurity: Not on file   Transportation Needs: Not on file   Physical Activity: Not on file   Stress: Not on file   Social Connections: Not on file   Intimate Partner Violence: Not on file   Housing Stability: Not on file       Family History:     Family History   Problem Relation Age of Onset    Heart Disease Mother     Other Mother         HIV    Cancer Mother         female cancer    Anxiety Disorder Sister     Anxiety Disorder Brother       Medical Decision Making:   I have independently reviewed/ordered the following labs:    CBC with Differential:   Recent Labs     07/28/22 0624 07/29/22  0552   WBC 28.2* PENDING   HGB 8.2* 8.6*   HCT 27.4* 26.5*   PLT 67* PENDING   LYMPHOPCT 4* PENDING   MONOPCT 3 PENDING       BMP:  Recent Labs     07/28/22 0624 07/29/22  0552    138   K 4.2 4.4    103   CO2 19* 22   BUN 25* 37*   CREATININE 1.61* 2.79*       Hepatic Function Panel:   Recent Labs     07/28/22 0624 07/29/22  0552   PROT 6.6 6.6   LABALBU 2.3* 2.3*   BILIDIR 10.35* 13.23*   IBILI 8.11* 5.36*   BILITOT 18.46* 18.59*   ALKPHOS 40 56   ALT 25 24   AST 68* 53*       No results for input(s): RPR in the last 72 hours. No results for input(s): HIV in the last 72 hours. No results for input(s): BC in the last 72 hours. Lab Results   Component Value Date/Time    CREATININE 2.79 07/29/2022 05:52 AM    GLUCOSE 86 07/29/2022 05:52 AM    GLUCOSE 99 04/15/2012 06:57 PM       Detailed results: Thank you for allowing us to participate in the care of this patient. Please call with questions.     This note is created with the assistance of a speech recognition program.  While intending to generate adocument that actually reflects the content of the visit, the document can still have some errors including those of syntax and sound a like substitutions which may escape proof reading. It such instances, actual meaningcan be extrapolated by contextual diversion. Cayden Mcghee MD  Office: [phone]  Perfect serve / office [phone]      I have discussed the care of the patient, including pertinent history and exam findings,  with the resident. I have seen and examined the patient and the key elements of all parts of the encounter have been performed by me. I agree with the assessment, plan and orders as documented by the resident.     Radha Turner, Infectious Diseases

## 2022-07-29 NOTE — PROGRESS NOTES
Critical Care Team - Daily Progress Note      Date and time: 7/29/2022 7:30 AM  Patient's name:  Julio Tamayo Record Number: [id number]  Patient's account/billing number: [de-identified]  Patient's YOB: 1980  Age: 38 y.o. Date of Admission: 7/26/2022  9:49 PM  Length of stay during current admission: 2      Primary Care Physician: TALYA Oro CNP    Code Status: Full Code    Reason for ICU admission: SBP      SUBJECTIVE:     OVERNIGHT EVENTS:       No acute events overnight  BP on the lower side 102/49  MAP 64 other vitals are stable  On 6 mcg of levophed not requiring oxygen, down from 10  Total Input is 897.2 ml and unmeasured urine occurrences    US renal and gallbladder is pending, will decide paracentesis based on that    Started lactulose yesterday     Creatinine has significantly worsened 2.79> 1.61  BUN 37>25 held lovenox  Consult nephro? Hemoglobin is 8.6 stable from yesterday  Platelet 69>58    Blood culture positive for ecoli    GI recommends therapeutic paracentesis possible by IR, low sodium diet, outpatient EGD for variceal screening    F.A.S.T. M. H.U.G.S. B.I.D. Feeding Diet: ADULT DIET; Regular; Low Sodium (2 gm); 1000 ml  Fluids: no  Family: -  Analgesic: Ketorolac overnight  Sedation: none   Thrombo-prophylaxis: [] Enoxaparin, [] Unfract.  Heparin Subcutaneously, [x] EPC Cuffs  Mobility: Yes  Heads up: 30  Ulcer prophylaxis: [] PPI Agent, [x] H7Trnez, [] Sucralfate, [] Other:  Glycemic control: Controlled  Spontaneous breathing trial: Not applicable   Bowel regimen/urine output: Lactulose, UO adequate  Indwelling catheter/lines: CVC right internal jugular and right forearm  De-escalation: pressor support as tolerated    BRIEF HISTORY:  Prosper Vega is a 38 y.o. with PMH of alcohol induced liver cirrhosis with ascites requiring twice monthly paracenteses who presented to the emergency department for evaluation of diffuse abdominal pain, fever and chills. Patient reports the she has been feeling generally ill with myalgias for the past 3 days. She states she has been doing nothing but laying on the couch and eating candy. She states that she felt too weak to get up. She also states that her symptoms do not feel consistent to when \"my liver is bothering me\". She states this feels more like an infection, \"like when I get a UTI\", however she denies urinary frequency, urgency or dysuria. On presentation to the emergency department, patient had an initial temp of 104F. She was initially not tachycardic and normotensive, however patient became hypotensive despite fluid resuscitation. She was started on Levophed and sepsis work-up was initiated.     AWAKE & FOLLOWING COMMANDS:  [] No   [] Yes    CURRENT VENTILATION STATUS:     [] Ventilator  [] BIPAP  [] Nasal Cannula [] Room Air      IF INTUBATED, ET TUBE MARKING AT LOWER LIP:       cms    SECRETIONS Amount:  [] Small [] Moderate  [] Large  [] None  Color:     [] White [] Colored  [] Bloody    SEDATION:  RAAS Score:  [] Propofol gtt  [] Versed gtt  [] Ativan gtt   [] No Sedation    PARALYZED:  [] No    [] Yes    DIARRHEA:                [] No                [] Yes  (C. Difficile status: [] positive                                                                                                                       [] negative                                                                                                                     [] pending)    VASOPRESSORS:  [] No    [] Yes    If yes -   [] Levophed       [] Dopamine     [] Vasopressin       [] Dobutamine  [] Phenylephrine         [] Epinephrine    CENTRAL LINES:     [] No   [] Yes   (Date of Insertion:   )           If yes -     [] Right IJ     [] Left IJ [] Right Femoral [] Left Femoral                   [] Right Subclavian [] Left Subclavian       SMITH'S CATHETER:   [] No   [] Yes  (Date of Insertion:   )     URINE OUTPUT:            [] Good   [] Low              [] Anuric      OBJECTIVE:     VITAL SIGNS:  BP (!) 102/50   Pulse 81   Temp 98.6 °F (37 °C) (Oral)   Resp 19   Ht 5' 4\" (1.626 m)   Wt 181 lb 14.1 oz (82.5 kg)   LMP  (LMP Unknown)   SpO2 95%   BMI 31.22 kg/m²   Tmax over 24 hours:  Temp (24hrs), Av.3 °F (36.8 °C), Min:98.1 °F (36.7 °C), Max:98.9 °F (37.2 °C)      Patient Vitals for the past 6 hrs:   BP Temp Temp src Pulse Resp SpO2   22 0645 (!) 102/50 -- -- 81 19 95 %   22 0630 (!) 100/53 -- -- 82 20 95 %   22 0615 (!) 97/48 -- -- 84 25 93 %   22 0600 101/75 98.6 °F (37 °C) Oral 81 25 93 %   22 0545 117/62 -- -- 81 (!) 33 93 %   22 0530 (!) 106/58 -- -- 84 27 93 %   22 0515 (!) 104/41 -- -- 77 21 95 %   22 0500 (!) 112/54 -- -- 77 21 95 %   22 0445 (!) 119/56 -- -- 76 18 95 %   22 0430 (!) 102/51 -- -- 75 20 95 %   22 0415 (!) 92/48 -- -- 79 23 94 %   22 0400 (!) 101/53 98.9 °F (37.2 °C) Oral 79 20 94 %   22 0345 (!) 102/46 -- -- 77 21 93 %   22 0330 (!) 96/48 -- -- 78 20 94 %   22 0315 (!) 104/52 -- -- 78 20 95 %   22 0300 (!) 110/56 -- -- 79 20 94 %   22 0245 (!) 112/52 -- -- 81 20 95 %   22 0230 (!) 107/53 -- -- 80 19 94 %   22 0215 (!) 108/50 -- -- 82 21 95 %   22 0200 (!) 103/49 98.4 °F (36.9 °C) Oral 80 20 94 %   22 0145 (!) 103/45 -- -- 79 20 94 %         Intake/Output Summary (Last 24 hours) at 2022 0730  Last data filed at 2022 0423  Gross per 24 hour   Intake 897.22 ml   Output --   Net 897.22 ml     Wt Readings from Last 2 Encounters:   22 181 lb 14.1 oz (82.5 kg)   22 163 lb (73.9 kg)     Body mass index is 31.22 kg/m².         PHYSICAL EXAMINATION:  GEN:                  awake, alert, cooperative, and no apparent distress  EYES:  lids and lashes normal, pupils equal, round, and reactive to light, sclera icteric bilaterally  HEENT:            Normocepalic, without Component Value Date/Time    PROTIME 22.4 07/26/2022 10:31 PM    INR 2.2 07/26/2022 10:31 PM     PTT:    Lab Results   Component Value Date/Time    APTT 39.9 07/26/2022 10:31 PM       Comprehensive Metabolic Profile:   Recent Labs     07/26/22 2231 07/28/22  0624 07/29/22  0552   * 135 138   K 4.4 4.2 4.4   CL 96* 101 103   CO2 23 19* 22   BUN 9 25* 37*   CREATININE 0.36* 1.61* 2.79*   GLUCOSE 112* 85 86   CALCIUM 7.8* 8.4* 8.5*   PROT 8.4* 6.6 6.6   LABALBU 1.8* 2.3* 2.3*   BILITOT 11.22* 18.46* 18.59*   ALKPHOS 97 40 56   * 68* 53*   ALT 32 25 24      Magnesium:   Lab Results   Component Value Date/Time    MG 1.5 06/30/2022 11:49 AM     Phosphorus:   Lab Results   Component Value Date/Time    PHOS 3.2 06/01/2022 03:16 PM     Ionized Calcium:   Lab Results   Component Value Date/Time    CAION 1.08 07/27/2022 09:36 AM        Urinalysis:     Troponin: No results for input(s): TROPONINI in the last 72 hours. Microbiology:    Cultures during this admission:     Blood cultures:                 [] None drawn      [] Negative             []  Positive (Details:  )  Urine Culture:                   [] None drawn      [] Negative             []  Positive (Details:  )  Sputum Culture:               [] None drawn       [] Negative             []  Positive (Details:  )   Endotracheal aspirate:     [] None drawn       [] Negative             []  Positive (Details:  )     Other pertinent Labs:       Radiology/Imaging:       ASSESSMENT:     Principal Problem:    SBP (spontaneous bacterial peritonitis) ([address]  Active Problems:    Alcoholic hepatitis with ascites    Septicemia (HCC)    Alcohol use disorder, moderate, dependence (HCC)    Elevated INR    Arterial hypotension    E. coli septicemia ([address]    Bandemia  Resolved Problems:    * No resolved hospital problems.  *            PLAN:     WEAN PER PROTOCOL:  [] No   [] Yes  [] N/A    DISCONTINUE ANY LABS:   [] No   [] Yes    ICU PROPHYLAXIS:  Stress ulcer:  [] PPI Agent  [] [id number] [] Sucralfate  [] Other:  VTE:   [] Enoxaparin  [] Unfract. Heparin Subcut  [] EPC Cuffs    NUTRITION: (Diet: ADULT DIET; Regular;  Low Sodium (2 gm); 1000 ml)    HOME MEDICATIONS RECONCILED: [] No  [] Yes    INSULIN DRIP:   [] No   [] Yes    CONSULTATION NEEDED:  [] No   [] Yes    FAMILY UPDATED:    [] No   [] Yes    TRANSFER OUT OF ICU:   [] No   [] Yes    ADDITIONAL PLAN:    Septic Shock due to UTI and SBP  SBP  UTI  Septicemia with positive blood cultures for gram negative rods  Decompensated cirrhosis liver due to alcoholic liver disease  Ascites - on large volume paracentesis  SHRADDHA  Lactic acidosis - Resolved  Hypomagnesemia  Hypocalcemia  Hyperbilirubinemia  Leukocytosis  Anemia of chronic disease due to cirrhosis liver  Thrombocytopenia  Coagulopathy due to cirrhosis liver     -Continue IV ceftriaxone   -Lactulose titrate for 2-3 BM/ day  -Replace Mg and Ca  -Check renal us and gall bladder r/o cholecystitis  -Monitor I&O  -Possible therapeutic drainage by IR for ascites  -Avoid nephrotoxic agents'  -D/cd lovenox  -Hold heparin due to low platelets  -Place sequential compression device        Yolanda Wynne MD     Family Medicine Resident   Department of [facility]         7/29/2022, 7:30 AM

## 2022-07-29 NOTE — PLAN OF CARE
Problem: Safety - Adult  Goal: Free from fall injury  Outcome: Progressing     Problem: Chronic Conditions and Co-morbidities  Goal: Patient's chronic conditions and co-morbidity symptoms are monitored and maintained or improved  Outcome: Progressing     Problem: Safety - Adult  Goal: *Absence of infection signs and symptoms  Outcome: Progressing     Problem: Skin/Tissue Integrity  Goal: Absence of new skin breakdown  Outcome: Progressing     Problem: Pain  Goal: Verbalizes/displays adequate comfort level or baseline comfort level  Outcome: Progressing     Problem: Discharge Planning  Goal: Discharge to home or other facility with appropriate resources  Outcome: Progressing  Flowsheets (Taken 7/29/2022 0800)  Discharge to home or other facility with appropriate resources: Identify barriers to discharge with patient and caregiver     Problem: ABCDS Injury Assessment  Goal: Absence of physical injury  Outcome: Progressing

## 2022-07-30 PROBLEM — N39.0 E-COLI UTI: Status: ACTIVE | Noted: 2022-01-01

## 2022-07-30 PROBLEM — B96.20 E-COLI UTI: Status: ACTIVE | Noted: 2022-01-01

## 2022-07-30 PROBLEM — R78.81 BACTEREMIA: Status: ACTIVE | Noted: 2022-01-01

## 2022-07-30 PROBLEM — N17.9 AKI (ACUTE KIDNEY INJURY) (HCC): Status: ACTIVE | Noted: 2022-01-01

## 2022-07-30 PROBLEM — E87.20 METABOLIC ACIDOSIS: Status: ACTIVE | Noted: 2022-01-01

## 2022-07-30 NOTE — PROGRESS NOTES
Critical Care Team - Daily Progress Note      Date and time: 7/30/2022 11:33 AM  Patient's name:  Elizabeth Tafoya  Medical Record Number: [id number]  Patient's account/billing number: [de-identified]  Patient's YOB: 1980  Age: 38 y.o. Date of Admission: 7/26/2022  9:49 PM  Length of stay during current admission: 3      Primary Care Physician: TALYA Webster CNP    Code Status: Full Code    Reason for ICU admission: SBP      SUBJECTIVE:     OVERNIGHT EVENTS:           Underwent paracentesis via IR yesterday - removal of 4.8L, reported improvement in symptoms    Levophed requirements are decreasing, unfortunately creatinine continues to trend upwards. Nephrology consulted, recommends placement for jamie to begin dialysis. Patient given unit of FFP prior to placement     F.A.S.T. M. H.U.G.S. B.I.D. Feeding Diet: ADULT DIET; Regular; Low Sodium (2 gm); 1000 ml  Fluids: no  Family: -bedside  Analgesic: n/a  Sedation: none   Thrombo-prophylaxis: [] Enoxaparin, [] Unfract. Heparin Subcutaneously, [x] EPC Cuffs  Mobility: Yes  Heads up: 30  Ulcer prophylaxis: [] PPI Agent, [x] G2Bgmid, [] Sucralfate, [] Other:  Glycemic control: Controlled  Spontaneous breathing trial: Not applicable   Bowel regimen/urine output: Lactulose, UO adequate  Indwelling catheter/lines: CVC right internal jugular and right forearm  De-escalation: pressor support as tolerated    BRIEF HISTORY:  Elizabeth Tafoya is a 38 y.o. with PMH of alcohol induced liver cirrhosis with ascites requiring twice monthly paracenteses who presented to the emergency department for evaluation of diffuse abdominal pain, fever and chills. Patient reports the she has been feeling generally ill with myalgias for the past 3 days. She states she has been doing nothing but laying on the couch and eating candy. She states that she felt too weak to get up.   She also states that her symptoms do not feel consistent to when \"my liver is bothering me\". She states this feels more like an infection, \"like when I get a UTI\", however she denies urinary frequency, urgency or dysuria. On presentation to the emergency department, patient had an initial temp of 104F. She was initially not tachycardic and normotensive, however patient became hypotensive despite fluid resuscitation. She was started on Levophed and sepsis work-up was initiated.     AWAKE & FOLLOWING COMMANDS:  [] No   [x] Yes    CURRENT VENTILATION STATUS:     [] Ventilator  [] BIPAP  [] Nasal Cannula [x] Room Air        SEDATION:  RAAS Score:  [] Propofol gtt  [] Versed gtt  [] Ativan gtt   [x] No Sedation    VASOPRESSORS:  [] No    [] Yes    If yes -   [x] Levophed       [] Dopamine     [] Vasopressin       [] Dobutamine  [] Phenylephrine         [] Epinephrine    CENTRAL LINES:     [] No   [] Yes   (Date of Insertion:   )           If yes -     [x] Right IJ     [] Left IJ [] Right Femoral [] Left Femoral                   [] Right Subclavian [] Left Subclavian       SMITH'S CATHETER:   [x] No   [] Yes  (Date of Insertion:   )       OBJECTIVE:     VITAL SIGNS:  BP (!) 108/57   Pulse 65   Temp 97.9 °F (36.6 °C) (Oral)   Resp 16   Ht 5' 4\" (1.626 m)   Wt 181 lb 14.1 oz (82.5 kg)   LMP  (LMP Unknown)   SpO2 97%   BMI 31.22 kg/m²   Tmax over 24 hours:  Temp (24hrs), Av.9 °F (36.6 °C), Min:97.5 °F (36.4 °C), Max:98.1 °F (36.7 °C)      Patient Vitals for the past 6 hrs:   BP Temp Temp src Pulse Resp SpO2   22 1100 -- 97.9 °F (36.6 °C) Oral -- -- --   22 1041 (!) 108/57 98.1 °F (36.7 °C) -- 65 16 97 %   22 1031 -- 98.1 °F (36.7 °C) Oral -- -- --   22 1000 (!) 100/45 -- -- 73 17 95 %   22 0900 -- -- -- 70 10 95 %   22 0845 (!) 101/45 -- -- 72 15 96 %   22 0830 (!) 93/48 -- -- 71 15 96 %   22 0815 (!) 94/49 -- -- 71 (!) 4 97 %   22 0800 (!) 92/45 97.7 °F (36.5 °C) Axillary 69 15 98 %   22 0745 -- -- -- 73 16 96 %   22 0730 -- -- -- 82 16 97 %   07/30/22 0715 (!) 95/44 -- -- 81 11 97 %   07/30/22 0700 (!) 83/54 -- -- 74 16 96 %   07/30/22 0615 (!) 100/44 -- -- 71 15 94 %   07/30/22 0600 (!) 90/37 98.1 °F (36.7 °C) Oral 69 19 95 %   07/30/22 0545 (!) 104/54 -- -- 68 16 97 %           Intake/Output Summary (Last 24 hours) at 7/30/2022 1133  Last data filed at 7/30/2022 0637  Gross per 24 hour   Intake 629.88 ml   Output --   Net 629.88 ml       Wt Readings from Last 2 Encounters:   07/28/22 181 lb 14.1 oz (82.5 kg)   07/09/22 163 lb (73.9 kg)     Body mass index is 31.22 kg/m². PHYSICAL EXAMINATION:  GEN:                  awake, alert, cooperative, and no apparent distress  EYES:  lids and lashes normal, pupils equal, round, and reactive to light, sclera icteric bilaterally  HEENT:            Normocepalic, without obvious abnormality  Atramatic  LUNGS:            no increased work of breathing, good air exchange, and clear to auscultation  CV:                     normal apical impulse, regular rate and rhythm, and normal S1 and S2  ABDOMEN:     Abdomen is distended and mildly tender to palpation with no rebound tenderness, guarding and or rigidity.   MSK:                  there is no redness, warmth, or swelling of the joints  NEURO[de-identified]           awake  alert  oriented to name, place and time  cranial nerves II-XII are grossly intact  motor is 5 out of 5 bilaterally  SKIN:                 No bruising or bleeding  Normal skin color, texture, turgor  Jaundice noted  EXTREMITIES:  No pedal or leg edema, no calf tenderness/swelling, no erythema, distal pulses intact  Any additional physical findings:    MEDICATIONS:    Scheduled Meds:   famotidine  20 mg Oral Daily    folic acid  1 mg Oral Daily    thiamine  100 mg Oral Daily    lactulose  10 g Oral BID    sodium chloride flush  5-40 mL IntraVENous 2 times per day    cefTRIAXone (ROCEPHIN) IV  2,000 mg IntraVENous Q24H    midodrine  10 mg Oral TID WC     Continuous Infusions: sodium chloride      norepinephrine 4 mcg/min (07/30/22 0637)    sodium chloride       PRN Meds:   sodium chloride, , PRN  sodium chloride flush, 5-40 mL, PRN  sodium chloride, , PRN  ondansetron, 4 mg, Q8H PRN   Or  ondansetron, 4 mg, Q6H PRN  polyethylene glycol, 17 g, Daily PRN        VENT SETTINGS (Comprehensive) (if applicable): Additional Respiratory Assessments  Heart Rate: 65  Resp: 16  SpO2: 97 %      Laboratory findings:    Complete Blood Count:   Recent Labs     07/28/22 0624 07/29/22  0552 07/30/22  0632   WBC 28.2* 26.3* 22.1*   HGB 8.2* 8.6* 7.9*   HCT 27.4* 26.5* 24.2*   PLT 67* 83* See Reflexed IPF Result          Last 3 Blood Glucose:   Recent Labs     07/28/22 0624 07/29/22  0552 07/30/22  0632   GLUCOSE 85 86 79          PT/INR:    Lab Results   Component Value Date/Time    PROTIME 24.1 07/29/2022 11:47 AM    INR 2.4 07/29/2022 11:47 AM     PTT:    Lab Results   Component Value Date/Time    APTT 39.9 07/26/2022 10:31 PM       Comprehensive Metabolic Profile:   Recent Labs     07/28/22 0624 07/29/22  0552 07/30/22  0632    138 133*   K 4.2 4.4 4.3    103 101   CO2 19* 22 20   BUN 25* 37* 47*   CREATININE 1.61* 2.79* 3.88*   GLUCOSE 85 86 79   CALCIUM 8.4* 8.5* 8.0*   PROT 6.6 6.6  --    LABALBU 2.3* 2.3*  --    BILITOT 18.46* 18.59*  --    ALKPHOS 40 56  --    AST 68* 53*  --    ALT 25 24  --         Magnesium:   Lab Results   Component Value Date/Time    MG 1.5 06/30/2022 11:49 AM     Phosphorus:   Lab Results   Component Value Date/Time    PHOS 3.2 06/01/2022 03:16 PM     Ionized Calcium:   Lab Results   Component Value Date/Time    CAION 1.08 07/27/2022 09:36 AM        Urinalysis:     Troponin: No results for input(s): TROPONINI in the last 72 hours.     Microbiology:    Cultures during this admission:     Blood cultures:                 [] None drawn      [] Negative             []  Positive (Details:  )  Urine Culture:                   [] None drawn      [] Negative []  Positive (Details:  )  Sputum Culture:               [] None drawn       [] Negative             []  Positive (Details:  )   Endotracheal aspirate:     [] None drawn       [] Negative             []  Positive (Details:  )     Other pertinent Labs:       Radiology/Imaging:       ASSESSMENT:     Principal Problem:    SBP (spontaneous bacterial peritonitis) ([facility] [address]  Active Problems:    Alcoholic hepatitis with ascites    Septicemia ([facility]CC)    Alcohol use disorder, moderate, dependence (HCC)    Elevated INR    Arterial hypotension    E. coli septicemia ([facility] [address]    Bandemia  Resolved Problems:    * No resolved hospital problems.  *            PLAN:       Neuro  Neuro checks per protocol  AxO x 4    CV  Levophed requirements decreasing, however with initiation of diaylsis may increase    Resp  On room air  Continue to monitor    GI  Alcoholic cirrhosis, 4.8 L removed yesterday  F/u paracentesis labs  Rocephin for presumed SBP  Pepcid  Lactulose titrated to bowel movements      Worsening SHRADDHA Creatinine 3.88 (2.79, 1.61, 0.36)  Na/k 133/4.3  Nephrology on board, will place jamie today  Monitor electrolytes, replace PRN    Heme  Stable 7.9, platelets 82  EPC cuffs  Chemical proph held d/t thrombocytopenia    ID  Leukocytosis 22.1, afebrile  E.coli septicemia, SBP - On Rocephin  F/u repeat paracentesis labs    Endo  Controlled    LDA  Right IJ CVC            Maira Stein DO   PGY 3  Department of [address], [facility]         7/30/2022, 11:33 AM

## 2022-07-30 NOTE — PROGRESS NOTES
headache, nausea, vomiting urinary symptoms, diarrhea, muscle aches the patient reported of having cough and sore throat. The patient still had abdominal pain around 5/10 in intensity which is better than yesterday she had mild tenderness in her abdominal on the examination. She has been started with IV ceftriaxone and lactulose. Labs showed WBC of 28.2, hemoglobin 8.2, glucose 85, BUN 25, creatinine 1.61, GFR 43. Interval changes  7/30/2022   Patient Vitals for the past 8 hrs:   BP Temp Temp src Pulse Resp SpO2   07/30/22 0800 (!) 92/45 97.7 °F (36.5 °C) Axillary 69 15 98 %   07/30/22 0700 (!) 83/54 -- -- 74 16 96 %   07/30/22 0615 (!) 100/44 -- -- 71 15 94 %   07/30/22 0600 (!) 90/37 98.1 °F (36.7 °C) Oral 69 19 95 %   07/30/22 0545 (!) 104/54 -- -- 68 16 97 %   07/30/22 0530 (!) 104/57 -- -- -- -- --   07/30/22 0515 (!) 112/53 -- -- 68 15 97 %   07/30/22 0500 (!) 106/49 -- -- 67 14 97 %   07/30/22 0445 (!) 103/45 -- -- 68 15 98 %   07/30/22 0430 (!) 97/47 -- -- 67 15 97 %   07/30/22 0415 (!) 97/50 -- -- 67 14 98 %   07/30/22 0400 (!) 91/42 98 °F (36.7 °C) Oral 72 15 98 %   07/30/22 0345 (!) 89/47 -- -- 72 (!) 8 97 %   07/30/22 0330 (!) 90/46 -- -- 73 12 98 %   07/30/22 0315 (!) 96/54 -- -- 73 17 97 %   07/30/22 0300 (!) 106/50 -- -- 71 20 98 %   07/30/22 0245 (!) 91/40 -- -- 70 15 97 %   07/30/22 0230 (!) 88/41 -- -- 68 16 97 %   07/30/22 0215 (!) 126/48 -- -- 72 20 97 %   07/30/22 0200 (!) 100/47 97.9 °F (36.6 °C) -- 71 16 98 %   07/30/22 0145 (!) 99/48 -- -- 70 15 97 %   07/30/22 0130 (!) 108/59 -- -- 71 17 98 %   07/30/22 0115 (!) 94/50 -- -- 70 17 97 %   07/30/22 0100 (!) 96/51 -- -- 72 16 97 %   07/30/22 0045 (!) 61/52 -- -- 72 16 96 %   07/30/22 0030 (!) 95/49 -- -- 72 15 98 %     Current evaluation:  Patient was evaluated at the bedside. The patient was alert, oriented, in no acute distress.   The patient denies any fever, chills, headache, cough, shortness of breath, chest pain, muscular pains, difficulty urination, constipation. The patient looks tired and reports that her fatigue has increased. She reports that her abdominal pain is improved since yesterday after paracentesis. JOSE ANGEL dawkins is hemodynamically stable but continues to be on Levophed 16 mg. Ascites fluid examination on 7/29/2022 reported many neutrophils and no organisms on direct exam, culture is still pending. WBC count is 22.1 and has improved since yesterday(26.3). Renal function and total bilirubin has worsened. QM IJ being placed for possible HD    Paracentesis > 4L fluid out  GB US w thick wall, likely from ascites    Summary of relevant labs:  Labs:  WBC - 28.2 - 26.3 - 22.1  Hb - 7.9  BUN - 37 - 47  Cr - 2.79 - 3.88  GFR - 23 - 16  Total bilirubin - 18.59    Micro:  Culture, body fluid:  Many neutrophils seen, no organisms seen, culture pending    Culture, Blood 1:  Positive for E. Coli    MRSA DNA Probe, Nasal:  Negative    Ascitic Fluid Culture: Many neutrophils, few mononuclear WBCs, Positive for gram variable rods and gram negative rods; E.coli detected. Urine Culture:  No significant growth    Covid-19 rapid:  negative  Imaging:  Right upper quadrant ultrasound of gallbladder on 7/29/2022 at 2:39 PM:  1. Cirrhosis and ascites. 2. Diffuse gallbladder wall thickening, which could be reactive to the adjacent cirrhosis. Clinical correlation for signs or symptoms of cholecystitis is recommended. Ultrasound of kidneys on 7/29/2022 at 2:39 PM:  Unremarkable ultrasound of the kidneys. Small ascites. X-ray of the chest on 7/26/2022 10:15 PM:    Impression   Minor left basilar atelectasis. Otherwise unremarkable exam with low lung volumes. CT of abdomen and pelvis with contrast on 7/27/2022 at 2:16 AM:    Impression   No evidence of hernia.   Extensive stigmata cirrhosis and portal venous hypertension including marked ascites, diffuse bowel wall edema, extensive varices including the gonadal veins and recanalization of the umbilical vein. There is also anasarca of the pannus. Subcutaneous edema however is decreased compared to previous. X-ray of chest on 7/27/2022 at 6:36 PM:    Impression   1. Right internal jugular center venous catheter tip in the SVC. No pneumothorax. 2. Stable minimal left lung base atelectasis       I have personally reviewed the past medical history, past surgical history, medications, social history, and family history, and I haveupdated the database accordingly. Allergies:   Motrin [ibuprofen], Seasonal, and Motrin [ibuprofen]     Review of Systems:     Review of Systems   Constitutional:  Positive for fatigue (Patient feels more fatigued than yesterday. ). Negative for activity change, appetite change, chills and fever. HENT:  Positive for sore throat. Patient reports that she has cough. Eyes:  Negative for discharge. Respiratory:  Negative for cough, chest tightness and shortness of breath. Cardiovascular:  Negative for chest pain and palpitations. Gastrointestinal:  Positive for abdominal distention (Patient feels pressure on the abdomen due to the distention.) and abdominal pain (Abdominal pain and pressure has improved since paracentesis.). Endocrine: Negative for cold intolerance. Genitourinary:  Negative for difficulty urinating and dysuria. Musculoskeletal:  Negative for arthralgias and myalgias. Skin:  Negative for color change and pallor. Allergic/Immunologic: Positive for immunocompromised state. Neurological:  Negative for numbness and headaches. Hematological:  Negative for adenopathy. Psychiatric/Behavioral:  Negative for agitation, behavioral problems and confusion. Physical Examination :       Physical Exam  Constitutional:       General: She is not in acute distress. Appearance: Normal appearance. She is not ill-appearing. HENT:      Head: Normocephalic and atraumatic.       Right Ear: External ear normal. Left Ear: External ear normal.      Nose: Nose normal. No congestion or rhinorrhea. Mouth/Throat:      Mouth: Mucous membranes are moist.      Pharynx: Oropharynx is clear. Eyes:      General: Scleral icterus present. Extraocular Movements: Extraocular movements intact. Pupils: Pupils are equal, round, and reactive to light. Cardiovascular:      Rate and Rhythm: Normal rate and regular rhythm. Pulses: Normal pulses. Heart sounds: Normal heart sounds. Pulmonary:      Effort: Pulmonary effort is normal.      Breath sounds: Normal breath sounds. Abdominal:      General: There is distension. Tenderness: There is abdominal tenderness (Abdominal tenderness is minimal.). Genitourinary:     Rectum: Guaiac result negative. Musculoskeletal:         General: No tenderness or deformity. Cervical back: Normal range of motion. No tenderness. Skin:     General: Skin is warm and dry. Neurological:      General: No focal deficit present. Mental Status: She is alert and oriented to person, place, and time. Mental status is at baseline.    Psychiatric:         Mood and Affect: Mood normal.         Behavior: Behavior normal.       Past Medical History:     Past Medical History:   Diagnosis Date    Anxiety     GERD (gastroesophageal reflux disease)     History of irregular heartbeat     Hypertension     Seasonal allergies        Past Surgical  History:     Past Surgical History:   Procedure Laterality Date    DILATION AND CURETTAGE OF UTERUS      TUBAL LIGATION         Medications:      famotidine  20 mg Oral Daily    folic acid  1 mg Oral Daily    thiamine  100 mg Oral Daily    lactulose  10 g Oral BID    sodium chloride flush  5-40 mL IntraVENous 2 times per day    cefTRIAXone (ROCEPHIN) IV  2,000 mg IntraVENous Q24H    midodrine  10 mg Oral TID WC       Social History:     Social History     Socioeconomic History    Marital status: Single     Spouse name: Not on file    Number of children: Not on file    Years of education: Not on file    Highest education level: Not on file   Occupational History    Not on file   Tobacco Use    Smoking status: Every Day     Packs/day: 0.25     Types: Cigars, Cigarettes    Smokeless tobacco: Never    Tobacco comments:     black and milds   Vaping Use    Vaping Use: Never used   Substance and Sexual Activity    Alcohol use:  Yes     Alcohol/week: 2.0 standard drinks     Types: 2 Cans of beer per week     Comment: daily 3-4 beers    Drug use: Yes     Types: Marijuana Rayne [place])    Sexual activity: Yes     Partners: Male   Other Topics Concern    Not on file   Social History Narrative    ** Merged History Encounter **          Social Determinants of Health     Financial Resource Strain: Not on file   Food Insecurity: Not on file   Transportation Needs: Not on file   Physical Activity: Not on file   Stress: Not on file   Social Connections: Not on file   Intimate Partner Violence: Not on file   Housing Stability: Not on file       Family History:     Family History   Problem Relation Age of Onset    Heart Disease Mother     Other Mother         HIV    Cancer Mother         female cancer    Anxiety Disorder Sister     Anxiety Disorder Brother       Medical Decision Making:   I have independently reviewed/ordered the following labs:    CBC with Differential:   Recent Labs     07/28/22  0624 07/29/22  0552 07/30/22  0632   WBC 28.2* 26.3* 22.1*   HGB 8.2* 8.6* 7.9*   HCT 27.4* 26.5* 24.2*   PLT 67* 83* See Reflexed IPF Result   LYMPHOPCT 4* 7*  --    MONOPCT 3 3  --        BMP:  Recent Labs     07/29/22  0552 07/30/22  0632    133*   K 4.4 4.3    101   CO2 22 20   BUN 37* 47*   CREATININE 2.79* 3.88*       Hepatic Function Panel:   Recent Labs     07/28/22  0624 07/29/22  0552   PROT 6.6 6.6   LABALBU 2.3* 2.3*   BILIDIR 10.35* 13.23*   IBILI 8.11* 5.36*   BILITOT 18.46* 18.59*   ALKPHOS 40 56   ALT 25 24   AST 68* 53*       No results for input(s): RPR in the last 72 hours. No results for input(s): HIV in the last 72 hours. No results for input(s): BC in the last 72 hours. Lab Results   Component Value Date/Time    CREATININE 3.88 07/30/2022 06:32 AM    GLUCOSE 79 07/30/2022 06:32 AM    GLUCOSE 99 04/15/2012 06:57 PM       Detailed results: Thank you for allowing us to participate in the care of this patient. Please call with questions. This note is created with the assistance of a speech recognition program.  While intending to generate adocument that actually reflects the content of the visit, the document can still have some errors including those of syntax and sound a like substitutions which may escape proof reading. It such instances, actual meaningcan be extrapolated by contextual diversion. Rosalee Aranda MD  Office: [phone]  Perfect serve / office [phone]      I have discussed the care of the patient, including pertinent history and exam findings,  with the resident. I have seen and examined the patient and the key elements of all parts of the encounter have been performed by me. I agree with the assessment, plan and orders as documented by the resident.     Radha Turner, Infectious Diseases

## 2022-07-30 NOTE — CONSENT
Informed Consent for Blood Component Transfusion Note    I have discussed with the patient the rationale for blood component (FFP) transfusion; its benefits in treating or preventing fatigue, organ damage, or death; and its risk which includes mild transfusion reactions, rare risk of blood borne infection, or more serious but rare reactions. I have discussed the alternatives to transfusion, including the risk and consequences of not receiving transfusion. The patient had an opportunity to ask questions and had agreed to proceed with transfusion of blood components.     Electronically signed by Shaun Black DO on 7/30/22 at 10:28 AM EDT

## 2022-07-30 NOTE — CONSULTS
Nephrology Consult Note    Reason for Consult: SHRADDHA  Requesting Physician:  Dr. Pauly Hernandez    Chief Complaint:  etoh cirrhosis, routine paracentesis, admitted for sepsis , SBP    History Obtained From:  WILLIAN, RN, patient    History of Present Illness: This is a 38 y.o. female PMH decompensated etoh cirrhosis, recurrent ascites, PHG, varices-on imaging, noncompliance (continues to drink etoh daily), GERD. She presented to the ED 7/26/22 with abdominal pain, increased abdominal girth, fevers, chills, had temp of 104 near admission. During time in ED she was hypotensive initially given fluid then started on Levophed and transferred to ICU. Her UA is + for UTI, she was also found to have SBP per diagnostic paracentesis in ED. She gets prn paracentesis outpatient basis, she does not restrict her fluid nor her salt intake at home She has not taken any diuretics at home. She was started on Rocephin, near admission given 50 g albumin, followed by another 75g for SBP tx. Near admission, patient was fluid restricted and started on a 2 gram low Na diet per GI. Cr at admission was 0.36 BUN 9 Na 130, K 4.4 CO2 23, calcium 7.8, protein 8.4, albumin 1.8, ammonia 66  BiliT 11.22, wbc 17.7, hgb 10.4, plat 71, mcv 106.8, INR 2.2. Cr has progressively uptrended and is 3.88 this am, BUN 47 Na 133, K 4.3 CO2 20, AG 12. She did void this am, was voiding yesterday with stools. Lactulose was ordered per primary, but had several loose stools last 2 days (5/6 each day). I held lactulose. Urine Cr 239.8  Urine Sodium 20. Paracentesis 7/29 4.3 Liters removed, again given albumin. Renal US on 7/29 unremarkable     Patient remains on Levophed. Pt denies any hx of heavy or prolonged NSAID use. There is no history of blood or bone marrow disorders. +hx of jaundice, + hx etoh hepatitis, no known sexually transmitted disease. Pt has no hx of collagen vascular disease or vasculitis.    No history of dysuria or frequency. No recent procedures involving IV contrast.   There is no hx of paraprotein disease.   + hx of recurrent UTI   Medication review shows prescribed use of aldactone 25 mg PO daily and lasix 20mg PO every other day. Patient was not taking these at home. Past Medical History:        Diagnosis Date    Anxiety     GERD (gastroesophageal reflux disease)     History of irregular heartbeat     Hypertension     Seasonal allergies        Past Surgical History:        Procedure Laterality Date    DILATION AND CURETTAGE OF UTERUS      TUBAL LIGATION         Current Medications:    famotidine (PEPCID) tablet 20 mg, Daily  folic acid (FOLVITE) tablet 1 mg, Daily  thiamine tablet 100 mg, Daily  lactulose (CHRONULAC) 10 GM/15ML solution 10 g, BID  norepinephrine (LEVOPHED) 16 mg in sodium chloride 0.9 % 250 mL infusion, Continuous  sodium chloride flush 0.9 % injection 5-40 mL, 2 times per day  sodium chloride flush 0.9 % injection 5-40 mL, PRN  0.9 % sodium chloride infusion, PRN  ondansetron (ZOFRAN-ODT) disintegrating tablet 4 mg, Q8H PRN   Or  ondansetron (ZOFRAN) injection 4 mg, Q6H PRN  polyethylene glycol (GLYCOLAX) packet 17 g, Daily PRN  cefTRIAXone (ROCEPHIN) 2,000 mg in sterile water 20 mL IV syringe, Q24H  midodrine (PROAMATINE) tablet 10 mg, TID WC        Allergies:  Motrin [ibuprofen], Seasonal, and Motrin [ibuprofen]    Social History:   Social History     Socioeconomic History    Marital status: Single     Spouse name: Not on file    Number of children: Not on file    Years of education: Not on file    Highest education level: Not on file   Occupational History    Not on file   Tobacco Use    Smoking status: Every Day     Packs/day: 0.25     Types: Cigars, Cigarettes    Smokeless tobacco: Never    Tobacco comments:     black and milds   Vaping Use    Vaping Use: Never used   Substance and Sexual Activity    Alcohol use:  Yes     Alcohol/week: 2.0 standard drinks     Types: 2 Cans of beer per week     Comment: daily 3-4 beers    Drug use: Yes     Types: Marijuana Alf Semen)    Sexual activity: Yes     Partners: Male   Other Topics Concern    Not on file   Social History Narrative    ** Merged History Encounter **          Social Determinants of Health     Financial Resource Strain: Not on file   Food Insecurity: Not on file   Transportation Needs: Not on file   Physical Activity: Not on file   Stress: Not on file   Social Connections: Not on file   Intimate Partner Violence: Not on file   Housing Stability: Not on file       Family History:   Family History   Problem Relation Age of Onset    Heart Disease Mother     Other Mother         HIV    Cancer Mother         female cancer    Anxiety Disorder Sister     Anxiety Disorder Brother        Review of Systems:    Constitutional: + fever, + chills, no lethargy, no weakness. HEENT:  No headache, otalgia, itchy eyes, nasal discharge or sore throat. Cardiac:  No chest pain, dyspnea, orthopnea or PND. Chest:              No cough, phlegm or wheezing. Abdomen:  + abdominal pain, nausea or vomiting. Neuro:              No focal weakness, abnormal movements orseizure like activity. Skin:   No rashes, no itching. :   No hematuria, no pyuria, no dysuria, no flank pain. Extremities:  + swelling or joint pains.       Objective:  CURRENT TEMPERATURE:  Temp: 98.1 °F (36.7 °C)  MAXIMUM TEMPERATURE OVER 24HRS:  Temp (24hrs), Av.9 °F (36.6 °C), Min:97.5 °F (36.4 °C), Max:98.1 °F (36.7 °C)    CURRENT RESPIRATORY RATE:  Resp: 15  CURRENT PULSE:  Heart Rate: 71  CURRENT BLOOD PRESSURE:  BP: (!) 100/44  24HR BLOOD PRESSURE RANGE:  Systolic (08XBD), WXE:45 , Min:61 , DJB:928   ; Diastolic (76RKH), PNK:57, Min:28, Max:118    24HR INTAKE/OUTPUT:    Intake/Output Summary (Last 24 hours) at 2022 0807  Last data filed at 2022 6783  Gross per 24 hour   Intake 629.88 ml   Output --   Net 629.88 ml     Patient Vitals for the past 96 hrs (Last 3 readings):   Weight 07/28/22 2025 181 lb 14.1 oz (82.5 kg)   07/28/22 0117 171 lb 8.3 oz (77.8 kg)   07/27/22 0453 170 lb 13.7 oz (77.5 kg)     Physical Exam:  General appearance:Awake, alert, no asterixis on exam  Skin: warm and dry, +jaundice, +stigmata of chronic liver disease  Eyes: conjunctivae normal and +scleral icterus  ENT: :no thrush no pharyngeal congestion    Neck: no carotid bruit ,no  JVD,no carotid Lymphadenopathy, noThyromegaly   Pulmonary: no wheezing or rhonchi. No rales heard.   Cardiovascular: Normal S1 & S2,  No S3 or  S4, no Pericardial Rub no Murmur   Abdomen: +ascites, no localized tenderness, +bs  Extremities: +edema    Labs:   CBC:  Recent Labs     07/28/22  0624 07/29/22  0552 07/30/22  0632   WBC 28.2* 26.3* 22.1*   RBC 2.25* 2.39* 2.16*   HGB 8.2* 8.6* 7.9*   HCT 27.4* 26.5* 24.2*   .8* 110.9* 112.0*   MCH 36.4* 36.0* 36.6*   MCHC 29.9 32.5 32.6   RDW 19.4* 18.6* 18.4*   PLT 67* 83* See Reflexed IPF Result   MPV 12.2 12.7  --       BMP:   Recent Labs     07/28/22  0624 07/29/22  0552 07/30/22  0632    138 133*   K 4.2 4.4 4.3    103 101   CO2 19* 22 20   BUN 25* 37* 47*   CREATININE 1.61* 2.79* 3.88*   GLUCOSE 85 86 79   CALCIUM 8.4* 8.5* 8.0*        Albumin:   Recent Labs     07/28/22  0624 07/29/22  0552   LABALBU 2.3* 2.3*         SPEP:   Lab Results   Component Value Date/Time    PROT 6.6 07/29/2022 05:52 AM     Urine Sodium:    Lab Results   Component Value Date/Time    DIONICIO 20 07/30/2022 05:42 AM      Urine Creatinine:    Lab Results   Component Value Date/Time    LABCREA 239.8 07/30/2022 05:42 AM     Urine Eosinophils:   Lab Results   Component Value Date/Time    UREO NONE SEEN 07/30/2022 05:42 AM     Urinalysis:  U/A:   Lab Results   Component Value Date/Time    NITRU POSITIVE 07/27/2022 03:52 AM    COLORU Hardy 07/27/2022 03:52 AM    PHUR 5.5 07/27/2022 03:52 AM    WBCUA 20 TO 50 07/27/2022 03:52 AM    RBCUA 2 TO 5 07/27/2022 03:52 AM    MUCUS 1+ 07/27/2022 03:52 AM TRICHOMONAS NOT REPORTED 07/14/2020 05:58 AM    YEAST NOT REPORTED 07/14/2020 05:58 AM    BACTERIA FEW 07/27/2022 03:52 AM    SPECGRAV 1.045 07/27/2022 03:52 AM    LEUKOCYTESUR MODERATE 07/27/2022 03:52 AM    UROBILINOGEN Normal 07/27/2022 03:52 AM    BILIRUBINUR LARGE 07/27/2022 03:52 AM    BILIRUBINUR NEGATIVE 04/15/2012 06:50 PM    GLUCOSEU NEGATIVE 07/27/2022 03:52 AM    GLUCOSEU NEGATIVE 04/15/2012 06:50 PM    KETUA NEGATIVE 07/27/2022 03:52 AM    AMORPHOUS 1+ 06/01/2022 01:05 AM         Radiology:  Reviewed as available. Assessment:  1. SHRADDHA suspect secondary to ATN from contrast exposure and E. Coli sepsis vs HRS  2. Baseline Cr 0.36  3. Septicemia E coli  4. SBP  5. UTI  6. Bandemia   7. LLL pulm atelectasis  8. Decompensated etoh cirrhosis, non compliance, ascites, hepatic encephalopathy, varices on imaging. 9. Macrocytic anemia  10. Coagulopathy secondary to chronic liver disease. 11. Thrombocytopenia secondary to chronic liver disease. Plan:  1. Will need to start dialysis today, discussed risk vs benefits with patient including risk of sudden cardiac death, she agree's to proceed. Discussed with Crit Care team, they plan to give unit of FFP prior to line placement. 2. Urine studies as ordered, serologies as ordered. 3. Place Johnson, strict I/O, daily weights. 4. Continue ProAmatine  5. Renal diet. 6. Avoid nephrotoxins. 7. Following   Will discuss with Dr. Jeri Garcia. Thank you for the consultation. Please do not hesitate to call with questions. Electronically signed by AGUSTO Blum on 7/30/2022 at 8:07 AM  [facility]    Attending Physician Statement  I have discussed the care of [place], including pertinent history and exam findings with the resident/fellow. I have reviewed the key elements of all parts of the encounter with the resident/fellow. I have seen and examined the patient with the resident/fellow.   I agree with the assessment and plan and status of the problem list as documented. Patient seen and examined. Discussed with critical care team as well. She is not been making much urine over the last 24 to 48 hours. Her creatinine has exponentially risen. In all likelihood she has developed ATN from contrast exposure and bacteremia. Hepatorenal syndrome does come to mind as well. Initial CT scan did not show any evidence of hydronephrosis.   She will need hemodialysis for clearances and metabolic acidosis  Will continue to follow and make additional recommendations  Jacquelyn Ramsey MD , MD

## 2022-07-30 NOTE — PROGRESS NOTES
Dialysis Post Treatment Note  Patient tolerated treatment well. Denies complaints at time of discharge. Vitals:    07/30/22 1835   BP: (!) 102/48   Pulse: 85   Resp: 21   Temp: 98.2 °F (36.8 °C)   SpO2: 96%     Pre-Weight = 82.5  Post-weight = Weight: 181 lb 10.5 oz (82.4 kg)  Total Liters Processed =    Rinseback Volume (mL) = Rinseback Volume (ml): 300 ml  Net Removal (mL) = no UF  Length of treatment=2.5  Access:  IJ catheter    HD completed' no complication noted; both access with heparin lock and secured; all needs attended.

## 2022-07-30 NOTE — PROGRESS NOTES
Dialysis Time Out  To be done by RN and tech or 2 RNs  Staff Names Bismark Brown RN    [x]  Identity of the patient using 2 patient identifiers  [x]  Consent for treatment  [x]  Equipment-proper machine and dialyzer  [x]  B-Hep B status  [x]  Orders- to include bath, blood flow, dialyzer, time and fluid removal  [x]  Access-Correct site and in working order  [x]  Time for patient to ask questions.

## 2022-07-30 NOTE — PROCEDURES
PROCEDURE NOTE - Benigno VENOUS LINE PLACEMENT    PATIENT NAME: Babita Garcia RECORD NO. [id number]  DATE: 7/30/2022  ATTENDING PHYSICIAN: Sharyle Favorite    PREOPERATIVE DIAGNOSIS:  need for CVVHD  POSTOPERATIVE DIAGNOSIS:  Same  PROCEDURE PERFORMED:  Left Internal Jugular Vein Central Line Insertion  PERFORMING PHYSICIAN: Bentley Barahona DO  ANESTHESIA:  Local utilizing 1% lidocaine  ESTIMATED BLOOD LOSS:  Less than 25 ml  COMPLICATIONS:  None immediately appreciated. DISCUSSION:  Prosper Vega is a 38y.o.-year-old female who requires benigno for CVVHD. The history and physical examination were reviewed and confirmed. CONSENT: The patient provided verbal consent for this procedure. PROCEDURE:  A timeout was initiated by the bedside nurse and was confirmed by those present. The patient was placed in a supine position. The skin overlying the Left Internal Jugular Vein was prepped with chlorhexadine and draped in sterile fashion. The skin was infiltrated with local anesthetic. The vessel and surrounding anatomy was visualized using ultrasound. Through the anesthetized region, the introducer needle was inserted into the internal jugular vein returning dark red non pulsatile blood. A guidewire was placed through the center of the needle with no resistance. Ultrasound confirmed presence of wire in the vein. A small incision made in the skin with a #11 scalpel blade. The dilator was inserted into the skin and vein over guidewire using Seldinger technique. The dilator was then removed and the second dilator was inserted over the guidewire via the Seldinger technique. The 14F 20 cm benigno catheter was placed in the vein over the guidewire using Seldinger technique. The guidewire was then removed and all ports aspirated and flushed appropriately. The catheter then secured using silk suture and a temporary sterile dressing was applied. No immediate complication was evident.   All sponge, instrument and needle counts were correct at the completion of the procedure. Postprocedural chest x-ray showed good position of the catheter with no evidence of hemothorax or pneumothorax. The patient tolerated the procedure well with no immediate complication evident.      Cody Interiano,   2:10 PM, 7/30/22

## 2022-07-30 NOTE — CARE COORDINATION
Met with pt after receiving a consult due to pt being evicted. Pt is alert and oriented. She states that she and a friend lived together and shared rent. She states that the friend had to move out and pt has not been able to pay the rent and has to be out on Monday. Provided pt a list of low income housing. Discussed shelter placement. She lives with her 3 adult daughters. Encouraged pt to call Em Jimenez to get on the list at [employer], [address] and [place]. Provided meal tickets for her 2 daughter's that were in the room.

## 2022-07-31 NOTE — PLAN OF CARE
Problem: Safety - Adult  Goal: Free from fall injury  7/31/2022 0920 by Amanda Wolf RN  Outcome: Progressing     Problem: Safety - Adult  Goal: *Absence of infection signs and symptoms  7/31/2022 0920 by Amanda Wolf RN  Outcome: Progressing     Problem: Chronic Conditions and Co-morbidities  Goal: Patient's chronic conditions and co-morbidity symptoms are monitored and maintained or improved  7/31/2022 0920 by Amanda Wolf RN  Outcome: Progressing     Problem: Skin/Tissue Integrity  Goal: Absence of new skin breakdown  7/31/2022 0920 by Amanda Wolf RN  Outcome: Progressing     Problem: Pain  Goal: Verbalizes/displays adequate comfort level or baseline comfort level  7/31/2022 0920 by Amanda Wolf RN  Outcome: Progressing     Problem: Discharge Planning  Goal: Discharge to home or other facility with appropriate resources  7/31/2022 0920 by Amanda Wolf RN  Outcome: Progressing  Flowsheets (Taken 7/31/2022 0800)  Discharge to home or other facility with appropriate resources: Identify barriers to discharge with patient and caregiver     Problem: ABCDS Injury Assessment  Goal: Absence of physical injury  7/31/2022 0920 by Amanda Wolf RN  Outcome: Progressing

## 2022-07-31 NOTE — PROGRESS NOTES
[facility] -   Infectious diseases evaluation  admission date 7/26/2022    reason for consultation:   The patient was consulted with infectious disease department because of ascites positive blood culture for E. coli. Impression :   Current:  Septicemia E coli  Spontaneous bacterial peritonitis - E coli  Post 2 paracentesis  UTI  Alcoholic hepatitis w decompensated ascites  Bandemia  LLL  pulm atelectasis  Hepatic coagulopathy  ARF - getting HD    Other:  Cirrhosis of liver  Alcoholism  Discussion / summary of stay / plan of care     Recommendations   Keep ceftriaxone 2 g iv daily and adjust to cx  Plan AB  x 10 days  paracentesis 7/29 cx pend neg still  QM placed for ARF and possible HD    Infection Control Recommendations   Lewellen Precautions  Contact Isolation       Antimicrobial Stewardship Recommendations   Simplification of therapy  Targeted therapy      History of Present Illness:   Initial history:  Cristy Scott is a 38y.o.-year-old female who had chief complaints of fevers with abdominal pain which started 2 days ago. The patient reports that the abdominal pain started suddenly, was 8-9/10 in intensity, was pressure-like. It was associated with fever, chills, and muscle aches. The patient has history of hypertension with alcoholic liver disease and recurrent ascites. The patient usually requires twice monthly paracentesis, low-salt diet, spironolactone, and Lasix. Patient  came to the emergency department on 7/26/2022 where she had high fever and subsequently developed shock which required IV fluid and pressor support. Diagnostic paracentesis was done and the patient was started on Zosyn. During the bedside evaluation, the patient was alert, oriented, and in no acute distress.   The patient reported that she feels better and has no headache, nausea, vomiting urinary symptoms, diarrhea, muscle aches the patient reported of having cough and sore throat. The patient still had abdominal pain around 5/10 in intensity which is better than yesterday she had mild tenderness in her abdominal on the examination. She has been started with IV ceftriaxone and lactulose. Labs showed WBC of 28.2, hemoglobin 8.2, glucose 85, BUN 25, creatinine 1.61, GFR 43.     Interval changes  7/31/2022   Patient Vitals for the past 8 hrs:   BP Temp Temp src Pulse Resp SpO2   07/31/22 1545 -- -- -- 73 18 95 %   07/31/22 1530 (!) 111/47 -- -- 70 15 98 %   07/31/22 1515 (!) 92/42 -- -- 69 22 98 %   07/31/22 1500 (!) 91/42 -- -- 67 15 97 %   07/31/22 1445 (!) 91/41 -- -- 69 15 96 %   07/31/22 1430 (!) 95/46 -- -- 69 15 98 %   07/31/22 1415 (!) 95/46 -- -- 67 15 99 %   07/31/22 1400 (!) 97/45 98.4 °F (36.9 °C) Oral 68 15 98 %   07/31/22 1345 (!) 93/47 -- -- 70 15 98 %   07/31/22 1330 (!) 96/42 -- -- 69 13 98 %   07/31/22 1315 (!) 96/50 -- -- 71 15 97 %   07/31/22 1300 (!) 100/48 -- -- 71 17 98 %   07/31/22 1245 (!) 96/49 -- -- 73 14 99 %   07/31/22 1230 (!) 95/48 -- -- 72 16 98 %   07/31/22 1215 (!) 92/49 -- -- 76 16 98 %   07/31/22 1200 104/84 98.4 °F (36.9 °C) Oral 79 17 97 %   07/31/22 1145 (!) 91/38 -- -- 81 19 98 %   07/31/22 1130 (!) 75/55 -- -- 82 19 97 %   07/31/22 1115 (!) 98/47 -- -- 77 13 97 %   07/31/22 1100 (!) 92/51 -- -- 75 15 98 %   07/31/22 1045 (!) 96/52 -- -- 79 14 95 %   07/31/22 1030 (!) 103/49 -- -- 77 16 95 %   07/31/22 1015 -- -- -- 75 14 97 %   07/31/22 1000 (!) 96/48 97.5 °F (36.4 °C) Axillary 82 17 97 %   07/31/22 0945 (!) 95/47 -- -- 77 15 96 %   07/31/22 0930 (!) 98/49 -- -- 78 16 96 %   07/31/22 0915 (!) 92/51 -- -- 76 15 96 %   07/31/22 0900 (!) 88/46 -- -- 75 14 97 %   07/31/22 0845 (!) 92/51 -- -- 74 15 96 %     Current evaluation:  No fever - feels better  Abd less tender though distended  And on low dose pressors  ox 3    WBC 18 better  Creat 3 range   QM IJ being placed for possible HD    Paracentesis > 4L fluid out 7/29, cx neg so far , WBC about 3000 still  GB US w thick wall, from ascites ? CT AP had shown no loculations    Summary of relevant labs:  Labs:  WBC - 28.2 - 26.3 - 22.1 - 18  Hb - 7.9  BUN - 37 - 47  Cr - 2.79 - 3.88  GFR - 23 - 16  Total bilirubin - 18.59    Micro:  Culture, body fluid:  Many neutrophils seen, no organisms seen, culture pending    Culture, Blood 1:  Positive for E. Coli    MRSA DNA Probe, Nasal:  Negative    Ascitic Fluid Culture: Many neutrophils, few mononuclear WBCs, Positive for gram variable rods and gram negative rods; E.coli detected. Urine Culture:  No significant growth    Covid-19 rapid:  negative  Imaging:  Right upper quadrant ultrasound of gallbladder on 7/29/2022 at 2:39 PM:  1. Cirrhosis and ascites. 2. Diffuse gallbladder wall thickening, which could be reactive to the adjacent cirrhosis. Clinical correlation for signs or symptoms of cholecystitis is recommended. Ultrasound of kidneys on 7/29/2022 at 2:39 PM:  Unremarkable ultrasound of the kidneys. Small ascites. X-ray of the chest on 7/26/2022 10:15 PM:    Impression   Minor left basilar atelectasis. Otherwise unremarkable exam with low lung volumes. CT of abdomen and pelvis with contrast on 7/27/2022 at 2:16 AM:    Impression   No evidence of hernia. Extensive stigmata cirrhosis and portal venous hypertension including marked ascites, diffuse bowel wall edema, extensive varices including the gonadal veins and recanalization of the umbilical vein. There is also anasarca of the pannus. Subcutaneous edema however is decreased compared to previous. X-ray of chest on 7/27/2022 at 6:36 PM:    Impression   1. Right internal jugular center venous catheter tip in the SVC. No pneumothorax. 2. Stable minimal left lung base atelectasis       I have personally reviewed the past medical history, past surgical history, medications, social history, and family history, and I haveupdated the database accordingly.       Allergies:   Motrin Abdominal:      General: There is distension. Tenderness: There is abdominal tenderness (Abdominal tenderness is minimal.). Genitourinary:     Rectum: Guaiac result negative. Musculoskeletal:         General: No tenderness or deformity. Cervical back: Normal range of motion. No tenderness. Skin:     General: Skin is warm and dry. Neurological:      General: No focal deficit present. Mental Status: She is alert and oriented to person, place, and time. Mental status is at baseline. Psychiatric:         Mood and Affect: Mood normal.         Behavior: Behavior normal.       Past Medical History:     Past Medical History:   Diagnosis Date    Anxiety     GERD (gastroesophageal reflux disease)     History of irregular heartbeat     Hypertension     Seasonal allergies        Past Surgical  History:     Past Surgical History:   Procedure Laterality Date    DILATION AND CURETTAGE OF UTERUS      TUBAL LIGATION         Medications:      heparin flush  300 Units Intercatheter Once    famotidine  20 mg Oral Daily    folic acid  1 mg Oral Daily    thiamine  100 mg Oral Daily    [Held by provider] lactulose  10 g Oral BID    sodium chloride flush  5-40 mL IntraVENous 2 times per day    cefTRIAXone (ROCEPHIN) IV  2,000 mg IntraVENous Q24H    midodrine  10 mg Oral TID WC       Social History:     Social History     Socioeconomic History    Marital status: Single     Spouse name: Not on file    Number of children: Not on file    Years of education: Not on file    Highest education level: Not on file   Occupational History    Not on file   Tobacco Use    Smoking status: Every Day     Packs/day: 0.25     Types: Cigars, Cigarettes    Smokeless tobacco: Never    Tobacco comments:     black and milds   Vaping Use    Vaping Use: Never used   Substance and Sexual Activity    Alcohol use: Yes     Alcohol/week: 2.0 standard drinks     Types: 2 Cans of beer per week     Comment: daily 3-4 beers    Drug use:  Yes Types: Marijuana (Weed)    Sexual activity: Yes     Partners: Male   Other Topics Concern    Not on file   Social History Narrative    ** Merged History Encounter **          Social Determinants of Health     Financial Resource Strain: Not on file   Food Insecurity: Not on file   Transportation Needs: Not on file   Physical Activity: Not on file   Stress: Not on file   Social Connections: Not on file   Intimate Partner Violence: Not on file   Housing Stability: Not on file       Family History:     Family History   Problem Relation Age of Onset    Heart Disease Mother     Other Mother         HIV    Cancer Mother         female cancer    Anxiety Disorder Sister     Anxiety Disorder Brother       Medical Decision Making:   I have independently reviewed/ordered the following labs:    CBC with Differential:   Recent Labs     07/29/22  0552 07/30/22  0632 07/31/22  0516   WBC 26.3* 22.1* 18.1*   HGB 8.6* 7.9* 7.9*   HCT 26.5* 24.2* 24.6*   PLT 83* See Reflexed IPF Result 67*   LYMPHOPCT 7*  --   --    MONOPCT 3  --   --        BMP:  Recent Labs     07/30/22  0632 07/31/22  0516   * 134*   K 4.3 4.7    99   CO2 20 23   BUN 47* 34*   CREATININE 3.88* 3.73*   MG  --  1.5*       Hepatic Function Panel:   Recent Labs     07/29/22  0552   PROT 6.6   LABALBU 2.3*   BILIDIR 13.23*   IBILI 5.36*   BILITOT 18.59*   ALKPHOS 56   ALT 24   AST 53*       No results for input(s): RPR in the last 72 hours. No results for input(s): HIV in the last 72 hours. No results for input(s): BC in the last 72 hours. Lab Results   Component Value Date/Time    CREATININE 3.73 07/31/2022 05:16 AM    GLUCOSE 59 07/31/2022 05:16 AM    GLUCOSE 99 04/15/2012 06:57 PM       Detailed results: Thank you for allowing us to participate in the care of this patient. Please call with questions.     This note is created with the assistance of a speech recognition program.  While intending to generate adocument that actually reflects the content of the visit, the document can still have some errors including those of syntax and sound a like substitutions which may escape proof reading. It such instances, actual meaningcan be extrapolated by contextual diversion.     Mary Chase MD  Office: [phone]  Perfect serve / office [phone]
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
## 2022-07-31 NOTE — PROGRESS NOTES
Nephrology Progress Note    Subjective:    Patient seen and examined. Anuric, Alex was placed yesterday. Started on HD yesterday, no complications, no UF, ran for 2.5 hours. Labs and ordered serologies reviewed. She feels ok today, on low dose levophed. No acute issues. Objective:  CURRENT TEMPERATURE:  Temp: 97.9 °F (36.6 °C)  MAXIMUM TEMPERATURE OVER 24HRS:  Temp (24hrs), Av.9 °F (36.6 °C), Min:97.5 °F (36.4 °C), Max:98.2 °F (36.8 °C)    CURRENT RESPIRATORY RATE:  Resp: 14  CURRENT PULSE:  Heart Rate: 75  CURRENT BLOOD PRESSURE:  BP: (!) 88/46  24HR BLOOD PRESSURE RANGE:  Systolic (84HED), TAW:69 , Min:84 , UHT:179   ; Diastolic (09FDZ), IUK:63, Min:34, Max:76    24HR INTAKE/OUTPUT:    Intake/Output Summary (Last 24 hours) at 2022 1105  Last data filed at 2022 0800  Gross per 24 hour   Intake 1014.74 ml   Output 45 ml   Net 969.74 ml     Patient Vitals for the past 96 hrs (Last 3 readings):   Weight   22 1835 181 lb 10.5 oz (82.4 kg)   22 1545 181 lb 14.1 oz (82.5 kg)   22 202 181 lb 14.1 oz (82.5 kg)     Physical Exam:  General appearance:Awake, alert, no asterixis on exam  Skin: warm and dry, +jaundice, +stigmata of chronic liver disease  Eyes: conjunctivae normal and +scleral icterus  ENT: :no thrush no pharyngeal congestion    Neck: no carotid bruit ,no  JVD,no carotid Lymphadenopathy, noThyromegaly   Pulmonary: no wheezing or rhonchi. No rales heard.   Cardiovascular: Normal S1 & S2,  No S3 or  S4, no Pericardial Rub no Murmur   Abdomen: +ascites, no localized tenderness, +bs  Extremities: +edema    Labs:   CBC:  Recent Labs     22  0552 22  0632 22  0516   WBC 26.3* 22.1* 18.1*   RBC 2.39* 2.16* 2.23*   HGB 8.6* 7.9* 7.9*   HCT 26.5* 24.2* 24.6*   .9* 112.0* 110.3*   MCH 36.0* 36.6* 35.4*   MCHC 32.5 32.6 32.1   RDW 18.6* 18.4* 17.7*   PLT 83* See Reflexed IPF Result 67*   MPV 12.7  --  12.2      BMP:   Recent Labs     22  0552 07/30/22  0632 07/31/22  0516    133* 134*   K 4.4 4.3 4.7    101 99   CO2 22 20 23   BUN 37* 47* 34*   CREATININE 2.79* 3.88* 3.73*   GLUCOSE 86 79 59*   CALCIUM 8.5* 8.0* 7.5*        Albumin:   Recent Labs     07/29/22  0552   LABALBU 2.3*         SPEP:   Lab Results   Component Value Date/Time    PROT 6.6 07/29/2022 05:52 AM     Urine Sodium:    Lab Results   Component Value Date/Time    DIONICIO 20 07/30/2022 05:42 AM      Urine Creatinine:    Lab Results   Component Value Date/Time    LABCREA 239.8 07/30/2022 05:42 AM     Urine Eosinophils:   Lab Results   Component Value Date/Time    UREO NONE SEEN 07/30/2022 05:42 AM     Urinalysis:  U/A:   Lab Results   Component Value Date/Time    NITRU POSITIVE 07/27/2022 03:52 AM    COLORU [place] 07/27/2022 03:52 AM    PHUR 5.5 07/27/2022 03:52 AM    WBCUA 20 TO 50 07/27/2022 03:52 AM    RBCUA 2 TO 5 07/27/2022 03:52 AM    MUCUS 1+ 07/27/2022 03:52 AM    TRICHOMONAS NOT REPORTED 07/14/2020 05:58 AM    YEAST NOT REPORTED 07/14/2020 05:58 AM    BACTERIA FEW 07/27/2022 03:52 AM    SPECGRAV 1.045 07/27/2022 03:52 AM    LEUKOCYTESUR MODERATE 07/27/2022 03:52 AM    UROBILINOGEN Normal 07/27/2022 03:52 AM    BILIRUBINUR LARGE 07/27/2022 03:52 AM    BILIRUBINUR NEGATIVE 04/15/2012 06:50 PM    GLUCOSEU NEGATIVE 07/27/2022 03:52 AM    GLUCOSEU NEGATIVE 04/15/2012 06:50 PM    KETUA NEGATIVE 07/27/2022 03:52 AM    AMORPHOUS 1+ 06/01/2022 01:05 AM         Assessment:  1. SHRADDHA suspect secondary to ATN from contrast exposure and bacteremia vs HRS less favored. New start HD yesterday for clearances and metabolic acidosis. oligoAnuric today, has Johnson. 2. Baseline Cr 0.36  3. Septicemia E coli  4. SBP  5. UTI  6. Bandemia   7. LLL pulm atelectasis  8. Decompensated etoh cirrhosis, non compliance, ascites, hepatic encephalopathy, varices on imaging. 9. Macrocytic anemia  10. Coagulopathy secondary to chronic liver disease.    11. Thrombocytopenia secondary to chronic liver disease. Plan:  1. Will hold dialysis today. Will order Lasix to see if urine output picks up. 2. Wean Levophed as able discussed with bedside RN that cirrhotics blood pressures run low. 3. Place Johnson, strict I/O, daily weights. 4. Continue ProAmatine  5. Renal diet. 6. Avoid nephrotoxins. 7. Paracentesis prn give albumin with such. 8. Following. Will discuss with Dr. Lucina Locke. Please do not hesitate to call with questions. Electronically signed by AGUSTO Mayen on 7/31/2022 at 11:05 AM  [facility]  Attending Physician Statement  I have discussed the care of Yolis Mauricio, including pertinent history and exam findings with the resident/fellow. I have reviewed the key elements of all parts of the encounter with the resident/fellow. I have seen and examined the patient with the resident/fellow. I agree with the assessment and plan and status of the problem list as documented. Seen and examined in detail along with my nurse practitioner. Patient did receive hemodialysis yesterday that she tolerated fairly well. Less azotemic today, potassium level  okay. Continues to be oligoanuric unfortunately and will require ongoing dialysis in my opinion. No compelling reason for dialysis today, I tentatively plan to do hemodialysis tomorrow. With her blood pressures being soft, ProAmatine has been increased to 10 mg 3 times daily. She denies any abdominal pain, shortness of breath today. No fever overnight. She currently has a temporary catheter in place.   Check labs tomorrow, tentatively plan dialysis tomorrow morning    Fern Phelps MD , MD

## 2022-07-31 NOTE — PLAN OF CARE
Problem: Safety - Adult  Goal: Free from fall injury  Outcome: Progressing  Goal: *Absence of infection signs and symptoms  Outcome: Progressing     Problem: Chronic Conditions and Co-morbidities  Goal: Patient's chronic conditions and co-morbidity symptoms are monitored and maintained or improved  Outcome: Progressing     Problem: Skin/Tissue Integrity  Goal: Absence of new skin breakdown  Description: 1. Monitor for areas of redness and/or skin breakdown  2. Assess vascular access sites hourly  3. Every 4-6 hours minimum:  Change oxygen saturation probe site  4. Every 4-6 hours:  If on nasal continuous positive airway pressure, respiratory therapy assess nares and determine need for appliance change or resting period.   Outcome: Progressing     Problem: Pain  Goal: Verbalizes/displays adequate comfort level or baseline comfort level  Outcome: Progressing     Problem: Discharge Planning  Goal: Discharge to home or other facility with appropriate resources  Outcome: Progressing     Problem: ABCDS Injury Assessment  Goal: Absence of physical injury  Outcome: Progressing

## 2022-08-01 PROBLEM — A41.9 SEPTIC SHOCK (HCC): Status: ACTIVE | Noted: 2022-01-01

## 2022-08-01 PROBLEM — R65.21 SEPTIC SHOCK (HCC): Status: ACTIVE | Noted: 2022-01-01
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
## 2022-08-01 NOTE — PROGRESS NOTES
Dialysis Post Treatment Note  Vitals:    08/01/22 1830   BP:    Pulse: 80   Resp: 17   Temp: 97.2 °F (36.2 °C)   SpO2: 97%   BP 99/49  Pre-Weight = 78.1 kg  Post-weight = Weight: 172 lb 13.5 oz (78.4 kg)  Total Liters Processed = Blood Volume Processed (Liters): 59.3 l/min  Rinseback Volume (mL) = Rinseback Volume (ml): 300 ml  Net Removal (mL) =  0  Patient's dry weight= TBD  Type of access used= Cath  Length of treatment=180 min    HD completed. Two doses of albumin given during treatment. Catheters heparinized and clamped.

## 2022-08-01 NOTE — PROGRESS NOTES
Dialysis Time Out  To be done by RN and tech or 2 RNs  Staff Names Javed OREILLY RN, Darby Ellis RN     [x]  Identity of the patient using 2 patient identifiers  [x]  Consent for treatment  [x]  Equipment-proper machine and dialyzer  [x]  B-Hep B status  [x]  Orders- to include bath, blood flow, dialyzer, time and fluid removal  [x]  Access-Correct site and in working order  [x]  Time for patient to ask questions. pain.

## 2022-08-01 NOTE — CARE COORDINATION
SW following for HD needs. Patient started on HD for SHRADDHA. Met with patient who appeared drowsy, some difficulty answering questions. Patient did confirm address and states she does not know if she had a Nephrologist prior to admission. SW discussed referral process and typical HD schedule. Patient would like clinic closest to home with [facility]. No DME at home, utilizes car to get to appointments. Was unable to clarify if her or her family drive the vehicle. Closest clinic is [facility]. Referral faxed. Will need updated Hep B panel. Dialysis Charge RN notified.

## 2022-08-01 NOTE — PROGRESS NOTES
syndrome less favored. New start hemodialysis on 7/30/2022 for clearances and metabolic acidosis. Patient continues anuric, has herron catheter in place  2. Baseline Creatinine was documented at 0.36 mg/dl  3. Septicemia with E coli  4. SBP  5. UTI  6. Bandemia   7. LLL pulm atelectasis  8. Decompensated alcoholic cirrhosis, non compliance, ascites, hepatic encephalopathy, varices on imaging. 9. Macrocytic anemia  10. Coagulopathy secondary to chronic liver disease. 11. Thrombocytopenia secondary to chronic liver disease. Plan:  1. Hemodialysis today, orders for dialysis entered into the record  2. Wean Levophed as able discussed with bedside RN that cirrhotics blood pressures run low. Levophed is continuing to run at 5 mcg/minute   3. [place] catheter removal today  4. Continue ProAmatine  5. Renal diet. 6. Avoid nephrotoxins. 7. Evaluate daily the need for dialysis          Please do not hesitate to call with questions.     Electronically signed by Lala Mcgovern MD on 8/1/2022 at 9:35 AM  [facility]

## 2022-08-01 NOTE — PROGRESS NOTES
[facility] -   Infectious diseases evaluation  admission date 7/26/2022    reason for consultation:   The patient was consulted with infectious disease department because of ascites positive blood culture for E. coli. Impression :   Current:  Septicemia E coli  Spontaneous bacterial peritonitis - E coli  Post 2 paracentesis  UTI  Alcoholic hepatitis w decompensated ascites  Bandemia  LLL  pulm atelectasis  Hepatic coagulopathy  ARF - getting HD    Other:  Cirrhosis of liver  Alcoholism  Discussion / summary of stay / plan of care     Recommendations   Keep ceftriaxone 2 g iv daily and adjust to cx  Plan AB  x 10 days  paracentesis 7/29 cx pend neg still  HD started 7/30/22    Infection Control Recommendations   [place] Precautions  Contact Isolation       Antimicrobial Stewardship Recommendations   Simplification of therapy  Targeted therapy      History of Present Illness:   Initial history:  Noel Dawson is a 38y.o.-year-old female who had chief complaints of fevers with abdominal pain which started 2 days ago. The patient reports that the abdominal pain started suddenly, was 8-9/10 in intensity, was pressure-like. It was associated with fever, chills, and muscle aches. The patient has history of hypertension with alcoholic liver disease and recurrent ascites. The patient usually requires twice monthly paracentesis, low-salt diet, spironolactone, and Lasix. Patient  came to the emergency department on 7/26/2022 where she had high fever and subsequently developed shock which required IV fluid and pressor support. Diagnostic paracentesis was done and the patient was started on Zosyn. During the bedside evaluation, the patient was alert, oriented, and in no acute distress. The patient reported that she feels better and has no headache, nausea, vomiting urinary symptoms, diarrhea, muscle aches the patient reported of having cough and sore throat. The patient still had abdominal pain around 5/10 in intensity which is better than yesterday she had mild tenderness in her abdominal on the examination. She has been started with IV ceftriaxone and lactulose. Labs showed WBC of 28.2, hemoglobin 8.2, glucose 85, BUN 25, creatinine 1.61, GFR 43.     Interval changes  8/1/2022   Patient Vitals for the past 8 hrs:   BP Temp Temp src Pulse Resp SpO2 Weight   08/01/22 1645 (!) 92/39 -- -- 61 15 98 % --   08/01/22 1630 (!) 91/38 -- -- 60 14 98 % --   08/01/22 1615 (!) 90/44 -- -- 60 15 96 % --   08/01/22 1600 (!) 90/43 -- -- 63 15 96 % --   08/01/22 1545 (!) 92/45 -- -- 66 16 97 % --   08/01/22 1532 (!) 93/49 -- -- 67 -- -- --   08/01/22 1530 (!) 93/49 -- -- 66 16 98 % --   08/01/22 1515 (!) 91/48 -- -- 65 16 98 % --   08/01/22 1510 (!) 91/48 -- -- 64 -- -- --   08/01/22 1500 (!) 98/57 97.9 °F (36.6 °C) -- 67 15 96 % 172 lb 2.9 oz (78.1 kg)   08/01/22 1445 -- -- -- 64 16 95 % --   08/01/22 1430 (!) 95/40 -- -- 65 16 96 % --   08/01/22 1415 -- -- -- 66 16 95 % --   08/01/22 1400 (!) 79/31 98 °F (36.7 °C) Oral 68 16 95 % --   08/01/22 1345 -- -- -- 71 18 98 % --   08/01/22 1330 (!) 97/50 -- -- 71 12 98 % --   08/01/22 1315 -- -- -- 71 16 97 % --   08/01/22 1300 (!) 88/45 -- -- 74 (!) 6 98 % --   08/01/22 1245 -- -- -- 72 14 97 % --   08/01/22 1230 112/87 -- -- 78 20 97 % --   08/01/22 1215 -- -- -- 77 16 97 % --   08/01/22 1200 (!) 110/53 97.8 °F (36.6 °C) Oral 75 21 96 % --   08/01/22 1145 -- -- -- 67 16 96 % --   08/01/22 1130 (!) 98/47 -- -- 70 17 96 % --   08/01/22 1127 -- 97.7 °F (36.5 °C) Oral -- -- -- --   08/01/22 1115 -- -- -- 73 16 97 % --   08/01/22 1100 (!) 100/57 -- -- 72 16 97 % --   08/01/22 1045 -- -- -- 68 17 97 % --   08/01/22 1030 (!) 96/43 -- -- 67 16 95 % --   08/01/22 1015 -- -- -- 67 16 96 % --   08/01/22 1000 (!) 95/43 97.7 °F (36.5 °C) Oral 68 15 94 % --   08/01/22 0945 (!) 86/44 -- -- 69 14 96 % --     Current evaluation:  Seen and examined in the ICU.  ON HD. Denies any pain, fever, chills, n/v/d. Paracentesis > 4L fluid out 7/29, cx neg so far , WBC about 3000 still  GB US w thick wall, from ascites ? CT AP had shown no loculations    Summary of relevant labs:  Labs:  WBC - 28.2 - 26.3 - 22.1 - 18-18.5  Hb - 7.9  BUN - 37 - 47  Cr - 2.79 - 3.88-5.07  GFR - 23 - 16  Total bilirubin - 18.59    Micro:  Culture, body fluid:  Many neutrophils seen, no organisms seen, culture pending    Culture, Blood 1:  Positive for E. Coli    MRSA DNA Probe, Nasal:  Negative    Ascitic Fluid Culture: Many neutrophils, few mononuclear WBCs, Positive for gram variable rods and gram negative rods; E.coli detected. Urine Culture:  No significant growth    Covid-19 rapid:  negative  Imaging:  Right upper quadrant ultrasound of gallbladder on 7/29/2022 at 2:39 PM:  1. Cirrhosis and ascites. 2. Diffuse gallbladder wall thickening, which could be reactive to the adjacent cirrhosis. Clinical correlation for signs or symptoms of cholecystitis is recommended. Ultrasound of kidneys on 7/29/2022 at 2:39 PM:  Unremarkable ultrasound of the kidneys. Small ascites. X-ray of the chest on 7/26/2022 10:15 PM:    Impression   Minor left basilar atelectasis. Otherwise unremarkable exam with low lung volumes. CT of abdomen and pelvis with contrast on 7/27/2022 at 2:16 AM:    Impression   No evidence of hernia. Extensive stigmata cirrhosis and portal venous hypertension including marked ascites, diffuse bowel wall edema, extensive varices including the gonadal veins and recanalization of the umbilical vein. There is also anasarca of the pannus. Subcutaneous edema however is decreased compared to previous. X-ray of chest on 7/27/2022 at 6:36 PM:    Impression   1. Right internal jugular center venous catheter tip in the SVC. No pneumothorax.    2. Stable minimal left lung base atelectasis       I have personally reviewed the past medical history, past surgical history, medications, social history, and family history, and I haveupdated the database accordingly. Allergies:   Motrin [ibuprofen], Seasonal, and Motrin [ibuprofen]     Review of Systems:     Review of Systems   Constitutional:  Positive for fatigue (Patient feels more fatigued than yesterday. ). Negative for activity change, appetite change, chills and fever. HENT:  Positive for sore throat. Patient reports that she has cough. Eyes:  Negative for discharge. Respiratory:  Negative for cough, chest tightness and shortness of breath. Cardiovascular:  Negative for chest pain and palpitations. Gastrointestinal:  Positive for abdominal distention (Patient feels pressure on the abdomen due to the distention. ). Negative for abdominal pain. Endocrine: Negative for cold intolerance. Genitourinary:  Negative for difficulty urinating and dysuria. Musculoskeletal:  Negative for arthralgias and myalgias. Skin:  Negative for color change and pallor. Allergic/Immunologic: Positive for immunocompromised state. Neurological:  Negative for numbness and headaches. Hematological:  Negative for adenopathy. Psychiatric/Behavioral:  Negative for agitation, behavioral problems and confusion. Physical Examination :       Physical Exam  Vitals and nursing note reviewed. Constitutional:       General: She is not in acute distress. Appearance: Normal appearance. She is not ill-appearing. HENT:      Head: Normocephalic and atraumatic. Right Ear: External ear normal.      Left Ear: External ear normal.      Nose: Nose normal. No congestion or rhinorrhea. Mouth/Throat:      Mouth: Mucous membranes are moist.      Pharynx: Oropharynx is clear. Eyes:      General: Scleral icterus present. Extraocular Movements: Extraocular movements intact. Pupils: Pupils are equal, round, and reactive to light. Cardiovascular:      Rate and Rhythm: Normal rate and regular rhythm. Pulses: Normal pulses. Heart sounds: Normal heart sounds. Pulmonary:      Effort: Pulmonary effort is normal.      Breath sounds: Normal breath sounds. Abdominal:      General: There is distension. Tenderness: There is abdominal tenderness (Abdominal tenderness is minimal.). Genitourinary:     Comments: Johnson. Urine tea colored. Musculoskeletal:         General: No tenderness or deformity. Normal range of motion. Cervical back: Normal range of motion. No tenderness. Skin:     General: Skin is warm and dry. Capillary Refill: Capillary refill takes less than 2 seconds. Findings: No erythema. Neurological:      Mental Status: She is alert and oriented to person, place, and time.    Psychiatric:         Mood and Affect: Mood normal.         Behavior: Behavior normal.       Past Medical History:     Past Medical History:   Diagnosis Date    Anxiety     GERD (gastroesophageal reflux disease)     History of irregular heartbeat     Hypertension     Seasonal allergies        Past Surgical  History:     Past Surgical History:   Procedure Laterality Date    DILATION AND CURETTAGE OF UTERUS      TUBAL LIGATION         Medications:      heparin flush  300 Units Intercatheter Once    famotidine  20 mg Oral Daily    folic acid  1 mg Oral Daily    thiamine  100 mg Oral Daily    [Held by provider] lactulose  10 g Oral BID    sodium chloride flush  5-40 mL IntraVENous 2 times per day    cefTRIAXone (ROCEPHIN) IV  2,000 mg IntraVENous Q24H    midodrine  10 mg Oral TID WC       Social History:     Social History     Socioeconomic History    Marital status: Single     Spouse name: Not on file    Number of children: Not on file    Years of education: Not on file    Highest education level: Not on file   Occupational History    Not on file   Tobacco Use    Smoking status: Every Day     Packs/day: 0.25     Types: Cigars, Cigarettes    Smokeless tobacco: Never    Tobacco comments:     black and milds Vaping Use    Vaping Use: Never used   Substance and Sexual Activity    Alcohol use: Yes     Alcohol/week: 2.0 standard drinks     Types: 2 Cans of beer per week     Comment: daily 3-4 beers    Drug use: Yes     Types: Marijuana Simsarah Jimenez)    Sexual activity: Yes     Partners: Male   Other Topics Concern    Not on file   Social History Narrative    ** Merged History Encounter **          Social Determinants of Health     Financial Resource Strain: Not on file   Food Insecurity: Not on file   Transportation Needs: Not on file   Physical Activity: Not on file   Stress: Not on file   Social Connections: Not on file   Intimate Partner Violence: Not on file   Housing Stability: Not on file       Family History:     Family History   Problem Relation Age of Onset    Heart Disease Mother     Other Mother         HIV    Cancer Mother         female cancer    Anxiety Disorder Sister     Anxiety Disorder Brother       Medical Decision Making:   I have independently reviewed/ordered the following labs:    CBC with Differential:   Recent Labs     07/31/22  0516 08/01/22  0651   WBC 18.1* 18.5*   HGB 7.9* 8.2*   HCT 24.6* 25.2*   PLT 67* 73*       BMP:  Recent Labs     07/31/22  0516 08/01/22  0651   * 129*   K 4.7 4.9   CL 99 97*   CO2 23 22   BUN 34* 44*   CREATININE 3.73* 5.07*   MG 1.5*  --        Hepatic Function Panel:   No results for input(s): PROT, LABALBU, BILIDIR, IBILI, BILITOT, ALKPHOS, ALT, AST in the last 72 hours. No results for input(s): RPR in the last 72 hours. No results for input(s): HIV in the last 72 hours. No results for input(s): BC in the last 72 hours. Lab Results   Component Value Date/Time    CREATININE 5.07 08/01/2022 06:51 AM    GLUCOSE 64 08/01/2022 06:51 AM    GLUCOSE 99 04/15/2012 06:57 PM       Detailed results: Thank you for allowing us to participate in the care of this patient. Please call with questions.     This note is created with the assistance of a speech recognition program.  While intending to generate adocument that actually reflects the content of the visit, the document can still have some errors including those of syntax and sound a like substitutions which may escape proof reading. It such instances, actual meaningcan be extrapolated by contextual diversion.     TALYA Smith - CNP  Office: [phone]  Perfect serve / office [phone]

## 2022-08-02 NOTE — PROGRESS NOTES
[facility] -   Infectious diseases evaluation  admission date 7/26/2022    reason for consultation:   The patient was consulted with infectious disease department because of ascites positive blood culture for E. coli. Impression :   Current:  Septicemia E coli  Spontaneous bacterial peritonitis - E coli  Post 2 paracentesis  UTI  Alcoholic hepatitis w decompensated ascites  Bandemia  LLL  pulm atelectasis  Hepatic coagulopathy  ARF - getting HD    Other:  Cirrhosis of liver  Alcoholism   or less  Discussion / summary of stay / plan of care     Recommendations   Keep ceftriaxone 2 g iv daily, anticipate switch to cipro at DC  Plan AB  x 10 days since last paracenthesis, till 8/8  paracentesis 7/29 cx pend neg still  HD started 7/30/22, will help remove fluids   Watch leukocytosis, persistent    Infection Control Recommendations   [place] Precautions  Contact Isolation       Antimicrobial Stewardship Recommendations   Simplification of therapy  Targeted therapy      History of Present Illness:   Initial history:  Rhea Belcher is a 38y.o.-year-old female who had chief complaints of fevers with abdominal pain which started 2 days ago. The patient reports that the abdominal pain started suddenly, was 8-9/10 in intensity, was pressure-like. It was associated with fever, chills, and muscle aches. The patient has history of hypertension with alcoholic liver disease and recurrent ascites. The patient usually requires twice monthly paracentesis, low-salt diet, spironolactone, and Lasix. Patient  came to the emergency department on 7/26/2022 where she had high fever and subsequently developed shock which required IV fluid and pressor support. Diagnostic paracentesis was done and the patient was started on Zosyn. During the bedside evaluation, the patient was alert, oriented, and in no acute distress.   The patient reported that she feels better and fluid out 7/29, cx neg so far , WBC about 3000 still  GB US w thick wall, from ascites ? CT AP had shown no loculations    Summary of relevant labs:  Labs:  WBC - 28.2 - 26.3 - 22.1 - 18-18.5- 20.5  Platelets- 67- 73- 56  Hb - 7.7 - stable  BUN - 37 - 47 - 34- 44- 26  Cr - 2.79 - 3.88-5.07 - 3.31  Total bilirubin - 18.59 (7/28)    Micro:  Culture, body fluid:  Many neutrophils seen, no growth *4 days(7/29)   - previous culture was positive for e coli smutli(7/27)    Culture, Blood 1:  Positive for E. Coli    MRSA DNA Probe, Nasal:  Negative    Urine Culture:  No significant growth    Covid-19 rapid:  negative  Imaging:  Right upper quadrant ultrasound of gallbladder on 7/29/2022 at 2:39 PM:  1. Cirrhosis and ascites. 2. Diffuse gallbladder wall thickening, which could be reactive to the adjacent cirrhosis. Clinical correlation for signs or symptoms of cholecystitis is recommended. Ultrasound of kidneys on 7/29/2022 at 2:39 PM:  Unremarkable ultrasound of the kidneys. Small ascites. X-ray of the chest on 7/26/2022 10:15 PM:    Impression   Minor left basilar atelectasis. Otherwise unremarkable exam with low lung volumes. CT of abdomen and pelvis with contrast on 7/27/2022 at 2:16 AM:    Impression   No evidence of hernia. Extensive stigmata cirrhosis and portal venous hypertension including marked ascites, diffuse bowel wall edema, extensive varices including the gonadal veins and recanalization of the umbilical vein. There is also anasarca of the pannus. Subcutaneous edema however is decreased compared to previous. X-ray of chest on 7/30/2022     Impression   1. Right internal jugular center venous catheter tip in the SVC. No pneumothorax. I have personally reviewed the past medical history, past surgical history, medications, social history, and family history, and I haveupdated the database accordingly.       Allergies:   Motrin [ibuprofen], Seasonal, and Motrin [ibuprofen]     Review of Systems:     Review of Systems   Constitutional:  Positive for fatigue (Patient feels more fatigued than yesterday. ). Negative for activity change, appetite change, chills, diaphoresis and fever. HENT:  Positive for sore throat. Negative for ear discharge, ear pain and rhinorrhea. Patient reports that she has cough. Eyes:  Negative for pain, discharge and redness. Respiratory:  Negative for apnea, cough, chest tightness and shortness of breath. Cardiovascular:  Negative for chest pain and palpitations. Gastrointestinal:  Positive for abdominal distention (Patient feels pressure on the abdomen due to the distention. ). Negative for abdominal pain. Endocrine: Negative for cold intolerance. Genitourinary:  Negative for difficulty urinating and dysuria. Musculoskeletal:  Negative for arthralgias, back pain and myalgias. Skin:  Negative for color change and pallor. Allergic/Immunologic: Positive for immunocompromised state. Neurological:  Negative for light-headedness, numbness and headaches. Hematological:  Negative for adenopathy. Psychiatric/Behavioral:  Negative for agitation, behavioral problems and confusion. Physical Examination :       Physical Exam  Vitals and nursing note reviewed. Constitutional:       General: She is not in acute distress. Appearance: Normal appearance. She is not ill-appearing, toxic-appearing or diaphoretic. HENT:      Head: Normocephalic and atraumatic. Right Ear: External ear normal.      Left Ear: External ear normal.      Nose: Nose normal. No congestion or rhinorrhea. Mouth/Throat:      Mouth: Mucous membranes are moist.      Pharynx: Oropharynx is clear. Eyes:      General: Scleral icterus present. Extraocular Movements: Extraocular movements intact. Pupils: Pupils are equal, round, and reactive to light. Cardiovascular:      Rate and Rhythm: Normal rate and regular rhythm. Pulses: Normal pulses.       Heart sounds: Normal heart sounds. No friction rub. Pulmonary:      Effort: Pulmonary effort is normal.      Breath sounds: Normal breath sounds. No rhonchi or rales. Abdominal:      General: There is distension. Tenderness: There is abdominal tenderness (Abdominal tenderness is minimal.). There is no guarding or rebound. Genitourinary:     Comments: Johnson. Urine tea colored. Musculoskeletal:         General: No tenderness or deformity. Normal range of motion. Cervical back: Normal range of motion. No rigidity or tenderness. Skin:     General: Skin is warm and dry. Capillary Refill: Capillary refill takes less than 2 seconds. Findings: No erythema. Neurological:      Mental Status: She is alert and oriented to person, place, and time.    Psychiatric:         Mood and Affect: Mood normal.         Behavior: Behavior normal.       Past Medical History:     Past Medical History:   Diagnosis Date    Anxiety     GERD (gastroesophageal reflux disease)     History of irregular heartbeat     Hypertension     Seasonal allergies        Past Surgical  History:     Past Surgical History:   Procedure Laterality Date    DILATION AND CURETTAGE OF UTERUS      TUBAL LIGATION         Medications:      albumin human  25 g IntraVENous Once    heparin flush  300 Units Intercatheter Once    famotidine  20 mg Oral Daily    folic acid  1 mg Oral Daily    thiamine  100 mg Oral Daily    [Held by provider] lactulose  10 g Oral BID    sodium chloride flush  5-40 mL IntraVENous 2 times per day    cefTRIAXone (ROCEPHIN) IV  2,000 mg IntraVENous Q24H    midodrine  10 mg Oral TID WC       Social History:     Social History     Socioeconomic History    Marital status: Single     Spouse name: Not on file    Number of children: Not on file    Years of education: Not on file    Highest education level: Not on file   Occupational History    Not on file   Tobacco Use    Smoking status: Every Day     Packs/day: 0.25     Types: Cigars, Cigarettes    Smokeless tobacco: Never    Tobacco comments:     black and milds   Vaping Use    Vaping Use: Never used   Substance and Sexual Activity    Alcohol use: Yes     Alcohol/week: 2.0 standard drinks     Types: 2 Cans of beer per week     Comment: daily 3-4 beers    Drug use: Yes     Types: Marijuana Jenna Marko)    Sexual activity: Yes     Partners: Male   Other Topics Concern    Not on file   Social History Narrative    ** Merged History Encounter **          Social Determinants of Health     Financial Resource Strain: Not on file   Food Insecurity: Not on file   Transportation Needs: Not on file   Physical Activity: Not on file   Stress: Not on file   Social Connections: Not on file   Intimate Partner Violence: Not on file   Housing Stability: Not on file       Family History:     Family History   Problem Relation Age of Onset    Heart Disease Mother     Other Mother         HIV    Cancer Mother         female cancer    Anxiety Disorder Sister     Anxiety Disorder Brother       Medical Decision Making:   I have independently reviewed/ordered the following labs:    CBC with Differential:   Recent Labs     08/01/22  0651 08/02/22  0443   WBC 18.5* 20.5*   HGB 8.2* 7.7*   HCT 25.2* 23.8*   PLT 73* 56*   LYMPHOPCT  --  16*   MONOPCT  --  9*     BMP:  Recent Labs     07/31/22  0516 08/01/22  0651 08/02/22  0443   * 129* 130*   K 4.7 4.9 4.0   CL 99 97* 95*   CO2 23 22 24   BUN 34* 44* 26*   CREATININE 3.73* 5.07* 3.31*   MG 1.5*  --   --      Hepatic Function Panel:   No results for input(s): PROT, LABALBU, BILIDIR, IBILI, BILITOT, ALKPHOS, ALT, AST in the last 72 hours. No results for input(s): RPR in the last 72 hours. No results for input(s): HIV in the last 72 hours. No results for input(s): BC in the last 72 hours.   Lab Results   Component Value Date/Time    CREATININE 3.31 08/02/2022 04:43 AM    GLUCOSE 80 08/02/2022 04:43 AM    GLUCOSE 99 04/15/2012 06:57 PM       Detailed results: Thank you for allowing us to participate in the care of this patient. Please call with questions. This note is created with the assistance of a speech recognition program.  While intending to generate adocument that actually reflects the content of the visit, the document can still have some errors including those of syntax and sound a like substitutions which may escape proof reading. It such instances, actual meaningcan be extrapolated by contextual diversion. Caden Marte MD  Office: [phone]  Perfect serve / office [phone]      I have discussed the care of the patient, including pertinent history and exam findings,  with the resident. I have seen and examined the patient and the key elements of all parts of the encounter have been performed by me. I agree with the assessment, plan and orders as documented by the resident.     Radha Turner, Infectious Diseases

## 2022-08-02 NOTE — PROGRESS NOTES
Dialysis Post Treatment Note  Vitals:    08/02/22 1300   BP: 115/67   Pulse: 70   Resp: 17   Temp:    SpO2: 99%     Pre-Weight = 80 kg  Post-weight = Weight: 176 lb 5.9 oz (80 kg)  Total Liters Processed = Blood Volume Processed (Liters): 66.5 l/min  Rinseback Volume (mL) = Rinseback Volume (ml): 300 ml  Net Removal (mL) =  0  Patient's dry weight= TBD  Type of access used= [facility]  Length of treatment= 180 minutes    Pt tolerated tx fair with soft BP throughout. Pt required scheduled dose of midodrine as well as 1x dose of albumin at start of tx.

## 2022-08-02 NOTE — PROGRESS NOTES
Dialysis Time Out  To be done by RN and tech or 2 RNs  Staff Names Gabriella Sanders RN    [x]  Identity of the patient using 2 patient identifiers  [x]  Consent for treatment  [x]  Equipment-proper machine and dialyzer  [x]  B-Hep B status  [x]  Orders- to include bath, blood flow, dialyzer, time and fluid removal  [x]  Access-Correct site and in working order  [x]  Time for patient to ask questions.

## 2022-08-02 NOTE — CARE COORDINATION
SRAVAN following for HD need. Referral to [facility] if OP HD needed at discharge. SRAVAN faxed Hep B panel to complete referral. Await Financial/Medical clearance. [id number] with Matthew Garcia at [facility]. Referral received. Awaiting financial/medical clearance.

## 2022-08-02 NOTE — PLAN OF CARE
Problem: Safety - Adult  Goal: Free from fall injury  Outcome: Progressing  Flowsheets (Taken 8/2/2022 0800)  Free From Fall Injury: Instruct family/caregiver on patient safety  Goal: *Absence of infection signs and symptoms  Outcome: Progressing     Problem: Chronic Conditions and Co-morbidities  Goal: Patient's chronic conditions and co-morbidity symptoms are monitored and maintained or improved  Outcome: Progressing     Problem: ABCDS Injury Assessment  Goal: Absence of physical injury  Recent Flowsheet Documentation  Taken 8/2/2022 0800 by Lesia Lim RN  Absence of Physical Injury: Implement safety measures based on patient assessment
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
## 2022-08-02 NOTE — PROGRESS NOTES
Nephrology Progress Note        Subjective: The patient is seen and examined in hemodialysis. The patient is stable on dialysis. Access cannulated without problems. No new issues overnite. Stable hemodynamics. She remains on room air. She does have evidence of ascites. She continues to have no urine output. She is seen on hemodialysis with the patient continuing on Levophed at a lower dose of 3 mcg/min. She will be receiving salt poor albumin with dialysis for hemodynamic support. Minimal fluid removal again today as the patient has no significant peripheral edema and has a significant amount of stools over the last 24 hours. She is eating reasonably well. No chest pain or shortness of breath or nausea or vomiting. Objective:  CURRENT TEMPERATURE:  Temp: 98.2 °F (36.8 °C)  MAXIMUM TEMPERATURE OVER 24HRS:  Temp (24hrs), Av.1 °F (36.7 °C), Min:97.2 °F (36.2 °C), Max:98.6 °F (37 °C)    CURRENT RESPIRATORY RATE:  Resp: 19  CURRENT PULSE:  Heart Rate: 74  CURRENT BLOOD PRESSURE:  BP: (!) 114/53  24HR BLOOD PRESSURE RANGE:  Systolic (42XJX), MISTY:12 , Min:78 , WTJ:902   ; Diastolic (75AWG), HERNÁNDEZ:04, Min:31, Max:87    24HR INTAKE/OUTPUT:    Intake/Output Summary (Last 24 hours) at 2022 1102  Last data filed at 2022 0631  Gross per 24 hour   Intake 457.99 ml   Output 97 ml   Net 360.99 ml     Patient Vitals for the past 96 hrs (Last 3 readings):   Weight   22 0930 176 lb 5.9 oz (80 kg)   22 1830 172 lb 13.5 oz (78.4 kg)   22 1500 172 lb 2.9 oz (78.1 kg)         Physical Exam:  General appearance:Awake, alert, in no acute distress  Skin: warm and dry, no rash   Eyes: Conjunctiva, positive scleral icterus  ENT:no thrush, moist mucous membranes  Neck:  No JVD, midline trachea, no accessory muscle use  Pulmonary: clear to auscultation bilaterally and no wheezing, rales or rhonchi   Cardiovascular:  The rate and rhythm with positive S1 and S2 and no rubs   abdomen: Soft and nontender with significant ascites diffusely, active bowel sounds   Extremities: No significant pitting peripheral edema, 2+ DP pulses bilaterally, warm extremities, no asterixis    Access:  Temporary hemodialysis catheter in place    Current Medications:    albumin human 25 % IV solution 25 g, PRN  dextrose 10 % infusion, Continuous  0.9 % sodium chloride infusion, PRN  heparin flush 100 UNIT/ML injection 300 Units, Once  0.9 % sodium chloride bolus, PRN  heparin (porcine) injection 1,600 Units, PRN  heparin (porcine) injection 1,500 Units, PRN  famotidine (PEPCID) tablet 20 mg, Daily  folic acid (FOLVITE) tablet 1 mg, Daily  thiamine tablet 100 mg, Daily  [Held by provider] lactulose (CHRONULAC) 10 GM/15ML solution 10 g, BID  norepinephrine (LEVOPHED) 16 mg in sodium chloride 0.9 % 250 mL infusion, Continuous  sodium chloride flush 0.9 % injection 5-40 mL, 2 times per day  sodium chloride flush 0.9 % injection 5-40 mL, PRN  0.9 % sodium chloride infusion, PRN  ondansetron (ZOFRAN-ODT) disintegrating tablet 4 mg, Q8H PRN   Or  ondansetron (ZOFRAN) injection 4 mg, Q6H PRN  polyethylene glycol (GLYCOLAX) packet 17 g, Daily PRN  cefTRIAXone (ROCEPHIN) 2,000 mg in sterile water 20 mL IV syringe, Q24H  midodrine (PROAMATINE) tablet 10 mg, TID WC      Labs:   CBC:   Recent Labs     07/31/22  0516 08/01/22  0651 08/02/22  0443   WBC 18.1* 18.5* 20.5*   RBC 2.23* 2.32* 2.17*   HGB 7.9* 8.2* 7.7*   HCT 24.6* 25.2* 23.8*   PLT 67* 73* 56*   MPV 12.2 12.3 12.1      BMP:   Recent Labs     07/31/22  0516 08/01/22  0651 08/02/22  0443   * 129* 130*   K 4.7 4.9 4.0   CL 99 97* 95*   CO2 23 22 24   BUN 34* 44* 26*   CREATININE 3.73* 5.07* 3.31*   GLUCOSE 59* 64* 80   CALCIUM 7.5* 7.8* 7.7*        Phosphorus:    Recent Labs     07/31/22  0516 08/02/22  0443   PHOS 4.1 4.3     Magnesium:   Recent Labs     07/31/22  0516   MG 1.5*     Albumin: No results for input(s): LABALBU in the last 72 hours.     Dialysis bath: Dialysis Bath  K+ (Potassium): 3  Ca+ (Calcium): 3  Na+ (Sodium): 137  HCO3 (Bicarb): 35    Radiology:  Reviewed as available. Assessment:  Anuric acute kidney injury secondary to ATN in the setting of radiocontrast and bacteremia versus hepatorenal syndrome. Patient started on dialysis on 7/30/2022 for clearance and metabolic acidosis with baseline creatinine documented 0.36  2. E. coli septicemia with bandemia and UTI  3 decompensated alcoholic cirrhosis with ascites hepatic encephalopathy and varices on imaging  4 left lower lobe pulmonary atelectasis  5.macrocytic anemia  6.coagulopathy secondary to chronic liver disease  7. Thrombocytopenia secondary to chronic liver disease    Plan:  1. Weight removal and dialysis orders reviewed. 2.  Wean Levophed as tolerated  3. The patient will need a tunneled hemodialysis catheter placed soon, likely as the patient appears she will be dialysis dependent for the near term at least  4. Follow up labs ordered. Please do not hesitate to call with questions.     Electronically signed by Chelsea Connor MD on 8/2/2022 at 11:02 AM

## 2022-08-02 NOTE — PLAN OF CARE
Problem: Safety - Adult  Goal: Free from fall injury  8/1/2022 2138 by Marc Valencia RN  Outcome: Progressing  Flowsheets (Taken 8/1/2022 2132)  Free From Fall Injury: Instruct family/caregiver on patient safety     Problem: Safety - Adult  Goal: *Absence of infection signs and symptoms  8/1/2022 2138 by Marc Valencia RN  Outcome: Progressing     Problem: Chronic Conditions and Co-morbidities  Goal: Patient's chronic conditions and co-morbidity symptoms are monitored and maintained or improved  8/1/2022 2138 by Marc Valencia RN  Outcome: Progressing     Problem: Skin/Tissue Integrity  Goal: Absence of new skin breakdown  Description: 1. Monitor for areas of redness and/or skin breakdown  2. Assess vascular access sites hourly  3. Every 4-6 hours minimum:  Change oxygen saturation probe site  4. Every 4-6 hours:  If on nasal continuous positive airway pressure, respiratory therapy assess nares and determine need for appliance change or resting period.   8/1/2022 2138 by Marc Valencia RN  Outcome: Progressing     Problem: Pain  Goal: Verbalizes/displays adequate comfort level or baseline comfort level  8/1/2022 2138 by Marc Valencia RN  Outcome: Progressing  Flowsheets (Taken 8/1/2022 2000)  Verbalizes/displays adequate comfort level or baseline comfort level:   Encourage patient to monitor pain and request assistance   Assess pain using appropriate pain scale   Administer analgesics based on type and severity of pain and evaluate response     Problem: Discharge Planning  Goal: Discharge to home or other facility with appropriate resources  8/1/2022 2138 by Marc Valencia RN  Outcome: Progressing     Problem: ABCDS Injury Assessment  Goal: Absence of physical injury  8/1/2022 2138 by Marc Valencia RN  Outcome: Progressing  Flowsheets (Taken 8/1/2022 2132)  Absence of Physical Injury: Implement safety measures based on patient assessment

## 2022-08-03 PROBLEM — K76.6 PORTAL HYPERTENSION (HCC): Status: ACTIVE | Noted: 2022-01-01

## 2022-08-03 PROBLEM — D62 ANEMIA DUE TO ACUTE BLOOD LOSS: Status: ACTIVE | Noted: 2020-07-14

## 2022-08-03 PROBLEM — K92.2 ACUTE UPPER GASTROINTESTINAL BLEEDING: Status: ACTIVE | Noted: 2022-01-01

## 2022-08-03 PROBLEM — E87.20 LACTIC ACIDOSIS: Status: ACTIVE | Noted: 2022-01-01

## 2022-08-03 PROBLEM — J96.01 ACUTE RESPIRATORY FAILURE WITH HYPOXIA (HCC): Status: ACTIVE | Noted: 2022-01-01

## 2022-08-03 NOTE — FLOWSHEET NOTE
SPIRITUAL CARE DEPARTMENT - [facility]  PROGRESS NOTE    Shift date: 8/3/2022  Shift day: Wednesday   Shift # 1    Room # 3025/3025-01   Name: Cristy Scott                [place]: [address] Floor of [place]: None    Referral: Code Blue    Admit Date & Time: 7/26/2022  9:49 PM    Assessment:  Cristy Scott is a 38 y.o. female in the hospital because liver failure. Patient coded in Interventional Radiology. Per RN, family was in waiting area on [address] Family was brought to IR waiting to meet with doctors. Writer attended family meeting with MDs in IR waiting to discuss code status and develop plan of care. After conversation and consultation, patient's 2 adult children made decision to make patient [facility]. Patient's 4 children (only 2 over age 25) and sister present. Intervention:  Writer introduced self and title as ; provided listening presence and support. Writer escorted family with MD back to [address] waiting; provided ongoing presence and support to family; provided prayer. Writer updated other shift chaplains. Outcome:  Family gathering to say goodbyes; taking turns to go to bedside. Patient to be terminally weaned when family is ready. Plan:  Chaplains will remain available to offer spiritual and emotional support as needed.       Electronically signed by Azeb Lynn on 8/3/2022 at 1:22 PM.  Martir Becerril  [phone]

## 2022-08-03 NOTE — PROGRESS NOTES
Patient is now DNR CC. As a result, nephrology will sign off. Please contact us if there are any questions. Sariah Cisneros MD  [facility]  8/3/2022

## 2022-08-03 NOTE — PLAN OF CARE
Problem: Safety - Adult  Goal: Free from fall injury  8/3/2022 1114 by Vincent Colón RN  Outcome: Progressing  Flowsheets (Taken 8/3/2022 0800)  Free From Fall Injury: Instruct family/caregiver on patient safety  8/3/2022 0239 by Yarelis Dejesus RN  Outcome: Progressing  Goal: *Absence of infection signs and symptoms  8/3/2022 1114 by Vincent Colón RN  Outcome: Progressing  8/3/2022 0239 by Yarelis Dejesus RN  Outcome: Progressing     Problem: Chronic Conditions and Co-morbidities  Goal: Patient's chronic conditions and co-morbidity symptoms are monitored and maintained or improved  8/3/2022 1114 by Vincent Colón RN  Outcome: Progressing  8/3/2022 0239 by Yarelis Dejesus RN  Outcome: Progressing     Problem: Skin/Tissue Integrity  Goal: Absence of new skin breakdown  Description: 1. Monitor for areas of redness and/or skin breakdown  2. Assess vascular access sites hourly  3. Every 4-6 hours minimum:  Change oxygen saturation probe site  4. Every 4-6 hours:  If on nasal continuous positive airway pressure, respiratory therapy assess nares and determine need for appliance change or resting period.   8/3/2022 1114 by Vincent Colón RN  Outcome: Progressing  8/3/2022 0239 by Yarelis Dejesus RN  Outcome: Progressing     Problem: Pain  Goal: Verbalizes/displays adequate comfort level or baseline comfort level  8/3/2022 1114 by Vincent Colón RN  Outcome: Progressing  8/3/2022 0239 by Yarelis Dejesus RN  Outcome: Progressing     Problem: Discharge Planning  Goal: Discharge to home or other facility with appropriate resources  8/3/2022 1114 by Vincent Colón RN  Outcome: Progressing  8/3/2022 0239 by Yarelis Dejesus RN  Outcome: Progressing     Problem: ABCDS Injury Assessment  Goal: Absence of physical injury  8/3/2022 1114 by Vincent Colón RN  Outcome: Progressing  Flowsheets (Taken 8/3/2022 0800)  Absence of Physical Injury: Implement safety measures based on patient assessment  8/3/2022 0239 by Dilcia Guajardo RN  Outcome: Progressing     Problem: Confusion  Goal: Confusion, delirium, dementia, or psychosis is improved or at baseline  Description: INTERVENTIONS:  1. Assess for possible contributors to thought disturbance, including medications, impaired vision or hearing, underlying metabolic abnormalities, dehydration, psychiatric diagnoses, and notify attending LIP  2. [place] high risk fall precautions, as indicated  3. Provide frequent short contacts to provide reality reorientation, refocusing and direction  4. Decrease environmental stimuli, including noise as appropriate  5. Monitor and intervene to maintain adequate nutrition, hydration, elimination, sleep and activity  6. If unable to ensure safety without constant attention obtain sitter and review sitter guidelines with assigned personnel  7. Initiate Psychosocial CNS and Spiritual Care consult, as indicated  Outcome: Progressing     Problem: Safety - Medical Restraint  Goal: Remains free of injury from restraints (Restraint for Interference with Medical Device)  Description: INTERVENTIONS:  1. Determine that other, less restrictive measures have been tried or would not be effective before applying the restraint  2. Evaluate the patient's condition at the time of restraint application  3. Inform patient/family regarding the reason for restraint  4.  Q2H: Monitor safety, psychosocial status, comfort, nutrition and hydration  Outcome: Progressing  Flowsheets  Taken 8/3/2022 0800  Remains free of injury from restraints (restraint for interference with medical device): Determine that other, less restrictive measures have been tried or would not be effective before applying the restraint  Taken 8/3/2022 0700  Remains free of injury from restraints (restraint for interference with medical device): Determine that other, less restrictive measures have been tried or would not be effective before applying the restraint

## 2022-08-03 NOTE — FLOWSHEET NOTE
SPIRITUAL CARE DEPARTMENT - [facility] [address] 85  PROGRESS NOTE    Shift date: 2022  Shift day: Tuesday   Shift # 3    Room # 3025/3025-01   Name: Earl Cast                Alevism: Unknown   Place of Hindu: Unknown    Referral: ICU Alert/Emergent Intubation    Admit Date & Time: 2022  9:49 PM    Assessment:  Earl Cast is a 38 y.o. female in the hospital because of \"Acute Kidney Injury and Septic Shock. \" Upon entering the room writer observes patient as recently \"intubated. \"     Intervention:   was contacted by unit nurse, indicating that patient is \"critical\" and family is present on unit.  learned from team that patient was recently \"intubated,\" and is \"receiving blood products due to a GI Bleed. \" Per team, patient will undergo a \"scope\" this morning to identify the location of the bleeding. Patient's aunt and other family members were present outside room. Per aunt, patient's parents are both . Per aunt, patient has a brother. Patient's family members expressed concern for patient's well-being.  provided support and hospitality to family. Outcome:  Patient's family remained outside room on unit, coping well and indicating no needs. Plan:  Chaplains will remain available to offer spiritual and emotional support as needed. 22 0558   Encounter Summary   Service Provided For: Family; Patient not available   Referral/Consult From: Multi-disciplinary team  (ICU Alert/Emergent Intubation)   Support System Family members   Last Encounter  22   Complexity of Encounter High   Begin Time 0558   End Time  0644   Total Time Calculated 46 min   Crisis   Type   (Emergent Intubation)   Spiritual/Emotional needs   Type Spiritual Support   Assessment/Intervention/Outcome   Assessment Calm  (Concerned)   Intervention Sustaining Presence/Ministry of presence   Outcome Receptive   Plan and Referrals   Plan/Referrals Continue to visit, (comment)  (as needed) Electronically signed by Isaak Paiz, on 8/3/2022 at 7:26 AM.  [facility]  [phone]

## 2022-08-03 NOTE — SIGNIFICANT EVENT
I had a long discussion with the patient's family in person, in presence of the RN taking care of the patient. Patient's current clinical condition, laboratory and radiographic findings as well as recommendations of physicians consulted on the case were discussed with the patient's family in detail. All questions and concerns of family were addressed, and appropriate emotional support was provided. After understanding patient's current medical condition, family decided that did not want any lab draws or treatments aimed at prolonging the life of patient, at the cost of patient's overall comfort. They expressed their wishes to make the patient comfortable, stop all lab draws and not to do any resuscitative procedures on the patient. They requested patient's code status to be changed to [facility], and signed the [facility] order form in my presence, which was witnessed by Julisa callahan. Will honor family's wishes, and will change patient's code status to [facility]. Discussed with patient's children about poor prognosis and futility in attempting to continue to treat patient. Had long discussion with family alongside  (). Decision was made by family for patient to go [facility]. Seun Carballo MD, MSUZANNE.   Department of Internal Medicine,  [facility])             8/3/2022, 12:23 PM

## 2022-08-03 NOTE — BRIEF OP NOTE
Brief Postoperative Note    Drew Callahan  YOB: 1980  [id number]    Pre-operative Diagnosis: Gastric bleeding; Ascites    Post-operative Diagnosis: Same    Procedure: TIPS insertion; paracentesis    Anesthesia: Local    Surgeons/Assistants: Alton Guzman MD and HOMAR Wade    Estimated Blood Loss: less than 50     Complications: None    Specimens: Was Obtained:     Findings: TIPS began and shortly after portal venogram, pt had PEA arrest.  ROSC achieved and after lengthy discussion with family it was decided to make pt DNR-CC and procedure was aborted. Successful paracentesis with 4400 L of yellow fluid obtained.      Electronically signed by HOMAR Stallings on 8/3/2022 at 1:52 PM

## 2022-08-03 NOTE — CONSULTS
Pt Duc [place]  MRN: [id number]  [place] 1980  Date of evaluation: 8/3/22  PCP:  TALYA Busch CNP    Code Report    ER called to Code blue in IR. Pt undergoing TIPS procedure. The pt on arrival was found in cardiac arrest from a PEA arrest.  The patient had CPR in progress with good compressions A line reading SBP over 595 diastolic in the 79Q. The pt was intubated prior to the procedure and had ETT in place BVM in process. No ETCO2 applied. Called for EtCO 2 monitoring compressions in progress by staff providing good compression and recoil. Fast patches applied. Epi had been given prior to arrival.  The patient on pulse check after arrival was ROSC and the pt SBP had risen to 160s. The pt was perfusing and care transitioned to Dr. Rudy Nagy. Noted no current EtCO2 to monitor compressions prior to had over              PROCEDURES:  None    CRITICAL CARE:  CRITICAL CARE: There was a high probability of clinically significant/life threatening deterioration in this patient's condition which required my urgent intervention. Total critical care time was 13 minutes. This excludes any time for separately reportable procedures.            Marissa Harden, DO  [facility]     (Please note that portions of this note were completed with a voicerecognition program.  Efforts were made to edit the dictations but occasionally words are mis-transcribed.)

## 2022-08-03 NOTE — PLAN OF CARE
Problem: Safety - Adult  Goal: Free from fall injury  8/3/2022 1351 by Ebony Frederick RN  Outcome: Not Progressing  8/3/2022 1114 by Elizabeth Snow RN  Outcome: Progressing  Flowsheets (Taken 8/3/2022 0800)  Free From Fall Injury: Instruct family/caregiver on patient safety  8/3/2022 0239 by Tam Davila RN  Outcome: Progressing  Goal: *Absence of infection signs and symptoms  8/3/2022 1114 by Elizabeth Snow RN  Outcome: Progressing  8/3/2022 0239 by Tam Davila RN  Outcome: Progressing     Problem: Chronic Conditions and Co-morbidities  Goal: Patient's chronic conditions and co-morbidity symptoms are monitored and maintained or improved  8/3/2022 1351 by Ebony Frederick RN  Outcome: Not Progressing  8/3/2022 1114 by Elizabeth Snow RN  Outcome: Progressing  8/3/2022 0239 by Tam Davila RN  Outcome: Progressing     Problem: Skin/Tissue Integrity  Goal: Absence of new skin breakdown  Description: 1. Monitor for areas of redness and/or skin breakdown  2. Assess vascular access sites hourly  3. Every 4-6 hours minimum:  Change oxygen saturation probe site  4. Every 4-6 hours:  If on nasal continuous positive airway pressure, respiratory therapy assess nares and determine need for appliance change or resting period.   8/3/2022 1351 by Ebony Frederick RN  Outcome: Not Progressing  8/3/2022 1114 by Elizabeth Snow RN  Outcome: Progressing  8/3/2022 0239 by Tam Davila RN  Outcome: Progressing     Problem: Pain  Goal: Verbalizes/displays adequate comfort level or baseline comfort level  8/3/2022 1351 by Ebony Frederick RN  Outcome: Not Progressing  8/3/2022 1114 by Elizabeth Snow RN  Outcome: Progressing  8/3/2022 0239 by Tam Davila RN  Outcome: Progressing     Problem: Discharge Planning  Goal: Discharge to home or other facility with appropriate resources  8/3/2022 1351 by Ebony Frederick RN  Outcome: Not Progressing  8/3/2022 1114 by Elizabeth Snow RN  Outcome: Progressing  8/3/2022 3481 by Cesar Trujillo RN  Outcome: Progressing     Problem: ABCDS Injury Assessment  Goal: Absence of physical injury  8/3/2022 1351 by Barrera Whittington RN  Outcome: Not Progressing  8/3/2022 1114 by Art Broussard RN  Outcome: Progressing  Flowsheets (Taken 8/3/2022 0800)  Absence of Physical Injury: Implement safety measures based on patient assessment  8/3/2022 0239 by Cesar Trujillo RN  Outcome: Progressing     Problem: Confusion  Goal: Confusion, delirium, dementia, or psychosis is improved or at baseline  Description: INTERVENTIONS:  1. Assess for possible contributors to thought disturbance, including medications, impaired vision or hearing, underlying metabolic abnormalities, dehydration, psychiatric diagnoses, and notify attending LIP  2. [place] high risk fall precautions, as indicated  3. Provide frequent short contacts to provide reality reorientation, refocusing and direction  4. Decrease environmental stimuli, including noise as appropriate  5. Monitor and intervene to maintain adequate nutrition, hydration, elimination, sleep and activity  6. If unable to ensure safety without constant attention obtain sitter and review sitter guidelines with assigned personnel  7. Initiate Psychosocial CNS and Spiritual Care consult, as indicated  8/3/2022 1351 by Barrera Whittington RN  Outcome: Not Progressing  8/3/2022 1114 by Art Broussard RN  Outcome: Progressing     Problem: Safety - Medical Restraint  Goal: Remains free of injury from restraints (Restraint for Interference with Medical Device)  Description: INTERVENTIONS:  1. Determine that other, less restrictive measures have been tried or would not be effective before applying the restraint  2. Evaluate the patient's condition at the time of restraint application  3. Inform patient/family regarding the reason for restraint  4.  Q2H: Monitor safety, psychosocial status, comfort, nutrition and hydration  8/3/2022 1351 by Barrera Whittington RN  Outcome: Completed  8/3/2022 1114 by Salomon Beebe RN  Outcome: Progressing  Flowsheets  Taken 8/3/2022 0800  Remains free of injury from restraints (restraint for interference with medical device): Determine that other, less restrictive measures have been tried or would not be effective before applying the restraint  Taken 8/3/2022 0700  Remains free of injury from restraints (restraint for interference with medical device): Determine that other, less restrictive measures have been tried or would not be effective before applying the restraint     Problem: Safety - Adult  Goal: Free from fall injury  8/3/2022 1351 by Victoriano Bryan RN  Outcome: Not Progressing  8/3/2022 1114 by Salomon Beebe RN  Outcome: Progressing  Flowsheets (Taken 8/3/2022 0800)  Free From Fall Injury: Instruct family/caregiver on patient safety  8/3/2022 0239 by Bruno Avelar RN  Outcome: Progressing  Goal: *Absence of infection signs and symptoms  8/3/2022 1351 by Victoriano Bryan RN  Outcome: Not Progressing  8/3/2022 1114 by Salomon Beebe RN  Outcome: Progressing  8/3/2022 0239 by Bruno Avelar RN  Outcome: Progressing     Problem: Chronic Conditions and Co-morbidities  Goal: Patient's chronic conditions and co-morbidity symptoms are monitored and maintained or improved  8/3/2022 1351 by Victoriano Bryan RN  Outcome: Not Progressing  8/3/2022 1114 by Salomon Beebe RN  Outcome: Progressing  8/3/2022 0239 by Bruno Avelar RN  Outcome: Progressing     Problem: Skin/Tissue Integrity  Goal: Absence of new skin breakdown  Description: 1. Monitor for areas of redness and/or skin breakdown  2. Assess vascular access sites hourly  3. Every 4-6 hours minimum:  Change oxygen saturation probe site  4. Every 4-6 hours:  If on nasal continuous positive airway pressure, respiratory therapy assess nares and determine need for appliance change or resting period.   8/3/2022 1351 by Victoriano Bryan RN  Outcome: Not Progressing  8/3/2022 1114 by Joce Mason 1114 by Ignacio Aleman RN  Outcome: Progressing

## 2022-08-03 NOTE — PROGRESS NOTES
Attending Physician Statement  I have discussed the care of Mya Drew, including pertinent history and exam findings with the resident. I have reviewed the key elements of all parts of the encounter with the resident. I have seen and examined the patient with the resident. I agree with the assessment and plan and status of the problem list as documented. Please see significant note by critical care resident Dr. Gladys Covington  Patient seen after she returned back to room. In intervention radiology patient lost pulses and had arrested require CPR/CODE BLUE called in intervention radiology as interventional radiology was attempting TI PS and after portal venogram patient had PEA arrest she had 3 rounds of CPR ROSC achieved. Intervention radiology had discussed with the family and the options of repeat procedure discussed as it was not successful as patient developed cardiac arrest/PEA arrest critical care resident DR Gladys Covington was in interventional radiology discussed with family and family decided to not undergo further intervention and change her CODE STATUS to DNR CC 4 children were present at that time. Patient was brought into ICU she did have bleeding around orally. Lactic acid repeated was 14 INR was 2.4. Ventilator setting PRVC/22/470/5/100 percent. She is on Levophed and she has been off propofol. Family is visiting the patient CODE STATUS to have changed to DNR CC. Will give time to family to spend with the patient called pastoral care and once all family member visited we will discuss again and will proceed with comfort care measures symptom control and withdrawal of support per family wishes. I have discussed with 4 children present in the room while patient was on ventilator and on pressors. As he decided about the comfort care discussed with him about extubation and symptom control and medication to provide comfort and to control symptom.   They do understand and wanted to be comfortable and proceed

## 2022-08-03 NOTE — DEATH NOTES
Death Pronouncement Note  Patient's Name: Anita Can   Patient's YOB: 1980  MRN Number: [id number]    Admitting Provider: No admitting provider for patient encounter.   Attending Provider: Neida George MD    Patient was examined and the following were absent: Pulses, Blood Pressure, and Respiratory effort    I declared the patient dead on 29028/3/2022 at 3:00 PM8/3/2022    Preliminary Cause of Death: GI bleed     Electronically signed by Seun Carballo MD on 8/3/22 at 3:25 PM EDT

## 2022-08-03 NOTE — ANESTHESIA PROCEDURE NOTES
Airway  Date/Time: 8/3/2022 6:05 AM  Urgency: emergent    Airway not difficult    General Information and Staff    Patient location during procedure: ICU  Resident/CRNA: TALYA Alvarez CRNA  Performed: resident/CRNA     Consent for Airway (if performed for an anesthetic, see related documentation for consents)  Patient identity confirmed: per hospital policy  Consent: The procedure was performed in an emergent situation. Verbal consent not obtained. Written consent not obtained.   Risks and benefits: risks, benefits and alternatives were not discussed      Code status verified:yes  Indications and Patient Condition  Indications for airway management: airway protection and respiratory distress  Sedation level: deep  Preoxygenated: yes  Patient position: sniffing  MILS maintained throughout  Mask difficulty assessment: not attempted    Final Airway Details  Final airway type: endotracheal airway      Successful airway: ETT  Cuffed: yes   Successful intubation technique: video laryngoscopy  Endotracheal tube insertion site: oral  Blade: Sharon  Blade size: #3  ETT size (mm): 7.5  Cormack-Lehane Classification: grade I - full view of glottis (blood noted in oral pharyx)  Placement verified by: chest auscultation and capnometry   Inital cuff pressure (cm H2O): 7  Measured from: gums  ETT to gums (cm): 23  Number of attempts at approach: 1  Number of other approaches attempted: 0    Additional Comments  Slight amount of blood noted in oral pharynx    Non-anticipated difficult airway: yes

## 2022-08-03 NOTE — PROGRESS NOTES
Insert Arterial Line  Date/Time:  08/03/22, 5:20 AM  Performed by: Tamia Piña RCP    Patient identity confirmed: arm band and provided demographic data   Time out: Immediately prior to procedure a \"time out\" was called to verify the correct patient, procedure, equipment, support staff. Preparation: Patient was prepped and draped in the usual sterile fashion.     Location:right radial    Abel's test normal: yes  Needle gauge: 20     Number of attempts: 1  Post-procedure: transparent dressing applied and line secured    Patient tolerance: well

## 2022-08-03 NOTE — PLAN OF CARE
Problem: Safety - Adult  Goal: Free from fall injury  8/3/2022 0239 by Marlin Hand RN  Outcome: Progressing  8/2/2022 1649 by Sravanthi Quinn RN  Outcome: Progressing  Flowsheets (Taken 8/2/2022 0800)  Free From Fall Injury: Instruct family/caregiver on patient safety  Goal: *Absence of infection signs and symptoms  8/3/2022 0239 by Marlin Hand RN  Outcome: Progressing  8/2/2022 1649 by Sravanthi Quinn RN  Outcome: Progressing     Problem: Chronic Conditions and Co-morbidities  Goal: Patient's chronic conditions and co-morbidity symptoms are monitored and maintained or improved  8/3/2022 0239 by Marlin Hand RN  Outcome: Progressing  8/2/2022 1649 by Sravanthi Quinn RN  Outcome: Progressing     Problem: Skin/Tissue Integrity  Goal: Absence of new skin breakdown  Description: 1. Monitor for areas of redness and/or skin breakdown  2. Assess vascular access sites hourly  3. Every 4-6 hours minimum:  Change oxygen saturation probe site  4. Every 4-6 hours:  If on nasal continuous positive airway pressure, respiratory therapy assess nares and determine need for appliance change or resting period.   Outcome: Progressing     Problem: Pain  Goal: Verbalizes/displays adequate comfort level or baseline comfort level  Outcome: Progressing     Problem: Discharge Planning  Goal: Discharge to home or other facility with appropriate resources  Outcome: Progressing     Problem: ABCDS Injury Assessment  Goal: Absence of physical injury  Outcome: Progressing

## 2022-08-03 NOTE — CARE COORDINATION
SW following for HD needs. Patient now intubated. New HD, referral to [facility] pending financial/medical clearance. SW provided update to David Hodge ([id number]) at [facility] Admissions.

## 2022-08-03 NOTE — SIGNIFICANT EVENT
This morning patient was having hematemesis. GI came in the a.m. and performed EGD. Was unable to stop gastric bleeding. Suspect this is was due to variceal bleed given history of cirrhosis. Decision was made for patient to be taken in to IR for TIPS procedure. During the procedure patient had PEA arrest.  She received 3 rounds of CPR in which ROSC was achieved. At this point a family meeting was conducted between myself Dr. Disha Bansal and her 4 children about how to proceed. We talked about patient's very advanced cirrhosis meld score of 33 and how she would have a high mortality rate even after the procedure. Options for [facility] versus DNR CCA were given to the family. Given the poor prognosis of the patient, family decided to pursue DNR CC for the patient. At this point the procedure was aborted and patient was sent back up to the ICU.       Neetu Pimentel MD  Internal medicine, PGY-2  [id number]   [unfilled] 12:57 PM

## 2022-08-03 NOTE — OR NURSING
Dressing dry and intact to right neck. Patient back on to bed and back to room. Remains on monitors Remains on vent.

## 2022-08-03 NOTE — PROGRESS NOTES
710 - pt having procedure done, egd at bedside    830 - per doctor, tube was retracted from 23cm to 19cm. Secured in place. Order was put in.    855 - pt was escorted down to IR for coil embolization by respiratory and nursing. 1120 - code blue was called during procedure in IR, pt bp dropped and pt was pulseless, 3 rounds of cpr done and pt achieved rosc. During code pt was taken off the ventilator and was bagged. After rosc was achieved pt was placed back on the vent. Family was contacted and decided for pt to be dnr/cc. Procedure was stopped and pt was brought back up to ICU. Family in at this time to say goodbyes then pt will be terminially extubated. 1448 - order put in by doctor to extubate patient. Patient is being terminally weaned. Pt was extubated and per family was left on room air. Pt was suctioned prior and after extubation. Vent was on standby. Time of death determined by nurse and doctor. Time of death was called at 1500 by doctor.

## 2022-08-03 NOTE — SIGNIFICANT EVENT
Patient was sedated and intubated during EGD. Variceal gastric bleed found on EGD. Due to active bleed and hemodynamic instability. Emergent consent was obtained for patient to go to AKIN Anderson MD  Internal medicine, PGY-2  [address]   [unfilled] 9:22 AM No

## 2022-08-03 NOTE — FLOWSHEET NOTE
Patient pronounced  at 1500 on 2022 by Dr. Jackie Castillo. Family at bedside.      Electronically signed by Kair Schwarz RN on 8/3/2022 at 4:20 PM

## 2022-08-03 NOTE — DISCHARGE SUMMARY
[address] [place] [address]     Department of Internal Medicine - Critical Care Service    INPATIENT DEATH SUMMARY      PATIENT IDENTIFICATION:  NAME:  Jennifer Sebastian   :   1980  MRN:    [id number]     Acct:    [de-identified]   Admit Date:  2022  Discharge date:  No discharge date for patient encounter. Attending Provider: Patricia Chau MD                                     Principal Problem:    SBP (spontaneous bacterial peritonitis) ([address]  Active Problems:    Alcoholic hepatitis with ascites    Bacteremia    Alcohol use disorder, moderate, dependence (HCC)    Elevated INR    Arterial hypotension    E. coli septicemia ([address]    Bandemia    SHRADDHA (acute kidney injury) ([address]    Metabolic acidosis    Septic shock (HCC)    Acute upper gastrointestinal bleeding    Portal hypertension (HCC)    Acute respiratory failure with hypoxia (HCC)    Lactic acidosis    Anemia due to acute blood loss  Resolved Problems:    * No resolved hospital problems. *       REASON FOR HOSPITALIZATION:   Chief Complaint   Patient presents with    Fever    Abdominal Pain          Hospital Course  Tish is a 51-year-old female with PMH alcohol induced liver cirrhosis with ascites requiring twice monthly paracentesis. Presented ED due to diffuse abdominal pain fever and chills. Patient's vitals showed initial temperature of 104. Was hypotensive was started on Levophed and transferred to ICU. Paracentesis with was performed which showed SBP. He was started on Rocephin and ID was consulted. Patient was also found to have SHRADDHA. Creatinine continue to rise so she was placed on dialysis. Patient sustained as he was prolonged as she was unable to be weaned off of the pressors. Unfortunately on 2022 patient had 2 episodes of bloody bowel movement and her blood pressure dropped. She required increased pressure support and needed to be intubated. Hemoglobin was found to be in 2.8.   She was given 5 units of PRBC 4 units of plasma and 2 units of FFP. GI was consulted in which EGD was performed showing variceal gastric bleed. Patient was then taken to IR for emergent TIPS procedure. While in our ER patient had a PEA arrest.  ROSC was achieved after 3 rounds of CPR. Family discussion was had about whether or not to reattempt TIPS. Family decided to pursue DNR CC. Patient was brought back to the ICU. family was given opportunity to say goodbye. And patient was terminally extubated. Patient  on 1500 August 3, 2022. Consults:   ID, GI, and nephrology    Procedures: CVC placement art line placement patient    Any Hospital Acquired Infections: None    PATIENT'S DISCHARGE CONDITION:        Disposition: Zoë Patel MD  Internal Medicine Resident  [facility]  8/3/2022 3:32 PM      Attending Physician Statement  I have discussed the care of Sol Bates, including pertinent history and other key findings with the resident. I have reviewed the key elements of all parts of the encounter with the resident. I have seen and examined the patient with the resident multiple times today. I agree with the assessment and status of the problem list as documented. Please see critical care notes for further details    Please note that this chart was generated using voice recognition Dragon dictation software. Although every effort was made to ensure the accuracy of this automated transcription, some errors in transcription may have occurred.      Jason Robin MD  8/3/2022 4:17 PM

## 2022-08-03 NOTE — CODE DOCUMENTATION
At 1120, while in IR BIPLANE 2, patient's blood pressure dropped (48/36) and the patient went into pulseless V TACH. Paola Dejesus RN, began compressions at 1120. Code blue announced. Pulse check at 1123 presented PEA. Code team arrived at [id number]. Compressions were resumed. Pulse check at 1125, ROSC compressions were ceased. Present at start of code:  ANGEL Stevenson, ANGEL Pulido Dr., RN    Electronically signed by Jyothi Nayak RN on 8/3/2022 at 1:43 PM

## 2022-08-03 NOTE — OR NURSING
Dr Alejandro Nagy talk with family. Procedure aborted at this time. Dr Wei Talbot sheath and hand held pressure applied.

## 2022-08-03 NOTE — FLOWSHEET NOTE
All belongings were taken by family except for a ring. Ring was put in specimen cup w/ pt label and placed inside body bag.      Electronically signed by Dinh Tong RN on 8/3/2022 at 4:21 PM

## 2022-08-03 NOTE — OR NURSING
[place] [employer] present. Abdomen prepped, draped and numbed with lidocaine. Access obtained and drained for 4.4 L  of dark lena fluid.

## 2022-08-03 NOTE — PROGRESS NOTES
[facility] -   Infectious diseases evaluation  admission date 7/26/2022    reason for consultation:   The patient was consulted with infectious disease department because of ascites positive blood culture for E. coli. Impression :   Current:  Septicemia E coli  Spontaneous bacterial peritonitis - E coli  Post 2 paracentesis  UTI  Alcoholic hepatitis w decompensated ascites  Bandemia  LLL  pulm atelectasis  Hepatic coagulopathy  ARF - getting HD    Other:  Cirrhosis of liver  Alcoholism   or less  Discussion / summary of stay / plan of care     Recommendations   Keep ceftriaxone 2 g iv daily, anticipate switch to cipro at [facility]  x 10 days since last paracenthesis, till 8/8  paracentesis 7/29 cx pend neg still  HD started 7/30/22,   8/3 major GI bleed, coded in IR - very high mortality and multiple high risk factors  ID signing off      Infection Control Recommendations   Enigma Precautions  Contact Isolation       Antimicrobial Stewardship Recommendations   Simplification of therapy  Targeted therapy      History of Present Illness:   Initial history:  Tom Perdue is a 38y.o.-year-old female who had chief complaints of fevers with abdominal pain which started 2 days ago. The patient reports that the abdominal pain started suddenly, was 8-9/10 in intensity, was pressure-like. It was associated with fever, chills, and muscle aches. The patient has history of hypertension with alcoholic liver disease and recurrent ascites. The patient usually requires twice monthly paracentesis, low-salt diet, spironolactone, and Lasix. Patient  came to the emergency department on 7/26/2022 where she had high fever and subsequently developed shock which required IV fluid and pressor support. Diagnostic paracentesis was done and the patient was started on Zosyn. During the bedside evaluation, the patient was alert, oriented, and in no acute distress. The patient reported that she feels better and has no headache, nausea, vomiting urinary symptoms, diarrhea, muscle aches the patient reported of having cough and sore throat. The patient still had abdominal pain around 5/10 in intensity which is better than yesterday she had mild tenderness in her abdominal on the examination. She has been started with IV ceftriaxone and lactulose. Labs showed WBC of 28.2, hemoglobin 8.2, glucose 85, BUN 25, creatinine 1.61, GFR 43.     Interval changes  8/3/2022   Patient Vitals for the past 8 hrs:   BP Temp Temp src Pulse Resp SpO2   08/03/22 0845 -- -- -- 90 18 --   08/03/22 0840 -- -- -- 89 18 --   08/03/22 0835 -- -- -- 89 19 --   08/03/22 0830 103/63 -- -- 89 18 --   08/03/22 0825 -- -- -- 87 18 100 %   08/03/22 0820 -- -- -- 87 18 100 %   08/03/22 0815 104/64 -- -- 89 18 100 %   08/03/22 0810 -- -- -- 90 16 (!) 87 %   08/03/22 0805 -- -- -- 86 18 (!) 71 %   08/03/22 0800 (!) 97/58 -- -- 84 15 (!) 68 %   08/03/22 0755 (!) 92/57 -- -- 85 16 (!) 74 %   08/03/22 0750 (!) 96/54 -- -- 85 15 (!) 69 %   08/03/22 0745 (!) 90/57 -- -- 86 14 (!) 71 %   08/03/22 0740 -- -- -- 89 14 (!) 67 %   08/03/22 0735 -- -- -- 85 13 (!) 68 %   08/03/22 0730 102/71 -- -- 90 16 (!) 86 %   08/03/22 0725 -- -- -- (!) 102 25 92 %   08/03/22 0720 -- -- -- 89 25 91 %   08/03/22 0715 111/75 -- -- 87 27 (!) 83 %   08/03/22 0710 -- -- -- 87 25 98 %   08/03/22 0705 -- -- -- 84 25 96 %   08/03/22 0700 97/75 -- -- 95 26 --   08/03/22 0655 -- -- -- 91 21 --   08/03/22 0650 -- -- -- 92 21 100 %   08/03/22 0645 (!) 103/59 -- -- 93 22 (!) 78 %   08/03/22 0644 -- -- -- 95 25 (!) 73 %   08/03/22 0643 -- -- -- 96 23 (!) 80 %   08/03/22 0642 -- -- -- 94 24 (!) 71 %   08/03/22 0641 -- -- -- 96 24 (!) 70 %   08/03/22 0640 -- -- -- 98 26 (!) 67 %   08/03/22 0639 -- -- -- 98 25 (!) 65 %   08/03/22 0631 -- -- -- (!) 107 27 (!) 60 %   08/03/22 0630 97/62 -- -- (!) 108 27 (!) 60 %   08/03/22 0629 109/63 -- -- (!) 108 28 (!) 61 %   08/03/22 0628 -- -- -- (!) 107 27 (!) 60 %   08/03/22 0618 -- -- -- (!) 108 23 --   08/03/22 0600 96/78 -- -- (!) 107 19 --   08/03/22 0500 (!) 100/50 -- -- 94 21 --   08/03/22 0400 (!) 57/49 -- -- 98 25 --   08/03/22 0300 (!) 85/37 -- -- 100 28 (!) 87 %   08/03/22 0230 (!) 94/52 -- -- (!) 104 26 (!) 88 %   08/03/22 0200 (!) 73/36 98.1 °F (36.7 °C) Oral 98 27 (!) 89 %   08/03/22 0130 -- -- -- 97 23 --     Current evaluation:  Seen and examined in the ICU. Overnight pt had bloody bowel movements with diffuse abdominal pain. Was intubated overnight - sedated   T max - 99.7  Norepinephrine drip - 25 mcg  Wbc -34.8 today  Hb 2.7 ->6.9 - receiving prbc 5 units  Lactic acid is 13.5 today  Has herron's in place  Abdomen is distended  Lungs are clear     Went to IR and coded there - code changed to DNR CC       Creatinine 3.18 - trending down-  s/p HD on 8/1.   2 doses of albumin were given during HD. Paracentesis > 4L fluid out 7/29, cx neg so far , WBC about 3000 still  GB US w thick wall, from ascites ? CT AP had shown no loculations    Summary of relevant labs:  Labs:  WBC - 28.2 - 26.3 - 22.1 - 18-18.5- 20.5 - 34.8  Platelets- 67- 73- 56 - 72 s/p 1 unit of platelet transfusion today  Hb - 7.7 -2.7 - 6.9   BUN - 37 - 47 - 34- 44- 26 - 19  Cr - 2.79 - 3.88-5.07 - 3.31- 3.13  Total bilirubin - 18.59 (7/28)  Lactic acid - 13.5    Micro:  Culture, body fluid:  Many neutrophils seen, no growth *4 days(7/29)   - previous culture was positive for e coli smutli(7/27)    Culture, Blood 1:  Positive for E. Coli    MRSA DNA Probe, Nasal:  Negative    Urine Culture:  No significant growth    Covid-19 rapid:  negative  Imaging:  Right upper quadrant ultrasound of gallbladder on 7/29/2022 at 2:39 PM:  1. Cirrhosis and ascites. 2. Diffuse gallbladder wall thickening, which could be reactive to the adjacent cirrhosis. Clinical correlation for signs or symptoms of cholecystitis is recommended.       Ultrasound of kidneys on 7/29/2022 at 2:39 PM:  Unremarkable ultrasound of the kidneys. Small ascites. X-ray of the chest on 7/26/2022 10:15 PM:    Impression   Minor left basilar atelectasis. Otherwise unremarkable exam with low lung volumes. CT of abdomen and pelvis with contrast on 7/27/2022 at 2:16 AM:    Impression   No evidence of hernia. Extensive stigmata cirrhosis and portal venous hypertension including marked ascites, diffuse bowel wall edema, extensive varices including the gonadal veins and recanalization of the umbilical vein. There is also anasarca of the pannus. Subcutaneous edema however is decreased compared to previous. X-ray of chest on 7/30/2022     Impression   1. Right internal jugular center venous catheter tip in the SVC. No pneumothorax. I have personally reviewed the past medical history, past surgical history, medications, social history, and family history, and I haveupdated the database accordingly. Allergies:   Motrin [ibuprofen], Seasonal, and Motrin [ibuprofen]     Review of Systems:     Review of Systems   Reason unable to perform ROS: pt intubated and sedated. Physical Examination :       Physical Exam  Vitals and nursing note reviewed. Constitutional:       General: She is not in acute distress. Appearance: Normal appearance. She is not ill-appearing, toxic-appearing or diaphoretic. HENT:      Head: Normocephalic and atraumatic. Right Ear: External ear normal.      Left Ear: External ear normal.      Nose: Nose normal. No congestion or rhinorrhea. Mouth/Throat:      Mouth: Mucous membranes are moist.      Pharynx: Oropharynx is clear. Eyes:      General: Scleral icterus present. Extraocular Movements: Extraocular movements intact. Pupils: Pupils are equal, round, and reactive to light. Cardiovascular:      Rate and Rhythm: Normal rate and regular rhythm. Pulses: Normal pulses. Heart sounds: Normal heart sounds.      No friction Smoking status: Every Day     Packs/day: 0.25     Types: Cigars, Cigarettes    Smokeless tobacco: Never    Tobacco comments:     black and milds   Vaping Use    Vaping Use: Never used   Substance and Sexual Activity    Alcohol use: Yes     Alcohol/week: 2.0 standard drinks     Types: 2 Cans of beer per week     Comment: daily 3-4 beers    Drug use: Yes     Types: Marijuana Candiss Littler)    Sexual activity: Yes     Partners: Male   Other Topics Concern    Not on file   Social History Narrative    ** Merged History Encounter **          Social Determinants of Health     Financial Resource Strain: Not on file   Food Insecurity: Not on file   Transportation Needs: Not on file   Physical Activity: Not on file   Stress: Not on file   Social Connections: Not on file   Intimate Partner Violence: Not on file   Housing Stability: Not on file       Family History:     Family History   Problem Relation Age of Onset    Heart Disease Mother     Other Mother         HIV    Cancer Mother         female cancer    Anxiety Disorder Sister     Anxiety Disorder Brother       Medical Decision Making:   I have independently reviewed/ordered the following labs:    CBC with Differential:   Recent Labs     08/03/22  0544 08/03/22  0802   WBC 34.8* PENDING   HGB 2.7* 6.9*   HCT 8.4* 21.0*   PLT See Reflexed IPF Result 72*   LYMPHOPCT 9* PENDING   MONOPCT 4 PENDING       BMP:  Recent Labs     08/02/22  0443 08/03/22  0544   * 136   K 4.0 4.6   CL 95* 99   CO2 24 15*   BUN 26* 19   CREATININE 3.31* 3.18*       Hepatic Function Panel:   Recent Labs     08/03/22  0544   PROT 4.0*   LABALBU 1.5*   BILITOT 13.45*   ALKPHOS 58   ALT 18   AST 58*       No results for input(s): RPR in the last 72 hours. No results for input(s): HIV in the last 72 hours. No results for input(s): BC in the last 72 hours.   Lab Results   Component Value Date/Time    CREATININE 3.18 08/03/2022 05:44 AM    GLUCOSE 80 08/03/2022 05:44 AM    GLUCOSE 99 04/15/2012 06:57 PM Detailed results: Thank you for allowing us to participate in the care of this patient. Please call with questions. This note is created with the assistance of a speech recognition program.  While intending to generate adocument that actually reflects the content of the visit, the document can still have some errors including those of syntax and sound a like substitutions which may escape proof reading. It such instances, actual meaningcan be extrapolated by contextual diversion. Caden Marte MD  Office: [phone]  Perfect serve / office [phone]      I have discussed the care of the patient, including pertinent history and exam findings,  with the resident. I have seen and examined the patient and the key elements of all parts of the encounter have been performed by me. I agree with the assessment, plan and orders as documented by the resident.     Radha Turner, Infectious Diseases
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
## 2022-08-03 NOTE — PROGRESS NOTES
SPIRITUAL CARE DEPARTMENT - [address] [facility] [address]  PROGRESS NOTE    Shift date: 8/3/2022  Shift day: Wednesday   Shift # 1    Room # 3025/3025-01   Name: Beto Malcolm                Synagogue: [employer],[address] of [employer]: Unknown    Referral: Follow up (Code status change)    Admit Date & Time: 7/26/2022  9:49 PM    Assessment:  Writer following up for family support. Patient made [facility], will be terminally weaned. Family requesting prayer at bedside prior to extubation. Intervention:  Writer provides listening presence, supportive conversation, prayer and blessing ritual at bedside.  Dov present with writer; assumes care for family from [address] [place] [address]. Outcome:  Family tearful; expressing appropriate grieving; providing support to one another. Plan:  Chaplains will remain available to offer spiritual and emotional support as needed.       Electronically signed by Mele Brooks, on 8/3/2022 at 2:19 PM.  [facility]  [phone]

## 2022-08-03 NOTE — OP NOTE
Operative Note      Patient: Anita Can  YOB: 1980  MRN: [id number]    Date of Procedure: 8/3/2022    Pre-Op Diagnosis: Esophageal bleed, non-variceal [K22.89]    Post-Op Diagnosis:  Bleeding gastric varices       Procedure(s):  EGD DIAGNOSTIC ONLY    Surgeon(s):  Jaylin Graham MD    Assistant:   * No surgical staff found *    Anesthesia: None    Estimated Blood Loss (mL): Minimal    Complications: None    Specimens:   * No specimens in log *    Implants:  * No implants in log *      Drains:   [REMOVED] Urinary Catheter Johnson-Temperature (Removed)   $ Urethral catheter insertion $ Not inserted for procedure 07/31/22 0800   Catheter Indications Need for fluid volume management of the critically ill patient in a critical care setting 08/02/22 1600   Site Assessment No urethral drainage 08/02/22 1600   Urine Color Brown;Tea 08/02/22 1600   Urine Appearance Hazy 08/02/22 1600   Collection Container Standard 08/02/22 1600   Securement Method Leg strap 08/02/22 1600   Catheter Best Practices  Drainage tube clipped to bed;Catheter secured to thigh; Tamper seal intact; Bag below bladder;Bag not on floor; Lack of dependent loop in tubing;Drainage bag less than half full 08/02/22 1600   Status Draining;Patent 08/02/22 1600   Output (mL) 50 mL 08/02/22 1830       Findings:   Grade 1-2 varices in the mid and distal esophagus without evidence or stigmata of bleeding  Large amount of blood in the entire stomach. This was evacuated using scope suction. And actively bleeding gastric varix was found in the fundus. Heema spray was applied with control of bleeding. The stomach was lavaged with sterile water. There was evidence of portal hypertensive gastropathy without evidence of bleeding. The examined duodenum showed without evidence of bleeding or stigmata of bleeding found in the examined duodenum. Recommendation:  Please Consult IR for stat coil embolization.   Case discussed with IR physician on call (Dr. John Ashley). Continue IV octreotide and Protonix infusions      Detailed Description of Procedure:   Emergency procedure. The patient's history was reviewed and a directed physical examination was performed prior to the procedure. Patient was monitored throughout the procedure with pulse oximetry and periodic assessment of vital signs. Patient was sedated as noted above. With the patient in the left lateral decubitus position, the Olympus videoendoscope was placed in the patient's mouth and under direct visualization passed into the esophagus. Visualization of the esophagus, stomach, and duodenum was performed during both introduction and withdrawal of the endoscope and retroflexed view of the proximal stomach was obtained. The scope was passed to the 2nd portion of the duodenum. The patient tolerated the procedure well and was taken to the recovery area in good condition. The patient  was taken to the recovery area in good condition.      Electronically signed by Jennie Mclain MD on 8/3/2022 at 8:45 AM

## 2022-08-03 NOTE — FLOWSHEET NOTE
8 am assessment deferred due to need to perform STAT EGD and transport to IR for emergent procedure. Patient sitting up and moving all extremities prior to sedation for procedure. Will evaluate patient once back on the floor.

## 2022-08-03 NOTE — SIGNIFICANT EVENT
Discussed with family at bedside, which included patient's three daughters, her mother and one other family member- patient's acute condition with variceal bleed leading to hypovolemic shock and acute respiratory failure requiring emergent intubation, as well need for urgent EGD and possible emobolization of vessels for GI bleed. Patient's family was explained about patient's poor prognosis, the need for emergent procedures, risks versus benefits. Patient does not have a legal POA; her next of kin is her four living children out of which two are minors. The remaining 2 children included 1 daughter and 1 son who could not be reached at the time despite multiple phone calls. Therefore, decision making lies with the patient's 25year old daughter who wishes for the patient to remain full code and undergo all possible treatment required to save her life. Chong Halsted was witnessed by Luis Mckeon  PGY-3  Internal Medicine  [address] [place] [address]  8/3/2022 10:04 AM

## 2022-08-03 NOTE — SIGNIFICANT EVENT
Patient had 2 bloody bowel movements associated with diffuse abdominal pain. Is hypotensive with SBP 84, . Levophed up titrated to 4 mcg/ min with goal SBP 90. IV access is central line, will obtain peripheral access. Will bolus 250 ml fluid, obtain stat H&H and give 1 unit PRBC as previously checked Hb was 7.7, patient will likely drop to below 7 with this ongoing active bleed. Will continue hemodynamic monitoring. Last checked platelet count was 56, will also order 1 unit platelets for active bleed + urgent EGD this am for concern of esophageal/ gastric varices. Will start patient on octreotide and protonix gtt for variceal bleed. Gastroenterology consulted and paged. Patient is already on rocephin 2 g IV for SBP; will continue. Will give IV fentanyl for abdominal pain. Patient is NPO as of now.     Cary Braga  PGY-3  ICU resident  [facility]  8/3/2022 3:53 AM

## 2022-08-03 NOTE — SIGNIFICANT EVENT
Dr. Sara Wetzel and CRNA at bedside for intubation  0603- 20mg etomidate given   0604 100mg succinylcholine Given  0605 7.5 ett 23 @lip  positive color change, bilateral breath sounds.    CXR order per critical care    Electronically signed by Endy Hooker RN on 8/3/2022 at 6:13 AM

## 2022-08-04 LAB
ABO/RH: NORMAL
ANTIBODY SCREEN: NEGATIVE
ARM BAND NUMBER: NORMAL
BLD PROD TYP BPU: NORMAL
BLOOD BANK BLOOD PRODUCT EXPIRATION DATE: NORMAL
BLOOD BANK ISBT PRODUCT BLOOD TYPE: 5100
BLOOD BANK ISBT PRODUCT BLOOD TYPE: 600
BLOOD BANK ISBT PRODUCT BLOOD TYPE: 6200
BLOOD BANK PRODUCT CODE: NORMAL
BLOOD BANK UNIT TYPE AND RH: NORMAL
BPU ID: NORMAL
CROSSMATCH RESULT: NORMAL
DISPENSE STATUS BLOOD BANK: NORMAL
EXPIRATION DATE: NORMAL
SURGICAL PATHOLOGY REPORT: NORMAL
TRANSFUSION STATUS: NORMAL
UNIT DIVISION: 0
UNIT ISSUE DATE/TIME: NORMAL

## 2022-08-05 LAB
LYMPHOCYTES, BODY FLUID: 22 %
NEUTROPHIL, FLUID: 48 %
OTHER CELLS FLUID: NORMAL %
RBC FLUID: 3000 /MM3
SPECIMEN TYPE: NORMAL
WBC FLUID: 640 /MM3

## 2022-08-09 LAB
CULTURE: ABNORMAL
DIRECT EXAM: ABNORMAL
SPECIMEN DESCRIPTION: ABNORMAL

## 2022-08-12 ENCOUNTER — TELEPHONE (OUTPATIENT)
Dept: PULMONOLOGY | Age: 42
End: 2022-08-12
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
## 2024-01-07 NOTE — TELEPHONE ENCOUNTER
Patient called the office to schedule. New appointment on 7/13/20 3:15 pm at the [facility]. V's office. Pt resting in bed A&Ox4. Morning medications given as ordered. No c/o pain or SOB. Pt ambulated back to bed from bathroom with assistance. Bed low, bed alarm on, call light within reach, and all other safety measures in place. Continuing plan of care.

## 2025-01-21 NOTE — PLAN OF CARE
Problem: Safety - Adult  Goal: Free from fall injury  Outcome: Progressing     Problem: Safety - Adult  Goal: *Absence of infection signs and symptoms  Outcome: Progressing     Problem: Chronic Conditions and Co-morbidities  Goal: Patient's chronic conditions and co-morbidity symptoms are monitored and maintained or improved  Outcome: Progressing     Problem: Skin/Tissue Integrity  Goal: Absence of new skin breakdown  Outcome: Progressing     Problem: Pain  Goal: Verbalizes/displays adequate comfort level or baseline comfort level  Outcome: Progressing     Problem: Discharge Planning  Goal: Discharge to home or other facility with appropriate resources  Outcome: Progressing  Flowsheets (Taken 7/30/2022 0800)  Discharge to home or other facility with appropriate resources: Identify barriers to discharge with patient and caregiver     Problem: ABCDS Injury Assessment  Goal: Absence of physical injury  Outcome: Progressing Yes

## (undated) DEVICE — FORCEPS BX L240CM JAW DIA22MM ORNG STD CAP W NDL RAD JAW 4

## (undated) DEVICE — MULTIPLE BAND LIGATOR: Brand: SPEEDBAND SUPERVIEW SUPER 7

## (undated) DEVICE — HEMOSPRAY ENDOSCOPIC HEMOSTAT: Brand: HEMOSPRAY

## (undated) DEVICE — SNARE ENDOSCP M L240CM LOOP W27MM SHTH DIA2.4MM OVL FLX

## (undated) DEVICE — NEEDLE SCLERO 25GA L240CM OD0.51MM ID0.24MM EXTN L4MM SHTH